# Patient Record
Sex: MALE | Race: WHITE | NOT HISPANIC OR LATINO | Employment: OTHER | URBAN - METROPOLITAN AREA
[De-identification: names, ages, dates, MRNs, and addresses within clinical notes are randomized per-mention and may not be internally consistent; named-entity substitution may affect disease eponyms.]

---

## 2017-01-05 ENCOUNTER — OFFICE VISIT (OUTPATIENT)
Dept: CARDIOLOGY | Facility: CLINIC | Age: 82
End: 2017-01-05
Attending: INTERNAL MEDICINE
Payer: MEDICARE

## 2017-01-05 VITALS
HEART RATE: 60 BPM | DIASTOLIC BLOOD PRESSURE: 78 MMHG | WEIGHT: 215 LBS | SYSTOLIC BLOOD PRESSURE: 138 MMHG | HEIGHT: 70 IN | BODY MASS INDEX: 30.78 KG/M2

## 2017-01-05 DIAGNOSIS — Z95.0 CARDIAC PACEMAKER IN SITU: ICD-10-CM

## 2017-01-05 DIAGNOSIS — I44.1 ATRIOVENTRICULAR BLOCK, SECOND DEGREE: Primary | ICD-10-CM

## 2017-01-05 DIAGNOSIS — G63 POLYNEUROPATHY ASSOCIATED WITH UNDERLYING DISEASE (H): ICD-10-CM

## 2017-01-05 PROCEDURE — 93000 ELECTROCARDIOGRAM COMPLETE: CPT | Performed by: PHYSICIAN ASSISTANT

## 2017-01-05 PROCEDURE — 99213 OFFICE O/P EST LOW 20 MIN: CPT | Performed by: PHYSICIAN ASSISTANT

## 2017-01-05 RX ORDER — GABAPENTIN 800 MG/1
800 TABLET ORAL 2 TIMES DAILY
Status: ON HOLD | COMMUNITY
Start: 2017-01-05 | End: 2022-07-14

## 2017-01-05 NOTE — PATIENT INSTRUCTIONS
1. Reviewed pacemaker checks - these look good!    2. No changes today    3. Continue good heart medications    4. Start checking BP a few times per week or so at [facility] and when you see Dr. Noyola.  Let me know if BPs are >130 most of the time    5. See us again in about September/October 2017 (when you get your pacemaker checked in the office) but call my nurse if need anything beforehand! My nurse is OSKAR: [phone]    6. Check your list of medications and if there are any discrepancies, call my nurse and let her know so we can update our medication list!!

## 2017-01-05 NOTE — PROGRESS NOTES
HPI and Plan:   See dictation #[id number]    Orders Placed This Encounter   Procedures     Follow-Up with Cardiac Advanced Practice Provider     EKG 12-lead complete w/read - Clinics (performed today)       Orders Placed This Encounter   Medications     gabapentin (NEURONTIN) 800 MG tablet     Sig: Take 1 tablet (800 mg) by mouth 2 times daily       Medications Discontinued During This Encounter   Medication Reason     gabapentin (NEURONTIN) 800 MG tablet Reorder     Encounter Diagnoses   Name Primary?     Atrioventricular block, second degree Yes     Polyneuropathy associated with underlying disease (H)      Cardiac pacemaker in situ        CURRENT MEDICATIONS:  Current Outpatient Prescriptions   Medication Sig Dispense Refill     gabapentin (NEURONTIN) 800 MG tablet Take 1 tablet (800 mg) by mouth 2 times daily       DULoxetine (CYMBALTA) 30 MG capsule Take 30 mg by mouth daily       tamsulosin (FLOMAX) 0.4 MG 24 hr capsule Take 0.4 mg by mouth daily       metFORMIN (GLUCOPHAGE-XR) 500 MG 24 hr tablet Take 500 mg by mouth daily (with dinner) 1/2 tab bid       simvastatin (ZOCOR) 40 MG tablet Take 40 mg by mouth At Bedtime       lisinopril (PRINIVIL,ZESTRIL) 10 MG tablet Take 10 mg by mouth daily       aspirin 325 MG tablet Take 325 mg by mouth daily       naproxen (NAPROSYN) 500 MG tablet Take 500 mg by mouth 2 times daily (with meals)       [DISCONTINUED] gabapentin (NEURONTIN) 800 MG tablet Take 800 mg by mouth 3 times daily 1 tab am, 2 tab pm         ALLERGIES   No Known Allergies    PAST MEDICAL HISTORY:  Past Medical History   Diagnosis Date     Second degree AV block 9-     Implantation of a dual-chamber pacemaker     Hypertension      Peripheral neuropathy (H)      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)      Hyperlipidemia      BPH (benign prostatic hyperplasia)        PAST SURGICAL HISTORY:  Past Surgical History   Procedure Laterality Date     Orthopedic surgery    "      FAMILY HISTORY:  Family History   Problem Relation Age of Onset     Family History Negative Mother      Myocardial Infarction Father 80     Unknown/Adopted Maternal Grandmother      Unknown/Adopted Maternal Grandfather      Unknown/Adopted Paternal Grandmother      Unknown/Adopted Paternal Grandfather      Rheumatoid Arthritis Sister      Myocardial Infarction Sister      Family History Negative Son      Family History Negative Daughter        SOCIAL HISTORY:  Social History     Social History     Marital Status:      Spouse Name: N/A     Number of Children: N/A     Years of Education: N/A     Social History Main Topics     Smoking status: Former Smoker     Smokeless tobacco: None     Alcohol Use: Yes      Comment: wine daily: 2      Drug Use: None     Sexual Activity: Not Asked     Other Topics Concern     Caffeine Concern No     coffee- occ decaf: 2-3 cups a day     Sleep Concern No     Stress Concern No     Weight Concern No     Special Diet No     Exercise Yes     everyday, range of motion     Social History Narrative       Review of Systems:  Skin:  Negative       Eyes:  Positive for glasses    ENT:  Negative      Respiratory:  Negative for dyspnea on exertion;shortness of breath     Cardiovascular:  Negative for;palpitations;chest pain;exercise intolerance;fatigue;lightheadedness;syncope or near-syncope Positive for;edema    Gastroenterology: Negative      Genitourinary:  Negative      Musculoskeletal:  Positive for arthritis;back pain    Neurologic:  Negative      Psychiatric:  Negative      Heme/Lymph/Imm:  Negative      Endocrine:  Positive for diabetes      Physical Exam:  Vitals: /78 mmHg  Pulse 60  Ht 1.778 m (5' 10\")  Wt 97.523 kg (215 lb)  BMI 30.85 kg/m2    Constitutional:  cooperative, alert and oriented, well developed, well nourished, in no acute distress        Skin:  warm and dry to the touch, no apparent skin lesions or masses noted        Head:  normocephalic, no masses " or lesions        Eyes:  pupils equal and round;conjunctivae and lids unremarkable;sclera white;no xanthalasma   eye glasses    ENT:  no pallor or cyanosis, dentition good hearing aide(s) present      Neck:  JVP normal;no carotid bruit        Chest:  normal breath sounds, clear to auscultation, normal A-P diameter, normal symmetry, normal respiratory excursion, no use of accessory muscles   pacemaker incision in the left infraclavicular area was well-healed      Cardiac: regular rhythm;no murmurs, gallops or rubs detected                  Abdomen:  abdomen soft        Vascular: pulses full and equal                                        Extremities and Back:  no deformities, clubbing, cyanosis, erythema observed   1+;bilateral LE edema          Neurological:  affect appropriate, oriented to time, person and place

## 2017-01-05 NOTE — MR AVS SNAPSHOT
After Visit Summary   1/5/2017    Mark Hernández    MRN: [id number]           Patient Information     Date Of Birth          9/23/1933        Visit Information        Provider Department      1/5/2017 1:50 PM Sho Hines PA-C [facility] AT [place]        Today's Diagnoses     Polyneuropathy associated with underlying disease (H)    -  1     Atrioventricular block, second degree           Care Instructions    1. Reviewed pacemaker checks - these look good!    2. No changes today    3. Continue good heart medications    4. Start checking BP a few times per week or so at [facility] and when you see Dr. Noyola.  Let me know if BPs are >130 most of the time    5. See us again in about September/October 2017 (when you get your pacemaker checked in the office) but call my nurse if need anything beforehand! My nurse is OSKAR: [phone]    6. Check your list of medications and if there are any discrepancies, call my nurse and let her know so we can update our medication list!!        Follow-ups after your visit        Additional Services     Follow-Up with Cardiac Advanced Practice Provider                 Your next 10 appointments already scheduled     Feb 16, 2017  2:30 PM   Remote PPM Check with ALEJANDRO TECH1   [facility] ([facility])    [address] [place] [id number] [phone]           This appointment is for a remote check of your pacemaker.  This is not an appointment at the office.              Future tests that were ordered for you today     Open Future Orders        Priority Expected Expires Ordered    Follow-Up with Cardiac Advanced Practice Provider Routine 10/2/2017 1/5/2018 1/5/2017            Who to contact     If you have questions or need follow up information about today's clinic visit or your schedule please contact [facility]  "directly at [phone].  Normal or non-critical lab and imaging results will be communicated to you by [email or web address], letter or phone within 4 business days after the clinic has received the results. If you do not hear from us within 7 days, please contact the clinic through [email or web address] or phone. If you have a critical or abnormal lab result, we will notify you by phone as soon as possible.  Submit refill requests through TVplus or call your pharmacy and they will forward the refill request to us. Please allow 3 business days for your refill to be completed.          Additional Information About Your Visit        eucl[employer]D[facility]irie Cloudware Information     TVplus lets you send messages to your doctor, view your test results, renew your prescriptions, schedule appointments and more. To sign up, go to [email or web address] . Click on \"Log in\" on the left side of the screen, which will take you to the Welcome page. Then click on \"Sign up Now\" on the right side of the page.     You will be asked to enter the access code listed below, as well as some personal information. Please follow the directions to create your username and password.     Your access code is: [email or web address]  Expires: 2017  2:23 PM     Your access code will  in 90 days. If you need help or a new code, please call your [facility] clinic or [phone].        Care EveryWhere ID     This is your Care EveryWhere ID. This could be used by other organizations to access your [facility] medical records  [id number]        Your Vitals Were     Pulse Height BMI (Body Mass Index)             60 1.778 m (5' 10\") 30.85 kg/m2          Blood Pressure from Last 3 Encounters:   17 138/78   16 160/62   16 134/84    Weight from Last 3 Encounters:   17 97.523 kg (215 lb)   16 96.798 kg (213 lb 6.4 oz)   16 96.163 kg (212 lb)              We Performed the Following     EKG 12-lead complete w/read - Clinics (performed today)     Follow-Up with Cardiac " Advanced Practice Provider          Today's Medication Changes          These changes are accurate as of: 1/5/17  2:23 PM.  If you have any questions, ask your nurse or doctor.               These medicines have changed or have updated prescriptions.        Dose/Directions    gabapentin 800 MG tablet   Commonly known as:  NEURONTIN   This may have changed:    - when to take this  - additional instructions   Used for:  Polyneuropathy associated with underlying disease (H)   Changed by:  Sho Hines PA-C        Dose:  800 mg   Take 1 tablet (800 mg) by mouth 2 times daily   Refills:  0                Primary Care Provider Office Phone # Fax #    Rebecca Noyola [phone]       [facility] [address]  [facility] [address]        Thank you!     Thank you for choosing [facility] AT [place]  for your care. Our goal is always to provide you with excellent care. Hearing back from our patients is one way we can continue to improve our services. Please take a few minutes to complete the written survey that you may receive in the mail after your visit with us. Thank you!             Your Updated Medication List - Protect others around you: Learn how to safely use, store and throw away your medicines at www.disposemymeds.org.          This list is accurate as of: 1/5/17  2:23 PM.  Always use your most recent med list.                   Brand Name Dispense Instructions for use    aspirin 325 MG tablet      Take 325 mg by mouth daily       DULoxetine 30 MG EC capsule    CYMBALTA     Take 30 mg by mouth daily       gabapentin 800 MG tablet    NEURONTIN     Take 1 tablet (800 mg) by mouth 2 times daily       lisinopril 10 MG tablet    PRINIVIL/ZESTRIL     Take 10 mg by mouth daily       metFORMIN 500 MG 24 hr tablet    GLUCOPHAGE-XR     Take 500 mg by mouth daily (with dinner) 1/2 tab bid       naproxen 500 MG tablet    NAPROSYN     Take 500 mg by  mouth 2 times daily (with meals)       simvastatin 40 MG tablet    ZOCOR     Take 40 mg by mouth At Bedtime       tamsulosin 0.4 MG capsule    FLOMAX     Take 0.4 mg by mouth daily

## 2017-01-05 NOTE — Clinical Note
1/5/2017    Rebecca Noyola MD  [facility]   [address] [place] [address]    RE: Mark Harrisondacia       Dear Colleague,    I had the pleasure of seeing Mark today when he came accompanied by his wife, Angle, for followup of his pacemaker implantation.  He is a very pleasant 83-year-old with a history of hypertension, type 2 diabetes and dyslipidemia.  He also was questionable coronary disease based on the results of an EKG and echocardiogram done in 08/2016.  He has a history of peripheral neuropathy from his diabetes and now sees Dr. Rebecca Noyola for this.  She has recently made adjustments in his gabapentin dosing.        He was sent to see Dr. Zamora back in 08/2016 due to concerns regarding second-degree heart block noted on an EKG.  He also was noted to have inferior Q-waves.  Dr. Zamora had him wear a 24-hour Holter monitor which showed evidence of both 2:1 and 3:1 heart block, and it was recommended that he undergo pacemaker implantation.  This was done on 09/16.  He had a dual-chamber [employer] pacemaker placed, and he has been getting routine device checks through our office.      Mark tells me that overall he has done well since this time.  He has absolutely no cardiorespiratory complaints.  He denies chest pain, pressure or tightness.  Denies change in his chronic lower extremity edema, denies orthopnea or PND, denies lightheadedness, minnie syncope or palpitations.      He has not had any problems with the pacemaker site or fevers or chills.  On 11/14 interrogation of his device showed he was 2% A paced and 99% V paced.  He did have 3 high rates which were noted to be sinus tachycardia on EGMs.      He is now seeing Dr. Radha Noyola for primary care prevention (as Dr. Blair has retired).  She sees him in 3 months and has recommended weight loss.      Echocardiogram done 06/2016 showed an EF of 50%-55% with hypokinesis of the inferior wall.  He had mild aortic and  tricuspid insufficiency, aortic root was borderline dilated at 4.1 cm.  There was question about a potential PFO.        Outpatient Encounter Prescriptions as of 1/5/2017   Medication Sig Dispense Refill     gabapentin (NEURONTIN) 800 MG tablet Take 1 tablet (800 mg) by mouth 2 times daily       DULoxetine (CYMBALTA) 30 MG capsule Take 30 mg by mouth daily       tamsulosin (FLOMAX) 0.4 MG 24 hr capsule Take 0.4 mg by mouth daily       metFORMIN (GLUCOPHAGE-XR) 500 MG 24 hr tablet Take 500 mg by mouth daily (with dinner) 1/2 tab bid       simvastatin (ZOCOR) 40 MG tablet Take 40 mg by mouth At Bedtime       lisinopril (PRINIVIL,ZESTRIL) 10 MG tablet Take 10 mg by mouth daily       aspirin 325 MG tablet Take 325 mg by mouth daily       naproxen (NAPROSYN) 500 MG tablet Take 500 mg by mouth 2 times daily (with meals)       [DISCONTINUED] gabapentin (NEURONTIN) 800 MG tablet Take 800 mg by mouth 3 times daily 1 tab am, 2 tab pm       No facility-administered encounter medications on file as of 1/5/2017.     ASSESSMENT AND PLAN:     1.  Heart block.  He is now status post dual-chamber [employer] pacemaker implantation in September.  He has done routine device checks, and they are all looking good.  He will continue this and has no questions today.     2.  Questionable coronary disease.  EKG done at Dr. Blair's office showed Q-waves in the inferior leads and he does have hypokinesis of the inferior walls.  He has had absolutely no chest discomfort, tightness or pressure.  He denies any limitations of his cardiorespiratory status.  He does remain on aspirin and statin therapy.  At this time, therefore, I will not make any medication changes or recommend stress testing, as he is completely asymptomatic, but would recommend continued use of proper cardiac medications.  His EF, as above, was 50%-55%.     3.  Hypertension.  Blood pressure was a bit high today when I checked it.  He usually gets in the 130s in the  clinic.  I have asked that he start checking this a bit more often and check what it is at Dr. Noyola's office.  If it remains elevated, certainly we can make some medication adjustments.  He is also working on losing weight, which should help bring blood pressure down as well.      I have asked him to look at his medication list and contact my nurse if there is any discrepancy between Dr. Noyola's and my list.  Right now I will plan to see him this fall when he gets his annual device check; however, he is to call me if he has any trouble in the interim.     Again, thank you for allowing me to participate in the care of your patient.      Sincerely,    Sho Hines PA-C     [facility]

## 2017-01-06 NOTE — PROGRESS NOTES
HISTORY OF PRESENT ILLNESS:  I had the pleasure of seeing Mark today when he came accompanied by his wife, Angle, for followup of his pacemaker implantation.  He is a very pleasant 83-year-old with a history of hypertension, type 2 diabetes and dyslipidemia.  He also was questionable coronary disease based on the results of an EKG and echocardiogram done in 08/2016.  He has a history of peripheral neuropathy from his diabetes and now sees Dr. Rebecca Noyola for this.  She has recently made adjustments in his gabapentin dosing.        He was sent to see Dr. Zamora back in 08/2016 due to concerns regarding second-degree heart block noted on an EKG.  He also was noted to have inferior Q-waves.  Dr. Zamora had him wear a 24-hour Holter monitor which showed evidence of both 2:1 and 3:1 heart block, and it was recommended that he undergo pacemaker implantation.  This was done on 09/16.  He had a dual-chamber [employer] pacemaker placed, and he has been getting routine device checks through our office.      Mark tells me that overall he has done well since this time.  He has absolutely no cardiorespiratory complaints.  He denies chest pain, pressure or tightness.  Denies change in his chronic lower extremity edema, denies orthopnea or PND, denies lightheadedness, minnie syncope or palpitations.      He has not had any problems with the pacemaker site or fevers or chills.  On 11/14 interrogation of his device showed he was 2% A paced and 99% V paced.  He did have 3 high rates which were noted to be sinus tachycardia on EGMs.      He is now seeing Dr. Radha Noyola for primary care prevention (as Dr. Blair has retired).  She sees him in 3 months and has recommended weight loss.      Echocardiogram done 06/2016 showed an EF of 50%-55% with hypokinesis of the inferior wall.  He had mild aortic and tricuspid insufficiency, aortic root was borderline dilated at 4.1 cm.  There was question about a potential PFO.         ASSESSMENT AND PLAN:     1.  Heart block.  He is now status post dual-chamber [employer] pacemaker implantation in September.  He has done routine device checks, and they are all looking good.  He will continue this and has no questions today.     2.  Questionable coronary disease.  EKG done at Dr. Blair's office showed Q-waves in the inferior leads and he does have hypokinesis of the inferior walls.  He has had absolutely no chest discomfort, tightness or pressure.  He denies any limitations of his cardiorespiratory status.  He does remain on aspirin and statin therapy.  At this time, therefore, I will not make any medication changes or recommend stress testing, as he is completely asymptomatic, but would recommend continued use of proper cardiac medications.  His EF, as above, was 50%-55%.     3.  Hypertension.  Blood pressure was a bit high today when I checked it.  He usually gets in the 130s in the clinic.  I have asked that he start checking this a bit more often and check what it is at Dr. Noyola's office.  If it remains elevated, certainly we can make some medication adjustments.  He is also working on losing weight, which should help bring blood pressure down as well.      I have asked him to look at his medication list and contact my nurse if there is any discrepancy between Dr. Noyola's and my list.  Right now I will plan to see him this fall when he gets his annual device check; however, he is to call me if he has any trouble in the interim.         TINY CHOUDHARY PA-C             D: 2017 14:39   T: 2017 19:23   MT: SHARMILA      Name:     FRANKIE THOMPSON   MRN:      [id number]        Account:      [id number]   :      1933           Service Date: 2017      Document: [id number]

## 2017-02-16 ENCOUNTER — ALLIED HEALTH/NURSE VISIT (OUTPATIENT)
Dept: CARDIOLOGY | Facility: CLINIC | Age: 82
End: 2017-02-16
Payer: MEDICARE

## 2017-02-16 DIAGNOSIS — Z95.0 CARDIAC PACEMAKER IN SITU: Primary | ICD-10-CM

## 2017-02-16 PROCEDURE — 93296 REM INTERROG EVL PM/IDS: CPT | Performed by: INTERNAL MEDICINE

## 2017-02-16 PROCEDURE — 93294 REM INTERROG EVL PM/LDLS PM: CPT | Performed by: INTERNAL MEDICINE

## 2017-02-16 NOTE — MR AVS SNAPSHOT
"              After Visit Summary   2/16/2017    Mark Hernández    MRN: [id number]           Patient Information     Date Of Birth          9/23/1933        Visit Information        Provider Department      2/16/2017 2:30 PM ALEJANDRO TECH1 [facility]        Today's Diagnoses     Cardiac pacemaker in situ    -  1       Follow-ups after your visit        Your next 10 appointments already scheduled     Feb 16, 2017  2:30 PM CST   Remote PPM Check with ALEJANDRO TECH1   [facility] ([facility])    [address] [place] [address] [phone]           This appointment is for a remote check of your pacemaker.  This is not an appointment at the office.              Who to contact     If you have questions or need follow up information about today's clinic visit or your schedule please contact [facility] directly at [phone].  Normal or non-critical lab and imaging results will be communicated to you by Papriikahart, letter or phone within 4 business days after the clinic has received the results. If you do not hear from us within 7 days, please contact the clinic through Papriikahart or phone. If you have a critical or abnormal lab result, we will notify you by phone as soon as possible.  Submit refill requests through [employer] or call your pharmacy and they will forward the refill request to us. Please allow 3 business days for your refill to be completed.          Additional Information About Your Visit        Papriikahart Information     [employer] lets you send messages to your doctor, view your test results, renew your prescriptions, schedule appointments and more. To sign up, go to [email or web address] . Click on \"Log in\" on the left side of the screen, which will take you to the Welcome page. Then click on \"Sign up Now\" on the right side of the page.     You will be asked to enter " the access code listed below, as well as some personal information. Please follow the directions to create your username and password.     Your access code is: [email or web address]  Expires: 2017  2:23 PM     Your access code will  in 90 days. If you need help or a new code, please call your [place] clinic or [phone].        Care EveryWhere ID     This is your Care EveryWhere ID. This could be used by other organizations to access your [place] medical records  [id number]         Blood Pressure from Last 3 Encounters:   17 138/78   16 160/62   16 134/84    Weight from Last 3 Encounters:   17 97.5 kg (215 lb)   16 96.8 kg (213 lb 6.4 oz)   16 96.2 kg (212 lb)              We Performed the Following     INTERROGATION DEVICE EVAL REMOTE, PACER/ICD (18860)     PM DEVICE INTERROGATE REMOTE (62663)        Primary Care Provider Office Phone # Fax #    Rebecca Noyola [phone]       [facility] [address]  [facility] [address]        Thank you!     Thank you for choosing [facility] [place]  for your care. Our goal is always to provide you with excellent care. Hearing back from our patients is one way we can continue to improve our services. Please take a few minutes to complete the written survey that you may receive in the mail after your visit with us. Thank you!             Your Updated Medication List - Protect others around you: Learn how to safely use, store and throw away your medicines at www.disposemymeds.org.          This list is accurate as of: 17  1:48 PM.  Always use your most recent med list.                   Brand Name Dispense Instructions for use    aspirin 325 MG tablet      Take 325 mg by mouth daily       DULoxetine 30 MG EC capsule    CYMBALTA     Take 30 mg by mouth daily       gabapentin 800 MG tablet    NEURONTIN     Take 1 tablet (800 mg) by mouth 2 times daily        lisinopril 10 MG tablet    PRINIVIL/ZESTRIL     Take 10 mg by mouth daily       metFORMIN 500 MG 24 hr tablet    GLUCOPHAGE-XR     Take 500 mg by mouth daily (with dinner) 1/2 tab bid       naproxen 500 MG tablet    NAPROSYN     Take 500 mg by mouth 2 times daily (with meals)       simvastatin 40 MG tablet    ZOCOR     Take 40 mg by mouth At Bedtime       tamsulosin 0.4 MG capsule    FLOMAX     Take 0.4 mg by mouth daily

## 2017-02-16 NOTE — PROGRESS NOTES
[place] [employer] (D) Remote PPM Device Check  AP: 2 % : 100 %  Mode: DDD        Presenting Rhythm: AS/  Heart Rate: Adequate rates per histogram  Sensing: Stable    Pacing Threshold: Stable    Impedance: Stable  Battery Status: 14.5 years  Atrial Arrhythmia: No mode switch episodes. 5 PMT episodes detected. EGMs show atrial rate at max tracking rate of 130bpm.  Ventricular Arrhythmia: 1 ventricular high rate. EGM shows Vs>As for probable NSVT lasting 18 beats, rates 145-180bpm. EF 50-55% (2016). Reviewed findings with JADYN Hopper. Will message Dr. Zamora with findings.      Care Plan: F/u PPM Latitude q 3 months. Gave patient results over the phone. TiffanyCVT

## 2017-05-25 ENCOUNTER — ALLIED HEALTH/NURSE VISIT (OUTPATIENT)
Dept: CARDIOLOGY | Facility: CLINIC | Age: 82
End: 2017-05-25
Payer: MEDICARE

## 2017-05-25 DIAGNOSIS — Z95.0 CARDIAC PACEMAKER IN SITU: Primary | ICD-10-CM

## 2017-05-25 PROCEDURE — 93296 REM INTERROG EVL PM/IDS: CPT | Performed by: INTERNAL MEDICINE

## 2017-05-25 PROCEDURE — 93294 REM INTERROG EVL PM/LDLS PM: CPT | Performed by: INTERNAL MEDICINE

## 2017-05-25 NOTE — PROGRESS NOTES
[place] [employer] Accolade (D) Remote PPM Device Check  AP: 3 % : 100 %  Mode: DDD        Presenting Rhythm: AP/  Heart Rate: Adequate rates per histogram  Sensing: Stable    Pacing Threshold: Stable    Impedance: Stable  Battery Status: 14 years  Atrial Arrhythmia: No mode switch episodes. 5 PMT episodes occurred, EGMs show atrial rate at max tracking rate of 130bpm.  Ventricular Arrhythmia: 1 ventricular high rate. EGM shows Vs>As for NSVT lasting 12 beats, rates 170-205bpm. EF 50-55% (2016). No associated symptoms as patient was asleep during episode. Reviewed with JADYN Jasso.       Care Plan: F/u PPM Latitude q 3 months. Gave patient results over the phone. TiffanyCVT

## 2017-05-25 NOTE — MR AVS SNAPSHOT
"              After Visit Summary   5/25/2017    Mark Hernández    MRN: [id number]           Patient Information     Date Of Birth          9/23/1933        Visit Information        Provider Department      5/25/2017 1:30 PM JAVON ONEILL [facility]        Today's Diagnoses     Cardiac pacemaker in situ    -  1       Follow-ups after your visit        Your next 10 appointments already scheduled     May 25, 2017  1:30 PM CDT   Remote PPM Check with ALEJANDRO TECH1   [facility] ([facility])    [address] [place] [address] [phone]           This appointment is for a remote check of your pacemaker.  This is not an appointment at the office.              Who to contact     If you have questions or need follow up information about today's clinic visit or your schedule please contact [facility] directly at [phone].  Normal or non-critical lab and imaging results will be communicated to you by Stardollhart, letter or phone within 4 business days after the clinic has received the results. If you do not hear from us within 7 days, please contact the clinic through Stardollhart or phone. If you have a critical or abnormal lab result, we will notify you by phone as soon as possible.  Submit refill requests through [employer] or call your pharmacy and they will forward the refill request to us. Please allow 3 business days for your refill to be completed.          Additional Information About Your Visit        Stardollhart Information     [employer] lets you send messages to your doctor, view your test results, renew your prescriptions, schedule appointments and more. To sign up, go to [email or web address] . Click on \"Log in\" on the left side of the screen, which will take you to the Welcome page. Then click on \"Sign up Now\" on the right side of the page.     You will be asked to enter " the access code listed below, as well as some personal information. Please follow the directions to create your username and password.     Your access code is: [email or web address]  Expires: 2017  8:52 AM     Your access code will  in 90 days. If you need help or a new code, please call your [place] clinic or [phone].        Care EveryWhere ID     This is your Care EveryWhere ID. This could be used by other organizations to access your [place] medical records  [id number]         Blood Pressure from Last 3 Encounters:   17 138/78   16 160/62   16 134/84    Weight from Last 3 Encounters:   17 97.5 kg (215 lb)   16 96.8 kg (213 lb 6.4 oz)   16 96.2 kg (212 lb)              We Performed the Following     INTERROGATION DEVICE EVAL REMOTE, PACER/ICD (09566)     PM DEVICE INTERROGATE REMOTE (97255)        Primary Care Provider Office Phone # Fax #    Rebecca Noyola [phone]       [facility] [address]  [facility] [address]        Thank you!     Thank you for choosing [facility] [place]  for your care. Our goal is always to provide you with excellent care. Hearing back from our patients is one way we can continue to improve our services. Please take a few minutes to complete the written survey that you may receive in the mail after your visit with us. Thank you!             Your Updated Medication List - Protect others around you: Learn how to safely use, store and throw away your medicines at www.disposemymeds.org.          This list is accurate as of: 17  8:52 AM.  Always use your most recent med list.                   Brand Name Dispense Instructions for use    aspirin 325 MG tablet      Take 325 mg by mouth daily       DULoxetine 30 MG EC capsule    CYMBALTA     Take 30 mg by mouth daily       gabapentin 800 MG tablet    NEURONTIN     Take 1 tablet (800 mg) by mouth 2 times daily        lisinopril 10 MG tablet    PRINIVIL/ZESTRIL     Take 10 mg by mouth daily       metFORMIN 500 MG 24 hr tablet    GLUCOPHAGE-XR     Take 500 mg by mouth daily (with dinner) 1/2 tab bid       naproxen 500 MG tablet    NAPROSYN     Take 500 mg by mouth 2 times daily (with meals)       simvastatin 40 MG tablet    ZOCOR     Take 40 mg by mouth At Bedtime       tamsulosin 0.4 MG capsule    FLOMAX     Take 0.4 mg by mouth daily

## 2017-08-31 ENCOUNTER — ALLIED HEALTH/NURSE VISIT (OUTPATIENT)
Dept: CARDIOLOGY | Facility: CLINIC | Age: 82
End: 2017-08-31
Payer: MEDICARE

## 2017-08-31 DIAGNOSIS — Z95.0 CARDIAC PACEMAKER IN SITU: Primary | ICD-10-CM

## 2017-08-31 PROCEDURE — 93296 REM INTERROG EVL PM/IDS: CPT | Performed by: INTERNAL MEDICINE

## 2017-08-31 PROCEDURE — 93294 REM INTERROG EVL PM/LDLS PM: CPT | Performed by: INTERNAL MEDICINE

## 2017-08-31 NOTE — MR AVS SNAPSHOT
After Visit Summary   8/31/2017    Mark Hernández    MRN: [id number]           Patient Information     Date Of Birth          9/23/1933        Visit Information        Provider Department      8/31/2017 1:45 PM ALEJANDRO TECH1 [facility]        Today's Diagnoses     Cardiac pacemaker in situ    -  1       Follow-ups after your visit        Your next 10 appointments already scheduled     Aug 31, 2017  1:45 PM CDT   Remote PPM Check with ALEJANDRO TECH1   [facility] ([facility])    [address] [place] [address] [address]  [facility] [id number] [phone]           This appointment is for a remote check of your pacemaker.  This is not an appointment at the office.            Sep 28, 2017  1:00 PM CDT   Pacemaker Check with ALEJANDRO DCR2   [facility] ([facility])    [address] [place] [address] [address]  [facility] [id number] [phone]            Sep 28, 2017  2:30 PM CDT   [facility] EP RETURN with Sho Hines PA-C   [facility] ([facility])    [address] [place] [address] [address]  [facility] [id number] [phone]              Who to contact     If you have questions or need follow up information about today's clinic visit or your schedule please contact [facility] directly at [phone].  Normal or non-critical lab and imaging results will be communicated to you by MyChart, letter or phone within 4 business days after the clinic has received the results. If you do not hear from us within 7 days, please contact the clinic through MyChart or phone. If you have a critical or abnormal lab result, we will notify you by phone as soon as possible.  Submit refill requests through [employer] or call your pharmacy and they will forward the refill request to us. Please allow 3 business days for your refill  "to be completed.          Additional Information About Your Visit        The [employer] travelhart Information     [employer] lets you send messages to your doctor, view your test results, renew your prescriptions, schedule appointments and more. To sign up, go to [email or web address] . Click on \"Log in\" on the left side of the screen, which will take you to the Welcome page. Then click on \"Sign up Now\" on the right side of the page.     You will be asked to enter the access code listed below, as well as some personal information. Please follow the directions to create your username and password.     Your access code is: [email or web address]  Expires: 2017 11:35 AM     Your access code will  in 90 days. If you need help or a new code, please call your [place] clinic or [phone].        Care EveryWhere ID     This is your Care EveryWhere ID. This could be used by other organizations to access your [place] medical records  [id number]         Blood Pressure from Last 3 Encounters:   17 138/78   16 160/62   16 134/84    Weight from Last 3 Encounters:   17 97.5 kg (215 lb)   16 96.8 kg (213 lb 6.4 oz)   16 96.2 kg (212 lb)              We Performed the Following     INTERROGATION DEVICE EVAL REMOTE, PACER/ICD (61724)     PM DEVICE INTERROGATE REMOTE (60363)        Primary Care Provider Office Phone # Fax #    Rebecca Noyola [phone]       [facility] [address]  [facility] [address]        Equal Access to Services     LESLEY MENJIVAR : Hadluis Gutierrez, wacharoda cesilia, qaybta lito alva. So [facility] [phone].    ATENCIÓN: Si habla español, tiene a gonzales disposición servicios gratuitos de asistencia lingüística. Camille al [phone].    We comply with applicable federal civil rights laws and [place] laws. We do not discriminate on the basis of race, color, national origin, age, disability " sex, sexual orientation or gender identity.            Thank you!     Thank you for choosing [facility] [place]  for your care. Our goal is always to provide you with excellent care. Hearing back from our patients is one way we can continue to improve our services. Please take a few minutes to complete the written survey that you may receive in the mail after your visit with us. Thank you!             Your Updated Medication List - Protect others around you: Learn how to safely use, store and throw away your medicines at www.disposemymeds.org.          This list is accurate as of: 8/31/17 11:35 AM.  Always use your most recent med list.                   Brand Name Dispense Instructions for use Diagnosis    aspirin 325 MG tablet      Take 325 mg by mouth daily        DULoxetine 30 MG EC capsule    CYMBALTA     Take 30 mg by mouth daily        gabapentin 800 MG tablet    NEURONTIN     Take 1 tablet (800 mg) by mouth 2 times daily    Polyneuropathy associated with underlying disease (H)       lisinopril 10 MG tablet    PRINIVIL/ZESTRIL     Take 10 mg by mouth daily        metFORMIN 500 MG 24 hr tablet    GLUCOPHAGE-XR     Take 500 mg by mouth daily (with dinner) 1/2 tab bid        naproxen 500 MG tablet    NAPROSYN     Take 500 mg by mouth 2 times daily (with meals)        simvastatin 40 MG tablet    ZOCOR     Take 40 mg by mouth At Bedtime        tamsulosin 0.4 MG capsule    FLOMAX     Take 0.4 mg by mouth daily

## 2017-08-31 NOTE — PROGRESS NOTES
[place] [employer] Accol[employer] (D) Remote PPM Device Check  AP: 2 % : 99 %  Mode: DDD        Presenting Rhythm: AS/  Heart Rate: Adequate rates per histogram  Sensing: Stable    Pacing Threshold: Stable    Impedance: Stable  Battery Status: 14 years  Atrial Arrhythmia: No mode switch episodes. 2 PMT episodes detected, EGMs show atrial rate at max track rate of 130bpm  Ventricular Arrhythmia: None     Care Plan: F/u annual threshold in 1 month. Gave patient results over the phone.  Young,CVT

## 2017-09-28 ENCOUNTER — OFFICE VISIT (OUTPATIENT)
Dept: CARDIOLOGY | Facility: CLINIC | Age: 82
End: 2017-09-28
Attending: PHYSICIAN ASSISTANT
Payer: MEDICARE

## 2017-09-28 VITALS
WEIGHT: 211.3 LBS | SYSTOLIC BLOOD PRESSURE: 122 MMHG | DIASTOLIC BLOOD PRESSURE: 46 MMHG | HEIGHT: 70 IN | HEART RATE: 83 BPM | BODY MASS INDEX: 30.25 KG/M2

## 2017-09-28 DIAGNOSIS — I44.1 ATRIOVENTRICULAR BLOCK, SECOND DEGREE: ICD-10-CM

## 2017-09-28 DIAGNOSIS — Z95.0 CARDIAC PACEMAKER IN SITU: Primary | ICD-10-CM

## 2017-09-28 PROCEDURE — 93280 PM DEVICE PROGR EVAL DUAL: CPT | Performed by: INTERNAL MEDICINE

## 2017-09-28 PROCEDURE — 99213 OFFICE O/P EST LOW 20 MIN: CPT | Mod: 25 | Performed by: PHYSICIAN ASSISTANT

## 2017-09-28 NOTE — LETTER
9/28/2017    Rebecca Noyola MD  [facility]   [address]  [facility] [address]    RE: Mark Hernández       Dear Colleague,    I had the pleasure of seeing Mark Hernández in the [facility].    HPI:   I had the pleasure of seeing Mark today when he came accompanied by his wife Tracey for routine follow-up. He first met Dr. Zamora 7/2016 after he had presented to a routine appointment and noted to have a slow heart rate. EKG showed 2:1 heart block and a Holter monitor was placed, showing both 2:1 and 3:1 heart block. He had no reversible etiology and had a structurally normal heart, and a dual-chamber [employer] pacemaker was placed September 2016. Other history includes hypertension, diabetes, dyslipidemia and peripheral neuropathy    Since that time, he states that he's done well from a heart perspective. He denies chest pain, pressure or tightness. Denies change in his chronic lower extremity edema, orthopnea or PND. He denies palpitations, lightheadedness or syncope. Denies fevers, chills or problems with his pacemaker site.    He has had significant right leg discomfort, and after a failed back injection, there is concern that he could have a septic right hip, which she had replaced a number of years ago. He sees Dr. De Los Santos and Dr. Macario at [facility] and sees Dr. Noyola, who recently got blood work. White count was normal (9/2017).    Annual device interrogation showed he had been 2% a paced and 99% V paced with some evidence of PAT but no atrial fibrillation or atrial flutter.    Assessment & Plan:    1. Heart Block s/p Pacemaker - he is 99% V paced on device interrogation today. He will continue routine device checks through our office and see Dr. Zamora in one year with his annual device interrogation.    2. HTN - blood pressure is under good control today and we will have him continue his current medications.    3. Questionable hip infection - there is  concern that he could have septic joint, and he is meeting with Dr. Noyola next week. I will keep an eye on his course peripherally, as certainly want to ensure he does not develop a systemic infection given his pacemaker implant.      Lucie Hines PA-C, MSPAS      Orders Placed This Encounter   Procedures     Follow-Up with Electrophysiologist     Follow-Up with Device Clinic     No orders of the defined types were placed in this encounter.    There are no discontinued medications.      Encounter Diagnosis   Name Primary?     Atrioventricular block, second degree        CURRENT MEDICATIONS:  Current Outpatient Prescriptions   Medication Sig Dispense Refill     gabapentin (NEURONTIN) 800 MG tablet Take 1 tablet (800 mg) by mouth 2 times daily       DULoxetine (CYMBALTA) 30 MG capsule Take 30 mg by mouth daily       tamsulosin (FLOMAX) 0.4 MG 24 hr capsule Take 0.4 mg by mouth daily       metFORMIN (GLUCOPHAGE-XR) 500 MG 24 hr tablet Take 500 mg by mouth daily (with dinner) 1/2 tab bid       simvastatin (ZOCOR) 40 MG tablet Take 40 mg by mouth At Bedtime       lisinopril (PRINIVIL,ZESTRIL) 10 MG tablet Take 10 mg by mouth daily       aspirin 325 MG tablet Take 325 mg by mouth daily       naproxen (NAPROSYN) 500 MG tablet Take 500 mg by mouth 2 times daily (with meals)         ALLERGIES   No Known Allergies    PAST MEDICAL HISTORY:  Past Medical History:   Diagnosis Date     BPH (benign prostatic hyperplasia)      Hyperlipidemia      Hypertension      Peripheral neuropathy (H)      Second degree AV block 9-    Implantation of a dual-chamber pacemaker     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)        PAST SURGICAL HISTORY:  Past Surgical History:   Procedure Laterality Date     ORTHOPEDIC SURGERY         FAMILY HISTORY:  Family History   Problem Relation Age of Onset     Family History Negative Mother      Myocardial Infarction Father 80     Unknown/Adopted Maternal Grandmother   "    Unknown/Adopted Maternal Grandfather      Unknown/Adopted Paternal Grandmother      Unknown/Adopted Paternal Grandfather      Rheumatoid Arthritis Sister      Myocardial Infarction Sister      Family History Negative Son      Family History Negative Daughter        SOCIAL HISTORY:  Social History     Social History     Marital status:      Spouse name: N/A     Number of children: N/A     Years of education: N/A     Social History Main Topics     Smoking status: Former Smoker     Packs/day: 1.00     Years: 24.00     Types: Cigarettes     Start date: 1949     Quit date: 1973     Smokeless tobacco: Never Used     Alcohol use Yes      Comment: wine daily: 2      Drug use: None     Sexual activity: Not Asked     Other Topics Concern     Caffeine Concern No     coffee- occ decaf: 2-3 cups a day     Sleep Concern No     Stress Concern No     Weight Concern No     Special Diet No     Exercise Yes     everyday, range of motion     Social History Narrative       Review of Systems:  Skin:  Negative     Eyes:  Positive for glasses  ENT:  Negative    Respiratory:  Negative for dyspnea on exertion;shortness of breath  Cardiovascular:  Negative for;palpitations;chest pain;exercise intolerance;fatigue;lightheadedness;syncope or near-syncope Positive for;edema  Gastroenterology: Negative    Genitourinary:  Negative    Musculoskeletal:  Positive for arthritis;back pain;joint pain  Neurologic:  Negative    Psychiatric:  Negative    Heme/Lymph/Imm:  Negative    Endocrine:  Positive for diabetes    Physical Exam:  Vitals: /46  Pulse 83  Ht 1.778 m (5' 10\")  Wt 95.8 kg (211 lb 4.8 oz)  BMI 30.32 kg/m2    Constitutional:  cooperative, alert and oriented, well developed, well nourished, in no acute distress        Skin:  warm and dry to the touch, no apparent skin lesions or masses noted        Head:  normocephalic, no masses or lesions        Eyes:  pupils equal and round;conjunctivae and lids unremarkable;sclera " white;no xanthalasma   eye glasses    ENT:  no pallor or cyanosis, dentition good hearing aide(s) present      Neck:  JVP normal;no carotid bruit        Chest:  normal breath sounds, clear to auscultation, normal A-P diameter, normal symmetry, normal respiratory excursion, no use of accessory muscles        Cardiac: regular rhythm;no murmurs, gallops or rubs detected                  Abdomen:  abdomen soft        Vascular: pulses full and equal                                      Extremities and Back:  no deformities, clubbing, cyanosis, erythema observed        Neurological:  affect appropriate, oriented to time, person and place          Recent Lab Results:  LIPID RESULTS:  No results found for: CHOL, HDL, LDL, TRIG, CHOLHDLRATIO    LIVER ENZYME RESULTS:  No results found for: AST, ALT    CBC RESULTS:  Lab Results   Component Value Date    WBC 5.1 09/23/2016    RBC 4.91 09/23/2016    HGB 16.6 09/23/2016    HCT 47.7 09/23/2016    MCV 97 09/23/2016    MCH 33.8 (H) 09/23/2016    MCHC 34.8 09/23/2016    RDW 13.4 09/23/2016     (L) 09/23/2016       BMP RESULTS:  Lab Results   Component Value Date     09/23/2016    POTASSIUM 4.8 09/23/2016    CHLORIDE 108 09/23/2016    CO2 30 09/23/2016    ANIONGAP 4 09/23/2016     (H) 09/23/2016    BUN 26 09/23/2016    CR 0.88 09/23/2016    GFRESTIMATED 83 09/23/2016    GFRESTBLACK >90   GFR Calc   09/23/2016    ESAU 8.9 09/23/2016        A1C RESULTS:  No results found for: A1C    INR RESULTS:  No results found for: INR    Thank you for allowing me to participate in the care of your patient.    Sincerely,     Sho Hines PA-C

## 2017-09-28 NOTE — MR AVS SNAPSHOT
After Visit Summary   9/28/2017    Mark Hernández    MRN: [id number]           Patient Information     Date Of Birth          9/23/1933        Visit Information        Provider Department      9/28/2017 2:30 PM Sho Hines PA-C [facility]        Today's Diagnoses     Atrioventricular block, second degree          Care Instructions    1. No medication changes today.      2. Pacemaker check today looked great.    3. I will keep an eye on Dr. Noyola's notes ... If infection noted in R hip, I will speak with Dr. Zamora to see if we need to do anything with the pacemaker.    4. See Dr. Zamora in 1 year but CALL if any issues beforehand - our device nurses are @ [phone]          Follow-ups after your visit        Additional Services     Follow-Up with Device Clinic           Follow-Up with Electrophysiologist                 Your next 10 appointments already scheduled     Jan 04, 2018  9:15 AM CST   Remote PPM Check with ALEJANDRO TECH1   [facility]    [address] [place] [id number] [phone]           This appointment is for a remote check of your pacemaker.  This is not an appointment at the office.              Future tests that were ordered for you today     Open Future Orders        Priority Expected Expires Ordered    Follow-Up with Device Clinic Routine 9/28/2018 11/2/2018 9/28/2017    Follow-Up with Electrophysiologist Routine 9/28/2018 9/29/2018 9/28/2017            Who to contact     If you have questions or need follow up information about today's clinic visit or your schedule please contact [facility] directly at [phone].  Normal or non-critical lab and imaging results will be communicated to you by MyChart, letter or phone within 4 business days after the clinic has received the results. If you do not hear from  "us within 7 days, please contact the clinic through [facility] or phone. If you have a critical or abnormal lab result, we will notify you by phone as soon as possible.  Submit refill requests through [facility] or call your pharmacy and they will forward the refill request to us. Please allow 3 business days for your refill to be completed.          Additional Information About Your Visit        [employer] Information     [facility] lets you send messages to your doctor, view your test results, renew your prescriptions, schedule appointments and more. To sign up, go to [email or web address] . Click on \"Log in\" on the left side of the screen, which will take you to the Welcome page. Then click on \"Sign up Now\" on the right side of the page.     You will be asked to enter the access code listed below, as well as some personal information. Please follow the directions to create your username and password.     Your access code is: [email or web address]  Expires: 2017 11:35 AM     Your access code will  in 90 days. If you need help or a new code, please call your [place] clinic or [phone].        Care EveryWhere ID     This is your Care EveryWhere ID. This could be used by other organizations to access your [place] medical records  [id number]        Your Vitals Were     Pulse Height BMI (Body Mass Index)             83 1.778 m (5' 10\") 30.32 kg/m2          Blood Pressure from Last 3 Encounters:   17 122/46   17 138/78   16 160/62    Weight from Last 3 Encounters:   17 95.8 kg (211 lb 4.8 oz)   17 97.5 kg (215 lb)   16 96.8 kg (213 lb 6.4 oz)              We Performed the Following     Follow-Up with Cardiac Advanced Practice Provider        Primary Care Provider Office Phone # Fax #    Rebecca Noyola [phone]       [facility] [address] [place] [address]        Equal Access to Services     JENIFFER MENJIVAR AH: Hadii meaghan Gutierrez, " robertda cesilia, qaybta kaalmada ora, lito sammyin hayaan flacajosh pedrodayton laDiogenesnael ah. So [facility] [phone].    ATENCIÓN: Si roque bridges, tiene a gonzales disposición servicios gratuitos de asistencia lingüística. Camille al [phone].    We comply with applicable federal civil rights laws and [place] laws. We do not discriminate on the basis of race, color, national origin, age, disability sex, sexual orientation or gender identity.            Thank you!     Thank you for choosing [facility] [place]  for your care. Our goal is always to provide you with excellent care. Hearing back from our patients is one way we can continue to improve our services. Please take a few minutes to complete the written survey that you may receive in the mail after your visit with us. Thank you!             Your Updated Medication List - Protect others around you: Learn how to safely use, store and throw away your medicines at www.disposemymeds.org.          This list is accurate as of: 9/28/17  2:51 PM.  Always use your most recent med list.                   Brand Name Dispense Instructions for use Diagnosis    aspirin 325 MG tablet      Take 325 mg by mouth daily        DULoxetine 30 MG EC capsule    CYMBALTA     Take 30 mg by mouth daily        gabapentin 800 MG tablet    NEURONTIN     Take 1 tablet (800 mg) by mouth 2 times daily    Polyneuropathy associated with underlying disease (H)       lisinopril 10 MG tablet    PRINIVIL/ZESTRIL     Take 10 mg by mouth daily        metFORMIN 500 MG 24 hr tablet    GLUCOPHAGE-XR     Take 500 mg by mouth daily (with dinner) 1/2 tab bid        naproxen 500 MG tablet    NAPROSYN     Take 500 mg by mouth 2 times daily (with meals)        simvastatin 40 MG tablet    ZOCOR     Take 40 mg by mouth At Bedtime        tamsulosin 0.4 MG capsule    FLOMAX     Take 0.4 mg by mouth daily

## 2017-09-28 NOTE — PATIENT INSTRUCTIONS
1. No medication changes today.      2. Pacemaker check today looked great.    3. I will keep an eye on Dr. Noyola's notes ... If infection noted in R hip, I will speak with Dr. Zamora to see if we need to do anything with the pacemaker.    4. See Dr. Zamora in 1 year but CALL if any issues beforehand - our device nurses are @ [phone]

## 2017-09-28 NOTE — PROGRESS NOTES
HPI:   I had the pleasure of seeing Mark today when he came accompanied by his wife Tracey for routine follow-up. He first met Dr. Zamora 7/2016 after he had presented to a routine appointment and noted to have a slow heart rate. EKG showed 2:1 heart block and a Holter monitor was placed, showing both 2:1 and 3:1 heart block. He had no reversible etiology and had a structurally normal heart, and a dual-chamber [employer] pacemaker was placed September 2016. Other history includes hypertension, diabetes, dyslipidemia and peripheral neuropathy    Since that time, he states that he's done well from a heart perspective. He denies chest pain, pressure or tightness. Denies change in his chronic lower extremity edema, orthopnea or PND. He denies palpitations, lightheadedness or syncope. Denies fevers, chills or problems with his pacemaker site.    He has had significant right leg discomfort, and after a failed back injection, there is concern that he could have a septic right hip, which she had replaced a number of years ago. He sees Dr. De Los Santos and Dr. Macario at [facility] and sees Dr. Noyola, who recently got blood work. White count was normal (9/2017).    Annual device interrogation showed he had been 2% a paced and 99% V paced with some evidence of PAT but no atrial fibrillation or atrial flutter.    Assessment & Plan:    1. Heart Block s/p Pacemaker - he is 99% V paced on device interrogation today. He will continue routine device checks through our office and see Dr. Zamora in one year with his annual device interrogation.    2. HTN - blood pressure is under good control today and we will have him continue his current medications.    3. Questionable hip infection - there is concern that he could have septic joint, and he is meeting with Dr. Noyola next week. I will keep an eye on his course peripherally, as certainly want to ensure he does not develop a systemic infection given his pacemaker implant.      Lucie Hines,  KATHLEEN, MSPAS      Orders Placed This Encounter   Procedures     Follow-Up with Electrophysiologist     Follow-Up with Device Clinic     No orders of the defined types were placed in this encounter.    There are no discontinued medications.      Encounter Diagnosis   Name Primary?     Atrioventricular block, second degree        CURRENT MEDICATIONS:  Current Outpatient Prescriptions   Medication Sig Dispense Refill     gabapentin (NEURONTIN) 800 MG tablet Take 1 tablet (800 mg) by mouth 2 times daily       DULoxetine (CYMBALTA) 30 MG capsule Take 30 mg by mouth daily       tamsulosin (FLOMAX) 0.4 MG 24 hr capsule Take 0.4 mg by mouth daily       metFORMIN (GLUCOPHAGE-XR) 500 MG 24 hr tablet Take 500 mg by mouth daily (with dinner) 1/2 tab bid       simvastatin (ZOCOR) 40 MG tablet Take 40 mg by mouth At Bedtime       lisinopril (PRINIVIL,ZESTRIL) 10 MG tablet Take 10 mg by mouth daily       aspirin 325 MG tablet Take 325 mg by mouth daily       naproxen (NAPROSYN) 500 MG tablet Take 500 mg by mouth 2 times daily (with meals)         ALLERGIES   No Known Allergies    PAST MEDICAL HISTORY:  Past Medical History:   Diagnosis Date     BPH (benign prostatic hyperplasia)      Hyperlipidemia      Hypertension      Peripheral neuropathy (H)      Second degree AV block 9-    Implantation of a dual-chamber pacemaker     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)        PAST SURGICAL HISTORY:  Past Surgical History:   Procedure Laterality Date     ORTHOPEDIC SURGERY         FAMILY HISTORY:  Family History   Problem Relation Age of Onset     Family History Negative Mother      Myocardial Infarction Father 80     Unknown/Adopted Maternal Grandmother      Unknown/Adopted Maternal Grandfather      Unknown/Adopted Paternal Grandmother      Unknown/Adopted Paternal Grandfather      Rheumatoid Arthritis Sister      Myocardial Infarction Sister      Family History Negative Son      Family History  "Negative Daughter        SOCIAL HISTORY:  Social History     Social History     Marital status:      Spouse name: N/A     Number of children: N/A     Years of education: N/A     Social History Main Topics     Smoking status: Former Smoker     Packs/day: 1.00     Years: 24.00     Types: Cigarettes     Start date: 1949     Quit date: 1973     Smokeless tobacco: Never Used     Alcohol use Yes      Comment: wine daily: 2      Drug use: None     Sexual activity: Not Asked     Other Topics Concern     Caffeine Concern No     coffee- occ decaf: 2-3 cups a day     Sleep Concern No     Stress Concern No     Weight Concern No     Special Diet No     Exercise Yes     everyday, range of motion     Social History Narrative       Review of Systems:  Skin:  Negative     Eyes:  Positive for glasses  ENT:  Negative    Respiratory:  Negative for dyspnea on exertion;shortness of breath  Cardiovascular:  Negative for;palpitations;chest pain;exercise intolerance;fatigue;lightheadedness;syncope or near-syncope Positive for;edema  Gastroenterology: Negative    Genitourinary:  Negative    Musculoskeletal:  Positive for arthritis;back pain;joint pain  Neurologic:  Negative    Psychiatric:  Negative    Heme/Lymph/Imm:  Negative    Endocrine:  Positive for diabetes    Physical Exam:  Vitals: /46  Pulse 83  Ht 1.778 m (5' 10\")  Wt 95.8 kg (211 lb 4.8 oz)  BMI 30.32 kg/m2    Constitutional:  cooperative, alert and oriented, well developed, well nourished, in no acute distress        Skin:  warm and dry to the touch, no apparent skin lesions or masses noted        Head:  normocephalic, no masses or lesions        Eyes:  pupils equal and round;conjunctivae and lids unremarkable;sclera white;no xanthalasma   eye glasses    ENT:  no pallor or cyanosis, dentition good hearing aide(s) present      Neck:  JVP normal;no carotid bruit        Chest:  normal breath sounds, clear to auscultation, normal A-P diameter, normal symmetry, " normal respiratory excursion, no use of accessory muscles        Cardiac: regular rhythm;no murmurs, gallops or rubs detected                  Abdomen:  abdomen soft        Vascular: pulses full and equal                                      Extremities and Back:  no deformities, clubbing, cyanosis, erythema observed        Neurological:  affect appropriate, oriented to time, person and place          Recent Lab Results:  LIPID RESULTS:  No results found for: CHOL, HDL, LDL, TRIG, CHOLHDLRATIO    LIVER ENZYME RESULTS:  No results found for: AST, ALT    CBC RESULTS:  Lab Results   Component Value Date    WBC 5.1 09/23/2016    RBC 4.91 09/23/2016    HGB 16.6 09/23/2016    HCT 47.7 09/23/2016    MCV 97 09/23/2016    MCH 33.8 (H) 09/23/2016    MCHC 34.8 09/23/2016    RDW 13.4 09/23/2016     (L) 09/23/2016       BMP RESULTS:  Lab Results   Component Value Date     09/23/2016    POTASSIUM 4.8 09/23/2016    CHLORIDE 108 09/23/2016    CO2 30 09/23/2016    ANIONGAP 4 09/23/2016     (H) 09/23/2016    BUN 26 09/23/2016    CR 0.88 09/23/2016    GFRESTIMATED 83 09/23/2016    GFRESTBLACK >90   GFR Calc   09/23/2016    ESAU 8.9 09/23/2016        A1C RESULTS:  No results found for: A1C    INR RESULTS:  No results found for: INR

## 2017-09-28 NOTE — PROGRESS NOTES
[employer] Accolade Pacemaker Device Check  AP: 2 % : 99 %  Mode: DDD 60        Underlying Rhythm: CHB with junctional escape beats at VVI 30  Heart Rate: Stable with adequate variability. Pt ambulates using a cane.  Sensing: Stable    Pacing Threshold: Stable   Impedance: Stable  Battery Status: 13.5 yrs  Atrial Arrhythmia: One ATR logged and EGM shows burst of PAT  Ventricular Arrhythmia: None  Setting Change: None    Care Plan: Pt has no complaints regarding PPM. Scheduled to see Lucie Hines today. Next Latitude scheduled for Son Ferguson RN.

## 2017-09-28 NOTE — MR AVS SNAPSHOT
After Visit Summary   9/28/2017    Mark Hernández    MRN: [id number]           Patient Information     Date Of Birth          9/23/1933        Visit Information        Provider Department      9/28/2017 1:00 PM ALEJANDRO DCR2 [facility]        Today's Diagnoses     Cardiac pacemaker in situ    -  1       Follow-ups after your visit        Your next 10 appointments already scheduled     Sep 28, 2017  2:30 PM CDT   [facility] EP RETURN with Sho Hines PA-C   [facility] ([facility])    [address]  [facility] [facility] [address] [phone]            Jan 04, 2018  9:15 AM CST   Remote PPM Check with ALEJANDRO TECH1   [facility] ([facility])    [address]  [place] [address] [phone]           This appointment is for a remote check of your pacemaker.  This is not an appointment at the office.              Who to contact     If you have questions or need follow up information about today's clinic visit or your schedule please contact [facility] directly at [phone].  Normal or non-critical lab and imaging results will be communicated to you by MyChart, letter or phone within 4 business days after the clinic has received the results. If you do not hear from us within 7 days, please contact the clinic through [facility] [employer] [facility] or phone. If you have a critical or abnormal lab result, we will notify you by phone as soon as possible.  Submit refill requests through [employer] or call your pharmacy and they will forward the refill request to us. Please allow 3 business days for your refill to be completed.          Additional Information About Your Visit        [employer] [facility] Information     [employer] lets you send messages to your doctor, view your test results, renew your prescriptions, schedule appointments  "and more. To sign up, go to [email or web address] . Click on \"Log in\" on the left side of the screen, which will take you to the Welcome page. Then click on \"Sign up Now\" on the right side of the page.     You will be asked to enter the access code listed below, as well as some personal information. Please follow the directions to create your username and password.     Your access code is: [email or web address]  Expires: 2017 11:35 AM     Your access code will  in 90 days. If you need help or a new code, please call your [place] clinic or [phone].        Care EveryWhere ID     This is your Care EveryWhere ID. This could be used by other organizations to access your [place] medical records  [id number]         Blood Pressure from Last 3 Encounters:   17 138/78   16 160/62   16 134/84    Weight from Last 3 Encounters:   17 97.5 kg (215 lb)   16 96.8 kg (213 lb 6.4 oz)   16 96.2 kg (212 lb)              We Performed the Following     PM DEVICE PROGRAMMING EVAL, DUAL LEAD PACER ([id number])        Primary Care Provider Office Phone # Fax #    Rebecca Noyola [phone]       [facility] [address]  [facility] [address]        Equal Access to Services     JENIFFER MENJIVAR AH: Hadii aad ku hadasho Soomaali, waaxda luqadaha, qaybta kaalmada adeegyada, lito snyder E Inkjosh ravi. So [facility] [phone].    ATENCIÓN: Si habla español, tiene a gonzales disposición servicios gratuitos de asistencia lingüística. Llbenji al [phone].    We comply with applicable federal civil rights laws and [place] laws. We do not discriminate on the basis of race, color, national origin, age, disability sex, sexual orientation or gender identity.            Thank you!     Thank you for choosing [facility] [place]  for your care. Our goal is always to provide you with excellent care. Hearing back from our patients is one " way we can continue to improve our services. Please take a few minutes to complete the written survey that you may receive in the mail after your visit with us. Thank you!             Your Updated Medication List - Protect others around you: Learn how to safely use, store and throw away your medicines at www.disposemymeds.org.          This list is accurate as of: 9/28/17  1:28 PM.  Always use your most recent med list.                   Brand Name Dispense Instructions for use Diagnosis    aspirin 325 MG tablet      Take 325 mg by mouth daily        DULoxetine 30 MG EC capsule    CYMBALTA     Take 30 mg by mouth daily        gabapentin 800 MG tablet    NEURONTIN     Take 1 tablet (800 mg) by mouth 2 times daily    Polyneuropathy associated with underlying disease (H)       lisinopril 10 MG tablet    PRINIVIL/ZESTRIL     Take 10 mg by mouth daily        metFORMIN 500 MG 24 hr tablet    GLUCOPHAGE-XR     Take 500 mg by mouth daily (with dinner) 1/2 tab bid        naproxen 500 MG tablet    NAPROSYN     Take 500 mg by mouth 2 times daily (with meals)        simvastatin 40 MG tablet    ZOCOR     Take 40 mg by mouth At Bedtime        tamsulosin 0.4 MG capsule    FLOMAX     Take 0.4 mg by mouth daily

## 2017-10-02 ENCOUNTER — HOSPITAL LABORATORY (OUTPATIENT)
Dept: OTHER | Facility: CLINIC | Age: 82
End: 2017-10-02

## 2017-10-02 LAB
APPEARANCE FLD: NORMAL
COLOR FLD: YELLOW
LYMPHOCYTES NFR FLD MANUAL: 5 %
NEUTS BAND NFR FLD MANUAL: 95 %
SPECIMEN SOURCE FLD: NORMAL
WBC # FLD AUTO: NORMAL /UL

## 2017-10-07 LAB
BACTERIA SPEC CULT: ABNORMAL
BACTERIA SPEC CULT: ABNORMAL
SPECIMEN SOURCE: ABNORMAL

## 2017-10-16 LAB
BACTERIA SPEC CULT: ABNORMAL
Lab: ABNORMAL
SPECIMEN SOURCE: ABNORMAL

## 2017-10-31 ENCOUNTER — DOCUMENTATION ONLY (OUTPATIENT)
Dept: CARDIOLOGY | Facility: CLINIC | Age: 82
End: 2017-10-31

## 2017-10-31 NOTE — PROGRESS NOTES
Called pt to get update on where he was with possible septic R hip prosthetic.    He has now had 2 aspirations since his last visit with me 9/28. The 2nd one was done just last week - apparently 'very little fluid' to aspirate. Awaiting culture results so has not yet heard back from Dr. De Los Santos.    No c/o fever, chills or problems with PPM site to report.    Will continue to keep on radar.

## 2017-12-14 ENCOUNTER — TELEPHONE (OUTPATIENT)
Dept: CARDIOLOGY | Facility: CLINIC | Age: 82
End: 2017-12-14

## 2017-12-14 NOTE — TELEPHONE ENCOUNTER
Call from pt this AM; he left a message. He had a ppm check and visit with Lucie Hines on 9-28-17.  There was at that time some question re: an infection in his right hip.  This indeed has proven to be positive for an infection in the hip. I will forward this note to Lucie Hines. SueLangenbrunnerRN

## 2017-12-18 ENCOUNTER — TRANSFERRED RECORDS (OUTPATIENT)
Dept: HEALTH INFORMATION MANAGEMENT | Facility: CLINIC | Age: 82
End: 2017-12-18

## 2017-12-18 NOTE — TELEPHONE ENCOUNTER
Call to pt with Lucie Hines's instructions to call us with any signs of infection ie lethargy, chills, fever or any changes in pacer site. Phone number of device office left. SueLangenbrunnerRN

## 2017-12-18 NOTE — TELEPHONE ENCOUNTER
Thanks for update - pls have pt make sure he is watching for any signs of systemic infection (lethargy, fever, chills) or any changes at the pacemaker pocket and call us if this happens.    Thx! Lucie

## 2017-12-27 ENCOUNTER — HOSPITAL ENCOUNTER (INPATIENT)
Facility: CLINIC | Age: 82
LOS: 6 days | Discharge: NURSING, REHAB OR LONG-TERM CARE | DRG: 467 | End: 2018-01-02
Attending: ORTHOPAEDIC SURGERY | Admitting: ORTHOPAEDIC SURGERY
Payer: MEDICARE

## 2017-12-27 DIAGNOSIS — Z96.649 INFECTION OF PROSTHETIC TOTAL HIP JOINT, INITIAL ENCOUNTER (H): Primary | ICD-10-CM

## 2017-12-27 DIAGNOSIS — T84.59XA INFECTION OF PROSTHETIC TOTAL HIP JOINT, INITIAL ENCOUNTER (H): Primary | ICD-10-CM

## 2017-12-27 PROBLEM — N17.9 ACUTE KIDNEY FAILURE (H): Status: ACTIVE | Noted: 2017-12-27

## 2017-12-27 LAB
ABO + RH BLD: NORMAL
ABO + RH BLD: NORMAL
ANION GAP SERPL CALCULATED.3IONS-SCNC: 6 MMOL/L (ref 3–14)
ANION GAP SERPL CALCULATED.3IONS-SCNC: 7 MMOL/L (ref 3–14)
BASOPHILS # BLD AUTO: 0 10E9/L (ref 0–0.2)
BASOPHILS NFR BLD AUTO: 0.2 %
BLD GP AB SCN SERPL QL: NORMAL
BLD PROD TYP BPU: NORMAL
BLOOD BANK CMNT PATIENT-IMP: NORMAL
BUN SERPL-MCNC: 45 MG/DL (ref 7–30)
BUN SERPL-MCNC: 50 MG/DL (ref 7–30)
CALCIUM SERPL-MCNC: 8.6 MG/DL (ref 8.5–10.1)
CALCIUM SERPL-MCNC: 8.7 MG/DL (ref 8.5–10.1)
CHLORIDE SERPL-SCNC: 105 MMOL/L (ref 94–109)
CHLORIDE SERPL-SCNC: 105 MMOL/L (ref 94–109)
CO2 SERPL-SCNC: 26 MMOL/L (ref 20–32)
CO2 SERPL-SCNC: 26 MMOL/L (ref 20–32)
CREAT SERPL-MCNC: 1.97 MG/DL (ref 0.66–1.25)
CREAT SERPL-MCNC: 2.02 MG/DL (ref 0.66–1.25)
DIFFERENTIAL METHOD BLD: ABNORMAL
EOSINOPHIL # BLD AUTO: 0.1 10E9/L (ref 0–0.7)
EOSINOPHIL NFR BLD AUTO: 1.2 %
ERYTHROCYTE [DISTWIDTH] IN BLOOD BY AUTOMATED COUNT: 13.8 % (ref 10–15)
ERYTHROCYTE [DISTWIDTH] IN BLOOD BY AUTOMATED COUNT: 13.9 % (ref 10–15)
ERYTHROCYTE [SEDIMENTATION RATE] IN BLOOD BY WESTERGREN METHOD: 38 MM/H (ref 0–20)
GFR SERPL CREATININE-BSD FRML MDRD: 32 ML/MIN/1.7M2
GFR SERPL CREATININE-BSD FRML MDRD: 33 ML/MIN/1.7M2
GLUCOSE BLDC GLUCOMTR-MCNC: 140 MG/DL (ref 70–99)
GLUCOSE BLDC GLUCOMTR-MCNC: 171 MG/DL (ref 70–99)
GLUCOSE SERPL-MCNC: 137 MG/DL (ref 70–99)
GLUCOSE SERPL-MCNC: 144 MG/DL (ref 70–99)
HCT VFR BLD AUTO: 34.6 % (ref 40–53)
HCT VFR BLD AUTO: 35.5 % (ref 40–53)
HGB BLD-MCNC: 11.5 G/DL (ref 13.3–17.7)
HGB BLD-MCNC: 11.5 G/DL (ref 13.3–17.7)
IMM GRANULOCYTES # BLD: 0 10E9/L (ref 0–0.4)
IMM GRANULOCYTES NFR BLD: 0.3 %
INR PPP: 1.11 (ref 0.86–1.14)
LYMPHOCYTES # BLD AUTO: 1 10E9/L (ref 0.8–5.3)
LYMPHOCYTES NFR BLD AUTO: 11.2 %
MCH RBC QN AUTO: 28.6 PG (ref 26.5–33)
MCH RBC QN AUTO: 29.6 PG (ref 26.5–33)
MCHC RBC AUTO-ENTMCNC: 32.4 G/DL (ref 31.5–36.5)
MCHC RBC AUTO-ENTMCNC: 33.2 G/DL (ref 31.5–36.5)
MCV RBC AUTO: 88 FL (ref 78–100)
MCV RBC AUTO: 89 FL (ref 78–100)
MONOCYTES # BLD AUTO: 0.7 10E9/L (ref 0–1.3)
MONOCYTES NFR BLD AUTO: 8.1 %
NEUTROPHILS # BLD AUTO: 6.8 10E9/L (ref 1.6–8.3)
NEUTROPHILS NFR BLD AUTO: 79 %
NRBC # BLD AUTO: 0 10*3/UL
NRBC BLD AUTO-RTO: 0 /100
NUM BPU REQUESTED: 3
PLATELET # BLD AUTO: 200 10E9/L (ref 150–450)
PLATELET # BLD AUTO: 219 10E9/L (ref 150–450)
POTASSIUM SERPL-SCNC: 4.8 MMOL/L (ref 3.4–5.3)
POTASSIUM SERPL-SCNC: 4.8 MMOL/L (ref 3.4–5.3)
RBC # BLD AUTO: 3.89 10E12/L (ref 4.4–5.9)
RBC # BLD AUTO: 4.02 10E12/L (ref 4.4–5.9)
SODIUM SERPL-SCNC: 137 MMOL/L (ref 133–144)
SODIUM SERPL-SCNC: 138 MMOL/L (ref 133–144)
SPECIMEN EXP DATE BLD: NORMAL
WBC # BLD AUTO: 8.6 10E9/L (ref 4–11)
WBC # BLD AUTO: 8.7 10E9/L (ref 4–11)

## 2017-12-27 PROCEDURE — 85610 PROTHROMBIN TIME: CPT | Performed by: ORTHOPAEDIC SURGERY

## 2017-12-27 PROCEDURE — 85027 COMPLETE CBC AUTOMATED: CPT | Performed by: HOSPITALIST

## 2017-12-27 PROCEDURE — 80048 BASIC METABOLIC PNL TOTAL CA: CPT | Performed by: HOSPITALIST

## 2017-12-27 PROCEDURE — 25000131 ZZH RX MED GY IP 250 OP 636 PS 637: Mod: GY | Performed by: HOSPITALIST

## 2017-12-27 PROCEDURE — 0SP90JZ REMOVAL OF SYNTHETIC SUBSTITUTE FROM RIGHT HIP JOINT, OPEN APPROACH: ICD-10-PCS | Performed by: ORTHOPAEDIC SURGERY

## 2017-12-27 PROCEDURE — 0SR9029 REPLACEMENT OF RIGHT HIP JOINT WITH METAL ON POLYETHYLENE SYNTHETIC SUBSTITUTE, CEMENTED, OPEN APPROACH: ICD-10-PCS | Performed by: ORTHOPAEDIC SURGERY

## 2017-12-27 PROCEDURE — 99223 1ST HOSP IP/OBS HIGH 75: CPT | Performed by: HOSPITALIST

## 2017-12-27 PROCEDURE — 99207 ZZC CONSULT E&M CHANGED TO INITIAL LEVEL: CPT | Performed by: HOSPITALIST

## 2017-12-27 PROCEDURE — 00000146 ZZHCL STATISTIC GLUCOSE BY METER IP

## 2017-12-27 PROCEDURE — 86923 COMPATIBILITY TEST ELECTRIC: CPT | Performed by: ORTHOPAEDIC SURGERY

## 2017-12-27 PROCEDURE — 85025 COMPLETE CBC W/AUTO DIFF WBC: CPT | Performed by: ORTHOPAEDIC SURGERY

## 2017-12-27 PROCEDURE — 86850 RBC ANTIBODY SCREEN: CPT | Performed by: ORTHOPAEDIC SURGERY

## 2017-12-27 PROCEDURE — 25000128 H RX IP 250 OP 636: Performed by: HOSPITALIST

## 2017-12-27 PROCEDURE — 36415 COLL VENOUS BLD VENIPUNCTURE: CPT | Performed by: ORTHOPAEDIC SURGERY

## 2017-12-27 PROCEDURE — 25000132 ZZH RX MED GY IP 250 OP 250 PS 637: Mod: GY | Performed by: HOSPITALIST

## 2017-12-27 PROCEDURE — 36415 COLL VENOUS BLD VENIPUNCTURE: CPT | Performed by: HOSPITALIST

## 2017-12-27 PROCEDURE — 85652 RBC SED RATE AUTOMATED: CPT | Performed by: ORTHOPAEDIC SURGERY

## 2017-12-27 PROCEDURE — 86900 BLOOD TYPING SEROLOGIC ABO: CPT | Performed by: ORTHOPAEDIC SURGERY

## 2017-12-27 PROCEDURE — 12000007 ZZH R&B INTERMEDIATE

## 2017-12-27 PROCEDURE — 86901 BLOOD TYPING SEROLOGIC RH(D): CPT | Performed by: ORTHOPAEDIC SURGERY

## 2017-12-27 PROCEDURE — 0QB60ZZ EXCISION OF RIGHT UPPER FEMUR, OPEN APPROACH: ICD-10-PCS | Performed by: ORTHOPAEDIC SURGERY

## 2017-12-27 PROCEDURE — A9270 NON-COVERED ITEM OR SERVICE: HCPCS | Mod: GY | Performed by: HOSPITALIST

## 2017-12-27 PROCEDURE — 80048 BASIC METABOLIC PNL TOTAL CA: CPT | Performed by: ORTHOPAEDIC SURGERY

## 2017-12-27 RX ORDER — SIMVASTATIN 10 MG
20 TABLET ORAL AT BEDTIME
Status: DISCONTINUED | OUTPATIENT
Start: 2017-12-27 | End: 2018-01-02 | Stop reason: HOSPADM

## 2017-12-27 RX ORDER — TAMSULOSIN HYDROCHLORIDE 0.4 MG/1
0.8 CAPSULE ORAL AT BEDTIME
Status: DISCONTINUED | OUTPATIENT
Start: 2017-12-27 | End: 2018-01-02 | Stop reason: HOSPADM

## 2017-12-27 RX ORDER — SODIUM CHLORIDE 9 MG/ML
INJECTION, SOLUTION INTRAVENOUS CONTINUOUS
Status: DISCONTINUED | OUTPATIENT
Start: 2017-12-27 | End: 2017-12-28

## 2017-12-27 RX ORDER — GABAPENTIN 800 MG/1
800 TABLET ORAL 2 TIMES DAILY
Status: DISCONTINUED | OUTPATIENT
Start: 2017-12-27 | End: 2018-01-02 | Stop reason: HOSPADM

## 2017-12-27 RX ORDER — DULOXETIN HYDROCHLORIDE 30 MG/1
30 CAPSULE, DELAYED RELEASE ORAL DAILY
Status: DISCONTINUED | OUTPATIENT
Start: 2017-12-28 | End: 2018-01-02 | Stop reason: HOSPADM

## 2017-12-27 RX ORDER — NALOXONE HYDROCHLORIDE 0.4 MG/ML
.1-.4 INJECTION, SOLUTION INTRAMUSCULAR; INTRAVENOUS; SUBCUTANEOUS
Status: CANCELLED | OUTPATIENT
Start: 2017-12-27

## 2017-12-27 RX ORDER — NICOTINE POLACRILEX 4 MG
15-30 LOZENGE BUCCAL
Status: DISCONTINUED | OUTPATIENT
Start: 2017-12-27 | End: 2018-01-02 | Stop reason: HOSPADM

## 2017-12-27 RX ORDER — HYDRALAZINE HYDROCHLORIDE 20 MG/ML
10 INJECTION INTRAMUSCULAR; INTRAVENOUS EVERY 6 HOURS PRN
Status: DISCONTINUED | OUTPATIENT
Start: 2017-12-27 | End: 2018-01-02 | Stop reason: HOSPADM

## 2017-12-27 RX ORDER — DEXTROSE MONOHYDRATE 25 G/50ML
25-50 INJECTION, SOLUTION INTRAVENOUS
Status: DISCONTINUED | OUTPATIENT
Start: 2017-12-27 | End: 2018-01-02 | Stop reason: HOSPADM

## 2017-12-27 RX ORDER — SULFAMETHOXAZOLE/TRIMETHOPRIM 800-160 MG
1 TABLET ORAL 2 TIMES DAILY
Status: ON HOLD | COMMUNITY
End: 2017-12-30

## 2017-12-27 RX ADMIN — SIMVASTATIN 20 MG: 10 TABLET, FILM COATED ORAL at 21:32

## 2017-12-27 RX ADMIN — GABAPENTIN 800 MG: 800 TABLET ORAL at 21:32

## 2017-12-27 RX ADMIN — SODIUM CHLORIDE, PRESERVATIVE FREE: 5 INJECTION INTRAVENOUS at 20:13

## 2017-12-27 RX ADMIN — TAMSULOSIN HYDROCHLORIDE 0.8 MG: 0.4 CAPSULE ORAL at 21:32

## 2017-12-27 RX ADMIN — INSULIN ASPART 1 UNITS: 100 INJECTION, SOLUTION INTRAVENOUS; SUBCUTANEOUS at 20:09

## 2017-12-27 NOTE — IP AVS SNAPSHOT
"[place] [address] [facility]: [phone]                                              INTERAGENCY TRANSFER FORM - LAB / IMAGING / EKG / EMG RESULTS   2017                    Hospital Admission Date: 2017  FRANKIE THOMPSON   : 1933  Sex: Male        Attending Provider: Kirk Summers MD     Allergies:  No Known Allergies    Infection:  None   Service:  ORTHOPEDICS    Ht:  1.778 m (5' 10\")   Wt:  89.4 kg (197 lb)   Admission Wt:  89.4 kg (197 lb)    BMI:  28.27 kg/m 2   BSA:  2.1 m 2            Patient PCP Information     Provider PCP Type    Rebecca Noyola MD General         Lab Results - 3 Days      Glucose by meter [[id number]] (Abnormal)  Resulted: 18 1236, Result status: Final result    Ordering provider: Kirk Summers MD  18 1208 Resulting lab: POINT OF CARE TEST, GLUCOSE    Specimen Information    Type Source Collected On     18 1208          Components       Value Reference Range Flag Lab   Glucose 164 70 - 99 mg/dL H 170            Glucose by meter [[id number]] (Abnormal)  Resulted: 18 0901, Result status: Final result    Ordering provider: Kirk Summers MD  18 0852 Resulting lab: POINT OF CARE TEST, GLUCOSE    Specimen Information    Type Source Collected On     18 0852          Components       Value Reference Range Flag Lab   Glucose 156 70 - 99 mg/dL H 170            Blood culture [[id number]]  Resulted: 18 0458, Result status: Preliminary result    Ordering provider: Kirk Summers MD  17 190 Resulting lab: MICRO RAPID TESTING LAB    Specimen Information    Type Source Collected On   Blood  17 1905   Comment:  Left Hand          Components       Value Reference Range Flag Lab   Specimen Description Blood Left Hand      Culture Micro No growth after 5 days   226            Blood culture [[id number]]  Resulted: 18 045, Result status: Preliminary result    Ordering provider: Kirk Summers, " MD  12/28/17 1901 Resulting lab: MICRO RAPID TESTING LAB    Specimen Information    Type Source Collected On   Blood  12/28/17 1910   Comment:  Right Arm          Components       Value Reference Range Flag Lab   Specimen Description Blood Right Arm      Special Requests Aerobic and anaerobic bottles received   FrStHsLb   Culture Micro No growth after 5 days   226            Glucose by meter [[id number]] (Abnormal)  Resulted: 01/02/18 0211, Result status: Final result    Ordering provider: Kirk Summers MD  01/02/18 0203 Resulting lab: POINT OF CARE TEST, GLUCOSE    Specimen Information    Type Source Collected On     01/02/18 0203          Components       Value Reference Range Flag Lab   Glucose 183 70 - 99 mg/dL H 170            Glucose by meter [[id number]] (Abnormal)  Resulted: 01/01/18 2101, Result status: Final result    Ordering provider: Kirk Summers MD  01/01/18 2053 Resulting lab: POINT OF CARE TEST, GLUCOSE    Specimen Information    Type Source Collected On     01/01/18 2053          Components       Value Reference Range Flag Lab   Glucose 255 70 - 99 mg/dL H 170            Glucose by meter [[id number]] (Abnormal)  Resulted: 01/01/18 1656, Result status: Final result    Ordering provider: Kirk Summers MD  01/01/18 1651 Resulting lab: POINT OF CARE TEST, GLUCOSE    Specimen Information    Type Source Collected On     01/01/18 1651          Components       Value Reference Range Flag Lab   Glucose 182 70 - 99 mg/dL H 170            Glucose by meter [[id number]] (Abnormal)  Resulted: 01/01/18 1251, Result status: Final result    Ordering provider: Kirk Summers MD  01/01/18 1243 Resulting lab: POINT OF CARE TEST, GLUCOSE    Specimen Information    Type Source Collected On     01/01/18 1243          Components       Value Reference Range Flag Lab   Glucose 155 70 - 99 mg/dL H 170            Glucose by meter [[id number]] (Abnormal)  Resulted: 01/01/18 0856, Result status: Final result     Ordering provider: Kirk Summers MD  01/01/18 0851 Resulting lab: POINT OF CARE TEST, GLUCOSE    Specimen Information    Type Source Collected On     01/01/18 0851          Components       Value Reference Range Flag Lab   Glucose 139 70 - 99 mg/dL H 170            Basic metabolic panel [[id number]] (Abnormal)  Resulted: 01/01/18 0704, Result status: Final result    Ordering provider: Kirk Summers MD  01/01/18 0000 Resulting lab: [facility]    Specimen Information    Type Source Collected On   Blood  01/01/18 0610          Components       Value Reference Range Flag Lab   Sodium 138 133 - 144 mmol/L  FrStHsLb   Potassium 4.2 3.4 - 5.3 mmol/L  FrStHsLb   Chloride 103 94 - 109 mmol/L  FrStHsLb   Carbon Dioxide 30 20 - 32 mmol/L  FrStHsLb   Anion Gap 5 3 - 14 mmol/L  FrStHsLb   Glucose 147 70 - 99 mg/dL H FrStHsLb   Urea Nitrogen 33 7 - 30 mg/dL H FrStHsLb   Creatinine 1.11 0.66 - 1.25 mg/dL  FrStHsLb   GFR Estimate 63 >60 mL/min/1.7m2  FrStHsLb   Comment:  Non  GFR Calc   GFR Estimate If Black 76 >60 mL/min/1.7m2  FrStHsLb   Comment:  African American GFR Calc   Calcium 9.0 8.5 - 10.1 mg/dL  FrStHsLb            Glucose by meter [[id number]] (Abnormal)  Resulted: 01/01/18 0315, Result status: Final result    Ordering provider: Kirk Summers MD  01/01/18 0305 Resulting lab: POINT OF CARE TEST, GLUCOSE    Specimen Information    Type Source Collected On     01/01/18 0305          Components       Value Reference Range Flag Lab   Glucose 157 70 - 99 mg/dL H 170            Glucose by meter [[id number]] (Abnormal)  Resulted: 12/31/17 2156, Result status: Final result    Ordering provider: Kirk Summers MD  12/31/17 2112 Resulting lab: POINT OF CARE TEST, GLUCOSE    Specimen Information    Type Source Collected On     12/31/17 2112          Components       Value Reference Range Flag Lab   Glucose 154 70 - 99 mg/dL H 170            Glucose by meter [[id number]] (Abnormal)   Resulted: 12/31/17 1851, Result status: Final result    Ordering provider: Kirk Summers MD  12/31/17 1731 Resulting lab: POINT OF CARE TEST, GLUCOSE    Specimen Information    Type Source Collected On     12/31/17 1731          Components       Value Reference Range Flag Lab   Glucose 257 70 - 99 mg/dL H 170            Glucose by meter [[id number]] (Abnormal)  Resulted: 12/31/17 1431, Result status: Final result    Ordering provider: Kirk Summers MD  12/31/17 1424 Resulting lab: POINT OF CARE TEST, GLUCOSE    Specimen Information    Type Source Collected On     12/31/17 1424          Components       Value Reference Range Flag Lab   Glucose 143 70 - 99 mg/dL H 170            Basic metabolic panel [[id number]] (Abnormal)  Resulted: 12/31/17 0554, Result status: Final result    Ordering provider: Kirk Summers MD  12/31/17 0000 Resulting lab: [facility]    Specimen Information    Type Source Collected On   Blood  12/31/17 0524          Components       Value Reference Range Flag Lab   Sodium 138 133 - 144 mmol/L  FrStHsLb   Potassium 4.0 3.4 - 5.3 mmol/L  FrStHsLb   Chloride 104 94 - 109 mmol/L  FrStHsLb   Carbon Dioxide 31 20 - 32 mmol/L  FrStHsLb   Anion Gap 3 3 - 14 mmol/L  FrStHsLb   Glucose 144 70 - 99 mg/dL H FrStHsLb   Urea Nitrogen 25 7 - 30 mg/dL  FrStHsLb   Creatinine 1.00 0.66 - 1.25 mg/dL  FrStHsLb   GFR Estimate 71 >60 mL/min/1.7m2  FrStHsLb   Comment:  Non  GFR Calc   GFR Estimate If Black 86 >60 mL/min/1.7m2  FrStHsLb   Comment:  African American GFR Calc   Calcium 8.8 8.5 - 10.1 mg/dL  FrStHsLb            Erythrocyte sedimentation rate auto [[id number]] (Abnormal)  Resulted: 12/31/17 0552, Result status: Final result    Ordering provider: Kirk Summers MD  12/31/17 0000 Resulting lab: [facility]    Specimen Information    Type Source Collected On   Blood  12/31/17 0524          Components       Value Reference Range Flag Lab   Sed Rate 80  0 - 20 mm/h H FrStHsLb            CBC with platelets [[id number]] (Abnormal)  Resulted: 12/31/17 0541, Result status: Final result    Ordering provider: Kirk Summers MD  12/31/17 0000 Resulting lab: [facility]    Specimen Information    Type Source Collected On   Blood  12/31/17 0524          Components       Value Reference Range Flag Lab   WBC 8.3 4.0 - 11.0 10e9/L  FrStHsLb   RBC Count 3.12 4.4 - 5.9 10e12/L L FrStHsLb   Hemoglobin 9.1 13.3 - 17.7 g/dL L FrStHsLb   Hematocrit 27.8 40.0 - 53.0 % L FrStHsLb   MCV 89 78 - 100 fl  FrStHsLb   MCH 29.2 26.5 - 33.0 pg  FrStHsLb   MCHC 32.7 31.5 - 36.5 g/dL  FrStHsLb   RDW 14.1 10.0 - 15.0 %  FrStHsLb   Platelet Count 180 150 - 450 10e9/L  FrStHsLb            Glucose by meter [[id number]] (Abnormal)  Resulted: 12/31/17 0221, Result status: Final result    Ordering provider: Kirk Summers MD  12/31/17 0201 Resulting lab: POINT OF CARE TEST, GLUCOSE    Specimen Information    Type Source Collected On     12/31/17 0201          Components       Value Reference Range Flag Lab   Glucose 150 70 - 99 mg/dL H 170            Glucose by meter [[id number]] (Abnormal)  Resulted: 12/30/17 2151, Result status: Final result    Ordering provider: Kirk Summers MD  12/30/17 2147 Resulting lab: POINT OF CARE TEST, GLUCOSE    Specimen Information    Type Source Collected On     12/30/17 2147          Components       Value Reference Range Flag Lab   Glucose 165 70 - 99 mg/dL H 170            Glucose by meter [[id number]] (Abnormal)  Resulted: 12/30/17 1655, Result status: Final result    Ordering provider: Kirk Summers MD  12/30/17 1652 Resulting lab: POINT OF CARE TEST, GLUCOSE    Specimen Information    Type Source Collected On     12/30/17 1652          Components       Value Reference Range Flag Lab   Glucose 170 70 - 99 mg/dL H 170            Glucose by meter [[id number]] (Abnormal)  Resulted: 12/30/17 1336, Result status: Final result    Ordering  provider: Kirk Summers MD  12/30/17 1332 Resulting lab: POINT OF CARE TEST, GLUCOSE    Specimen Information    Type Source Collected On     12/30/17 1332          Components       Value Reference Range Flag Lab   Glucose 140 70 - 99 mg/dL H 170            Basic metabolic panel [[id number]] (Abnormal)  Resulted: 12/30/17 0746, Result status: Final result    Ordering provider: Kirk Summers MD  12/30/17 0000 Resulting lab: [facility]    Specimen Information    Type Source Collected On   Blood  12/30/17 0715          Components       Value Reference Range Flag Lab   Sodium 139 133 - 144 mmol/L  FrStHsLb   Potassium 4.0 3.4 - 5.3 mmol/L  FrStHsLb   Chloride 105 94 - 109 mmol/L  FrStHsLb   Carbon Dioxide 28 20 - 32 mmol/L  FrStHsLb   Anion Gap 6 3 - 14 mmol/L  FrStHsLb   Glucose 147 70 - 99 mg/dL H FrStHsLb   Urea Nitrogen 26 7 - 30 mg/dL  FrStHsLb   Creatinine 1.24 0.66 - 1.25 mg/dL  FrStHsLb   GFR Estimate 56 >60 mL/min/1.7m2 L FrStHsLb   Comment:  Non  GFR Calc   GFR Estimate If Black 67 >60 mL/min/1.7m2  FrStHsLb   Comment:  African American GFR Calc   Calcium 8.5 8.5 - 10.1 mg/dL  FrStHsLb            CBC with platelets [[id number]] (Abnormal)  Resulted: 12/30/17 0726, Result status: Final result    Ordering provider: Kirk Summers MD  12/30/17 0000 Resulting lab: [facility]    Specimen Information    Type Source Collected On   Blood  12/30/17 0715          Components       Value Reference Range Flag Lab   WBC 7.3 4.0 - 11.0 10e9/L  FrStHsLb   RBC Count 3.20 4.4 - 5.9 10e12/L L FrStHsLb   Hemoglobin 9.1 13.3 - 17.7 g/dL L FrStHsLb   Hematocrit 28.5 40.0 - 53.0 % L FrStHsLb   MCV 89 78 - 100 fl  FrStHsLb   MCH 28.4 26.5 - 33.0 pg  FrStHsLb   MCHC 31.9 31.5 - 36.5 g/dL  FrStHsLb   RDW 14.0 10.0 - 15.0 %  FrStHsLb   Platelet Count 158 150 - 450 10e9/L  FrStHsLb            Glucose by meter [[id number]] (Abnormal)  Resulted: 12/30/17 0211, Result status: Final  result    Ordering provider: Kirk Summers MD  12/30/17 0204 Resulting lab: POINT OF CARE TEST, GLUCOSE    Specimen Information    Type Source Collected On     12/30/17 0204          Components       Value Reference Range Flag Lab   Glucose 161 70 - 99 mg/dL H 170            Testing Performed By     Lab - Abbreviation Name Director Address Valid Date Range    14 - FrStHsLb [facility] Unknown [address] [address] 05/08/15 1057 - Present    170 - Unknown POINT OF CARE TEST, GLUCOSE Unknown Unknown 10/31/11 1114 - Present    226 - Unknown MICRO RAPID TESTING LAB Unknown [address] [facility] [address] 12/19/14 0955 - Present            Unresulted Labs (24h ago through future)    Start       Ordered    01/04/18 0600  Creatinine  EVERY THREE DAYS,   Routine      01/01/18 0927    12/31/17 0600  Platelet count  (enoxaparin (LOVENOX) (Weight  kg with CrCl greater than 30 mL/min is prechecked))  EVERY THREE DAYS,   Routine     Comments:  Repeat every 3 days while on VTE prophylaxis. If no result is listed, this lab has not been done the past 365 days. LATEST LAB RESULT: Platelet Count (10e9/L)       Date                     Value                 12/27/2017               200              ----------      12/28/17 1900         Imaging Results - 3 Days      XR Chest Port 1 View [[id number]]  Resulted: 12/30/17 1514, Result status: Final result    Ordering provider: Eduardo Guido MD  12/30/17 1351 Resulted by: David Escamilla MD    Performed: 12/30/17 1358 - 12/30/17 1415 Resulting lab: RADIOLOGY RESULTS    Narrative:       CHEST PORTABLE ONE VIEW 12/30/2017 2:15 PM     COMPARISON: Two-view chest x-ray 9/23/2016.    HISTORY: RN placed PICC - verify tip placement.       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    There is a  new right PICC line with its tip in the upper superior vena  cava.    Moderate cardiomegaly again noted. The lungs are clear. There is no  pleural effusion or pneumothorax.    SEBASTIAN ALVARES MD      Testing Performed By     Lab - Abbreviation Name Director Address Valid Date Range    104 - Rad Rslts RADIOLOGY RESULTS Unknown Unknown 02/16/05 1553 - Present                Encounter-Level Documents:     There are no encounter-level documents.      Order-Level Documents - 12/27/2017:     Scan on 12/27/2017  9:14 AM by Outside, Provider : EKG, P. GILSON (below)

## 2017-12-27 NOTE — IP AVS SNAPSHOT
[address] [facility]    [address] [place] [address]    Phone:  [phone]                                       After Visit Summary   12/27/2017    Mark Hernández    MRN: [id number]           After Visit Summary Signature Page     I have received my discharge instructions, and my questions have been answered. I have discussed any challenges I see with this plan with the nurse or doctor.    ..........................................................................................................................................  Patient/Patient Representative Signature      ..........................................................................................................................................  Patient Representative Print Name and Relationship to Patient    ..................................................               ................................................  Date                                            Time    ..........................................................................................................................................  Reviewed by Signature/Title    ...................................................              ..............................................  Date                                                            Time

## 2017-12-27 NOTE — IP AVS SNAPSHOT
` `     [place] [address] [facility]: [phone]                 INTERAGENCY TRANSFER FORM - NOTES (H&P, Discharge Summary, Consults, Procedures, Therapies)   2017                    Hospital Admission Date: 2017  MARK HERNÁNDEZ   : 1933  Sex: Male        Patient PCP Information     Provider PCP Type    Rebecca Noyola MD General         History & Physicals      H&P signed by Sara Barron at 2017  9:14 AM      Author:  Sara Barron Service:  (none) Author Type:  Physician    Filed:  2017  9:14 AM Date of Service:  2017  9:10 AM Creation Time:  2017  9:14 AM    Status:  Signed :  Sara Barron (Physician)     Scan on 2017  9:14 AM by Anderson Provider : CODY RIGGINS, HISTORY AND PHY 2017 1          Revision History        User Key Date/Time User Provider Type Action    > [N/A] 2017  9:14 AM Anderson, Provider Physician Sign                  Discharge Summaries     No notes of this type exist for this encounter.         Consult Notes      Consults signed by Eduardo Guido MD at 2017 11:56 AM      Author:  Eduardo Guido MD Service:  Infectious Disease Author Type:  Physician    Filed:  2017 11:56 AM Date of Service:  2017  9:19 AM Creation Time:  2017  9:31 AM    Status:  Signed :  Eduardo Guido MD (Physician)         ATTENDING PHYSICIAN:  Harry Summers MD      REASON FOR CONSULTATION:  I was asked by Dr. Summers to assess infected right total hip arthroplasty.      IMPRESSION:   1.  Mark Hernández, 84-year-old male with diabetes mellitus.   2.  History of right total hip arthroplasty, originally placed in .  The patient required revision arthroplasty in  because of a fall and a periprosthetic fracture.   3.  Hip aspirations done for chronic hip pain reasons including aspiration done in October of this year which showed staph epidermidis, sensitive to cephalosporins and  oxacillin.  The patient has received p.o.  Bactrim for the past ten days and was admitted on this occasion for explantation of the arthroplasty.   4.  Status post explantation of right total hip arthroplasty with poly exchange 12/28/2017.  No new hip cultures available.  Blood cultures negative to date.      RECOMMENDATIONS:   1.  Agree with IV Ancef given the sensitivities of the staph epidermidis.   2.  The patient will require six weeks of IV antibiotic therapy, likely followed by several months of oral antibiotic because of the poly exchange.      HISTORY OF PRESENT ILLNESS:  This is an 84-year-old male with original right total hip arthroplasty in 1991.  He suffered a periprosthetic fracture after a fall in 2001 and required revision of the arthroplasty.  He says that since that time he has had pain in the area of the hip.  More recently, he has seen Dr. Summers and has undergone a number of hip aspirations.  An aspiration from 10/02/2017 grew staph epidermidis sensitive to oxacillin.  Fluid showed 28,000 white blood cells, predominantly neutrophils.  The patient has been given oral Bactrim over the previous ten days and arrangements made to be admitted to the hospital for explantation of the arthroplasty.  This was done yesterday with poly exchange.  The patient denies accompanying fevers.  Blood cultures are negative at 1 day.      PAST MEDICAL HISTORY:   1.  Diabetes mellitus.   2.  Hypertension.   3.  Hyperlipidemia.   4.  Peripheral neuropathy.   5.  Second-degree AV block with pacemaker placement.   6.  BPH.   7.  Right total hip arthroplasty with revision.   8.  Diverticulosis.   9.  Bladder cancer with tumor excision 04/2017.      ALLERGIES:  None known.      SOCIAL HISTORY:  Lives at home.  Retired  of [employer].      FAMILY HISTORY:  Noncontributory.      REVIEW OF SYSTEMS:  Negative other than that described above.      PHYSICAL EXAMINATION:   VITAL SIGNS:  Temperature 97.9, blood pressure  133/68, heart rate 76 and regular.   GENERAL:  Well-developed, well-nourished, alert and oriented, does not look acutely ill.   SKIN:  No rashes or nodules.   HEENT:  Eyes:  No subconjunctival hemorrhages or scleral icterus.  Oropharynx without erythema or exudate.   NECK:  Supple, no thyromegaly.   LYMPH:  No cervical or axillary lymphadenopathy.   LUNGS:  Clear to auscultation, no use of accessory muscles of respiration.   COR:  S1, S2 normal, no S3, S4 or murmur.   ABDOMEN:  Soft, nontender, no mass or hepatosplenomegaly.   EXTREMITIES:  Right hip is surgically dressed and was not examined.  No calf tenderness or pedal edema.      For further details of the patient's history, please refer to the chart.  Thank you very much for this consultation.         EDUARDO CARRILLO MD             D: 2017 09:19   T: 2017 09:31   MT: #[id number]      Name:     FRANKIE THOMPSON   MRN:      [id number]        Account:       [id number]   :      1933           Consult Date:  2017      Document: [id number]       cc: Cayetano Vasques MD   [SO1.1]   Revision History        User Key Date/Time User Provider Type Action    > SO1.1 2017 11:56 AM Eduardo Carrillo MD Physician Sign            Consults by Eduardo Carrillo MD at 2017  9:21 AM     Author:  Eduardo Carrillo MD Service:  (none) Author Type:  Physician    Filed:  2017  9:21 AM Date of Service:  2017  9:21 AM Creation Time:  2017  9:20 AM    Status:  Signed :  Eduardo Carrillo MD (Physician)         ID consult dictated.  Infected R MATTY.  Cont Ancef.  Will need 6 wks IV ab.[SO1.1]     Revision History        User Key Date/Time User Provider Type Action    > SO1.1 2017  9:21 AM Eduardo Carrillo MD Physician Sign            Consults by Kirk Summers MD at 2017  1:15 PM     Author:  Kirk Summers MD Service:  (none) Author Type:  Physician    Filed:  2017  4:44 PM Date of  Service:  2017  1:15 PM Creation Time:  2017  4:44 PM    Status:  Signed :  Kirk Summers MD (Physician)         Septic  Right  Total hip   Sub acute/ chronic[JT1.1]     Revision History        User Key Date/Time User Provider Type Action    > JT1.1 2017  4:44 PM Kirk Summers MD Physician Sign                     Progress Notes - Physician (Notes from 17 through 18)      Progress Notes by Kirk Summers MD at 2018 12:18 PM     Author:  Kirk Summers MD Service:  (none) Author Type:  Physician    Filed:  2018 12:20 PM Date of Service:  2018 12:18 PM Creation Time:  2018 12:18 PM    Status:  Signed :  Kirk Summers MD (Physician)         Mark Hernández  2018  POD # ?    Doing well.  Pain well-controlled.  Tolerating physical therapy and rehabilitation well.  independent  Temperatures:  Current - Temp: 97.8  F (36.6  C); Max - Temp  Av.1  F (36.7  C)  Min: 97.4  F (36.3  C)  Max: 98.8  F (37.1  C)  Pulse range: No Data Recorded  Blood pressure range: Systolic (24hrs), Av , Min:115 , Max:127   ; Diastolic (24hrs), Av, Min:58, Max:72    CMS:    intact  Labs:   Results for orders placed or performed during the hospital encounter of 17 (from the past 24 hour(s))   Glucose by meter   Result Value Ref Range    Glucose 155 (H) 70 - 99 mg/dL   Glucose by meter   Result Value Ref Range    Glucose 182 (H) 70 - 99 mg/dL   Glucose by meter   Result Value Ref Range    Glucose 255 (H) 70 - 99 mg/dL   Glucose by meter   Result Value Ref Range    Glucose 183 (H) 70 - 99 mg/dL   Glucose by meter   Result Value Ref Range    Glucose 156 (H) 70 - 99 mg/dL       PLAN:  Discharge plan: Skilled Nursing Facility   Due  To cost  Of  Antibiotics,  Otherwise  He  Would be ready  To go home  Today.[JT1.1]       Revision History        User Key Date/Time User Provider Type Action    > JT1.1 2018 12:20 PM Kirk Summers MD Physician Sign             Progress Notes by Bhavin Mccoy MD at 1/2/2018  9:20 AM     Author:  Bhavin Mccoy MD Service:  Infectious Disease Author Type:  Physician    Filed:  1/2/2018  9:21 AM Date of Service:  1/2/2018  9:20 AM Creation Time:  1/2/2018  9:20 AM    Status:  Signed :  Bhavin Mccoy MD (Physician)         [facility]  Infectious Disease Progress Note          Assessment and Plan:   IMPRESSION:   1.  Mark Hernández, 84-year-old male with diabetes mellitus.   2.  History of right total hip arthroplasty, originally placed in 1991.  The patient required revision arthroplasty in 2001 because of a fall and a periprosthetic fracture.   3.  Hip aspirations done for chronic hip pain reasons including aspiration done in October of this year which showed staph epidermidis, sensitive to cephalosporins and oxacillin.  The patient has received p.o.  Bactrim for the past ten days and was admitted on this occasion for explantation of the arthroplasty.   4.  Status post explantation of right total hip arthroplasty with poly exchange 12/28/2017.  No new hip cultures available.  Blood cultures negative to date.       RECOMMENDATIONS:   1.  Continue IV Ancef given the sensitivities of the staph epidermidis, sl risk of heterogenous population but not worth vanco toxicity, definitely add po Rif for full course   2.  The patient will require six weeks of IV antibiotic therapy, likely followed by several months of oral antibiotic because of the poly exchange. Likely po minocin 100 bid and Rif for 3 mos then po minocin or other for life  3. Have pt f/u in office with Dr. Mccoy in 4  wks.  4. Orders entered for 6 wks of IV Ancef.        Interval History:   PICC placed.  Afebrile.  Blood cxs neg.  No new surgical cxs. Doing well flynn Rif, disposition in process              Medications:       rifampin  300 mg Oral Q12H VERNON     sodium chloride (PF)  10 mL Intracatheter Q8H     ferrous gluconate  324 mg Oral  "Daily with breakfast     senna-docusate  1-2 tablet Oral BID     ceFAZolin  2 g Intravenous Q8H     enoxaparin  40 mg Subcutaneous Q24H     Polyethylene Glycol 400  1 drop Both Eyes BID     insulin aspart  1-10 Units Subcutaneous TID AC     insulin aspart  1-7 Units Subcutaneous At Bedtime     DULoxetine  30 mg Oral Daily     gabapentin  800 mg Oral BID     simvastatin  20 mg Oral At Bedtime     tamsulosin  0.8 mg Oral At Bedtime                  Physical Exam:   Blood pressure 115/63, pulse 82, temperature 97.8  F (36.6  C), temperature source Oral, resp. rate 16, height 1.778 m (5' 10\"), weight 89.4 kg (197 lb), SpO2 94 %.  [unfilled]  Vital Signs with Ranges  Temp:  [97.4  F (36.3  C)-98.8  F (37.1  C)] 97.8  F (36.6  C)  Pulse:  [82] 82  Heart Rate:  [80-87] 80  Resp:  [16] 16  BP: (114-127)/(58-72) 115/63  SpO2:  [94 %-98 %] 94 %    Constitutional: Awake, alert, cooperative, no apparent distress   Lungs: Clear to auscultation bilaterally, no crackles or wheezing   Cardiovascular: Regular rate and rhythm, normal S1 and S2, and no murmur noted   Abdomen: Normal bowel sounds, soft, non-distended, non-tender   Skin: No rashes, no cyanosis, no edema   Other:           Data:   All microbiology laboratory data reviewed.  Recent Labs   Lab Test  12/31/17   0524  12/30/17   0715  12/29/17   0655   WBC  8.3  7.3  8.7   HGB  9.1*  9.1*  9.9*   HCT  27.8*  28.5*  31.2*   MCV  89  89  91   PLT  180  158  169     Recent Labs   Lab Test  01/01/18   0610  12/31/17   0524  12/30/17   0715   CR  1.11  1.00  1.24     Recent Labs   Lab Test  12/31/17   0524   SED  80*[SD1.1]        Revision History        User Key Date/Time User Provider Type Action    > SD1.1 1/2/2018  9:21 AM Bhavin Mccoy MD Physician Sign            Progress Notes by Kirk Summers MD at 1/2/2018  8:27 AM     Author:  Kirk Summers MD Service:  (none) Author Type:  Physician    Filed:  1/2/2018  8:27 AM Date of Service:  1/2/2018  8:27 AM Creation " Time:  1/2/2018  8:27 AM    Status:  Signed :  Kirk Summers MD (Physician)         Ortho    Will need  To  Find  Any  nh  As  Cant  Reasonably  Keep him here.   NOT   [facility][JT1.1]     Revision History        User Key Date/Time User Provider Type Action    > JT1.1 1/2/2018  8:27 AM Kirk Summers MD Physician Sign            Progress Notes by Vanessa Inman LICSW at 1/1/2018  1:45 PM     Author:  Vanessa Inman LICSW Service:  (none) Author Type:      Filed:  1/1/2018  4:12 PM Date of Service:  1/1/2018  1:45 PM Creation Time:  1/1/2018  1:45 PM    Status:  Addendum :  Vanessa Inman LICSW ()         GLORIA  D:  [facility] and [facility].  Spoke with Tamara at [facility] -no vacancies until Wednesday or Thursday,   [facility] and any further availability is not know till their admission office re-opens on Tuesday.    [facility] has a semi-private room available and their nursing supervisor Sandra will assess patient.  Referral faxed to [phone][KH1.1]    Update:  Writer did not hear back from nursing supervisor and when writer called [facility] back the  evening supervisor was on duty and he did not have the faxed referral.    Writer has left a message for [facility] admissions asking for a return call on Tuesday regarding availability.[KH1.2]  Spoke with patient and updated him.  Explained Tuesday SW will follow up with [facility] in the morning.  He prefers a private room and private room rates for [facility] and [facility] were discussed.  In regards to transportation, he reports his wife will transport if staff can assist with transfers in and out of the car.[KH1.3]      Revision History        User Key Date/Time User Provider Type Action    > KH1.3 1/1/2018  4:12 PM Vanessa Inman LICSW  Addend     KH1.2 1/1/2018  3:38 PM Vanessa Inman LICSW  Addend      KH1.1 1/1/2018  1:51 PM Vanessa Inman, [facility]  Sign            Progress Notes by Rafael Steven MD at 1/1/2018 11:06 AM     Author:  Rafael Steven MD Service:  Hospitalist Author Type:  Physician    Filed:  1/1/2018 11:07 AM Date of Service:  1/1/2018 11:06 AM Creation Time:  1/1/2018 11:06 AM    Status:  Signed :  Rafael Steven MD (Physician)         [facility]    Hospitalist Progress Note    Assessment & Plan   Mr. Mark Hernández is an 84-year-old male with past medical history significant for diabetes, hypertension, dyslipidemia and peripheral neuropathy pacemaker for atrioventricular block, benign prostatic hyperplasia, right total hip arthroplasty, diverticulosis who is admitted to orthopedics for infected right total hip arthroplasty and hospitalist requested consultation for acute renal failure and medical management.     Infected right total hip arthroplasty.    -Status post I/D and placement of abx beads, poly exchange on 12/28  -antibiotics per Infectious disease  -routine postoperative care per orthopedic surgery     Acute kidney injury:  -His baseline creatinine is around 1 as of 09/2017.    -Presented with a creatinine of 2.02  -prerenal and probably worsened by a recent use of naproxen and the continued use of metformin and lisinopril.   -Continue to hold lisinopril  -blood pressure adequately controlled at this time  -renal function improved     Diabetes mellitus.    -Continue to hold metformin.    -sliding scale insulin.   -Adequate glycemic control at the moment    Hypertension.    -Adequately controlled  -hold lisinopril as above    Dyslipidemia.    -Continue simvastatin.     Peripheral neuropathy.    -continue Neurontin and Cymbalta.     Benign prostatic hyperplasia.    -Continue with Flomax.       D/W: RN  DVT Prophylaxis: Defer to primary service  Code Status: No Order    Disposition: Expected discharge per ortho    Rafael Steven  MD    Interval History     Renal function stable. Denies new complaints except constiption      -Data reviewed today: I reviewed all new labs and imaging results over the last 24 hours. I personally reviewed no images or EKG's today.    Physical Exam   Temp: 98.3  F (36.8  C) Temp src: Oral BP: 116/61 Pulse: 81 Heart Rate: 79 Resp: 16 SpO2: 98 % O2 Device: None (Room air)    Vitals:    12/28/17 0356   Weight: 89.4 kg (197 lb)     Vital Signs with Ranges  Temp:  [97.8  F (36.6  C)-99.3  F (37.4  C)] 98.3  F (36.8  C)  Pulse:  [81] 81  Heart Rate:  [] 79  Resp:  [16] 16  BP: (103-131)/(56-75) 116/61  SpO2:  [96 %-98 %] 98 %  I/O last 3 completed shifts:  In: 200 [P.O.:200]  Out: 1775 [Urine:1775]    Constitutional: AAOX3, NAD, Appears comfortable  Respiratory:  CTA B/L, Normal WOB  Cardiovascular: RRR, No murmur  GI: Soft, Non- tender, BS- normoactive  Skin/Integument: Warm and dry, no rashes  MSK: rt hip incision bandaged  Neuro: CN- grossly intact    Medications        rifampin  300 mg Oral Q12H VERNON     sodium chloride (PF)  10 mL Intracatheter Q8H     ferrous gluconate  324 mg Oral Daily with breakfast     senna-docusate  1-2 tablet Oral BID     ceFAZolin  2 g Intravenous Q8H     enoxaparin  40 mg Subcutaneous Q24H     Polyethylene Glycol 400  1 drop Both Eyes BID     insulin aspart  1-10 Units Subcutaneous TID AC     insulin aspart  1-7 Units Subcutaneous At Bedtime     DULoxetine  30 mg Oral Daily     gabapentin  800 mg Oral BID     simvastatin  20 mg Oral At Bedtime     tamsulosin  0.8 mg Oral At Bedtime       Data     Recent Labs  Lab 01/01/18  0610 12/31/17  0524 12/30/17  0715 12/29/17  0655  12/27/17 2020   WBC  --  8.3 7.3 8.7  < > 8.7   HGB  --  9.1* 9.1* 9.9*  < > 11.5*   MCV  --  89 89 91  < > 89   PLT  --  180 158 169  < > 200   INR  --   --   --   --   --  1.11    138 139 137  < > 138   POTASSIUM 4.2 4.0 4.0 4.9  < > 4.8   CHLORIDE 103 104 105 105  < > 105   CO2 30 31 28 27  < > 26   BUN  33* 25 26 27  < > 50*   CR 1.11 1.00 1.24 1.29*  < > 2.02*   ANIONGAP 5 3 6 5  < > 7   ESAU 9.0 8.8 8.5 8.1*  < > 8.7   * 144* 147* 141*  < > 144*   < > = values in this interval not displayed.    No results found for this or any previous visit (from the past 24 hour(s)).[NB1.1]     Revision History        User Key Date/Time User Provider Type Action    > NB1.1 1/1/2018 11:07 AM Rafael Steven MD Physician Sign            Progress Notes by Bhavin Mccoy MD at 1/1/2018 10:30 AM     Author:  Bhavin Mccoy MD Service:  Infectious Disease Author Type:  Physician    Filed:  1/1/2018 10:32 AM Date of Service:  1/1/2018 10:30 AM Creation Time:  1/1/2018 10:30 AM    Status:  Signed :  Bhavin Mccoy MD (Physician)         [facility]  Infectious Disease Progress Note          Assessment and Plan:   IMPRESSION:   1.  Mark Hernández, 84-year-old male with diabetes mellitus.   2.  History of right total hip arthroplasty, originally placed in 1991.  The patient required revision arthroplasty in 2001 because of a fall and a periprosthetic fracture.   3.  Hip aspirations done for chronic hip pain reasons including aspiration done in October of this year which showed staph epidermidis, sensitive to cephalosporins and oxacillin.  The patient has received p.o.  Bactrim for the past ten days and was admitted on this occasion for explantation of the arthroplasty.   4.  Status post explantation of right total hip arthroplasty with poly exchange 12/28/2017.  No new hip cultures available.  Blood cultures negative to date.       RECOMMENDATIONS:   1.  Continue IV Ancef given the sensitivities of the staph epidermidis, sl risk of heterogenous population but not worth vanco toxicity, definitely add po Rif for full course   2.  The patient will require six weeks of IV antibiotic therapy, likely followed by several months of oral antibiotic because of the poly exchange. Likely po minocin 100 bid  "and Rif for 3 mos then po minocin or other for life  3. Have pt f/u in office with Dr. Mccoy in 4  wks.  4. Orders entered for 6 wks of IV Ancef.        Interval History:   PICC placed.  Afebrile.  Blood cxs neg.  No new surgical cxs.              Medications:       rifampin  300 mg Oral Q12H VERNON     sodium chloride (PF)  10 mL Intracatheter Q8H     ferrous gluconate  324 mg Oral Daily with breakfast     senna-docusate  1-2 tablet Oral BID     ceFAZolin  2 g Intravenous Q8H     enoxaparin  40 mg Subcutaneous Q24H     Polyethylene Glycol 400  1 drop Both Eyes BID     insulin aspart  1-10 Units Subcutaneous TID AC     insulin aspart  1-7 Units Subcutaneous At Bedtime     DULoxetine  30 mg Oral Daily     gabapentin  800 mg Oral BID     simvastatin  20 mg Oral At Bedtime     tamsulosin  0.8 mg Oral At Bedtime                  Physical Exam:   Blood pressure 116/61, pulse 81, temperature 98.3  F (36.8  C), temperature source Oral, resp. rate 16, height 1.778 m (5' 10\"), weight 89.4 kg (197 lb), SpO2 98 %.  [unfilled]  Vital Signs with Ranges  Temp:  [97.8  F (36.6  C)-99.3  F (37.4  C)] 98.3  F (36.8  C)  Pulse:  [81] 81  Heart Rate:  [] 79  Resp:  [16] 16  BP: (103-131)/(56-75) 116/61  SpO2:  [96 %-98 %] 98 %    Constitutional: Awake, alert, cooperative, no apparent distress   Lungs: Clear to auscultation bilaterally, no crackles or wheezing   Cardiovascular: Regular rate and rhythm, normal S1 and S2, and no murmur noted   Abdomen: Normal bowel sounds, soft, non-distended, non-tender   Skin: No rashes, no cyanosis, no edema   Other:           Data:   All microbiology laboratory data reviewed.  Recent Labs   Lab Test  12/31/17   0524  12/30/17   0715  12/29/17   0655   WBC  8.3  7.3  8.7   HGB  9.1*  9.1*  9.9*   HCT  27.8*  28.5*  31.2*   MCV  89  89  91   PLT  180  158  169     Recent Labs   Lab Test  01/01/18   0610  12/31/17   0524  12/30/17   0715   CR  1.11  1.00  1.24     Recent Labs   Lab Test  12/31/17   " "0524   SED  80*[SD1.1]        Revision History        User Key Date/Time User Provider Type Action    > SD1.1 2018 10:32 AM Bhavin Mccoy MD Physician Sign            Progress Notes by Napoleon Munguia PA-C at 2018  8:54 AM     Author:  Napoleon Munguia PA-C Service:  Orthopedics Author Type:  Physician Assistant - C    Filed:  2018  8:57 AM Date of Service:  2018  8:54 AM Creation Time:  2018  8:54 AM    Status:  Signed :  Napoleon Munguia PA-C (Physician Assistant - C)         Mark SAMANIEGO Edgar  2018  Patient s/p Rev and Irrigation and debridement of RTHA: stable  Admit Date:  2017      Doing well.  Objective: patient states he is doing well overall. No complaints of chest pain or shortness of breath.   Ready to work with therapy and plans on discharging to a TCU tomorrow. Pain well controlled with oral pain medications.   Blood pressure 116/61, pulse 81, temperature 98.3  F (36.8  C), temperature source Oral, resp. rate 16, height 1.778 m (5' 10\"), weight 89.4 kg (197 lb), SpO2 98 %.    Temperatures:  Current - Temp: 98.3  F (36.8  C); Max - Temp  Av.6  F (37  C)  Min: 97.8  F (36.6  C)  Max: 99.3  F (37.4  C)  Pulse range: Pulse  Av  Min: 81  Max: 81  Blood pressure range: Systolic (24hrs), Av , Min:103 , Max:131   ; Diastolic (24hrs), Av, Min:56, Max:75    Exam:  CMS: intact  alert, stable, wound ok  Dressing c/d/i  picc line site well maintained and clean  Calf s/nt in RLE  Communicates clearly with examiner    Labs:  Recent Labs   Lab Test  18   0610  17   0524  17   0715   POTASSIUM  4.2  4.0  4.0     Recent Labs   Lab Test  17   0524  17   0715  17   0655   HGB  9.1*  9.1*  9.9*     Recent Labs   Lab Test  17   INR  1.11     Recent Labs   Lab Test  17   0524  17   0715  17   0655   PLT  180  158  169       PLAN: continue current dvt ppx regimen.   Continue current oral " pain medications  Continue IV abx per infectious disease recommendation: IV ancef  Plan on discharge to TCU tomorrow.   Continue current hip precautions as well and use ambulatory assistance at all times.[NH1.1]        Revision History        User Key Date/Time User Provider Type Action    > NH1.1 1/1/2018  8:57 AM Napoleon Munguia PA-C Physician Assistant - C Sign            Progress Notes by Rafael Steven MD at 12/31/2017  2:48 PM     Author:  Rafael Steven MD Service:  Hospitalist Author Type:  Physician    Filed:  12/31/2017  2:50 PM Date of Service:  12/31/2017  2:48 PM Creation Time:  12/31/2017  2:48 PM    Status:  Signed :  Rafael Steven MD (Physician)         [facility]    Hospitalist Progress Note    Assessment & Plan   Mr. Mark Hernández is an 84-year-old male with past medical history significant for diabetes, hypertension, dyslipidemia and peripheral neuropathy pacemaker for atrioventricular block, benign prostatic hyperplasia, right total hip arthroplasty, diverticulosis who is admitted to orthopedics for infected right total hip arthroplasty and hospitalist requested consultation for acute renal failure and medical management.     Infected right total hip arthroplasty.    -Status post I/D and placement of abx beads, poly exchange on 12/28  -antibiotics per Infectious disease  -routine postoperative care per orthopedic surgery     Acute kidney injury:  -His baseline creatinine is around 1 as of 09/2017.    -Presented with a creatinine of 2.02  -prerenal and probably worsened by a recent use of naproxen and the continued use of metformin and lisinopril.   -Continue to hold lisinopril  -blood pressure adequately controlled at this time  -renal function improved     Diabetes mellitus.    -Continue to hold metformin.    -sliding scale insulin.   -Adequate glycemic control at the moment    Hypertension.    -Adequately controlled  -hold lisinopril     Dyslipidemia.     -Continue simvastatin.     Peripheral neuropathy.    -continue Neurontin and Cymbalta.     Benign prostatic hyperplasia.    -Continue with Flomax.       D/W: RN  DVT Prophylaxis: Defer to primary service  Code Status: No Order    Disposition: Expected discharge per ortho    Rafael Steven MD    Interval History     Renal function improved. Patient denies new complaints. Blood sugars adequately controlled.      -Data reviewed today: I reviewed all new labs and imaging results over the last 24 hours. I personally reviewed no images or EKG's today.    Physical Exam   Temp: 97.8  F (36.6  C) Temp src: Oral BP: 131/75   Heart Rate: 100 Resp: 16 SpO2: 96 % O2 Device: None (Room air)    Vitals:    12/28/17 0356   Weight: 89.4 kg (197 lb)     Vital Signs with Ranges  Temp:  [97.8  F (36.6  C)-99  F (37.2  C)] 97.8  F (36.6  C)  Heart Rate:  [] 100  Resp:  [16-18] 16  BP: (112-131)/(59-75) 131/75  SpO2:  [94 %-96 %] 96 %  I/O last 3 completed shifts:  In: 640 [P.O.:640]  Out: 2450 [Urine:2450]    Constitutional: AAOX3, NAD, Appears comfortable  Respiratory:  CTA B/L, Normal WOB  Cardiovascular: RRR, No murmur  GI: Soft, Non- tender, BS- normoactive  Skin/Integument: Warm and dry, no rashes  MSK: rt hip incision bandaged  Neuro: CN- grossly intact    Medications        sodium chloride (PF)  10 mL Intracatheter Q8H     ferrous gluconate  324 mg Oral Daily with breakfast     senna-docusate  1-2 tablet Oral BID     ceFAZolin  2 g Intravenous Q8H     sodium chloride (PF)  3 mL Intracatheter Q8H     enoxaparin  40 mg Subcutaneous Q24H     Polyethylene Glycol 400  1 drop Both Eyes BID     insulin aspart  1-10 Units Subcutaneous TID AC     insulin aspart  1-7 Units Subcutaneous At Bedtime     DULoxetine  30 mg Oral Daily     gabapentin  800 mg Oral BID     simvastatin  20 mg Oral At Bedtime     tamsulosin  0.8 mg Oral At Bedtime       Data     Recent Labs  Lab 12/31/17  0524 12/30/17  0715 12/29/17  0655  12/27/17 2020  "  WBC 8.3 7.3 8.7  < > 8.7   HGB 9.1* 9.1* 9.9*  < > 11.5*   MCV 89 89 91  < > 89    158 169  < > 200   INR  --   --   --   --  1.11    139 137  < > 138   POTASSIUM 4.0 4.0 4.9  < > 4.8   CHLORIDE 104 105 105  < > 105   CO2 31 28 27  < > 26   BUN 25 26 27  < > 50*   CR 1.00 1.24 1.29*  < > 2.02*   ANIONGAP 3 6 5  < > 7   ESAU 8.8 8.5 8.1*  < > 8.7   * 147* 141*  < > 144*   < > = values in this interval not displayed.    No results found for this or any previous visit (from the past 24 hour(s)).[NB1.1]     Revision History        User Key Date/Time User Provider Type Action    > NB1.1 2017  2:50 PM Rafael Steven MD Physician Sign            Progress Notes by Zeina Escalante MD at 2017 11:04 AM     Author:  Zeina Escalante MD Service:  Orthopedics Author Type:  Physician    Filed:  2017 11:06 AM Date of Service:  2017 11:04 AM Creation Time:  2017 11:04 AM    Status:  Signed :  Zeina Escalante MD (Physician)         Mark Harrisonaman  2017  POD # 3 sp revision joao    Admit Date:  2017      Doing well.  Clean wound without signs of infection.  Normal healing wound.  No immediate surgical complications identified.  No excessive bleeding  Pain well-controlled.  Objective:  Blood pressure 129/66, pulse 66, temperature 98  F (36.7  C), resp. rate 16, height 1.778 m (5' 10\"), weight 89.4 kg (197 lb), SpO2 96 %.    Temperatures:  Current - Temp: 98  F (36.7  C); Max - Temp  Av.4  F (36.9  C)  Min: 98  F (36.7  C)  Max: 99  F (37.2  C)  Pulse range: No Data Recorded  Blood pressure range: Systolic (24hrs), Av , Min:111 , Max:129   ; Diastolic (24hrs), Av, Min:59, Max:66    Exam:  CMS: intact  alert, stable, wound ok  Calf nontender b le.       Labs:  Recent Labs   Lab Test  17   0524  17   0715  17   0655   POTASSIUM  4.0  4.0  4.9     Recent Labs   Lab Test  17   0524  17   0715  17   0655   HGB  " 9.1*  9.1*  9.9*     Recent Labs   Lab Test  12/27/17   2020   INR  1.11     Recent Labs   Lab Test  12/31/17   0524  12/30/17   0715  12/29/17   0655   PLT  180  158  169       PLAN: Weight bearing as tolerated  Continue physical therapy  Pain control measures  Additional problems to be followed by medicine physician  Dc to rehab when bed is available.[LV1.1]          Revision History        User Key Date/Time User Provider Type Action    > LV1.1 12/31/2017 11:06 AM Zeina Escalante MD Physician Sign            Progress Notes by Eduardo Guido MD at 12/31/2017  8:28 AM     Author:  Eduardo Guido MD Service:  (none) Author Type:  Physician    Filed:  12/31/2017  8:30 AM Date of Service:  12/31/2017  8:28 AM Creation Time:  12/31/2017  8:28 AM    Status:  Signed :  Eduardo Guido MD (Physician)         [facility]  Infectious Disease Progress Note          Assessment and Plan:   IMPRESSION:   1.  Mark Hernández, 84-year-old male with diabetes mellitus.   2.  History of right total hip arthroplasty, originally placed in 1991.  The patient required revision arthroplasty in 2001 because of a fall and a periprosthetic fracture.   3.  Hip aspirations done for chronic hip pain reasons including aspiration done in October of this year which showed staph epidermidis, sensitive to cephalosporins and oxacillin.  The patient has received p.o.  Bactrim for the past ten days and was admitted on this occasion for explantation of the arthroplasty.   4.  Status post explantation of right total hip arthroplasty with poly exchange 12/28/2017.  No new hip cultures available.  Blood cultures negative to date.       RECOMMENDATIONS:   1.  Agree with IV Ancef given the sensitivities of the staph epidermidis.   2.  The patient will require six weeks of IV antibiotic therapy, likely followed by several months of oral antibiotic because of the poly exchange.   3. Have pt f/u in office  "with either Dr. Broussard or Dr. Mccoy in 2-3 wks.  4. Orders entered for 6 wks of IV Ancef.        Interval History:   PICC placed.  Afebrile.  Blood cxs neg.  No new surgical cxs.              Medications:       sodium chloride (PF)  10 mL Intracatheter Q8H     ferrous gluconate  324 mg Oral Daily with breakfast     senna-docusate  1-2 tablet Oral BID     ceFAZolin  2 g Intravenous Q8H     sodium chloride (PF)  3 mL Intracatheter Q8H     enoxaparin  40 mg Subcutaneous Q24H     acetaminophen  975 mg Oral Q8H     Polyethylene Glycol 400  1 drop Both Eyes BID     insulin aspart  1-10 Units Subcutaneous TID AC     insulin aspart  1-7 Units Subcutaneous At Bedtime     DULoxetine  30 mg Oral Daily     gabapentin  800 mg Oral BID     simvastatin  20 mg Oral At Bedtime     tamsulosin  0.8 mg Oral At Bedtime                  Physical Exam:   Blood pressure 129/66, pulse 66, temperature 98  F (36.7  C), resp. rate 16, height 1.778 m (5' 10\"), weight 89.4 kg (197 lb), SpO2 96 %.  [unfilled]  Vital Signs with Ranges  Temp:  [98  F (36.7  C)-99  F (37.2  C)] 98  F (36.7  C)  Heart Rate:  [81-97] 81  Resp:  [16-18] 16  BP: (111-129)/(59-66) 129/66  SpO2:  [94 %-98 %] 96 %    Constitutional: Awake, alert, cooperative, no apparent distress   Lungs: Clear to auscultation bilaterally, no crackles or wheezing   Cardiovascular: Regular rate and rhythm, normal S1 and S2, and no murmur noted   Abdomen: Normal bowel sounds, soft, non-distended, non-tender   Skin: No rashes, no cyanosis, no edema   Other:           Data:   All microbiology laboratory data reviewed.  Recent Labs   Lab Test  12/31/17   0524 12/30/17   0715  12/29/17   0655   WBC  8.3  7.3  8.7   HGB  9.1*  9.1*  9.9*   HCT  27.8*  28.5*  31.2*   MCV  89  89  91   PLT  180  158  169     Recent Labs   Lab Test  12/31/17   0524  12/30/17   0715  12/29/17   0655   CR  1.00  1.24  1.29*     Recent Labs   Lab Test  12/31/17   0524   SED  80*[SO1.1]        Revision History        " User Key Date/Time User Provider Type Action    > SO1.1 12/31/2017  8:30 AM Eduardo Guido MD Physician Sign            Progress Notes by Vanessa Inman LICSW at 12/30/2017  2:53 PM     Author:  Vanessa Inman LICSW Service:  (none) Author Type:      Filed:  12/30/2017  2:54 PM Date of Service:  12/30/2017  2:53 PM Creation Time:  12/30/2017  2:53 PM    Status:  Signed :  Vanessa Inman LICSW ()         SW  On Sunday,  will [facility] [place].[KH1.1]      Revision History        User Key Date/Time User Provider Type Action    > KH1.1 12/30/2017  2:54 PM Vanessa Inman LICSW  Sign            Progress Notes by Rafael Steven MD at 12/30/2017  1:37 PM     Author:  Rafael Steven MD Service:  Hospitalist Author Type:  Physician    Filed:  12/30/2017  1:43 PM Date of Service:  12/30/2017  1:37 PM Creation Time:  12/30/2017  1:37 PM    Status:  Signed :  Rafael Steven MD (Physician)         [facility]    Hospitalist Progress Note    Assessment & Plan    Mr. Mark Hernández is an 84-year-old male with past medical history significant for diabetes, hypertension, dyslipidemia and peripheral neuropathy pacemaker for atrioventricular block, benign prostatic hyperplasia, right total hip arthroplasty, diverticulosis who is admitted to orthopedics for infected right total hip arthroplasty and hospitalist requested consultation for acute renal failure and medical management.     Infected right total hip arthroplasty.    -Status post I/D and placement of abx beads, poly exchange on 12/28  -antibiotics. Infectious disease  -routine postoperative care per orthopedic surgery     Acute kidney injury:  -His baseline creatinine is around 1 as of 09/2017.    -Presented with a creatinine of 2.02  -prerenal and probably worsened by a recent use of naproxen and the continued use of metformin and  lisinopril.   -Continue to hold lisinopril  -renal function improving     Diabetes mellitus.    -Continue to hold metformin.    -sliding scale insulin.   -Adequate glycemic control at the moment    Hypertension.    -Adequately controlled  -hold lisinopril     Dyslipidemia.    -Continue with simvastatin.     Peripheral neuropathy.    -continue Neurontin and Cymbalta.     Benign prostatic hyperplasia.    -Continue with Flomax.       D/W: RN  DVT Prophylaxis: Defer to primary service  Code Status: No Order    Disposition: Expected discharge per ortho    Rafael Steven MD    Interval History   renal function continues to improve. Pain well controlled. No dyspnea    -Data reviewed today: I reviewed all new labs and imaging results over the last 24 hours. I personally reviewed no images or EKG's today.    Physical Exam   Temp: 97.5  F (36.4  C) Temp src: Oral BP: 111/66   Heart Rate: 86 Resp: 18 SpO2: 98 % O2 Device: None (Room air)    Vitals:    12/28/17 0356   Weight: 89.4 kg (197 lb)     Vital Signs with Ranges  Temp:  [97.5  F (36.4  C)-99.4  F (37.4  C)] 97.5  F (36.4  C)  Heart Rate:  [] 86  Resp:  [16-18] 18  BP: (111-139)/(63-76) 111/66  SpO2:  [94 %-100 %] 98 %  I/O last 3 completed shifts:  In: 2306 [P.O.:880; I.V.:1426]  Out: 3075 [Urine:2975; Drains:100]    Constitutional: AAOX3, NAD, Appears comfortable  Respiratory:  No crackles, No wheezes, CTA B/L, Normal WOB  Cardiovascular: RRR, No murmur  GI: Soft, Non- tender, BS- normoactive  Skin/Integument: Warm and dry, no rashes  MSK: rt hip incision bandaged  Neuro: CN- grossly intact    Medications        sodium chloride (PF)  10 mL Intracatheter Q8H     [START ON 12/31/2017] ferrous gluconate  324 mg Oral Daily with breakfast     senna-docusate  1-2 tablet Oral BID     ceFAZolin  2 g Intravenous Q8H     sodium chloride (PF)  3 mL Intracatheter Q8H     enoxaparin  40 mg Subcutaneous Q24H     acetaminophen  975 mg Oral Q8H     Polyethylene Glycol 400  1  drop Both Eyes BID     insulin aspart  1-10 Units Subcutaneous TID AC     insulin aspart  1-7 Units Subcutaneous At Bedtime     DULoxetine  30 mg Oral Daily     gabapentin  800 mg Oral BID     simvastatin  20 mg Oral At Bedtime     tamsulosin  0.8 mg Oral At Bedtime       Data     Recent Labs  Lab 17  0715 17  0655 17  1910 17  0708 17   WBC 7.3 8.7 9.7  --  8.7   HGB 9.1* 9.9* 11.1*  --  11.5*   MCV 89 91 90  --  89    169 202  --  200   INR  --   --   --   --  1.11    137  --  139 138   POTASSIUM 4.0 4.9  --  4.8 4.8   CHLORIDE 105 105  --  108 105   CO2 28 27  --  29 26   BUN 26 27  --  43* 50*   CR 1.24 1.29* 1.39* 1.66* 2.02*   ANIONGAP 6 5  --  2* 7   ESAU 8.5 8.1*  --  8.7 8.7   * 141*  --  146* 144*       No results found for this or any previous visit (from the past 24 hour(s)).[NB1.1]     Revision History        User Key Date/Time User Provider Type Action    > NB1.1 2017  1:43 PM Rafael Steven MD Physician Sign            Progress Notes by Kirk Summers MD at 2017 12:10 PM     Author:  Kirk Summers MD Service:  (none) Author Type:  Physician    Filed:  2017 12:11 PM Date of Service:  2017 12:10 PM Creation Time:  2017 12:10 PM    Status:  Signed :  Kirk Summers MD (Physician)         Mark F Edgar  2017  POD #2    Doing well.  Clean wound without signs of infection.  No immediate surgical complications identified.  No excessive bleeding  Pain well-controlled.  Tolerating physical therapy and rehabilitation well.  Temperatures:  Current - Temp: 97.5  F (36.4  C); Max - Temp  Av.5  F (36.9  C)  Min: 97.5  F (36.4  C)  Max: 99.4  F (37.4  C)  Pulse range: No Data Recorded  Blood pressure range: Systolic (24hrs), Av , Min:121 , Max:152   ; Diastolic (24hrs), Av, Min:63, Max:83    CMS: intact  Labs:   Results for orders placed or performed during the hospital encounter of  12/27/17 (from the past 24 hour(s))   Glucose by meter   Result Value Ref Range    Glucose 177 (H) 70 - 99 mg/dL   Glucose by meter   Result Value Ref Range    Glucose 172 (H) 70 - 99 mg/dL   Glucose by meter   Result Value Ref Range    Glucose 161 (H) 70 - 99 mg/dL   Basic metabolic panel   Result Value Ref Range    Sodium 139 133 - 144 mmol/L    Potassium 4.0 3.4 - 5.3 mmol/L    Chloride 105 94 - 109 mmol/L    Carbon Dioxide 28 20 - 32 mmol/L    Anion Gap 6 3 - 14 mmol/L    Glucose 147 (H) 70 - 99 mg/dL    Urea Nitrogen 26 7 - 30 mg/dL    Creatinine 1.24 0.66 - 1.25 mg/dL    GFR Estimate 56 (L) >60 mL/min/1.7m2    GFR Estimate If Black 67 >60 mL/min/1.7m2    Calcium 8.5 8.5 - 10.1 mg/dL   CBC with platelets   Result Value Ref Range    WBC 7.3 4.0 - 11.0 10e9/L    RBC Count 3.20 (L) 4.4 - 5.9 10e12/L    Hemoglobin 9.1 (L) 13.3 - 17.7 g/dL    Hematocrit 28.5 (L) 40.0 - 53.0 %    MCV 89 78 - 100 fl    MCH 28.4 26.5 - 33.0 pg    MCHC 31.9 31.5 - 36.5 g/dL    RDW 14.0 10.0 - 15.0 %    Platelet Count 158 150 - 450 10e9/L       PLAN:should be ready  For tcu  Tomorrow  Has  Done well  Discharge plan: Skilled Nursing Facility    PICC line  today[JT1.1]       Revision History        User Key Date/Time User Provider Type Action    > JT1.1 12/30/2017 12:11 PM Kirk Summers MD Physician Sign            Progress Notes by Vanessa Inman LICSW at 12/30/2017 12:04 PM     Author:  Vanessa Inman LICSW Service:  (none) Author Type:      Filed:  12/30/2017 12:05 PM Date of Service:  12/30/2017 12:04 PM Creation Time:  12/30/2017 12:04 PM    Status:  Signed :  Vanessa Inman LICSW ()         SW  D:  Call out to [facility] and [facility] regarding placement possibly Sunday or Monday.[KH1.1]     Revision History        User Key Date/Time User Provider Type Action    > KH1.1 12/30/2017 12:05 PM [facility] Inman, [facility]  Sign            Progress Notes by  Eduardo Guido MD at 12/30/2017  9:31 AM     Author:  Eduardo Guido MD Service:  (none) Author Type:  Physician    Filed:  12/30/2017  9:39 AM Date of Service:  12/30/2017  9:31 AM Creation Time:  12/30/2017  9:31 AM    Status:  Signed :  Eduardo Guido MD (Physician)         [facility]  Infectious Disease Progress Note          Assessment and Plan:   IMPRESSION:   1.  Mark Hernández, 84-year-old male with diabetes mellitus.   2.  History of right total hip arthroplasty, originally placed in 1991.  The patient required revision arthroplasty in 2001 because of a fall and a periprosthetic fracture.   3.  Hip aspirations done for chronic hip pain reasons including aspiration done in October of this year which showed staph epidermidis, sensitive to cephalosporins and oxacillin.  The patient has received p.o.  Bactrim for the past ten days and was admitted on this occasion for explantation of the arthroplasty.   4.  Status post explantation of right total hip arthroplasty with poly exchange 12/28/2017.  No new hip cultures available.  Blood cultures negative to date.       RECOMMENDATIONS:   1.  Agree with IV Ancef given the sensitivities of the staph epidermidis.   2.  The patient will require six weeks of IV antibiotic therapy, likely followed by several months of oral antibiotic because of the poly exchange.   3. Will order PICC  4. Orders entered for 6 wks of IV Ancef.        Interval History:   Feels well.  No c/o.  Afebrile.  Blood cxs neg.  No new surgical cxs.              Medications:       ceFAZolin  2 g Intravenous Q8H     sodium chloride (PF)  3 mL Intracatheter Q8H     enoxaparin  40 mg Subcutaneous Q24H     acetaminophen  975 mg Oral Q8H     Polyethylene Glycol 400  1 drop Both Eyes BID     insulin aspart  1-10 Units Subcutaneous TID AC     insulin aspart  1-7 Units Subcutaneous At Bedtime     DULoxetine  30 mg Oral Daily     gabapentin  800 mg Oral BID  "    simvastatin  20 mg Oral At Bedtime     tamsulosin  0.8 mg Oral At Bedtime                  Physical Exam:   Blood pressure 122/63, pulse 66, temperature 97.5  F (36.4  C), resp. rate 16, height 1.778 m (5' 10\"), weight 89.4 kg (197 lb), SpO2 99 %.  [unfilled]  Vital Signs with Ranges  Temp:  [97.5  F (36.4  C)-99.4  F (37.4  C)] 97.5  F (36.4  C)  Heart Rate:  [] 80  Resp:  [16] 16  BP: (121-152)/(63-83) 122/63  SpO2:  [94 %-100 %] 99 %    Constitutional: Awake, alert, cooperative, no apparent distress   Lungs: Clear to auscultation bilaterally, no crackles or wheezing   Cardiovascular: Regular rate and rhythm, normal S1 and S2, and no murmur noted   Abdomen: Normal bowel sounds, soft, non-distended, non-tender   Skin: No rashes, no cyanosis, no edema   Other:           Data:   All microbiology laboratory data reviewed.  Recent Labs   Lab Test  12/30/17   0715  12/29/17   0655  12/28/17   1910   WBC  7.3  8.7  9.7   HGB  9.1*  9.9*  11.1*   HCT  28.5*  31.2*  34.5*   MCV  89  91  90   PLT  158  169  202     Recent Labs   Lab Test  12/30/17   0715  12/29/17   0655  12/28/17   1910   CR  1.24  1.29*  1.39*     Recent Labs   Lab Test  12/27/17   2020   SED  38*[SO1.1]        Revision History        User Key Date/Time User Provider Type Action    > SO1.1 12/30/2017  9:39 AM Eduardo Guido MD Physician Sign                  Procedure Notes     No notes of this type exist for this encounter.      Progress Notes - Therapies (Notes from 12/30/17 through 01/02/18)     No notes of this type exist for this encounter.      "

## 2017-12-27 NOTE — PRE-PROCEDURE INSTRUCTIONS
Admit  For  Hydration  Due to elevation  Of the  Creatine    Has a  Spontaneous  Septic  Right hip  Hid  Internid=st  rec  admit

## 2017-12-27 NOTE — IP AVS SNAPSHOT
"` `           [place] [address] [facility]: [phone]                                              INTERAGENCY TRANSFER FORM - NURSING   2017                    Hospital Admission Date: 2017  FRANKIE THOMPSON   : 1933  Sex: Male        Attending Provider: Kirk Summers MD     Allergies:  No Known Allergies    Infection:  None   Service:  ORTHOPEDICS    Ht:  1.778 m (5' 10\")   Wt:  89.4 kg (197 lb)   Admission Wt:  89.4 kg (197 lb)    BMI:  28.27 kg/m 2   BSA:  2.1 m 2            Patient PCP Information     Provider PCP Type    Rebecca Noyola MD General      Current Code Status     This patient does not have a recorded code status. Please follow your organizational policy for patients in this situation.      Code Status History     This patient does not have a recorded code status. Please follow your organizational policy for patients in this situation.      Advance Directives        Does patient have a scanned Advance Directive/ACP document in EPIC?           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 ) 17 0817   Full except areas not inspected   Buttock, left;Buttock, right;Coccyx " "12/29/17 1037    GASTROINTESTINAL (ADULT,PEDIATRIC,OB)     Procedural focused assessment (identify areas inspected)   Occiput;Nose;Cheek, left;Cheek, right;Abdomen;Hip, right;Buttock, right;Buttock, left 12/28/17 1724    GI WDL  WDL 01/02/18 1308   Inspection under devices  Full 01/02/18 1308    All Quadrants Bowel Sounds  audible and active in all quadrants 01/02/18 1310   Skin WDL  ex 01/02/18 1308    Last Bowel Movement  01/01/18 01/02/18 0905   Skin Color/Characteristics  bruised (ecchymotic) 01/02/18 1308    GI Signs/Symptoms  constipation 01/01/18 1445   Skin Integrity  incision(s) 01/02/18 1308    Passing flatus  yes 01/02/18 1310   SAFETY      COMMUNICATION ASSESSMENT     Safety WDL  WDL 01/02/18 1308    Patient's communication style  spoken language (English or Bilingual) 12/27/17 2142   All Alarms  alarm(s) activated and audible 01/02/18 1310    FINAL RESOURCES                        Assessment WDL (Within Defined Limits) Definitions           Safety WDL     Effective: 09/28/15    Row Information: <b>WDL Definition:</b> Bed in low position, wheels locked; call light in reach; upper side rails up x 2; ID band on<br> <font color=\"gray\"><i>Item=AS safety wdl>>List=AS safety wdl>>Version=F14</i></font>      Skin WDL     Effective: 09/28/15    Row Information: <b>WDL Definition:</b> Warm; dry; intact; elastic; without discoloration; pressure points without redness<br> <font color=\"gray\"><i>Item=AS skin wdl>>List=AS skin wdl>>Version=F14</i></font>      Vitals     Vital Signs Flowsheet     VITAL SIGNS     Response to Interventions  Decrease in pain 01/02/18 0731    Temp  97.8  F (36.6  C) 01/02/18 0748   ANALGESIA SIDE EFFECTS MONITORING      Temp src  Oral 01/02/18 0748   Side Effects Monitoring: Respiratory Quality  R 01/02/18 0901    Resp  16 01/02/18 0748   Side Effects Monitoring: Respiratory Depth  N 01/02/18 0901    Pulse  82 01/01/18 1212   Side Effects Monitoring: Sedation Level  1 01/02/18 0901    " "Heart Rate  80 01/02/18 0748   HEIGHT AND WEIGHT      Pulse/Heart Rate Source  Monitor 01/02/18 0210   Height  1.778 m (5' 10\") 12/28/17 0401    BP  115/63 01/02/18 0748   Height Method  Stated 12/28/17 0401    BP Location  Left arm 01/02/18 0210   Weight  89.4 kg (197 lb) 12/28/17 0401    OXYGEN THERAPY     Weight Method  Bed scale 12/28/17 0401    SpO2  94 % 01/02/18 0748   Bed Scale  Standard (fitted sheet, draw sheet/ pad, cover/flat sheet, blanket, two pillows);Pillow (add comment for number);Fitted sheet;Draw sheet/ pad (add comment for number);[place] (add comment for number);Cover/flat sheet (add comment for number) (2 pillows, one blanket, one sheet) 12/28/17 0401    O2 Device  None (Room air) 01/02/18 0748   BSA (Calculated - sq m)  2.1 12/28/17 0401    Oxygen Delivery  2 LPM 12/29/17 0417   BMI (Calculated)  28.33 12/28/17 0401    RESPIRATORY MONITORING     POSITIONING      Respiratory Monitoring (EtCO2)  45 mmHg 12/29/17 1658   Body Position  independently positioning 01/02/18 0901    Integrated Pulmonary Index (IPI)  10 12/29/17 1658   Head of Bed (HOB)  HOB at 20-30 degrees 01/02/18 0901    PACEMAKER     Positioning/Transfer Devices  pillows;in use 12/31/17 2254    Pacemaker  Permanent 01/02/18 1308   Chair  Upright in chair 01/02/18 1308    Pacer Mode  DDD 12/28/17 1834   DAILY CARE      Function  Sensing 12/28/17 1834   Activity Management  activity adjusted per tolerance;ambulated in room;ambulated in schrader;ambulated to bathroom 01/02/18 0901    PAIN/COMFORT     Activity Assistance Provided  assistance, 1 person 01/02/18 0901    Patient Currently in Pain  yes 01/02/18 0901   Assistive Device Utilized  gait belt;walker 01/02/18 0901    Preferred Pain Scale  number (Numeric Rating Pain Scale) 01/02/18 0901   ECG      0-10 Pain Scale  2 01/02/18 0901   ECG Rhythm  Paced rhythm 12/28/17 1805    Pain Location  Hip 01/01/18 1434   Ectopy  None 12/28/17 1805    Pain Orientation  Right 01/01/18 " 1434   OTHER      Pain Management Interventions  analgesia administered 12/29/17 1019   NIBP  113/80 12/28/17 1719    Pain Intervention(s)  Medication (See eMAR) 01/01/18 1434   NIBP Mean  92 12/28/17 1719            Patient Lines/Drains/Airways Status    Active LINES/DRAINS/AIRWAYS     Name: Placement date: Placement time: Site: Days: Last dressing change:    Incision/Surgical Site 12/28/17 Right Hip 12/28/17   1634    4             Patient Lines/Drains/Airways Status    Active PICC/CVC     Name: Placement date: Placement time: Site: Days: Additional Info Last dressing change:    PICC Single Lumen 12/30/17 Right Basilic 12/30/17   1351   Basilic   2 External Cath Length (cm): 5 cm            Size (Fr): 4 Fr            Orientation: Right            Extremity Circumference (cm): 29.5 cm            Dressing Intervention: Chlorhexidine patch;Transparent;Securing device;New dressing            Description: Valved;Power PICC            Total Catheter Length (cm) Trimmed: 44 cm            Site Prep: Chlorhexidine            Local Anesthetic: Injectable            Inserted by: lynda Ochoa RN            Insertion attempts with ultrasound: 1            Patient Tolerance: Tolerated well            Placement Verification: Xray;Blood Return            Difficulty with threading line: No            Tip location: SVC            Full barrier precautions done: Yes            Consent Signed: Yes            Time Out performed: Yes            Lot #: [id number]               Intake/Output Detail Report     Date Intake     Output     Net    Shift P.O. I.V. IV Piggyback Total Urine Drains Blood Total       Noc 12/31/17 2300 - 01/01/18 0659 -- -- -- -- 575 -- -- 575 -575    Day 01/01/18 0700 - 01/01/18 1459 600 -- -- 600 875 -- -- 875 -275    [place] 01/01/18 1500 - 01/01/18 2259 880 -- -- 880 750 -- -- 750 130    Noc 01/01/18 2300 - 01/02/18 0659 -- -- -- -- 1650 -- -- 1650 -1650    Day 01/02/18 0700 - 01/02/18 1459 -- -- -- -- 350 -- -- 350  -350      Last Void/BM       Most Recent Value    Urine Occurrence 1 at 01/02/2018 1203    Stool Occurrence 1 at 01/02/2018 1203      Case Management/Discharge Planning     Case Management/Discharge Planning Flowsheet     REFERRAL INFORMATION     EXPECTED DISCHARGE      Did the Initial Social Work Assessment result in a Social Work Case?  Yes 12/29/17 1604   Expected Discharge Date  01/02/18 (TCU) 01/01/18 1631    Admission Type  inpatient 12/29/17 1604   DISCHARGE PLANNING      Arrived From  home or self-care 12/29/17 1604   Transportation Available  car;family or friend will provide (Pt drove prior to hospitalization. ) 12/29/17 0817    Referral Source  physician 12/29/17 1604   FINAL RESOURCES      Reason For Consult  discharge planning 12/29/17 1604   Equipment Currently Used at Home  walker, rolling 12/29/17 0817    Record Reviewed  medical record 12/29/17 1604   PAS Number  [id number] 01/02/18 1301    CTS Assigned to Case  Snadra Gilbert 12/29/17 1604   Senior Linkage Line Referral Placed  01/02/18 01/02/18 1301    LIVING ENVIRONMENT     ABUSE RISK SCREEN      Lives With  spouse 12/29/17 1604   QUESTION TO PATIENT:  Has a member of your family or a partner(now or in the past) intimidated, hurt, manipulated, or controlled you in any way?  no 12/27/17 2146    Living Arrangements  house 12/29/17 1604   QUESTION TO PATIENT: Do you feel safe going back to the place where you are living?  yes 12/27/17 2146    Able to Return to Prior Living Arrangements  no 12/29/17 1604   OBSERVATION: Is there reason to believe there has been maltreatment of a vulnerable adult (ie. Physical/Sexual/Emotional abuse, self neglect, lack of adequate food, shelter, medical care, or financial exploitation)?  no 12/27/17 2146    COPING/STRESS     (R) MENTAL HEALTH SUICIDE RISK      Major Change/Loss/Stressor  denies 12/27/17 2144   Are you depressed or being treated for depression?  No 12/27/17 2142

## 2017-12-27 NOTE — IP AVS SNAPSHOT
MRN:[id number]                      After Visit Summary   12/27/2017    Mark Hernández    MRN: [id number]           Thank you!     Thank you for choosing [facility] for your care. Our goal is always to provide you with excellent care. Hearing back from our patients is one way we can continue to improve our services. Please take a few minutes to complete the written survey that you may receive in the mail after you visit with us. Thank you!        Patient Information     Date Of Birth          9/23/1933        Designated Caregiver       Most Recent Value    Caregiver    Will someone help with your care after discharge? yes    Name of designated caregiver Mark    Phone number of caregiver [phone]    Caregiver address in file      About your hospital stay     You were admitted on:  December 27, 2017 You last received care in the:  [place] [address] [facility]    You were discharged on:  January 2, 2018        Reason for your hospital stay       Septic  Total hip                  Who to Call     For medical emergencies, please call 911.  For non-urgent questions about your medical care, please call your primary care provider or clinic, [phone]  For questions related to your surgery, please call your surgery clinic        Attending Provider     Provider Specialty    iKrk Summers MD Surgery       Primary Care Provider Office Phone # Fax #    Rebecca Noyola [phone]      After Care Instructions     Activity - Up with assistive device           Activity - Up with nursing assistance           Advance Diet as Tolerated       Follow this diet upon discharge:   Diabetic  diet            Discharge Instructions           General info for SNF       Length of Stay Estimate: Short Term Care: Estimated # of Days 31-90  Condition at Discharge: Stable  Level of care:skilled   Rehabilitation Potential: Excellent  Admission H&P remains valid and up-to-date: Yes  Recent  Chemotherapy: N/A  Use Nursing Home Standing Orders: Yes            Hip precautions           Mantoux instructions       Give two-step Mantoux (PPD) Per Facility Policy Yes            Weight bearing status       Full wt bearing  But  No  Abductor  Strengthening  For  6 to 8  weeks            Wound care       Site:   Right hip  Instructions:  Dressing change  Qod    Hitesh  Out  And  Steri strip  At  18  Days  Post op  Or later                  Follow-up Appointments     Follow Up and recommended labs and tests       Follow up with me in 3 weeks  Post  Discharge  At  [place]  Office.     Cbc  Sed  Rate  Every  Monday  please                  Your next 10 appointments already scheduled     Jan 04, 2018  9:15 AM CST   Remote PPM Check with ALEJANDRO TECH1   [facility] ([facility])    [address]  [facility] [address] [phone]           This appointment is for a remote check of your pacemaker.  This is not an appointment at the office.              Additional Services     Occupational Therapy Adult Consult       Evaluate and treat as clinically indicated.    Reason:    joao            Physical Therapy Adult Consult       Evaluate and treat as clinically indicated.    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, has chronic greater  Troch  Non union so is  At  A bit  Higher  Risk  For  A dislocation   Avoid  Abducto  Strengthening  But  Can  Use  General  Abductor  Use exercises                  Future tests that were ordered for you     AntiEmbolism Stockings       Bilateral below knee length.On in the morning, off at night                  Pending Results     Date and Time Order Name Status Description    12/28/2017 1901 Blood culture Preliminary     12/28/2017 1901 Blood culture Preliminary             Statement of Approval     Ordered          01/02/18 1222  I have reviewed and agree with all the recommendations and orders detailed in this document.  EFFECTIVE NOW   "   Approved and electronically signed by:  Kirk Summers MD           17 0943  I have reviewed and agree with all the recommendations and orders detailed in this document.     Approved and electronically signed by:  Eduardo Guido MD             Admission Information     Date & Time Provider Department Dept. Phone    2017 Kirk Summers MD [place] [address] [facility] [phone]      Your Vitals Were     Blood Pressure Pulse Temperature Respirations Height Weight    115/63 82 97.8  F (36.6  C) (Oral) 16 1.778 m (5' 10\") 89.4 kg (197 lb)    Pulse Oximetry BMI (Body Mass Index)                94% 28.27 kg/m2          MyChart Information     [employer] lets you send messages to your doctor, view your test results, renew your prescriptions, schedule appointments and more. To sign up, go to [email or web address] . Click on \"Log in\" on the left side of the screen, which will take you to the Welcome page. Then click on \"Sign up Now\" on the right side of the page.     You will be asked to enter the access code listed below, as well as some personal information. Please follow the directions to create your username and password.     Your access code is: [email or web address]  Expires: 3/31/2018 11:08 AM     Your access code will  in 90 days. If you need help or a new code, please call your [place] clinic or [phone].        Care EveryWhere ID     This is your Care EveryWhere ID. This could be used by other organizations to access your [place] medical records  [id number]        Equal Access to Services     [facility]: Hadii meaghan forrester hadasho Sojerricaali, waaxda luqadaha, qaybta kaalmada lito payan. So [facility] [phone].    ATENCIÓN: Si habla español, tiene a gonzales disposición servicios gratuitos de asistencia lingüística. Llame al [phone].    We comply with applicable federal civil rights laws and [place] laws. We do not discriminate on " the basis of race, color, national origin, age, disability, sex, sexual orientation, or gender identity.               Review of your medicines      START taking        Dose / Directions    acetaminophen 325 MG tablet   Commonly known as:  TYLENOL        Dose:  975 mg   Take 3 tablets (975 mg) by mouth every 8 hours   Quantity:  100 tablet   Refills:  0       ceFAZolin 1 GM vial   Commonly known as:  ANCEF        Dose:  2 g   Inject 2 g into the vein every 8 hours CBC with differential, creatinine, SGOT weekly while on this medication to be faxed to Dr. Mccoy office.   Quantity:  1 each   Refills:  1       ceFAZolin-dextrose 2-4 GM/100ML-% Soln infusion   Commonly known as:  ANCEF   Indication:  Bone and/or Joint Infection        Dose:  2 g   Inject 100 mLs (2 g) into the vein every 8 hours   Refills:  0       docusate sodium 100 MG tablet   Commonly known as:  COLACE        Dose:  100 mg   Take 100 mg by mouth daily   Quantity:  60 tablet   Refills:  1       enoxaparin 40 MG/0.4ML injection   Commonly known as:  LOVENOX        Dose:  40 mg   Inject 0.4 mLs (40 mg) Subcutaneous every 24 hours for 10 days   Refills:  0       ferrous gluconate 324 (38 FE) MG tablet   Commonly known as:  FERGON        Dose:  324 mg   Take 1 tablet (324 mg) by mouth daily (with breakfast)   Quantity:  60 tablet   Refills:  0       oxyCODONE IR 5 MG tablet   Commonly known as:  ROXICODONE        Dose:  5 mg   Take 1 tablet (5 mg) by mouth every 4 hours as needed for moderate to severe pain   Quantity:  80 tablet   Refills:  0       rifampin 300 MG capsule   Commonly known as:  RIFADIN   Indication:  Bone and/or Joint Infection        Dose:  300 mg   Take 1 capsule (300 mg) by mouth every 12 hours   Refills:  0         CONTINUE these medicines which have NOT CHANGED        Dose / Directions    aspirin 325 MG tablet        Dose:  325 mg   Start taking on:  1/10/2018   Take 1 tablet (325 mg) by mouth daily   Quantity:  60 tablet   Refills:   0       CYANOCOBALAMIN PO        Dose:  1000 mcg   Take 1,000 mcg by mouth daily   Refills:  0       DULoxetine 30 MG EC capsule   Commonly known as:  CYMBALTA        Dose:  30 mg   Take 30 mg by mouth daily   Refills:  0       gabapentin 800 MG tablet   Commonly known as:  NEURONTIN   Used for:  Polyneuropathy associated with underlying disease (H)        Dose:  800 mg   Take 1 tablet (800 mg) by mouth 2 times daily   Refills:  0       lisinopril 10 MG tablet   Commonly known as:  PRINIVIL/ZESTRIL        Dose:  10 mg   Take 10 mg by mouth daily   Refills:  0       metFORMIN 500 MG 24 hr tablet   Commonly known as:  GLUCOPHAGE-XR        Dose:  500 mg   Take 500 mg by mouth daily (with dinner) 1/2 tab bid   Refills:  0       simvastatin 40 MG tablet   Commonly known as:  ZOCOR        Dose:  20 mg   Take 20 mg by mouth At Bedtime Takes 1/2 of a 40mg tablet   Refills:  0       tamsulosin 0.4 MG capsule   Commonly known as:  FLOMAX        Dose:  0.8 mg   Take 0.8 mg by mouth At Bedtime   Refills:  0         STOP taking     naproxen 500 MG tablet   Commonly known as:  NAPROSYN           sulfamethoxazole-trimethoprim 800-160 MG per tablet   Commonly known as:  BACTRIM DS/SEPTRA DS                Where to get your medicines      These medications were sent to [facility] - [address]  [address] [facility] [place] [address]     Phone:  [phone]     ferrous gluconate 324 (38 FE) MG tablet         Some of these will need a paper prescription and others can be bought over the counter. Ask your nurse if you have questions.     You don't need a prescription for these medications     acetaminophen 325 MG tablet    aspirin 325 MG tablet    ceFAZolin 1 GM vial    ceFAZolin-dextrose 2-4 GM/100ML-% Soln infusion    docusate sodium 100 MG tablet    enoxaparin 40 MG/0.4ML injection    rifampin 300 MG capsule         Information about where to get these medications is not yet available     !  Ask your nurse or doctor about these medications     oxyCODONE IR 5 MG tablet               ANTIBIOTIC INSTRUCTION     You've Been Prescribed an Antibiotic - Now What?  Your healthcare team thinks that you or your loved one might have an infection. Some infections can be treated with antibiotics, which are powerful, life-saving drugs. Like all medications, antibiotics have side effects and should only be used when necessary. There are some important things you should know about your antibiotic treatment.      Your healthcare team may run tests before you start taking an antibiotic.    Your team may take samples (e.g., from your blood, urine or other areas) to run tests to look for bacteria. These test can be important to determine if you need an antibiotic at all and, if you do, which antibiotic will work best.      Within a few days, your healthcare team might change or even stop your antibiotic.    Your team may start you on an antibiotic while they are working to find out what is making you sick.    Your team might change your antibiotic because test results show that a different antibiotic would be better to treat your infection.    In some cases, once your team has more information, they learn that you do not need an antibiotic at all. They may find out that you don't have an infection, or that the antibiotic you're taking won't work against your infection. For example, an infection caused by a virus can't be treated with antibiotics. Staying on an antibiotic when you don't need it is more likely to be harmful than helpful.      You may experience side effects from your antibiotic.    Like all medications, antibiotics have side effects. Some of these can be serious.    Let you healthcare team know if you have any known allergies when you are admitted to the hospital.    One significant side effect of nearly all antibiotics is the risk of severe and sometimes deadly diarrhea caused by Clostridium difficile (C.  Difficile). This occurs when a person takes antibiotics because some good germs are destroyed. Antibiotic use allows C. diificile to take over, putting patients at high risk for this serious infection.    As a patient or caregiver, it is important to understand your or your loved one's antibiotic treatment. It is especially important for caregivers to speak up when patients can't speak for themselves. Here are some important questions to ask your healthcare team.    What infection is this antibiotic treating and how do you know I have that infection?    What side effects might occur from this antibiotic?    How long will I need to take this antibiotic?    Is it safe to take this antibiotic with other medications or supplements (e.g., vitamins) that I am taking?     Are there any special directions I need to know about taking this antibiotic? For example, should I take it with food?    How will I be monitored to know whether my infection is responding to the antibiotic?    What tests may help to make sure the right antibiotic is prescribed for me?      Information provided by:  www.cdc.gov/getsmart  U.S. Department of Health and Human Services  Centers for disease Control and Prevention  National Center for Emerging and Zoonotic Infectious Diseases  Division of Healthcare Quality Promotion         Protect others around you: Learn how to safely use, store and throw away your medicines at www.disposemymeds.org.             Medication List: This is a list of all your medications and when to take them. Check marks below indicate your daily home schedule. Keep this list as a reference.      Medications           Morning Afternoon Evening Bedtime As Needed    acetaminophen 325 MG tablet   Commonly known as:  TYLENOL   Take 3 tablets (975 mg) by mouth every 8 hours   Last time this was given:  650 mg on 12/31/2017  4:34 PM                                aspirin 325 MG tablet   Take 1 tablet (325 mg) by mouth daily   Start  taking on:  1/10/2018                                ceFAZolin 1 GM vial   Commonly known as:  ANCEF   Inject 2 g into the vein every 8 hours CBC with differential, creatinine, SGOT weekly while on this medication to be faxed to Dr. Mccoy office.   Last time this was given:  4 g on 12/28/2017  4:25 PM                                ceFAZolin-dextrose 2-4 GM/100ML-% Soln infusion   Commonly known as:  ANCEF   Inject 100 mLs (2 g) into the vein every 8 hours   Last time this was given:  2 g on 1/2/2018 12:03 PM                                CYANOCOBALAMIN PO   Take 1,000 mcg by mouth daily                                docusate sodium 100 MG tablet   Commonly known as:  COLACE   Take 100 mg by mouth daily                                DULoxetine 30 MG EC capsule   Commonly known as:  CYMBALTA   Take 30 mg by mouth daily   Last time this was given:  30 mg on 1/2/2018  7:56 AM                                enoxaparin 40 MG/0.4ML injection   Commonly known as:  LOVENOX   Inject 0.4 mLs (40 mg) Subcutaneous every 24 hours for 10 days   Last time this was given:  40 mg on 1/2/2018 12:03 PM                                ferrous gluconate 324 (38 FE) MG tablet   Commonly known as:  FERGON   Take 1 tablet (324 mg) by mouth daily (with breakfast)   Last time this was given:  324 mg on 1/2/2018  7:56 AM                                gabapentin 800 MG tablet   Commonly known as:  NEURONTIN   Take 1 tablet (800 mg) by mouth 2 times daily   Last time this was given:  800 mg on 1/2/2018  7:56 AM                                lisinopril 10 MG tablet   Commonly known as:  PRINIVIL/ZESTRIL   Take 10 mg by mouth daily                                metFORMIN 500 MG 24 hr tablet   Commonly known as:  GLUCOPHAGE-XR   Take 500 mg by mouth daily (with dinner) 1/2 tab bid                                oxyCODONE IR 5 MG tablet   Commonly known as:  ROXICODONE   Take 1 tablet (5 mg) by mouth every 4 hours as needed for moderate to  severe pain   Last time this was given:  5 mg on 1/2/2018  6:38 AM                                rifampin 300 MG capsule   Commonly known as:  RIFADIN   Take 1 capsule (300 mg) by mouth every 12 hours   Last time this was given:  300 mg on 1/2/2018  6:39 AM                                simvastatin 40 MG tablet   Commonly known as:  ZOCOR   Take 20 mg by mouth At Bedtime Takes 1/2 of a 40mg tablet   Last time this was given:  20 mg on 1/1/2018  9:23 PM                                tamsulosin 0.4 MG capsule   Commonly known as:  FLOMAX   Take 0.8 mg by mouth At Bedtime   Last time this was given:  0.8 mg on 1/1/2018  9:24 PM

## 2017-12-27 NOTE — IP AVS SNAPSHOT
"[place] [address] [facility]: [phone]                                              INTERAGENCY TRANSFER FORM - PHYSICIAN ORDERS   2017                    Hospital Admission Date: 2017  FRANKIE THOMPSON   : 1933  Sex: Male        Attending Provider: Kirk Summers MD     Allergies:  No Known Allergies    Infection:  None   Service:  ORTHOPEDICS    Ht:  1.778 m (5' 10\")   Wt:  89.4 kg (197 lb)   Admission Wt:  89.4 kg (197 lb)    BMI:  28.27 kg/m 2   BSA:  2.1 m 2            Patient PCP Information     Provider PCP Type    Rebecca Noyola MD General      ED Clinical Impression     Diagnosis Description Comment Added By Time Added    Infection of prosthetic total hip joint, initial encounter (H) [T84.59XA, Z96.649] Infection of prosthetic total hip joint, initial encounter (H) [T84.59XA, Z96.649]  Eduardo Guido MD 2017  9:42 AM      Hospital Problems as of 2018              Priority Class Noted POA    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 Please follow your organizational policy for patients in this situation.         Medication Review      START taking        Dose / Directions Comments    acetaminophen 325 MG tablet   Commonly known as:  TYLENOL        Dose:  975 mg   Take 3 tablets (975 mg) by mouth every 8 hours   Quantity:  100 tablet   Refills:  0        ceFAZolin 1 GM vial   Commonly known as:  ANCEF        Dose:  2 g   Inject 2 g into the vein every 8 hours CBC with differential, creatinine, SGOT weekly while on this medication to be faxed to Dr. Mccoy office.   Quantity:  1 each   Refills:  1        ceFAZolin-dextrose 2-4 GM/100ML-% Soln infusion   Commonly known as:  ANCEF "   Indication:  Bone and/or Joint Infection        Dose:  2 g   Inject 100 mLs (2 g) into the vein every 8 hours   Refills:  0        docusate sodium 100 MG tablet   Commonly known as:  COLACE        Dose:  100 mg   Take 100 mg by mouth daily   Quantity:  60 tablet   Refills:  1        enoxaparin 40 MG/0.4ML injection   Commonly known as:  LOVENOX        Dose:  40 mg   Inject 0.4 mLs (40 mg) Subcutaneous every 24 hours for 10 days   Refills:  0        ferrous gluconate 324 (38 FE) MG tablet   Commonly known as:  FERGON        Dose:  324 mg   Take 1 tablet (324 mg) by mouth daily (with breakfast)   Quantity:  60 tablet   Refills:  0        oxyCODONE IR 5 MG tablet   Commonly known as:  ROXICODONE        Dose:  5 mg   Take 1 tablet (5 mg) by mouth every 4 hours as needed for moderate to severe pain   Quantity:  80 tablet   Refills:  0        rifampin 300 MG capsule   Commonly known as:  RIFADIN   Indication:  Bone and/or Joint Infection        Dose:  300 mg   Take 1 capsule (300 mg) by mouth every 12 hours   Refills:  0          CONTINUE these medications which have NOT CHANGED        Dose / Directions Comments    aspirin 325 MG tablet        Dose:  325 mg   Start taking on:  1/10/2018   Take 1 tablet (325 mg) by mouth daily   Quantity:  60 tablet   Refills:  0        CYANOCOBALAMIN PO        Dose:  1000 mcg   Take 1,000 mcg by mouth daily   Refills:  0        DULoxetine 30 MG EC capsule   Commonly known as:  CYMBALTA        Dose:  30 mg   Take 30 mg by mouth daily   Refills:  0        gabapentin 800 MG tablet   Commonly known as:  NEURONTIN   Used for:  Polyneuropathy associated with underlying disease (H)        Dose:  800 mg   Take 1 tablet (800 mg) by mouth 2 times daily   Refills:  0        lisinopril 10 MG tablet   Commonly known as:  PRINIVIL/ZESTRIL        Dose:  10 mg   Take 10 mg by mouth daily   Refills:  0        metFORMIN 500 MG 24 hr tablet   Commonly known as:  GLUCOPHAGE-XR        Dose:  500 mg   Take  500 mg by mouth daily (with dinner) 1/2 tab bid   Refills:  0        simvastatin 40 MG tablet   Commonly known as:  ZOCOR        Dose:  20 mg   Take 20 mg by mouth At Bedtime Takes 1/2 of a 40mg tablet   Refills:  0        tamsulosin 0.4 MG capsule   Commonly known as:  FLOMAX        Dose:  0.8 mg   Take 0.8 mg by mouth At Bedtime   Refills:  0          STOP taking     naproxen 500 MG tablet   Commonly known as:  NAPROSYN           sulfamethoxazole-trimethoprim 800-160 MG per tablet   Commonly known as:  BACTRIM DS/SEPTRA DS                   Summary of Visit     Reason for your hospital stay       Septic  Total hip             After Care     Activity - Up with assistive device           Activity - Up with nursing assistance           Advance Diet as Tolerated       Follow this diet upon discharge:   Diabetic  diet       Discharge Instructions           General info for SNF       Length of Stay Estimate: Short Term Care: Estimated # of Days 31-90  Condition at Discharge: Stable  Level of care:skilled   Rehabilitation Potential: Excellent  Admission H&P remains valid and up-to-date: Yes  Recent Chemotherapy: N/A  Use Nursing Home Standing Orders: Yes       Hip precautions           Mantoux instructions       Give two-step Mantoux (PPD) Per Facility Policy Yes       Weight bearing status       Full wt bearing  But  No  Abductor  Strengthening  For  6 to 8  weeks       Wound care       Site:   Right hip  Instructions:  Dressing change  Qod    Hitesh  Out  And  Steri strip  At  18  Days  Post op  Or later             Referrals     Occupational Therapy Adult Consult       Evaluate and treat as clinically indicated.    Reason:    joao       Physical Therapy Adult Consult       Evaluate and treat as clinically indicated.    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, has chronic greater  Troch  Non union so is  At  A bit  Higher  Risk  For  A dislocation   Avoid  Abducto  Strengthening  But  Can  Use  General  Abductor  Use  exercises             Supplies     AntiEmbolism Stockings       Bilateral below knee length.On in the morning, off at night             Your next 10 appointments already scheduled     Jan 04, 2018  9:15 AM CST   Remote PPM Check with ALEJANDRO TECH1   [facility] ([facility])    [address] [place], [place] [id number]  [facility] [address] [phone]           This appointment is for a remote check of your pacemaker.  This is not an appointment at the office.              Follow-Up Appointment Instructions     Future Labs/Procedures    Follow Up and recommended labs and tests     Comments:    Follow up with me in 3 weeks  Post  Discharge  At  [place]  Office.     Cbc  Sed  Rate  Every  Monday  please      Follow-Up Appointment Instructions     Follow Up and recommended labs and tests       Follow up with me in 3 weeks  Post  Discharge  At  [place]  Office.     Cbc  Sed  Rate  Every  Monday  please             Statement of Approval     Ordered          01/02/18 1222  I have reviewed and agree with all the recommendations and orders detailed in this document.  EFFECTIVE NOW     Approved and electronically signed by:  Kirk Summers MD           12/30/17 8988  I have reviewed and agree with all the recommendations and orders detailed in this document.     Approved and electronically signed by:  Eduardo Guido MD

## 2017-12-27 NOTE — IP AVS SNAPSHOT
` ` Patient Information     Patient Name Sex     Mark Hernández ([id number]) Male 1933       Room Bed    5517 5517-01      Patient Demographics     Address Phone    [address] [facility] [address] [phone] (Home)  [phone] (Mobile)      Patient Ethnicity & Race     Ethnic Group Patient Race    American White      Emergency Contact(s)     Name Relation Home Work Mobile    Angle Hernández Spouse [phone]      Documents on File        Status Date Received Description       Documents for the Patient    Insurance Card  08     Consent Form  08     Privacy Notice - [place] Received 08     Other  08 medicare ?'s    External Medication Information Consent Accepted 16     Patient ID Received 16     Consent for Services - Hospital/Clinic Received () 11     Insurance Card Received 11     Advance Directives and Living Will Not Received  Living Will 96    Consent for EHR Access  13 Copied from existing Consent for services - C/HOD collected on 2011    [facility] Specified Other       Consent for Services/Privacy Notice - Hospital/Clinic Received () 16     Insurance Card Received 16 BCBS/Medicare    HIM BRANDEN Authorization  08/10/16 [facility]    HIM BRANDEN Authorization  16     Insurance Card Received 17     HIM BRANDEN Authorization  17 [facility] [place]    Consent for Services/Privacy Notice - Hospital/Clinic Received 17     Care Everywhere Prospective Auth Received 17     Patient ID Received 17     Insurance Card Received 17        Documents for the Encounter    H/P - History and Physical  17 P. GILSON, HISTORY AND PHY 2017    CMS IM for Patient Signature Received 17     CMS IM for Patient Signature       EKG Cardiac - HIM Scan  17 EKG, P. 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 Acct Type    Mark Hernández  FCS Yes Personal/Family    Address Phone          [address] [place] [address] [phone](H)              Coverage Information (for Hospital Account #[id number])     1. MEDICARE/MEDICARE     F/O Payor/Plan Precert #    MEDICARE/MEDICARE     Subscriber Subscriber #    GeorgidaciaMark DANETTE [id number]    Address Phone    [employer]  [address]  [place] [address] [phone]          2. [place]     F/O Payor/Plan Precert #    [place]     Subscriber Subscriber #    Madhav Hernándezip DANETTE [id number]    Address Phone    [address] [address]  [place] [address] [phone]

## 2017-12-27 NOTE — IP AVS SNAPSHOT
` [place] [address] ORTHO SPECIALTY UNIT: [phone]            Medication Administration Report for Mark Hernández as of 01/02/18 1332   Legend:    Given Hold Not Given Due Canceled Entry Other Actions    Time Time (Time) Time  Time-Action       Inactive    Active    Linked        Medications 12/27/17 12/28/17 12/29/17 12/30/17 12/31/17 01/01/18 01/02/18    acetaminophen (TYLENOL) tablet 650 mg  Dose: 650 mg Freq: EVERY 4 HOURS PRN Route: PO  PRN Reason: other  PRN Comment: surgical pain  Start: 12/31/17 0000   Admin Instructions: May give first dose 4 hours after last scheduled dose of acetaminophen.  Maximum acetaminophen dose from all sources = 75 mg/kg/day not to exceed 4 grams/day.         1634 (650 mg)-Given             benzocaine-menthol (CHLORASEPTIC) 6-10 MG lozenge 1-2 lozenge  Dose: 1-2 lozenge Freq: EVERY 1 HOUR PRN Route: BU  PRN Reason: sore throat  PRN Comment: sore throat without fever  Start: 12/28/17 1900              ceFAZolin (ANCEF) intermittent infusion 2 g in 100 mL dextrose PRE-MIX  Dose: 2 g Freq: EVERY 8 HOURS Route: IV  Indications of Use: BONE AND/OR JOINT INFECTION  Start: 12/29/17 1200      1124 (2 g)-Given       1957 (2 g)-Given        0418 (2 g)-Given       (1341)-Not Given [C]       2021 (2 g)-Given        0349 (2 g)-Given       1206 (2 g)-Given       2034 (2 g)-Given        0405 (2 g)-Given       1205 (2 g)-Given       2030 (2 g)-Given        0332 (2 g)-Given       1203 (2 g)-Given       [ ] 2000           DULoxetine (CYMBALTA) EC capsule 30 mg  Dose: 30 mg Freq: DAILY Route: PO  Start: 12/28/17 0900     (1026)-Not Given       1155-Auto Hold       1840-Unhold        1124 (30 mg)-Given        0843 (30 mg)-Given        0826 (30 mg)-Given        0807 (30 mg)-Given        0756 (30 mg)-Given                  enoxaparin (LOVENOX) injection 40 mg  Dose: 40 mg Freq: EVERY 24 HOURS Route: SC  Start: 12/29/17 3430   Admin Instructions: Check to make sure start date/time is  12-24 hours post op unless documented complication, AND no sooner than 22 hours post op if spinal anesthesia used.   Continue until discharge to home. HOLD if platelet count falls below 50% of baseline or less than 100,000/ L and notify provider.       1124 (40 mg)-Given        1144 (40 mg)-Given        1206 (40 mg)-Given        1156 (40 mg)-Given        1203 (40 mg)-Given           ferrous gluconate (FERGON) tablet 324 mg  Dose: 324 mg Freq: DAILY WITH BREAKFAST Route: PO  Start: 12/31/17 0900   Admin Instructions: DO NOT CRUSH. Absorbed best on an empty stomach. If stomach upset occurs, can take with meals.         0826 (324 mg)-Given        0807 (324 mg)-Given        0756 (324 mg)-Given                  gabapentin (NEURONTIN) tablet 800 mg  Dose: 800 mg Freq: 2 TIMES DAILY Route: PO  Start: 12/27/17 2100    2132 (800 mg)-Given        (1026)-Not Given       1155-Auto Hold       1840-Unhold       2134 (800 mg)-Given        1124 (800 mg)-Given       2123 (800 mg)-Given        0843 (800 mg)-Given       2102 (800 mg)-Given        0826 (800 mg)-Given       2032 (800 mg)-Given        0807 (800 mg)-Given       2031 (800 mg)-Given [C]        0756 (800 mg)-Given              [ ] 2100           glucose 40 % gel 15-30 g  Dose: 15-30 g Freq: EVERY 15 MIN PRN Route: PO  PRN Reason: low blood sugar  Start: 12/27/17 1818   Admin Instructions: Give 15 g for BG 51 to 69 mg/dL IF patient is conscious and able to swallow. Give 30 g for BG less than or equal to 50 mg/dL IF patient is conscious and able to swallow. Do NOT give glucose gel via enteral tube.  IF patient has enteral tube: give apple juice 120 mL (4 oz or 15 g of CHO) via enteral tube for BG 51 to 69 mg/dL.  Give apple juice 240 mL (8 oz or 30 g of CHO) via enteral tube for BG less than or equal to 50 mg/dL.    ~Oral gel is preferable for conscious and able to swallow patient.   ~IF gel unavailable or patient refuses may provide apple juice 120 mL (4 oz or 15 g of CHO).  Document juice on I and O flowsheet.      1155-Auto Hold       1840-Unhold               Or  dextrose 50 % injection 25-50 mL  Dose: 25-50 mL Freq: EVERY 15 MIN PRN Route: IV  PRN Reason: low blood sugar  Start: 12/27/17 1818   Admin Instructions: Use if have IV access, BG less than 70 mg/dL and meet dose criteria below:  Dose if conscious and alert (or disorientated) and NPO = 25 mL  Dose if unconscious / not alert = 50 mL  Vesicant. For ordered doses up to 25 mg, give IV Push undiluted. Give each 5g over 1 minute.      1155-Auto Hold       1840-Unhold               Or  glucagon injection 1 mg  Dose: 1 mg Freq: EVERY 15 MIN PRN Route: SC  PRN Reason: low blood sugar  PRN Comment: May repeat x 1 only  Start: 12/27/17 1818   Admin Instructions: May give SQ or IM. ONLY use glucagon IF patient has NO IV access AND is UNABLE to swallow AND blood glucose is LESS than or EQUAL to 50 mg/dL.  Give IV Push over 1 minute. Reconstitute with 1mL sterile water.      1155-Auto Hold       1840-Unhold                hydrALAZINE (APRESOLINE) injection 10 mg  Dose: 10 mg Freq: EVERY 6 HOURS PRN Route: IV  PRN Reason: high blood pressure  PRN Comment: For sbp>180  Start: 12/27/17 1818   Admin Instructions: For ordered doses up to 40 mg, give IV Push undiluted over 1 minute.      1155-Auto Hold       1840-Unhold                HYDROmorphone (PF) (DILAUDID) injection 0.3-0.5 mg  Dose: 0.3-0.5 mg Freq: EVERY 2 HOURS PRN Route: IV  PRN Reason: other  PRN Comment: pain control or improvement in physical function.  Hold dose for analgesic side effects.  Start: 12/28/17 1900   Admin Instructions: Notify provider to assess for uncontrolled pain or analgesic side effects. Hold while on IV PCA or with regular IV opioid dosing.  For ordered doses up to 4 mg give IV Push undiluted. Administer each 2mg over 2-5 minutes.      1930 (0.5 mg)-Given       2134 (0.5 mg)-Given        0424 (0.5 mg)-Given [C]       0805 (0.5 mg)-Given               insulin  aspart (NovoLOG) inj (RAPID ACTING)  Dose: 1-7 Units Freq: AT BEDTIME Route: SC  Start: 12/27/17 2200   Admin Instructions: HIGH INSULIN RESISTANCE DOSING    Do Not give Bedtime Correction Insulin if BG less than 200.   For  - 224 give 1 units.   For  - 249 give 2 units.   For  - 274 give 3 units.   For  - 299 give 4 units.   For  - 324 give 5 units.   For  - 349 give 6 units.   For BG greater than or equal to 350 give 7 units.   Notify provider if glucose greater than or equal to 350 mg/dL after administration of correction dose.  If given at mealtime, must be administered 5 min before meal or immediately after.     (2134)-Not Given        1155-Auto Hold       1840-Unhold       (2135)-Not Given [C]        (2125)-Not Given        (2200)-Not Given        (2143)-Not Given        2125 (3 Units)-Given        [ ] 2200           insulin aspart (NovoLOG) inj (RAPID ACTING)  Dose: 1-10 Units Freq: 3 TIMES DAILY BEFORE MEALS Route: SC  Start: 12/27/17 1830   Admin Instructions: Correction Scale - HIGH INSULIN RESISTANCE DOSING     Do Not give Correction Insulin if Pre-Meal BG less than 140.   For Pre-Meal  - 164 give 1 unit.   For Pre-Meal  - 189 give 2 units.   For Pre-Meal  - 214 give 3 units.   For Pre-Meal  - 239 give 4 units.   For Pre-Meal  - 264 give 5 units.   For Pre-Meal  - 289 give 6 units.   For Pre-Meal  - 314 give 7 units.   For Pre-Meal  - 339 give 8 units.   For Pre-Meal  - 364 give 9 units.   For Pre-Meal BG greater than or equal to 365 give 10 units  To be given with prandial insulin, and based on pre-meal blood glucose.   Notify provider if glucose greater than or equal to 350 mg/dL after administration of correction dose.  If given at mealtime, must be administered 5 min before meal or immediately after.     2009 (1 Units)-Given        (0917)-Not Given       1155-Auto Hold       1200-Automatically Held      "  1700-Automatically Held       1840-Unhold        (1000)-Not Given       (1359)-Not Given [C]       1455 (2 Units)-Given       (1848)-Not Given        (0847)-Not Given       (1342)-Not Given       (1728)-Not Given        (0831)-Not Given       (1200)-Not Given       1752 (5 Units)-Given        (0954)-Not Given       1314 (1 Units)-Given       1801 (2 Units)-Given        0855 (1 Units)-Given       1251 (1 Units)-Given       [ ] 1700           lidocaine (LMX4) cream  Freq: EVERY 1 HOUR PRN Route: Top  PRN Reason: pain  PRN Comment: with VAD insertion or accessing implanted port.  Start: 12/28/17 1900   Admin Instructions: Do NOT give if patient has a history of allergy to any local anesthetic or any \"david\" product.   Apply 30 minutes prior to VAD insertion or port access.  MAX Dose:  2.5 g (  of 5 g tube)               lidocaine 1 % 1 mL  Dose: 1 mL Freq: EVERY 1 HOUR PRN Route: OTHER  PRN Comment: mild pain with VAD insertion or accessing implanted port  Start: 12/28/17 1900   Admin Instructions: Do NOT give if patient has a history of allergy to any local anesthetic or any \"david\" product. MAX dose 1 mL subcutaneous OR intradermal in divided doses.               naloxone (NARCAN) injection 0.1-0.4 mg  Dose: 0.1-0.4 mg Freq: EVERY 2 MIN PRN Route: IV  PRN Reason: opioid reversal  Start: 12/28/17 1900   Admin Instructions: For respiratory rate LESS than or EQUAL to 8.  Partial reversal dose:  0.1 mg titrated q 2 minutes for Analgesia Side Effects Monitoring Sedation Level of 3 (frequently drowsy, arousable, drifts to sleep during conversation).Full reversal dose:  0.4 mg bolus for Analgesia Side Effects Monitoring Sedation Level of 4 (somnolent, minimal or no response to stimulation).  For ordered doses up to 2mg give IVP. Give each 0.4mg over 15 seconds in emergency situations. For non-emergent situations further dilute in 9mL of NS to facilitate titration of response.               ondansetron (ZOFRAN-ODT) ODT tab " 4 mg  Dose: 4 mg Freq: EVERY 6 HOURS PRN Route: PO  PRN Reasons: nausea,vomiting  Start: 12/28/17 1900   Admin Instructions: This is Step 1 of nausea and vomiting management.  If nausea not resolved in 15 minutes, go to Step 2 prochlorperazine (COMPAZINE). Do not push through foil backing. Peel back foil and gently remove. Place on tongue immediately. Administration with liquid unnecessary              Or  ondansetron (ZOFRAN) injection 4 mg  Dose: 4 mg Freq: EVERY 6 HOURS PRN Route: IV  PRN Reasons: nausea,vomiting  Start: 12/28/17 1900   Admin Instructions: This is Step 1 of nausea and vomiting management.  If nausea not resolved in 15 minutes, go to Step 2 prochlorperazine (COMPAZINE).  Irritant. For ordered doses up to 4 mg, give IV Push undiluted over 2-5 minutes.               oxyCODONE IR (ROXICODONE) tablet 5 mg  Dose: 5 mg Freq: EVERY 3 HOURS PRN Route: PO  PRN Reason: moderate to severe pain  Start: 12/28/17 1900   Admin Instructions: IF CrCl is UNKNOWN start at lowest end of dosing range.  Hold while on PCA or with regular IV opioid dosing.       1304 (5 mg)-Given       2123 (5 mg)-Given        0843 (5 mg)-Given       1144 (5 mg)-Given       1442 (5 mg)-Given       2102 (5 mg)-Given        0826 (5 mg)-Given       1634 (5 mg)-Given       2032 (5 mg)-Given        0807 (5 mg)-Given        0638 (5 mg)-Given           polyethylene glycol (MIRALAX/GLYCOLAX) Packet 17 g  Dose: 17 g Freq: 2 TIMES DAILY PRN Route: PO  PRN Comment: constipation  Start: 01/01/18 1106   Admin Instructions: 1 Packet = 17 grams. Mixed prescribed dose in 8 ounces of water. Follow with 8 oz. of water.          1313 (17 g)-Given            Polyethylene Glycol 400 0.25 % GEL 1 drop  Dose: 1 drop Freq: 2 TIMES DAILY Route: Both Eyes  Start: 12/28/17 2215   Admin Instructions: Pt own      2255 (1 drop)-Given        1412 (1 drop)-Given       2127 (1 drop)-Given        0848 (1 drop)-Given       2105 (1 drop)-Given        0835 (1  drop)-Given       2147 (1 drop)-Given        0953 (1 drop)-Given       2030 (1 drop)-Given        0756 (1 drop)-Given              [ ] 2100           prochlorperazine (COMPAZINE) injection 5 mg  Dose: 5 mg Freq: EVERY 6 HOURS PRN Route: IV  PRN Reasons: nausea,vomiting  Start: 12/28/17 1900   Admin Instructions: This is Step 2 of nausea and vomiting management.   If nausea not resolved in 15 minutes, give metoclopramide (REGLAN) if ordered (step 3 of nausea and vomiting management)  For ordered doses up to 10 mg, give IV Push undiluted. Each 5mg over 1 minute.              Or  prochlorperazine (COMPAZINE) tablet 5 mg  Dose: 5 mg Freq: EVERY 6 HOURS PRN Route: PO  PRN Reasons: nausea,vomiting  Start: 12/28/17 1900   Admin Instructions: This is Step 2 of nausea and vomiting management.   If nausea not resolved in 15 minutes, give metoclopramide (REGLAN) if ordered (step 3 of nausea and vomiting management)               rifampin (RIFADIN) capsule 300 mg  Dose: 300 mg Freq: EVERY 12 HOURS SCHEDULED Route: PO  Indications of Use: BONE AND/OR JOINT INFECTION  Start: 01/01/18 1045   Admin Instructions: Recommended to take 1 hour before or 2 hours after a meal.          1313 (300 mg)-Given       1808 (300 mg)-Given        0639 (300 mg)-Given       [ ] 1900           senna-docusate (SENOKOT-S;PERICOLACE) 8.6-50 MG per tablet 1-2 tablet  Dose: 1-2 tablet Freq: 2 TIMES DAILY Route: PO  Start: 12/30/17 2100       2102 (2 tablet)-Given        0826 (2 tablet)-Given       2032 (2 tablet)-Given        0807 (2 tablet)-Given       (2040)-Not Given        0756 (2 tablet)-Given              [ ] 2100           simvastatin (ZOCOR) tablet 20 mg  Dose: 20 mg Freq: AT BEDTIME Route: PO  Start: 12/27/17 2200    2132 (20 mg)-Given        1155-Auto Hold       1840-Unhold       2135 (20 mg)-Given        2123 (20 mg)-Given        2102 (20 mg)-Given        2146 (20 mg)-Given        2123 (20 mg)-Given        [ ] 2200           sodium chloride  (PF) 0.9% PF flush 10 mL  Dose: 10 mL Freq: EVERY 8 HOURS Route: IK  Start: 12/30/17 1015   Admin Instructions: And Q1H PRN, to lock CVC - Open Ended (Tunneled and Non-Tunneled) dormant lumen.        1144 (10 mL)-Given       2022 (10 mL)-Given        0351 (10 mL)-Given       0524 (10 mL)-Given       1030 (10 mL)-Given       1750 (10 mL)-Given        0405 (10 mL)-Given       0954 (10 mL)-Given       1808 (10 mL)-Given               0935 (10 mL)-Given       [ ] 1815           sodium chloride (PF) 0.9% PF flush 10-20 mL  Dose: 10-20 mL Freq: EVERY 1 HOUR PRN Route: IK  PRN Reasons: line flush,post meds or blood draw  Start: 12/30/17 1014   Admin Instructions: to flush CVC - Open Ended (Tunneled and Non-Tunneled).  10 mL post IV meds; 20 mL post blood draw.               tamsulosin (FLOMAX) capsule 0.8 mg  Dose: 0.8 mg Freq: AT BEDTIME Route: PO  Start: 12/27/17 2200   Admin Instructions: Administer 30 minutes after the same meal each day.  Capsules should be swallowed whole; do not crush chew or open.     2132 (0.8 mg)-Given        1155-Auto Hold       1840-Unhold       2134 (0.8 mg)-Given        2123 (0.8 mg)-Given        2102 (0.8 mg)-Given        2146 (0.8 mg)-Given        2124 (0.8 mg)-Given        [ ] 2200          Discontinued Medications  Medications 12/27/17 12/28/17 12/29/17 12/30/17 12/31/17 01/01/18 01/02/18         Dose: 975 mg Freq: EVERY 8 HOURS Route: PO  Start: 12/28/17 1915   End: 12/31/17 1359   Admin Instructions: Do not use if patient has an active opioid/acetaminophen analgesic order for pain  Maximum acetaminophen dose from all sources = 75 mg/kg/day not to exceed 4 grams/day.      (1919)-Not Given [C]       2135 (975 mg)-Given        0706 (975 mg)-Given              1714 (975 mg)-Given       2123 (975 mg)-Given        0602 (975 mg)-Given       1341 (975 mg)-Given       2102 (975 mg)-Given        0518 (975 mg)-Given       1359-Med Discontinued           Dose: 1 mL Freq: EVERY 1 HOUR PRN Route:  "OTHER  PRN Comment: mild pain with VAD insertion or accessing implanted port  Start: 12/27/17 2004   End: 01/02/18 0903   Admin Instructions: Do NOT give if patient has a history of allergy to any local anesthetic or any \"david\" product. MAX dose 1 mL subcutaneous OR intradermal in divided doses.           0903-Med Discontinued         Dose: 3 mL Freq: EVERY 8 HOURS Route: IK  Start: 12/28/17 1915   End: 01/01/18 0332   Admin Instructions: And Q1H PRN, to lock peripheral IV dormant line.      (1916)-Not Given        0424 (3 mL)-Given       (1100)-Not Given       1958 (3 mL)-Given        0420 (3 mL)-Given       1342 (3 mL)-Given                      (1751)-Not Given       (2033)-Not Given               0332-Med Discontinued          Dose: 3 mL Freq: EVERY 1 HOUR PRN Route: IK  PRN Reason: line flush  PRN Comment: for peripheral IV flush post IV meds  Start: 12/28/17 1900   End: 01/01/18 0331         0331-Med Discontinued          "

## 2017-12-28 ENCOUNTER — ANESTHESIA (OUTPATIENT)
Dept: SURGERY | Facility: CLINIC | Age: 82
DRG: 467 | End: 2017-12-28
Payer: MEDICARE

## 2017-12-28 ENCOUNTER — APPOINTMENT (OUTPATIENT)
Dept: GENERAL RADIOLOGY | Facility: CLINIC | Age: 82
DRG: 467 | End: 2017-12-28
Attending: ORTHOPAEDIC SURGERY
Payer: MEDICARE

## 2017-12-28 ENCOUNTER — ANESTHESIA EVENT (OUTPATIENT)
Dept: SURGERY | Facility: CLINIC | Age: 82
DRG: 467 | End: 2017-12-28
Payer: MEDICARE

## 2017-12-28 PROBLEM — Z96.649 INFECTION OF PROSTHETIC TOTAL HIP JOINT (H): Status: ACTIVE | Noted: 2017-12-28

## 2017-12-28 PROBLEM — T84.59XA INFECTION OF PROSTHETIC TOTAL HIP JOINT (H): Status: ACTIVE | Noted: 2017-12-28

## 2017-12-28 LAB
ANION GAP SERPL CALCULATED.3IONS-SCNC: 2 MMOL/L (ref 3–14)
BASOPHILS # BLD AUTO: 0 10E9/L (ref 0–0.2)
BASOPHILS NFR BLD AUTO: 0.2 %
BUN SERPL-MCNC: 43 MG/DL (ref 7–30)
CALCIUM SERPL-MCNC: 8.7 MG/DL (ref 8.5–10.1)
CHLORIDE SERPL-SCNC: 108 MMOL/L (ref 94–109)
CO2 SERPL-SCNC: 29 MMOL/L (ref 20–32)
CREAT SERPL-MCNC: 1.39 MG/DL (ref 0.66–1.25)
CREAT SERPL-MCNC: 1.66 MG/DL (ref 0.66–1.25)
DIFFERENTIAL METHOD BLD: ABNORMAL
EOSINOPHIL # BLD AUTO: 0.1 10E9/L (ref 0–0.7)
EOSINOPHIL NFR BLD AUTO: 0.6 %
ERYTHROCYTE [DISTWIDTH] IN BLOOD BY AUTOMATED COUNT: 14.2 % (ref 10–15)
GFR SERPL CREATININE-BSD FRML MDRD: 40 ML/MIN/1.7M2
GFR SERPL CREATININE-BSD FRML MDRD: 49 ML/MIN/1.7M2
GLUCOSE BLDC GLUCOMTR-MCNC: 136 MG/DL (ref 70–99)
GLUCOSE BLDC GLUCOMTR-MCNC: 143 MG/DL (ref 70–99)
GLUCOSE BLDC GLUCOMTR-MCNC: 146 MG/DL (ref 70–99)
GLUCOSE BLDC GLUCOMTR-MCNC: 170 MG/DL (ref 70–99)
GLUCOSE SERPL-MCNC: 146 MG/DL (ref 70–99)
HCT VFR BLD AUTO: 34.5 % (ref 40–53)
HGB BLD-MCNC: 11.1 G/DL (ref 13.3–17.7)
IMM GRANULOCYTES # BLD: 0 10E9/L (ref 0–0.4)
IMM GRANULOCYTES NFR BLD: 0.2 %
LYMPHOCYTES # BLD AUTO: 0.8 10E9/L (ref 0.8–5.3)
LYMPHOCYTES NFR BLD AUTO: 7.7 %
MCH RBC QN AUTO: 29.1 PG (ref 26.5–33)
MCHC RBC AUTO-ENTMCNC: 32.2 G/DL (ref 31.5–36.5)
MCV RBC AUTO: 90 FL (ref 78–100)
MONOCYTES # BLD AUTO: 0.7 10E9/L (ref 0–1.3)
MONOCYTES NFR BLD AUTO: 6.8 %
NEUTROPHILS # BLD AUTO: 8.2 10E9/L (ref 1.6–8.3)
NEUTROPHILS NFR BLD AUTO: 84.5 %
NRBC # BLD AUTO: 0 10*3/UL
NRBC BLD AUTO-RTO: 0 /100
PLATELET # BLD AUTO: 202 10E9/L (ref 150–450)
POTASSIUM SERPL-SCNC: 4.8 MMOL/L (ref 3.4–5.3)
RBC # BLD AUTO: 3.82 10E12/L (ref 4.4–5.9)
SODIUM SERPL-SCNC: 139 MMOL/L (ref 133–144)
WBC # BLD AUTO: 9.7 10E9/L (ref 4–11)

## 2017-12-28 PROCEDURE — 25000128 H RX IP 250 OP 636: Performed by: ANESTHESIOLOGY

## 2017-12-28 PROCEDURE — 25000132 ZZH RX MED GY IP 250 OP 250 PS 637: Mod: GY | Performed by: ORTHOPAEDIC SURGERY

## 2017-12-28 PROCEDURE — 00000146 ZZHCL STATISTIC GLUCOSE BY METER IP

## 2017-12-28 PROCEDURE — 71000012 ZZH RECOVERY PHASE 1 LEVEL 1 FIRST HR: Performed by: ORTHOPAEDIC SURGERY

## 2017-12-28 PROCEDURE — 99207 ZZC NO CHARGE VISIT/PATIENT NOT SEEN: CPT | Performed by: PHYSICIAN ASSISTANT

## 2017-12-28 PROCEDURE — 25000128 H RX IP 250 OP 636: Performed by: ORTHOPAEDIC SURGERY

## 2017-12-28 PROCEDURE — C1776 JOINT DEVICE (IMPLANTABLE): HCPCS | Performed by: ORTHOPAEDIC SURGERY

## 2017-12-28 PROCEDURE — A9270 NON-COVERED ITEM OR SERVICE: HCPCS | Mod: GY | Performed by: ORTHOPAEDIC SURGERY

## 2017-12-28 PROCEDURE — 36415 COLL VENOUS BLD VENIPUNCTURE: CPT | Performed by: ORTHOPAEDIC SURGERY

## 2017-12-28 PROCEDURE — 40000169 ZZH STATISTIC PRE-PROCEDURE ASSESSMENT I: Performed by: ORTHOPAEDIC SURGERY

## 2017-12-28 PROCEDURE — 40000986 XR PELVIS PORT 1/2 VW

## 2017-12-28 PROCEDURE — 80048 BASIC METABOLIC PNL TOTAL CA: CPT | Performed by: HOSPITALIST

## 2017-12-28 PROCEDURE — 27210995 ZZH RX 272: Performed by: ORTHOPAEDIC SURGERY

## 2017-12-28 PROCEDURE — 99207 ZZC NO CHARGE VISIT/PATIENT NOT SEEN: CPT | Performed by: INTERNAL MEDICINE

## 2017-12-28 PROCEDURE — 37000009 ZZH ANESTHESIA TECHNICAL FEE, EACH ADDTL 15 MIN: Performed by: ORTHOPAEDIC SURGERY

## 2017-12-28 PROCEDURE — 25800025 ZZH RX 258: Performed by: ORTHOPAEDIC SURGERY

## 2017-12-28 PROCEDURE — 36000067 ZZH SURGERY LEVEL 5 1ST 30 MIN: Performed by: ORTHOPAEDIC SURGERY

## 2017-12-28 PROCEDURE — 36415 COLL VENOUS BLD VENIPUNCTURE: CPT | Performed by: HOSPITALIST

## 2017-12-28 PROCEDURE — 25000125 ZZHC RX 250

## 2017-12-28 PROCEDURE — 36000069 ZZH SURGERY LEVEL 5 EA 15 ADDTL MIN: Performed by: ORTHOPAEDIC SURGERY

## 2017-12-28 PROCEDURE — 25000566 ZZH SEVOFLURANE, EA 15 MIN: Performed by: ORTHOPAEDIC SURGERY

## 2017-12-28 PROCEDURE — 82565 ASSAY OF CREATININE: CPT | Performed by: ORTHOPAEDIC SURGERY

## 2017-12-28 PROCEDURE — 25000125 ZZHC RX 250: Performed by: ORTHOPAEDIC SURGERY

## 2017-12-28 PROCEDURE — 27210794 ZZH OR GENERAL SUPPLY STERILE: Performed by: ORTHOPAEDIC SURGERY

## 2017-12-28 PROCEDURE — 25000132 ZZH RX MED GY IP 250 OP 250 PS 637: Mod: GY | Performed by: HOSPITALIST

## 2017-12-28 PROCEDURE — C1763 CONN TISS, NON-HUMAN: HCPCS | Performed by: ORTHOPAEDIC SURGERY

## 2017-12-28 PROCEDURE — 87040 BLOOD CULTURE FOR BACTERIA: CPT | Performed by: ORTHOPAEDIC SURGERY

## 2017-12-28 PROCEDURE — 25000128 H RX IP 250 OP 636: Performed by: NURSE ANESTHETIST, CERTIFIED REGISTERED

## 2017-12-28 PROCEDURE — A9270 NON-COVERED ITEM OR SERVICE: HCPCS | Mod: GY | Performed by: HOSPITALIST

## 2017-12-28 PROCEDURE — 85025 COMPLETE CBC W/AUTO DIFF WBC: CPT | Performed by: ORTHOPAEDIC SURGERY

## 2017-12-28 PROCEDURE — 37000008 ZZH ANESTHESIA TECHNICAL FEE, 1ST 30 MIN: Performed by: ORTHOPAEDIC SURGERY

## 2017-12-28 PROCEDURE — 12000007 ZZH R&B INTERMEDIATE

## 2017-12-28 PROCEDURE — 25000125 ZZHC RX 250: Performed by: NURSE ANESTHETIST, CERTIFIED REGISTERED

## 2017-12-28 DEVICE — IMPLANTABLE DEVICE
Type: IMPLANTABLE DEVICE | Site: HIP | Status: NON-FUNCTIONAL
Removed: 2020-05-13

## 2017-12-28 DEVICE — GRAFT BONE OSTEOSET KIT FAST CURE 25ML 8400-0311: Type: IMPLANTABLE DEVICE | Site: HIP | Status: FUNCTIONAL

## 2017-12-28 RX ORDER — CEFAZOLIN SODIUM 2 G/100ML
2 INJECTION, SOLUTION INTRAVENOUS
Status: COMPLETED | OUTPATIENT
Start: 2017-12-28 | End: 2017-12-28

## 2017-12-28 RX ORDER — SODIUM CHLORIDE, SODIUM LACTATE, POTASSIUM CHLORIDE, CALCIUM CHLORIDE 600; 310; 30; 20 MG/100ML; MG/100ML; MG/100ML; MG/100ML
INJECTION, SOLUTION INTRAVENOUS CONTINUOUS
Status: DISCONTINUED | OUTPATIENT
Start: 2017-12-28 | End: 2017-12-28

## 2017-12-28 RX ORDER — VECURONIUM BROMIDE 1 MG/ML
INJECTION, POWDER, LYOPHILIZED, FOR SOLUTION INTRAVENOUS PRN
Status: DISCONTINUED | OUTPATIENT
Start: 2017-12-28 | End: 2017-12-28

## 2017-12-28 RX ORDER — ONDANSETRON 4 MG/1
4 TABLET, ORALLY DISINTEGRATING ORAL EVERY 6 HOURS PRN
Status: DISCONTINUED | OUTPATIENT
Start: 2017-12-28 | End: 2018-01-02 | Stop reason: HOSPADM

## 2017-12-28 RX ORDER — ACETAMINOPHEN 325 MG/1
650 TABLET ORAL EVERY 4 HOURS PRN
Status: DISCONTINUED | OUTPATIENT
Start: 2017-12-31 | End: 2018-01-02 | Stop reason: HOSPADM

## 2017-12-28 RX ORDER — ONDANSETRON 4 MG/1
4 TABLET, ORALLY DISINTEGRATING ORAL EVERY 30 MIN PRN
Status: DISCONTINUED | OUTPATIENT
Start: 2017-12-28 | End: 2017-12-28 | Stop reason: HOSPADM

## 2017-12-28 RX ORDER — BUPIVACAINE HYDROCHLORIDE AND EPINEPHRINE 2.5; 5 MG/ML; UG/ML
INJECTION, SOLUTION INFILTRATION; PERINEURAL PRN
Status: DISCONTINUED | OUTPATIENT
Start: 2017-12-28 | End: 2017-12-28 | Stop reason: HOSPADM

## 2017-12-28 RX ORDER — BUPIVACAINE HYDROCHLORIDE 2.5 MG/ML
INJECTION, SOLUTION INFILTRATION; PERINEURAL PRN
Status: DISCONTINUED | OUTPATIENT
Start: 2017-12-28 | End: 2017-12-28 | Stop reason: HOSPADM

## 2017-12-28 RX ORDER — LIDOCAINE 40 MG/G
CREAM TOPICAL
Status: DISCONTINUED | OUTPATIENT
Start: 2017-12-28 | End: 2018-01-02 | Stop reason: HOSPADM

## 2017-12-28 RX ORDER — HYDROMORPHONE HYDROCHLORIDE 1 MG/ML
.3-.5 INJECTION, SOLUTION INTRAMUSCULAR; INTRAVENOUS; SUBCUTANEOUS
Status: DISCONTINUED | OUTPATIENT
Start: 2017-12-28 | End: 2018-01-02 | Stop reason: HOSPADM

## 2017-12-28 RX ORDER — PROCHLORPERAZINE MALEATE 5 MG
5 TABLET ORAL EVERY 6 HOURS PRN
Status: DISCONTINUED | OUTPATIENT
Start: 2017-12-28 | End: 2018-01-02 | Stop reason: HOSPADM

## 2017-12-28 RX ORDER — SODIUM CHLORIDE, SODIUM LACTATE, POTASSIUM CHLORIDE, CALCIUM CHLORIDE 600; 310; 30; 20 MG/100ML; MG/100ML; MG/100ML; MG/100ML
INJECTION, SOLUTION INTRAVENOUS CONTINUOUS PRN
Status: DISCONTINUED | OUTPATIENT
Start: 2017-12-28 | End: 2017-12-28

## 2017-12-28 RX ORDER — VANCOMYCIN HYDROCHLORIDE 1 G/20ML
INJECTION, POWDER, LYOPHILIZED, FOR SOLUTION INTRAVENOUS PRN
Status: DISCONTINUED | OUTPATIENT
Start: 2017-12-28 | End: 2017-12-28 | Stop reason: HOSPADM

## 2017-12-28 RX ORDER — SODIUM CHLORIDE, SODIUM LACTATE, POTASSIUM CHLORIDE, CALCIUM CHLORIDE 600; 310; 30; 20 MG/100ML; MG/100ML; MG/100ML; MG/100ML
INJECTION, SOLUTION INTRAVENOUS CONTINUOUS
Status: DISCONTINUED | OUTPATIENT
Start: 2017-12-28 | End: 2017-12-28 | Stop reason: HOSPADM

## 2017-12-28 RX ORDER — FENTANYL CITRATE 50 UG/ML
25-50 INJECTION, SOLUTION INTRAMUSCULAR; INTRAVENOUS
Status: DISCONTINUED | OUTPATIENT
Start: 2017-12-28 | End: 2017-12-28 | Stop reason: HOSPADM

## 2017-12-28 RX ORDER — SODIUM CHLORIDE 9 MG/ML
INJECTION, SOLUTION INTRAVENOUS CONTINUOUS
Status: DISCONTINUED | OUTPATIENT
Start: 2017-12-28 | End: 2017-12-29

## 2017-12-28 RX ORDER — HYDROMORPHONE HYDROCHLORIDE 1 MG/ML
.3-.5 INJECTION, SOLUTION INTRAMUSCULAR; INTRAVENOUS; SUBCUTANEOUS EVERY 10 MIN PRN
Status: DISCONTINUED | OUTPATIENT
Start: 2017-12-28 | End: 2017-12-28 | Stop reason: HOSPADM

## 2017-12-28 RX ORDER — ONDANSETRON 2 MG/ML
INJECTION INTRAMUSCULAR; INTRAVENOUS PRN
Status: DISCONTINUED | OUTPATIENT
Start: 2017-12-28 | End: 2017-12-28

## 2017-12-28 RX ORDER — OXYCODONE HYDROCHLORIDE 5 MG/1
5 TABLET ORAL
Status: DISCONTINUED | OUTPATIENT
Start: 2017-12-28 | End: 2018-01-02 | Stop reason: HOSPADM

## 2017-12-28 RX ORDER — FENTANYL CITRATE 50 UG/ML
INJECTION, SOLUTION INTRAMUSCULAR; INTRAVENOUS PRN
Status: DISCONTINUED | OUTPATIENT
Start: 2017-12-28 | End: 2017-12-28

## 2017-12-28 RX ORDER — ACETAMINOPHEN 325 MG/1
975 TABLET ORAL EVERY 8 HOURS
Status: DISPENSED | OUTPATIENT
Start: 2017-12-28 | End: 2017-12-31

## 2017-12-28 RX ORDER — CEFAZOLIN SODIUM 1 G/3ML
INJECTION, POWDER, FOR SOLUTION INTRAMUSCULAR; INTRAVENOUS CONTINUOUS PRN
Status: DISCONTINUED | OUTPATIENT
Start: 2017-12-28 | End: 2017-12-28 | Stop reason: HOSPADM

## 2017-12-28 RX ORDER — NALOXONE HYDROCHLORIDE 0.4 MG/ML
.1-.4 INJECTION, SOLUTION INTRAMUSCULAR; INTRAVENOUS; SUBCUTANEOUS
Status: DISCONTINUED | OUTPATIENT
Start: 2017-12-28 | End: 2017-12-28 | Stop reason: HOSPADM

## 2017-12-28 RX ORDER — EPHEDRINE SULFATE 50 MG/ML
INJECTION, SOLUTION INTRAMUSCULAR; INTRAVENOUS; SUBCUTANEOUS PRN
Status: DISCONTINUED | OUTPATIENT
Start: 2017-12-28 | End: 2017-12-28

## 2017-12-28 RX ORDER — NEOSTIGMINE METHYLSULFATE 1 MG/ML
VIAL (ML) INJECTION PRN
Status: DISCONTINUED | OUTPATIENT
Start: 2017-12-28 | End: 2017-12-28

## 2017-12-28 RX ORDER — NALOXONE HYDROCHLORIDE 0.4 MG/ML
.1-.4 INJECTION, SOLUTION INTRAMUSCULAR; INTRAVENOUS; SUBCUTANEOUS
Status: DISCONTINUED | OUTPATIENT
Start: 2017-12-28 | End: 2018-01-02 | Stop reason: HOSPADM

## 2017-12-28 RX ORDER — GLYCOPYRROLATE 0.2 MG/ML
INJECTION, SOLUTION INTRAMUSCULAR; INTRAVENOUS PRN
Status: DISCONTINUED | OUTPATIENT
Start: 2017-12-28 | End: 2017-12-28

## 2017-12-28 RX ORDER — ONDANSETRON 2 MG/ML
4 INJECTION INTRAMUSCULAR; INTRAVENOUS EVERY 30 MIN PRN
Status: DISCONTINUED | OUTPATIENT
Start: 2017-12-28 | End: 2017-12-28 | Stop reason: HOSPADM

## 2017-12-28 RX ORDER — LIDOCAINE HYDROCHLORIDE 20 MG/ML
INJECTION, SOLUTION INFILTRATION; PERINEURAL PRN
Status: DISCONTINUED | OUTPATIENT
Start: 2017-12-28 | End: 2017-12-28

## 2017-12-28 RX ORDER — PROPOFOL 10 MG/ML
INJECTION, EMULSION INTRAVENOUS PRN
Status: DISCONTINUED | OUTPATIENT
Start: 2017-12-28 | End: 2017-12-28

## 2017-12-28 RX ORDER — ONDANSETRON 2 MG/ML
4 INJECTION INTRAMUSCULAR; INTRAVENOUS EVERY 6 HOURS PRN
Status: DISCONTINUED | OUTPATIENT
Start: 2017-12-28 | End: 2018-01-02 | Stop reason: HOSPADM

## 2017-12-28 RX ADMIN — PHENYLEPHRINE HYDROCHLORIDE 100 MCG: 10 INJECTION INTRAVENOUS at 15:12

## 2017-12-28 RX ADMIN — Medication 5 MG: at 15:14

## 2017-12-28 RX ADMIN — ROCURONIUM BROMIDE 50 MG: 10 INJECTION INTRAVENOUS at 14:18

## 2017-12-28 RX ADMIN — Medication 5 MG: at 17:04

## 2017-12-28 RX ADMIN — SODIUM CHLORIDE, POTASSIUM CHLORIDE, SODIUM LACTATE AND CALCIUM CHLORIDE: 600; 310; 30; 20 INJECTION, SOLUTION INTRAVENOUS at 12:18

## 2017-12-28 RX ADMIN — PHENYLEPHRINE HYDROCHLORIDE 100 MCG: 10 INJECTION INTRAVENOUS at 17:04

## 2017-12-28 RX ADMIN — PHENYLEPHRINE HYDROCHLORIDE 200 MCG: 10 INJECTION INTRAVENOUS at 14:18

## 2017-12-28 RX ADMIN — VECURONIUM BROMIDE 2 MG: 1 INJECTION, POWDER, LYOPHILIZED, FOR SOLUTION INTRAVENOUS at 15:32

## 2017-12-28 RX ADMIN — Medication 5 MG: at 16:26

## 2017-12-28 RX ADMIN — FENTANYL CITRATE 50 MCG: 50 INJECTION, SOLUTION INTRAMUSCULAR; INTRAVENOUS at 15:09

## 2017-12-28 RX ADMIN — PHENYLEPHRINE HYDROCHLORIDE 100 MCG: 10 INJECTION INTRAVENOUS at 16:12

## 2017-12-28 RX ADMIN — PHENYLEPHRINE HYDROCHLORIDE 0.25 MCG/KG/MIN: 10 INJECTION, SOLUTION INTRAMUSCULAR; INTRAVENOUS; SUBCUTANEOUS at 15:28

## 2017-12-28 RX ADMIN — SODIUM CHLORIDE, POTASSIUM CHLORIDE, SODIUM LACTATE AND CALCIUM CHLORIDE: 600; 310; 30; 20 INJECTION, SOLUTION INTRAVENOUS at 15:17

## 2017-12-28 RX ADMIN — LIDOCAINE HYDROCHLORIDE 40 MG: 20 INJECTION, SOLUTION INFILTRATION; PERINEURAL at 14:17

## 2017-12-28 RX ADMIN — Medication 0.5 MG: at 21:34

## 2017-12-28 RX ADMIN — Medication 0.5 MG: at 19:30

## 2017-12-28 RX ADMIN — CEFAZOLIN SODIUM 2 G: 2 INJECTION, SOLUTION INTRAVENOUS at 14:49

## 2017-12-28 RX ADMIN — Medication 5 MG: at 15:12

## 2017-12-28 RX ADMIN — PHENYLEPHRINE HYDROCHLORIDE 100 MCG: 10 INJECTION INTRAVENOUS at 15:20

## 2017-12-28 RX ADMIN — VECURONIUM BROMIDE 1 MG: 1 INJECTION, POWDER, LYOPHILIZED, FOR SOLUTION INTRAVENOUS at 16:19

## 2017-12-28 RX ADMIN — GABAPENTIN 800 MG: 800 TABLET ORAL at 21:34

## 2017-12-28 RX ADMIN — SIMVASTATIN 20 MG: 10 TABLET, FILM COATED ORAL at 21:35

## 2017-12-28 RX ADMIN — GLYCOPYRROLATE 0.6 MG: 0.2 INJECTION, SOLUTION INTRAMUSCULAR; INTRAVENOUS at 17:00

## 2017-12-28 RX ADMIN — PHENYLEPHRINE HYDROCHLORIDE 100 MCG: 10 INJECTION INTRAVENOUS at 16:26

## 2017-12-28 RX ADMIN — SODIUM CHLORIDE: 9 INJECTION, SOLUTION INTRAVENOUS at 19:28

## 2017-12-28 RX ADMIN — PROPOFOL 130 MG: 10 INJECTION, EMULSION INTRAVENOUS at 14:17

## 2017-12-28 RX ADMIN — ACETAMINOPHEN 975 MG: 325 TABLET, FILM COATED ORAL at 21:35

## 2017-12-28 RX ADMIN — NEOSTIGMINE METHYLSULFATE 4 MG: 1 INJECTION INTRAMUSCULAR; INTRAVENOUS; SUBCUTANEOUS at 17:00

## 2017-12-28 RX ADMIN — ONDANSETRON 4 MG: 2 INJECTION INTRAMUSCULAR; INTRAVENOUS at 16:58

## 2017-12-28 RX ADMIN — DEXMEDETOMIDINE HYDROCHLORIDE 8 MCG: 100 INJECTION, SOLUTION INTRAVENOUS at 14:10

## 2017-12-28 RX ADMIN — HYDROMORPHONE HYDROCHLORIDE 0.5 MG: 1 INJECTION, SOLUTION INTRAMUSCULAR; INTRAVENOUS; SUBCUTANEOUS at 14:55

## 2017-12-28 RX ADMIN — FENTANYL CITRATE 50 MCG: 50 INJECTION, SOLUTION INTRAMUSCULAR; INTRAVENOUS at 15:05

## 2017-12-28 RX ADMIN — PHENYLEPHRINE HYDROCHLORIDE 100 MCG: 10 INJECTION INTRAVENOUS at 15:55

## 2017-12-28 RX ADMIN — PHENYLEPHRINE HYDROCHLORIDE 100 MCG: 10 INJECTION INTRAVENOUS at 15:14

## 2017-12-28 RX ADMIN — CEFAZOLIN SODIUM 1 G: 1 SOLUTION INTRAVENOUS at 22:25

## 2017-12-28 RX ADMIN — TAMSULOSIN HYDROCHLORIDE 0.8 MG: 0.4 CAPSULE ORAL at 21:34

## 2017-12-28 RX ADMIN — CEFAZOLIN SODIUM 1 G: 1 SOLUTION INTRAVENOUS at 16:54

## 2017-12-28 RX ADMIN — DEXMEDETOMIDINE HYDROCHLORIDE 0.3 MCG/KG/HR: 100 INJECTION, SOLUTION INTRAVENOUS at 14:57

## 2017-12-28 RX ADMIN — VECURONIUM BROMIDE 1 MG: 1 INJECTION, POWDER, LYOPHILIZED, FOR SOLUTION INTRAVENOUS at 15:49

## 2017-12-28 RX ADMIN — SODIUM CHLORIDE, POTASSIUM CHLORIDE, SODIUM LACTATE AND CALCIUM CHLORIDE: 600; 310; 30; 20 INJECTION, SOLUTION INTRAVENOUS at 14:29

## 2017-12-28 ASSESSMENT — ENCOUNTER SYMPTOMS: DYSRHYTHMIAS: 1

## 2017-12-28 NOTE — PLAN OF CARE
Problem: Patient Care Overview  Goal: Plan of Care/Patient Progress Review  Outcome: No Change  Pt is A&Ox4, [place], use hearing aids. Baseline numbness on bilateral LE. Up w/ SBA. Voiding in urinal/BR. Had BM x1 this shift. NPO since midnight. Denied pain and declined any pain medications. Surgery @1315 today.

## 2017-12-28 NOTE — PLAN OF CARE
Problem: Patient Care Overview  Goal: Plan of Care/Patient Progress Review  Outcome: No Change  Pt was directly admitted around 1730, A&O x4, VSS on RA, CMS intact, Activity w/1, denies pain, Diet NPO, IV-SL, Pt going for I&D tomorrow. Continue to monitor.

## 2017-12-28 NOTE — PROGRESS NOTES
"SPIRITUAL [facility] SERVICES Progress Note  [facility]    SH visited pt, who is known to me, on request. Pt reflected on his health journey and shared how his kerrie in God has helped he and his wife navigate it. Pt displayed positive attitude and says he is looking forward to the results of his surgery today so he will have more information about his condition and next steps. Pt shared that he is well supported by family, including a daughter who is an RN, as well as his wife, whom he described as an \"gloria\" and his Jewish community.    SH provided reflective listening, support and encouragement, affirming pt's long history of service and ability to be a light in this circumstance. Pt welcomed prayer. SH will follow.    Gigi Varghese M.Div.  Chaplain Resident  [phone] Pager  "

## 2017-12-28 NOTE — CONSULTS
PRIMARY CARE PHYSICIAN:  Dr. Rebecca Noyola.      REQUESTING PHYSICIAN:  Dr. Summers from Orthopedics.      REASON FOR CONSULTATION:  Acute renal failure, medical management.      HISTORY OF PRESENT ILLNESS:  Mr. Mark Hernández is a pleasant 84-year-old male with a past medical history as listed below, notably for right total hip arthroplasty who is admitted to orthopedics for planned right hip revision for an infected arthroplasty tomorrow and hospitalist was requested a consultation for acute renal failure and medical management.  As stated he had right hip arthroplasty in 2001 and since September 2017 he has been having some right hip pain.  Has had 3 aspirations done so far and the last being on 12/05/2017 which was growing coagulase negative staph.  He was started on Bactrim for 10 days starting 12/18/2017 and is planned for right hip revision tomorrow.  He has been taking tramadol for pain control.  He saw his primary care provider yesterday for a preop clearance and routine labs were noted with a BMP with a creatinine of 1.68 which is a jump from his baseline creatinine around 1, so the patient was admitted tonight for medical optimization.  He denies any fever, chills or rigors.  No chest pain or shortness of breath.  Denies pain in abdomen.  Reports normal bowel and bladder habit.      REVIEW OF SYSTEMS:  A 10-point review of systems was done and were negative apart from those mentioned in the history of present illness.  He does have chronic bilateral lower extremity edema.        PAST MEDICAL HISTORY:   1.  Diabetes mellitus type 2.   2.  Hypertension.   3.  Dyslipidemia.   4.  Peripheral neuropathy.   5.  History of second-degree AV block status post pacemaker   6.  BPH.   7.  Right total hip arthroplasty 2001.   8.  Diverticulosis.   9.  Bladder cancer, status post tumor excision, 04/2017.      MEDICATIONS PRIOR TO ADMISSION:  Prescriptions Prior to Admission   Medication Sig Dispense Refill Last Dose      sulfamethoxazole-trimethoprim (BACTRIM DS/SEPTRA DS) 800-160 MG per tablet Take 1 tablet by mouth 2 times daily   12/27/2017 at am     CYANOCOBALAMIN PO Take 1,000 mcg by mouth daily   12/27/2017 at Unknown time     gabapentin (NEURONTIN) 800 MG tablet Take 1 tablet (800 mg) by mouth 2 times daily   12/27/2017 at pm     DULoxetine (CYMBALTA) 30 MG capsule Take 30 mg by mouth daily   12/27/2017 at am     tamsulosin (FLOMAX) 0.4 MG 24 hr capsule Take 0.8 mg by mouth At Bedtime    12/26/2017 at hs     metFORMIN (GLUCOPHAGE-XR) 500 MG 24 hr tablet Take 500 mg by mouth daily (with dinner) 1/2 tab bid   12/27/2017 at pm     simvastatin (ZOCOR) 40 MG tablet Take 20 mg by mouth At Bedtime Takes 1/2 of a 40mg tablet   12/26/2017 at hs     lisinopril (PRINIVIL,ZESTRIL) 10 MG tablet Take 10 mg by mouth daily   12/27/2017 at am     aspirin 325 MG tablet Take 325 mg by mouth daily   12/27/2017 at am     naproxen (NAPROSYN) 500 MG tablet Take 500 mg by mouth 2 times daily as needed for moderate pain    Past Week at prn         ALLERGIES:  No known drug allergies.        SOCIAL HISTORY:  He denies smoking.  Takes occasional wine.  No illicit drug use.      FAMILY HISTORY:  His father had MI, mother had schizophrenia.  Family history otherwise not pertinent to current presentation.      PHYSICAL EXAMINATION:   GENERAL:  The patient is conscious, alert, oriented x3, lying comfortably in bed in no apparent distress.   VITAL SIGNS:  Temperature 97.6, heart rate of 95, blood pressure 123/69, saturation 97% on room air.   HEENT:  Pupils are equal and reactive to light and accommodation.  Extraocular movements are intact.  Oral mucosa is moist.   NECK:  Supple, no raised JVD.   RESPIRATORY:  Respiration system lung sounds bilaterally clear to auscultation, no wheezes or crepitation.   CARDIOVASCULAR:  Normal S1-S2, regular rate and rhythm, no murmur.   ABDOMEN:  Soft, nontender, nondistended, no guarding, rigidity or rebound  tenderness.   LOWER EXTREMITIES:  With mild bilateral edema present.   NEUROLOGIC:  No focal neurological deficits noted.  Cranial nerves II-XII grossly intact.   PSYCHIATRIC:  Normal mood and affect.      LABORATORY DATA AND IMAGING:  Reviewed.  Preop labs from 12/26/2017 [facility]  CBC with WBC of 7.6, hemoglobin of 12.4, platelet 236, BMP with sodium of 136, potassium 5.0, chloride 102, bicarbonate 25, BUN 39, creatinine of 1.68.  Preop EKG noted with A sensed V paced rhythm.        ASSESSMENT AND PLAN:  Mr. Mark Hernández is an 84-year-old male with past medical history significant for diabetes, hypertension, dyslipidemia and peripheral neuropathy pacemaker for atrioventricular block, benign prostatic hyperplasia, right total hip arthroplasty, diverticulosis who is admitted to orthopedics for infected right total hip arthroplasty and hospitalist requested consultation for acute renal failure and medical management.     1.  Infected right total hip arthroplasty.  Perioperative care per orthopedics plans for right total hip revision tomorrow.     2.  Acute renal failure.  His baseline creatinine is around 1 as of 09/2017.  The acute renal failure is likely prerenal and probably worsened by a recent use of naproxen and tramadol and the continued use of metformin and lisinopril.  At this point, will start him on IV hydration with normal saline at 100 mL per hour.  Will hold off on the metformin, lisinopril.  He already discontinued his tramadol and naproxen.  Will repeat basic metabolic panel in the a.m.  If renal function is worsening, we might need to obtain a renal ultrasound and nephrology consultation.     3.  Diabetes mellitus.  Will hold off on the metformin.  Will start him on sliding scale insulin.   4.  Hypertension.  His blood pressures currently seems controlled.  I will hold off on the lisinopril because of acute renal failure.  Will start him on hydralazine p.r.n. for systolic blood pressure  more than 180.   5.  Dyslipidemia.  Continue with simvastatin.   6.  Peripheral neuropathy.  Will continue with his Neurontin and Cymbalta.   7.  Benign prostatic hyperplasia.  Continue with Flomax.   8.  Deep venous thrombosis prophylaxis per Orthopedics.      CODE STATUS:  Full code.      We thank Orthopedic Service for letting us participate in the care of this patient.  We will follow the patient along with you.         RUBEN VASQUEZ MD             D: 2017 18:28   T: 2017 19:50   MT: RAMONITA      Name:     FRANKIE THOMPSON   MRN:      [id number]        Account:       [id number]   :      1933           Consult Date:  2017      Document: [id number]

## 2017-12-28 NOTE — ANESTHESIA CARE TRANSFER NOTE
Patient: Mark Hernández    Procedure(s):  IRRIGATION AND DEBRIDEMENT RIGHT HIP WITH PLACEMENT ANTIBIOTIC BEADS, POLY EXCHANGE, AND PARTIAL HIP REVISION.  - Wound Class: III-Contaminated   - Wound Class: I-Clean    Diagnosis: HIP INFECTION, POSSIBLE LOOSENING RIGHT HIP   Diagnosis Additional Information: No value filed.    Anesthesia Type:   General, ETT     Note:  Airway :Face Mask  Patient transferred to:PACU  Handoff Report: Identifed the Patient, Identified the Reponsible Provider, Reviewed the pertinent medical history, Discussed the surgical course, Reviewed Intra-OP anesthesia mangement and issues during anesthesia, Set expectations for post-procedure period and Allowed opportunity for questions and acknowledgement of understanding    Neuromuscular blockade reversed after TOF 4/4, spontaneous respirations, adequate tidal volumes, followed commands to voice, oropharynx suctioned with soft flexible catheter, extubated atraumatically, extubated with suction, airway patent after extubation.  Oxygen via facemask at 10 liters per minute to PACU. Oxygen tubing connected to wall O2 in PACU, SpO2, NiBP, and EKG monitors and alarms on and functioning, report on patient's clinical status given to PACU RN, RN questions answered. Patient denied pain at handoff in PACU.    Electronically Signed By: LUCY Mix CRNA  December 28, 2017  5:31 PM

## 2017-12-28 NOTE — PROGRESS NOTES
Per [facility]:  Patient has Medicare, and a [employer] supplement that follows Medicare guidelines.   Would potentially have coverage for drug through Part D, and have a copay per dispense. Supplies and per yessica would be self-pay and patient would need to be homebound for nursing coverage.   Please let us know if patient would like a self-pay quote, we would need drug, dose and frequency.  Will follow. Ivy Nicholas RN

## 2017-12-28 NOTE — PLAN OF CARE
Problem: Patient Care Overview  Goal: Plan of Care/Patient Progress Review  Outcome: No Change  Bedrest w/ bathroom privileges, SBA. A&O x4. CMS intact. Pain 6/10 when walking, pt refused any pain meds. All meds held d/t NPO status. Sent to surgery @ noon.

## 2017-12-28 NOTE — BRIEF OP NOTE
[facility] Brief Operative Note    Pre-operative diagnosis: HIP INFECTION, POSSIBLE LOOSENING RIGHT HIP    Post-operative diagnosis Septic  Hip. ,  No  Loosening,  Poly  Exchanged, femoral had  exchanged   Procedure: Procedure(s):  IRRIGATION AND DEBRIDEMENT RIGHT HIP WITH PLACEMENT ANTIBIOTIC BEADS, POLY EXCHANGE, AND PARTIAL HIP REVISION.  - Wound Class: III-Contaminated   - Wound Class: I-Clean   Surgeon(s): Surgeon(s) and Role:     * Kirk Summers MD - Primary    asst medhat moncada opa-c   Estimated blood loss: 400 mL    Specimens: none   Findings: Chronic  Sepsis,  Sub  [facility]  [facility]     Will require  6  To 8 weks  Iv abx  And  Po  forever

## 2017-12-28 NOTE — ANESTHESIA PREPROCEDURE EVALUATION
Anesthesia Evaluation     . Pt has had prior anesthetic.     No history of anesthetic complications          ROS/MED HX    ENT/Pulmonary:      (-) sleep apnea   Neurologic:     (+)neuropathy     Cardiovascular:     (+) hypertension----. : . . . pacemaker :. dysrhythmias 2nd Deg Heart Block, .       METS/Exercise Tolerance:     Hematologic:         Musculoskeletal:         GI/Hepatic:        (-) GERD   Renal/Genitourinary:     (+) chronic renal disease,       Endo:     (+) type II DM .      Psychiatric:         Infectious Disease:         Malignancy:         Other:                     Physical Exam  Normal systems: dental    Airway   Mallampati: II  TM distance: >3 FB  Neck ROM: full    Dental     Cardiovascular   Rhythm and rate: regular      Pulmonary    breath sounds clear to auscultation                    Anesthesia Plan      History & Physical Review  History and physical reviewed and following examination; no interval change.    ASA Status:  3 .        Plan for General and ETT with Intravenous induction. Maintenance will be Balanced.    PONV prophylaxis:  Ondansetron (or other 5HT-3) and Dexamethasone or Solumedrol  Additional equipment: Videolaryngoscope and 2nd IV      Postoperative Care  Postoperative pain management:  IV analgesics.      Consents  Anesthetic plan, risks, benefits and alternatives discussed with:  Patient.  Use of blood products discussed: Yes.   Consented to blood products.  .                          .

## 2017-12-29 ENCOUNTER — APPOINTMENT (OUTPATIENT)
Dept: PHYSICAL THERAPY | Facility: CLINIC | Age: 82
DRG: 467 | End: 2017-12-29
Attending: ORTHOPAEDIC SURGERY
Payer: MEDICARE

## 2017-12-29 LAB
ANION GAP SERPL CALCULATED.3IONS-SCNC: 5 MMOL/L (ref 3–14)
BLD PROD TYP BPU: NORMAL
BLD UNIT ID BPU: 0
BLOOD PRODUCT CODE: NORMAL
BPU ID: NORMAL
BUN SERPL-MCNC: 27 MG/DL (ref 7–30)
CALCIUM SERPL-MCNC: 8.1 MG/DL (ref 8.5–10.1)
CHLORIDE SERPL-SCNC: 105 MMOL/L (ref 94–109)
CO2 SERPL-SCNC: 27 MMOL/L (ref 20–32)
CREAT SERPL-MCNC: 1.29 MG/DL (ref 0.66–1.25)
ERYTHROCYTE [DISTWIDTH] IN BLOOD BY AUTOMATED COUNT: 14.2 % (ref 10–15)
GFR SERPL CREATININE-BSD FRML MDRD: 53 ML/MIN/1.7M2
GLUCOSE BLDC GLUCOMTR-MCNC: 138 MG/DL (ref 70–99)
GLUCOSE BLDC GLUCOMTR-MCNC: 148 MG/DL (ref 70–99)
GLUCOSE BLDC GLUCOMTR-MCNC: 172 MG/DL (ref 70–99)
GLUCOSE BLDC GLUCOMTR-MCNC: 177 MG/DL (ref 70–99)
GLUCOSE SERPL-MCNC: 141 MG/DL (ref 70–99)
HCT VFR BLD AUTO: 31.2 % (ref 40–53)
HGB BLD-MCNC: 9.9 G/DL (ref 13.3–17.7)
MCH RBC QN AUTO: 28.8 PG (ref 26.5–33)
MCHC RBC AUTO-ENTMCNC: 31.7 G/DL (ref 31.5–36.5)
MCV RBC AUTO: 91 FL (ref 78–100)
PLATELET # BLD AUTO: 169 10E9/L (ref 150–450)
POTASSIUM SERPL-SCNC: 4.9 MMOL/L (ref 3.4–5.3)
RBC # BLD AUTO: 3.44 10E12/L (ref 4.4–5.9)
SODIUM SERPL-SCNC: 137 MMOL/L (ref 133–144)
TRANSFUSION STATUS PATIENT QL: NORMAL
WBC # BLD AUTO: 8.7 10E9/L (ref 4–11)

## 2017-12-29 PROCEDURE — 99207 ZZC CDG-MDM COMPONENT: MEETS MODERATE - UP CODED: CPT | Performed by: INTERNAL MEDICINE

## 2017-12-29 PROCEDURE — 12000007 ZZH R&B INTERMEDIATE

## 2017-12-29 PROCEDURE — 97161 PT EVAL LOW COMPLEX 20 MIN: CPT | Mod: GP | Performed by: PHYSICAL THERAPIST

## 2017-12-29 PROCEDURE — 00000146 ZZHCL STATISTIC GLUCOSE BY METER IP

## 2017-12-29 PROCEDURE — 99233 SBSQ HOSP IP/OBS HIGH 50: CPT | Performed by: INTERNAL MEDICINE

## 2017-12-29 PROCEDURE — 40000193 ZZH STATISTIC PT WARD VISIT: Performed by: PHYSICAL THERAPIST

## 2017-12-29 PROCEDURE — 36415 COLL VENOUS BLD VENIPUNCTURE: CPT | Performed by: ORTHOPAEDIC SURGERY

## 2017-12-29 PROCEDURE — 25000128 H RX IP 250 OP 636: Performed by: ORTHOPAEDIC SURGERY

## 2017-12-29 PROCEDURE — 80048 BASIC METABOLIC PNL TOTAL CA: CPT | Performed by: ORTHOPAEDIC SURGERY

## 2017-12-29 PROCEDURE — A9270 NON-COVERED ITEM OR SERVICE: HCPCS | Mod: GY | Performed by: HOSPITALIST

## 2017-12-29 PROCEDURE — 97110 THERAPEUTIC EXERCISES: CPT | Mod: GP | Performed by: PHYSICAL THERAPIST

## 2017-12-29 PROCEDURE — 25000128 H RX IP 250 OP 636: Performed by: INTERNAL MEDICINE

## 2017-12-29 PROCEDURE — 25000132 ZZH RX MED GY IP 250 OP 250 PS 637: Mod: GY | Performed by: ORTHOPAEDIC SURGERY

## 2017-12-29 PROCEDURE — 97530 THERAPEUTIC ACTIVITIES: CPT | Mod: GP | Performed by: PHYSICAL THERAPIST

## 2017-12-29 PROCEDURE — 85027 COMPLETE CBC AUTOMATED: CPT | Performed by: ORTHOPAEDIC SURGERY

## 2017-12-29 PROCEDURE — A9270 NON-COVERED ITEM OR SERVICE: HCPCS | Mod: GY | Performed by: ORTHOPAEDIC SURGERY

## 2017-12-29 PROCEDURE — 25000132 ZZH RX MED GY IP 250 OP 250 PS 637: Mod: GY | Performed by: HOSPITALIST

## 2017-12-29 RX ORDER — CEFAZOLIN SODIUM 2 G/100ML
2 INJECTION, SOLUTION INTRAVENOUS EVERY 8 HOURS
Status: DISCONTINUED | OUTPATIENT
Start: 2017-12-29 | End: 2018-01-02 | Stop reason: HOSPADM

## 2017-12-29 RX ADMIN — GABAPENTIN 800 MG: 800 TABLET ORAL at 11:24

## 2017-12-29 RX ADMIN — OXYCODONE HYDROCHLORIDE 5 MG: 5 TABLET ORAL at 13:04

## 2017-12-29 RX ADMIN — DULOXETINE 30 MG: 30 CAPSULE, DELAYED RELEASE ORAL at 11:24

## 2017-12-29 RX ADMIN — ACETAMINOPHEN 975 MG: 325 TABLET, FILM COATED ORAL at 21:23

## 2017-12-29 RX ADMIN — ACETAMINOPHEN 975 MG: 325 TABLET, FILM COATED ORAL at 17:14

## 2017-12-29 RX ADMIN — Medication 0.5 MG: at 04:24

## 2017-12-29 RX ADMIN — CEFAZOLIN SODIUM 2 G: 2 INJECTION, SOLUTION INTRAVENOUS at 19:57

## 2017-12-29 RX ADMIN — OXYCODONE HYDROCHLORIDE 5 MG: 5 TABLET ORAL at 21:23

## 2017-12-29 RX ADMIN — Medication 0.5 MG: at 08:05

## 2017-12-29 RX ADMIN — CEFAZOLIN SODIUM 2 G: 2 INJECTION, SOLUTION INTRAVENOUS at 11:24

## 2017-12-29 RX ADMIN — TAMSULOSIN HYDROCHLORIDE 0.8 MG: 0.4 CAPSULE ORAL at 21:23

## 2017-12-29 RX ADMIN — ENOXAPARIN SODIUM 40 MG: 40 INJECTION SUBCUTANEOUS at 11:24

## 2017-12-29 RX ADMIN — ACETAMINOPHEN 975 MG: 325 TABLET, FILM COATED ORAL at 07:06

## 2017-12-29 RX ADMIN — INSULIN ASPART 2 UNITS: 100 INJECTION, SOLUTION INTRAVENOUS; SUBCUTANEOUS at 14:55

## 2017-12-29 RX ADMIN — SIMVASTATIN 20 MG: 10 TABLET, FILM COATED ORAL at 21:23

## 2017-12-29 RX ADMIN — CEFAZOLIN SODIUM 1 G: 1 SOLUTION INTRAVENOUS at 07:06

## 2017-12-29 RX ADMIN — GABAPENTIN 800 MG: 800 TABLET ORAL at 21:23

## 2017-12-29 NOTE — PLAN OF CARE
Problem: Patient Care Overview  Goal: Plan of Care/Patient Progress Review  Outcome: No Change  POD 1.  A&Ox4.  VSS.  Baseline neuropathy.  Pain managed with IV Dilaudid and scheduled Tylenol.  CMS intact.  Mock removed at 0700.  Due to void.  Hemovac draining.  IVF infusing.  Continue to monitor.

## 2017-12-29 NOTE — PLAN OF CARE
Problem: Patient Care Overview  Goal: Plan of Care/Patient Progress Review  Outcome: Improving  Pt remains A/O. arrivbed to floor at 1845. General anesthesia. HVC. Dangled. IVF. Pain controlled with Iv dilaudid. O2 2L. Mock. Pacemaker. DMII. Clears. Continue to monitor.

## 2017-12-29 NOTE — PROGRESS NOTES
Met with patient this morning to discuss discharge planning. Had gotten a call from [facility] inquiring if the referral for Home IV Antibiotics is still active. After speaking with the patient, he said there is no way he could discharge to home. His home is  a split level and it would be hard to maneuver around. He really wants to discharge to a TCU, [facility] or [facility]. I explained that it would be up to his surgeon to approve this plan. Will continue to follow for further discharge plans.

## 2017-12-29 NOTE — PROGRESS NOTES
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 Patient is Mark, an 84 year-old male who was admitted on 12/27 for an infection of prosthetic total hip joint. His tentative discharge date is 12/30 or 12/31. GLORIA discussed with Dr. Kirk Summers MD, and reviewed medical record. Per Dr. Summers, the recommendation is TCU for 6 weeks of IV antibiotics. Patient would prefer a private room at [facility] or [facility]. He is aware of the possibility of private room fees. GLORIA placed these referrals via [facility].     Transportation Available: car, family or friend will provide (Pt drove prior to hospitalization. )    ASSESSMENT  Cognitive Status:  Did not meet with patient  Concerns to be addressed: Discharge planning.     PLAN  Financial costs for the patient includes Private room fees.  Patient given options and choices for discharge TCU choices.  Patient/family is agreeable to the plan?  Yes  Patient Goals and Preferences: Discharge to TCU.  Patient anticipates discharging to:  TCU.    AMANDA Murillo, LICSW

## 2017-12-29 NOTE — PLAN OF CARE
Problem: Patient Care Overview  Goal: Plan of Care/Patient Progress Review  PT: Orders received, evaluation completed, and treatment initiated. Patient is a 83 y/o male POD # 1 R hip I & D, placement of antibiotic beads, and partial revision. Patient lives with his spouse in a Townhouse, 2 steps to enter/exit and 16 stairs to access bedroom. Patient reports completing functional mobility independently with use of FWW prior to surgery.     Discharge Planner PT   Patient plan for discharge: None stated.     Current status: Patient performed supine-sit with Katy to maintain hip precautions. Sit <> stand with FWW and Katy, cues provided for hand placement and upright posture upon standing. Patient ambulated 30 feet with FWW and CGA.     Barriers to return to prior living situation: Stairs-not yet assessed.     Recommendations for discharge: TBD on POD # 2    Rationale for recommendations: TBD on POD # 2         Entered by: Lynn Laureano 12/29/2017 9:45 AM

## 2017-12-29 NOTE — ANESTHESIA POSTPROCEDURE EVALUATION
Patient: Mark Hernández    Procedure(s):  IRRIGATION AND DEBRIDEMENT RIGHT HIP WITH PLACEMENT ANTIBIOTIC BEADS, POLY EXCHANGE, AND PARTIAL HIP REVISION.  - Wound Class: III-Contaminated   - Wound Class: I-Clean    Diagnosis:HIP INFECTION, POSSIBLE LOOSENING RIGHT HIP   Diagnosis Additional Information: No value filed.    Anesthesia Type:  General, ETT    Note:  Anesthesia Post Evaluation    Patient location during evaluation: PACU  Patient participation: Able to fully participate in evaluation  Level of consciousness: awake  Pain management: adequate  Airway patency: patent  Cardiovascular status: acceptable  Respiratory status: acceptable  Hydration status: acceptable  PONV: none     Anesthetic complications: None          Last vitals:  Vitals:    12/28/17 1730 12/28/17 1740 12/28/17 1750   BP: 113/67 115/64 105/63   Pulse:      Resp: 25 8 11   Temp:  36.3  C (97.3  F) 36.3  C (97.3  F)   SpO2: 98% 98% 97%         Electronically Signed By: Lynda Hilario  December 28, 2017  6:04 PM

## 2017-12-29 NOTE — PROGRESS NOTES
[facility]    Hospitalist Progress Note    Assessment & Plan    Mr. Mark Hernández is an 84-year-old male with past medical history significant for diabetes, hypertension, dyslipidemia and peripheral neuropathy pacemaker for atrioventricular block, benign prostatic hyperplasia, right total hip arthroplasty, diverticulosis who is admitted to orthopedics for infected right total hip arthroplasty and hospitalist requested consultation for acute renal failure and medical management.     Infected right total hip arthroplasty.    -Status post I/D and placement of abx beads, poly exchange on 12/28  -antibiotics. Infectious disease  -routine postoperative care per orthopedic surgery     Acute kidney injury:  -His baseline creatinine is around 1 as of 09/2017.    -Presented with a creatinine of 2.02  -the acute renal failure is likely prerenal and probably worsened by a recent use of naproxen and the continued use of metformin and lisinopril.   -Continue to hold lisinopril  -renal function better with hydration     Diabetes mellitus.    -Will hold metformin.    -sliding scale insulin.     Hypertension.    -His blood pressures currently seems controlled.   -hold lisinopril     Dyslipidemia.    -Continue with simvastatin.     Peripheral neuropathy.    -continue Neurontin and Cymbalta.     Benign prostatic hyperplasia.    -Continue with Flomax.       D/W: RN  DVT Prophylaxis: Defer to primary service  Code Status: No Order    Disposition: Expected discharge per ortho    Rafael Steven MD    Interval History   Pain well controlled. S/p I and D and right hip replacement with abx beads on 12/28. No dyspnea    -Data reviewed today: I reviewed all new labs and imaging results over the last 24 hours. I personally reviewed no images or EKG's today.    Physical Exam   Temp: 97.9  F (36.6  C) Temp src: Oral BP: 133/68   Heart Rate: 76 Resp: 16 SpO2: 95 % O2 Device: Nasal cannula Oxygen Delivery: 2 LPM  Vitals:     12/28/17 0356   Weight: 89.4 kg (197 lb)     Vital Signs with Ranges  Temp:  [97.2  F (36.2  C)-97.9  F (36.6  C)] 97.9  F (36.6  C)  Heart Rate:  [71-85] 76  Resp:  [8-25] 16  BP: ()/(46-70) 133/68  SpO2:  [92 %-99 %] 95 %  I/O last 3 completed shifts:  In: 2050 [I.V.:2050]  Out: 1100 [Urine:675; Drains:25; Blood:400]    Constitutional: AAOX3, NAD, Appears comfortable  HEENT: Moist oral mucosa, no oral lesions, No pallor or icterus  Neck- Supple, Good ROM, No JVD  Respiratory:  No crackles, No wheezes, CTA B/L, Normal WOB  Cardiovascular: RRR, No murmur  GI: Soft, Non- tender, BS- normoactive, No Guarding/rebound/rigidity  Skin/Integument: Warm and dry, no rashes  MSK: rt hip incision bandaged  Neuro: CN- grossly intact    Medications     NaCl 125 mL/hr at 12/28/17 2225       ceFAZolin  2 g Intravenous Q8H     sodium chloride (PF)  3 mL Intracatheter Q8H     enoxaparin  40 mg Subcutaneous Q24H     acetaminophen  975 mg Oral Q8H     Polyethylene Glycol 400  1 drop Both Eyes BID     insulin aspart  1-10 Units Subcutaneous TID AC     insulin aspart  1-7 Units Subcutaneous At Bedtime     DULoxetine  30 mg Oral Daily     gabapentin  800 mg Oral BID     simvastatin  20 mg Oral At Bedtime     tamsulosin  0.8 mg Oral At Bedtime       Data     Recent Labs  Lab 12/29/17  0655 12/28/17  1910 12/28/17  0708 12/27/17 2020   WBC 8.7 9.7  --  8.7   HGB 9.9* 11.1*  --  11.5*   MCV 91 90  --  89    202  --  200   INR  --   --   --  1.11     --  139 138   POTASSIUM 4.9  --  4.8 4.8   CHLORIDE 105  --  108 105   CO2 27  --  29 26   BUN 27  --  43* 50*   CR 1.29* 1.39* 1.66* 2.02*   ANIONGAP 5  --  2* 7   ESAU 8.1*  --  8.7 8.7   *  --  146* 144*       Recent Results (from the past 24 hour(s))   XR Pelvis Port 1/2 Views    Narrative    PORTABLE PELVIS ONE-TWO VIEWS   12/28/2017 6:00 PM     INDICATION: Postop hip arthroplasty.    COMPARISON: None.      Impression    IMPRESSION: Postoperative changes of right  hip arthroplasty with screw  fixation of the acetabular component. Numerous tiny bone densities  about the femoral neck region. Surgical drain in place. Postoperative  gas in the soft tissues.    KIERAN LE MD

## 2017-12-29 NOTE — PROGRESS NOTES
12/29/17 0812   Quick Adds   Type of Visit Initial PT Evaluation   Living Environment   Lives With spouse   Living Arrangements house  ([place] [facility])   Number of Stairs to Enter Home 2  (No railing. )   Number of Stairs Within Home 16  (1 railing. )   Transportation Available car;family or friend will provide  (Pt drove prior to hospitalization. )   Living Environment Comment Pt's spouse is able to assist at time of discharge.    Self-Care   Usual Activity Tolerance good   Current Activity Tolerance moderate   Regular Exercise yes   Activity/Exercise Type strength training   Exercise Amount/Frequency daily   Equipment Currently Used at Home walker, rolling   Activity/Exercise/Self-Care Comment Pt owns FWW and SEC.    Functional Level Prior   Ambulation 1-->assistive equipment   Transferring 1-->assistive equipment   Toileting 1-->assistive equipment   Bathing 1-->assistive equipment  (Shower bench. )   Dressing 0-->independent   Fall history within last six months yes   Number of times patient has fallen within last six months 6   Prior Functional Level Comment Pt reports completing functional mobility independently with use of FWW.    General Information   Onset of Illness/Injury or Date of Surgery - Date 12/27/17   Referring Physician Kirk Summers MD   Patient/Family Goals Statement None stated.    Pertinent History of Current Problem (include personal factors and/or comorbidities that impact the POC) 83 y/o male POD # 1 I & D of R hip, placement of antibiotic beads and partial MATTY revision.    Precautions/Limitations fall precautions;right hip precautions  (Posterior-Lateral Precautions, No Abduction for 6-8 weeks. )   Weight-Bearing Status - RLE weight-bearing as tolerated   General Observations Pt in supine upon arrival of therapist.    General Info Comments Ambulate with assist.    Cognitive Status Examination   Orientation orientation to person, place and time   Level of Consciousness alert    Follows Commands and Answers Questions 100% of the time   Personal Safety and Judgment intact   Pain Assessment   Patient Currently in Pain (R hip pain at rest: 4-5/10)   Integumentary/Edema   Integumentary/Edema Comments R hip incision covered with dressing, hemovac intact.    Posture    Posture Comments Noted forward flexed posture.    Range of Motion (ROM)   ROM Comment Limited R hip ROM secondary to pain and hip precautions, otherwise B LEs WFL.    Strength   Strength Comments Not formally assessed. Pt demonstrates at least 3/5 grossly in B LEs with functional mobility.   Bed Mobility   Bed Mobility Comments Supine-sit, [place].    Transfer Skills   Transfer Comments Sit <> stand with FWW and Kat[place].    Gait   Gait Comments Pt amb 10' with FWW and CGA.    Balance   Balance Comments Noted good sitting balance and fair standing balance.    Sensory Examination   Sensory Perception Comments Pt reports peripheral neuropathy in B LEs at baseline.    Modality Interventions   Planned Modality Interventions Cryotherapy   Planned Modality Interventions Comments PRN.    General Therapy Interventions   Planned Therapy Interventions bed mobility training;gait training;ROM;strengthening;transfer training   Clinical Impression   Criteria for Skilled Therapeutic Intervention yes, treatment indicated   PT Diagnosis Difficulty with gait.    Influenced by the following impairments Pain, Generalized weakness, Decreased activity tolerance, Impaired R hip ROM   Functional limitations due to impairments Limited functional mobility requiring AD and assist.    Clinical Presentation Evolving/Changing   Clinical Presentation Rationale Based on PMH, current presentation, and social support.    Clinical Decision Making (Complexity) Low complexity   Therapy Frequency` 2 times/day   Predicted Duration of Therapy Intervention (days/wks) 4 days   Anticipated Discharge Disposition Home with Assist   Risk & Benefits of therapy have been explained  "Yes   Patient, Family & other staff in agreement with plan of care Yes   [facility] AM-PAC  \"6 Clicks\" V.2 Basic Mobility Inpatient Short Form   1. Turning from your back to your side while in a flat bed without using bedrails? 4 - None   2. Moving from lying on your back to sitting on the side of a flat bed without using bedrails? 3 - A Little   3. Moving to and from a bed to a chair (including a wheelchair)? 3 - A Little   4. Standing up from a chair using your arms (e.g., wheelchair, or bedside chair)? 3 - A Little   5. To walk in hospital room? 3 - A Little   6. Climbing 3-5 steps with a railing? 2 - A Lot   Basic Mobility Raw Score (Score out of 24.Lower scores equate to lower levels of function) 18   Total Evaluation Time   Total Evaluation Time (Minutes) 8     "

## 2017-12-29 NOTE — PLAN OF CARE
Problem: Patient Care Overview  Goal: Plan of Care/Patient Progress Review  Outcome: No Change  A&O x4. CMS intact. L pedial pulse weak but palpable. D/c'd IV fluids. Hemovac in place and draining sanguinous fluid. B, 177. A1, w/walker to bedside commode.

## 2017-12-29 NOTE — PROGRESS NOTES
Cross Cover:    Paged regarding IV fluid orders and PTA eye drops.    -Continue with IVF with normal saline at 125 cc/hr.    -Resume PTA eye drops when verified by pharmacy.      Ivan Thomas PA-C  [phone]

## 2017-12-29 NOTE — CONSULTS
ATTENDING PHYSICIAN:  Harry Summers MD      REASON FOR CONSULTATION:  I was asked by Dr. Summers to assess infected right total hip arthroplasty.      IMPRESSION:   1.  Mark Hernández, 84-year-old male with diabetes mellitus.   2.  History of right total hip arthroplasty, originally placed in 1991.  The patient required revision arthroplasty in 2001 because of a fall and a periprosthetic fracture.   3.  Hip aspirations done for chronic hip pain reasons including aspiration done in October of this year which showed staph epidermidis, sensitive to cephalosporins and oxacillin.  The patient has received p.o.  Bactrim for the past ten days and was admitted on this occasion for explantation of the arthroplasty.   4.  Status post explantation of right total hip arthroplasty with poly exchange 12/28/2017.  No new hip cultures available.  Blood cultures negative to date.      RECOMMENDATIONS:   1.  Agree with IV Ancef given the sensitivities of the staph epidermidis.   2.  The patient will require six weeks of IV antibiotic therapy, likely followed by several months of oral antibiotic because of the poly exchange.      HISTORY OF PRESENT ILLNESS:  This is an 84-year-old male with original right total hip arthroplasty in 1991.  He suffered a periprosthetic fracture after a fall in 2001 and required revision of the arthroplasty.  He says that since that time he has had pain in the area of the hip.  More recently, he has seen Dr. Summers and has undergone a number of hip aspirations.  An aspiration from 10/02/2017 grew staph epidermidis sensitive to oxacillin.  Fluid showed 28,000 white blood cells, predominantly neutrophils.  The patient has been given oral Bactrim over the previous ten days and arrangements made to be admitted to the hospital for explantation of the arthroplasty.  This was done yesterday with poly exchange.  The patient denies accompanying fevers.  Blood cultures are negative at 1 day.      PAST MEDICAL HISTORY:    1.  Diabetes mellitus.   2.  Hypertension.   3.  Hyperlipidemia.   4.  Peripheral neuropathy.   5.  Second-degree AV block with pacemaker placement.   6.  BPH.   7.  Right total hip arthroplasty with revision.   8.  Diverticulosis.   9.  Bladder cancer with tumor excision 2017.      ALLERGIES:  None known.      SOCIAL HISTORY:  Lives at home.  Retired  of [employer].      FAMILY HISTORY:  Noncontributory.      REVIEW OF SYSTEMS:  Negative other than that described above.      PHYSICAL EXAMINATION:   VITAL SIGNS:  Temperature 97.9, blood pressure 133/68, heart rate 76 and regular.   GENERAL:  Well-developed, well-nourished, alert and oriented, does not look acutely ill.   SKIN:  No rashes or nodules.   HEENT:  Eyes:  No subconjunctival hemorrhages or scleral icterus.  Oropharynx without erythema or exudate.   NECK:  Supple, no thyromegaly.   LYMPH:  No cervical or axillary lymphadenopathy.   LUNGS:  Clear to auscultation, no use of accessory muscles of respiration.   COR:  S1, S2 normal, no S3, S4 or murmur.   ABDOMEN:  Soft, nontender, no mass or hepatosplenomegaly.   EXTREMITIES:  Right hip is surgically dressed and was not examined.  No calf tenderness or pedal edema.      For further details of the patient's history, please refer to the chart.  Thank you very much for this consultation.         DEB CARRILLO MD             D: 2017 09:19   T: 2017 09:31   MT: ARMAND#[id number]      Name:     FRANKIE THOMPSON   MRN:      [id number]        Account:       [id number]   :      1933           Consult Date:  2017      Document: [id number]       cc: Cayetano Vasques MD

## 2017-12-29 NOTE — PROGRESS NOTES
Mark Hernández  2017  POD #1    Doing well.  No immediate surgical complications identified.  Temperatures:  Current - Temp: 99  F (37.2  C); Max - Temp  Av.6  F (36.4  C)  Min: 97.2  F (36.2  C)  Max: 99  F (37.2  C)  Pulse range: No Data Recorded  Blood pressure range: Systolic (24hrs), Av , Min:89 , Max:152   ; Diastolic (24hrs), Av, Min:46, Max:83    CMS: intact  Labs:   Results for orders placed or performed during the hospital encounter of 17 (from the past 24 hour(s))   Glucose by meter   Result Value Ref Range    Glucose 170 (H) 70 - 99 mg/dL   XR Pelvis Port 1/2 Views    Narrative    PORTABLE PELVIS ONE-TWO VIEWS   2017 6:00 PM     INDICATION: Postop hip arthroplasty.    COMPARISON: None.      Impression    IMPRESSION: Postoperative changes of right hip arthroplasty with screw  fixation of the acetabular component. Numerous tiny bone densities  about the femoral neck region. Surgical drain in place. Postoperative  gas in the soft tissues.    KIERAN LE MD   Blood culture   Result Value Ref Range    Specimen Description Blood Left Hand     Culture Micro No growth after 16 hours    CBC with platelets differential   Result Value Ref Range    WBC 9.7 4.0 - 11.0 10e9/L    RBC Count 3.82 (L) 4.4 - 5.9 10e12/L    Hemoglobin 11.1 (L) 13.3 - 17.7 g/dL    Hematocrit 34.5 (L) 40.0 - 53.0 %    MCV 90 78 - 100 fl    MCH 29.1 26.5 - 33.0 pg    MCHC 32.2 31.5 - 36.5 g/dL    RDW 14.2 10.0 - 15.0 %    Platelet Count 202 150 - 450 10e9/L    Diff Method Automated Method     % Neutrophils 84.5 %    % Lymphocytes 7.7 %    % Monocytes 6.8 %    % Eosinophils 0.6 %    % Basophils 0.2 %    % Immature Granulocytes 0.2 %    Nucleated RBCs 0 0 /100    Absolute Neutrophil 8.2 1.6 - 8.3 10e9/L    Absolute Lymphocytes 0.8 0.8 - 5.3 10e9/L    Absolute Monocytes 0.7 0.0 - 1.3 10e9/L    Absolute Eosinophils 0.1 0.0 - 0.7 10e9/L    Absolute Basophils 0.0 0.0 - 0.2 10e9/L    Abs Immature Granulocytes  0.0 0 - 0.4 10e9/L    Absolute Nucleated RBC 0.0    Creatinine   Result Value Ref Range    Creatinine 1.39 (H) 0.66 - 1.25 mg/dL    GFR Estimate 49 (L) >60 mL/min/1.7m2    GFR Estimate If Black 59 (L) >60 mL/min/1.7m2   Blood culture   Result Value Ref Range    Specimen Description Blood Right Arm     Special Requests Aerobic and anaerobic bottles received     Culture Micro No growth after 16 hours    Glucose by meter   Result Value Ref Range    Glucose 136 (H) 70 - 99 mg/dL   Glucose by meter   Result Value Ref Range    Glucose 148 (H) 70 - 99 mg/dL   Basic metabolic panel   Result Value Ref Range    Sodium 137 133 - 144 mmol/L    Potassium 4.9 3.4 - 5.3 mmol/L    Chloride 105 94 - 109 mmol/L    Carbon Dioxide 27 20 - 32 mmol/L    Anion Gap 5 3 - 14 mmol/L    Glucose 141 (H) 70 - 99 mg/dL    Urea Nitrogen 27 7 - 30 mg/dL    Creatinine 1.29 (H) 0.66 - 1.25 mg/dL    GFR Estimate 53 (L) >60 mL/min/1.7m2    GFR Estimate If Black 64 >60 mL/min/1.7m2    Calcium 8.1 (L) 8.5 - 10.1 mg/dL   CBC with platelets   Result Value Ref Range    WBC 8.7 4.0 - 11.0 10e9/L    RBC Count 3.44 (L) 4.4 - 5.9 10e12/L    Hemoglobin 9.9 (L) 13.3 - 17.7 g/dL    Hematocrit 31.2 (L) 40.0 - 53.0 %    MCV 91 78 - 100 fl    MCH 28.8 26.5 - 33.0 pg    MCHC 31.7 31.5 - 36.5 g/dL    RDW 14.2 10.0 - 15.0 %    Platelet Count 169 150 - 450 10e9/L   Glucose by meter   Result Value Ref Range    Glucose 138 (H) 70 - 99 mg/dL   Glucose by meter   Result Value Ref Range    Glucose 177 (H) 70 - 99 mg/dL       PLAN:  Discharge plan: initiallly NH   But  Will depend  On  finacial  If  Iv  abx  Are  Covered  comonents  Left in  Minimal soft tissue work  Done   Should  Rebound  Fairly quickly    Id  Consult  In  Works   picc line  When  Optimal  Sat  Or  Sun      prob  D/c  tuesday

## 2017-12-29 NOTE — PLAN OF CARE
Problem: Patient Care Overview  Goal: Plan of Care/Patient Progress Review  PT: Patient transferring to commode upon arrival of therapist. Patient requesting to hold PT until after using commode.

## 2017-12-30 ENCOUNTER — APPOINTMENT (OUTPATIENT)
Dept: GENERAL RADIOLOGY | Facility: CLINIC | Age: 82
DRG: 467 | End: 2017-12-30
Attending: INTERNAL MEDICINE
Payer: MEDICARE

## 2017-12-30 ENCOUNTER — APPOINTMENT (OUTPATIENT)
Dept: PHYSICAL THERAPY | Facility: CLINIC | Age: 82
DRG: 467 | End: 2017-12-30
Attending: ORTHOPAEDIC SURGERY
Payer: MEDICARE

## 2017-12-30 LAB
ANION GAP SERPL CALCULATED.3IONS-SCNC: 6 MMOL/L (ref 3–14)
BUN SERPL-MCNC: 26 MG/DL (ref 7–30)
CALCIUM SERPL-MCNC: 8.5 MG/DL (ref 8.5–10.1)
CHLORIDE SERPL-SCNC: 105 MMOL/L (ref 94–109)
CO2 SERPL-SCNC: 28 MMOL/L (ref 20–32)
CREAT SERPL-MCNC: 1.24 MG/DL (ref 0.66–1.25)
ERYTHROCYTE [DISTWIDTH] IN BLOOD BY AUTOMATED COUNT: 14 % (ref 10–15)
GFR SERPL CREATININE-BSD FRML MDRD: 56 ML/MIN/1.7M2
GLUCOSE BLDC GLUCOMTR-MCNC: 140 MG/DL (ref 70–99)
GLUCOSE BLDC GLUCOMTR-MCNC: 161 MG/DL (ref 70–99)
GLUCOSE BLDC GLUCOMTR-MCNC: 165 MG/DL (ref 70–99)
GLUCOSE BLDC GLUCOMTR-MCNC: 170 MG/DL (ref 70–99)
GLUCOSE SERPL-MCNC: 147 MG/DL (ref 70–99)
HCT VFR BLD AUTO: 28.5 % (ref 40–53)
HGB BLD-MCNC: 9.1 G/DL (ref 13.3–17.7)
MCH RBC QN AUTO: 28.4 PG (ref 26.5–33)
MCHC RBC AUTO-ENTMCNC: 31.9 G/DL (ref 31.5–36.5)
MCV RBC AUTO: 89 FL (ref 78–100)
PLATELET # BLD AUTO: 158 10E9/L (ref 150–450)
POTASSIUM SERPL-SCNC: 4 MMOL/L (ref 3.4–5.3)
RBC # BLD AUTO: 3.2 10E12/L (ref 4.4–5.9)
SODIUM SERPL-SCNC: 139 MMOL/L (ref 133–144)
WBC # BLD AUTO: 7.3 10E9/L (ref 4–11)

## 2017-12-30 PROCEDURE — 25000132 ZZH RX MED GY IP 250 OP 250 PS 637: Mod: GY | Performed by: HOSPITALIST

## 2017-12-30 PROCEDURE — A9270 NON-COVERED ITEM OR SERVICE: HCPCS | Mod: GY | Performed by: ORTHOPAEDIC SURGERY

## 2017-12-30 PROCEDURE — 25000132 ZZH RX MED GY IP 250 OP 250 PS 637: Mod: GY | Performed by: ORTHOPAEDIC SURGERY

## 2017-12-30 PROCEDURE — 00000146 ZZHCL STATISTIC GLUCOSE BY METER IP

## 2017-12-30 PROCEDURE — 97110 THERAPEUTIC EXERCISES: CPT | Mod: GP | Performed by: PHYSICAL THERAPIST

## 2017-12-30 PROCEDURE — 25000125 ZZHC RX 250: Performed by: INTERNAL MEDICINE

## 2017-12-30 PROCEDURE — 12000007 ZZH R&B INTERMEDIATE

## 2017-12-30 PROCEDURE — 97530 THERAPEUTIC ACTIVITIES: CPT | Mod: GP | Performed by: PHYSICAL THERAPIST

## 2017-12-30 PROCEDURE — 85027 COMPLETE CBC AUTOMATED: CPT | Performed by: ORTHOPAEDIC SURGERY

## 2017-12-30 PROCEDURE — 97116 GAIT TRAINING THERAPY: CPT | Mod: GP | Performed by: PHYSICAL THERAPIST

## 2017-12-30 PROCEDURE — 36569 INSJ PICC 5 YR+ W/O IMAGING: CPT

## 2017-12-30 PROCEDURE — 40000986 XR CHEST PORT 1 VW

## 2017-12-30 PROCEDURE — 40000193 ZZH STATISTIC PT WARD VISIT: Performed by: PHYSICAL THERAPIST

## 2017-12-30 PROCEDURE — 25000128 H RX IP 250 OP 636: Performed by: INTERNAL MEDICINE

## 2017-12-30 PROCEDURE — 40000894 ZZH STATISTIC OT IP EVAL DEFER: Performed by: OCCUPATIONAL THERAPIST

## 2017-12-30 PROCEDURE — 99232 SBSQ HOSP IP/OBS MODERATE 35: CPT | Performed by: INTERNAL MEDICINE

## 2017-12-30 PROCEDURE — A9270 NON-COVERED ITEM OR SERVICE: HCPCS | Mod: GY | Performed by: HOSPITALIST

## 2017-12-30 PROCEDURE — 80048 BASIC METABOLIC PNL TOTAL CA: CPT | Performed by: ORTHOPAEDIC SURGERY

## 2017-12-30 PROCEDURE — 36415 COLL VENOUS BLD VENIPUNCTURE: CPT | Performed by: ORTHOPAEDIC SURGERY

## 2017-12-30 PROCEDURE — 27210219 ZZH KIT SHRLOCK 5FR DBL LUM PWR P

## 2017-12-30 PROCEDURE — 25000128 H RX IP 250 OP 636: Performed by: ORTHOPAEDIC SURGERY

## 2017-12-30 RX ORDER — CEFAZOLIN SODIUM 1 G/3ML
2 INJECTION, POWDER, FOR SOLUTION INTRAMUSCULAR; INTRAVENOUS EVERY 8 HOURS
Qty: 1 EACH | Refills: 1 | Status: SHIPPED | OUTPATIENT
Start: 2017-12-30 | End: 2017-12-31

## 2017-12-30 RX ORDER — FERROUS GLUCONATE 324(38)MG
324 TABLET ORAL
Qty: 60 TABLET | Refills: 0 | Status: SHIPPED | OUTPATIENT
Start: 2017-12-31 | End: 2020-05-12

## 2017-12-30 RX ORDER — FERROUS GLUCONATE 324(38)MG
324 TABLET ORAL
Status: DISCONTINUED | OUTPATIENT
Start: 2017-12-31 | End: 2018-01-02 | Stop reason: HOSPADM

## 2017-12-30 RX ORDER — ASPIRIN 325 MG
325 TABLET ORAL DAILY
Qty: 60 TABLET | COMMUNITY
Start: 2018-01-10 | End: 2019-04-24

## 2017-12-30 RX ORDER — OXYCODONE HYDROCHLORIDE 5 MG/1
5 TABLET ORAL EVERY 4 HOURS PRN
Qty: 80 TABLET | Refills: 0 | Status: SHIPPED | DISCHARGE
Start: 2017-12-30 | End: 2019-04-24

## 2017-12-30 RX ORDER — ACETAMINOPHEN 325 MG/1
975 TABLET ORAL EVERY 8 HOURS
Qty: 100 TABLET | COMMUNITY
Start: 2017-12-30 | End: 2020-05-12

## 2017-12-30 RX ORDER — AMOXICILLIN 250 MG
1-2 CAPSULE ORAL 2 TIMES DAILY
Status: DISCONTINUED | OUTPATIENT
Start: 2017-12-30 | End: 2018-01-02 | Stop reason: HOSPADM

## 2017-12-30 RX ORDER — ASPIRIN 81 MG
100 TABLET, DELAYED RELEASE (ENTERIC COATED) ORAL DAILY
Qty: 60 TABLET | Refills: 1 | DISCHARGE
Start: 2017-12-30 | End: 2018-08-21

## 2017-12-30 RX ADMIN — ACETAMINOPHEN 975 MG: 325 TABLET, FILM COATED ORAL at 13:41

## 2017-12-30 RX ADMIN — DULOXETINE 30 MG: 30 CAPSULE, DELAYED RELEASE ORAL at 08:43

## 2017-12-30 RX ADMIN — ENOXAPARIN SODIUM 40 MG: 40 INJECTION SUBCUTANEOUS at 11:44

## 2017-12-30 RX ADMIN — ACETAMINOPHEN 975 MG: 325 TABLET, FILM COATED ORAL at 06:02

## 2017-12-30 RX ADMIN — GABAPENTIN 800 MG: 800 TABLET ORAL at 08:43

## 2017-12-30 RX ADMIN — CEFAZOLIN SODIUM 2 G: 2 INJECTION, SOLUTION INTRAVENOUS at 20:21

## 2017-12-30 RX ADMIN — TAMSULOSIN HYDROCHLORIDE 0.8 MG: 0.4 CAPSULE ORAL at 21:02

## 2017-12-30 RX ADMIN — OXYCODONE HYDROCHLORIDE 5 MG: 5 TABLET ORAL at 11:44

## 2017-12-30 RX ADMIN — LIDOCAINE HYDROCHLORIDE 2 ML: 10 INJECTION, SOLUTION EPIDURAL; INFILTRATION; INTRACAUDAL; PERINEURAL at 13:29

## 2017-12-30 RX ADMIN — OXYCODONE HYDROCHLORIDE 5 MG: 5 TABLET ORAL at 08:43

## 2017-12-30 RX ADMIN — SENNOSIDES AND DOCUSATE SODIUM 2 TABLET: 8.6; 5 TABLET ORAL at 21:02

## 2017-12-30 RX ADMIN — ACETAMINOPHEN 975 MG: 325 TABLET, FILM COATED ORAL at 21:02

## 2017-12-30 RX ADMIN — GABAPENTIN 800 MG: 800 TABLET ORAL at 21:02

## 2017-12-30 RX ADMIN — OXYCODONE HYDROCHLORIDE 5 MG: 5 TABLET ORAL at 21:02

## 2017-12-30 RX ADMIN — OXYCODONE HYDROCHLORIDE 5 MG: 5 TABLET ORAL at 14:42

## 2017-12-30 RX ADMIN — CEFAZOLIN SODIUM 2 G: 2 INJECTION, SOLUTION INTRAVENOUS at 04:18

## 2017-12-30 RX ADMIN — SIMVASTATIN 20 MG: 10 TABLET, FILM COATED ORAL at 21:02

## 2017-12-30 NOTE — PLAN OF CARE
Problem: Patient Care Overview  Goal: Plan of Care/Patient Progress Review  OT order received.  Per chart review, patient will need TCU for antibiotic therapy.  Will defer OT evaluation to TCU setting.

## 2017-12-30 NOTE — PLAN OF CARE
Problem: Patient Care Overview  Goal: Plan of Care/Patient Progress Review  Outcome: Improving  Up with 1 and a walker. Taking oxycodone and tylenol for pain. PICC placed today for 6 weeks of antibiotics. Plan to go to TCU on Sunday.

## 2017-12-30 NOTE — PROGRESS NOTES
[facility]    Hospitalist Progress Note    Assessment & Plan    Mr. Mark Hernández is an 84-year-old male with past medical history significant for diabetes, hypertension, dyslipidemia and peripheral neuropathy pacemaker for atrioventricular block, benign prostatic hyperplasia, right total hip arthroplasty, diverticulosis who is admitted to orthopedics for infected right total hip arthroplasty and hospitalist requested consultation for acute renal failure and medical management.     Infected right total hip arthroplasty.    -Status post I/D and placement of abx beads, poly exchange on 12/28  -antibiotics. Infectious disease  -routine postoperative care per orthopedic surgery     Acute kidney injury:  -His baseline creatinine is around 1 as of 09/2017.    -Presented with a creatinine of 2.02  -prerenal and probably worsened by a recent use of naproxen and the continued use of metformin and lisinopril.   -Continue to hold lisinopril  -renal function improving     Diabetes mellitus.    -Continue to hold metformin.    -sliding scale insulin.   -Adequate glycemic control at the moment    Hypertension.    -Adequately controlled  -hold lisinopril     Dyslipidemia.    -Continue with simvastatin.     Peripheral neuropathy.    -continue Neurontin and Cymbalta.     Benign prostatic hyperplasia.    -Continue with Flomax.       D/W: RN  DVT Prophylaxis: Defer to primary service  Code Status: No Order    Disposition: Expected discharge per ortho    Rafael Steven MD    Interval History   renal function continues to improve. Pain well controlled. No dyspnea    -Data reviewed today: I reviewed all new labs and imaging results over the last 24 hours. I personally reviewed no images or EKG's today.    Physical Exam   Temp: 97.5  F (36.4  C) Temp src: Oral BP: 111/66   Heart Rate: 86 Resp: 18 SpO2: 98 % O2 Device: None (Room air)    Vitals:    12/28/17 0356   Weight: 89.4 kg (197 lb)     Vital Signs with  Ranges  Temp:  [97.5  F (36.4  C)-99.4  F (37.4  C)] 97.5  F (36.4  C)  Heart Rate:  [] 86  Resp:  [16-18] 18  BP: (111-139)/(63-76) 111/66  SpO2:  [94 %-100 %] 98 %  I/O last 3 completed shifts:  In: 2306 [P.O.:880; I.V.:1426]  Out: 3075 [Urine:2975; Drains:100]    Constitutional: AAOX3, NAD, Appears comfortable  Respiratory:  No crackles, No wheezes, CTA B/L, Normal WOB  Cardiovascular: RRR, No murmur  GI: Soft, Non- tender, BS- normoactive  Skin/Integument: Warm and dry, no rashes  MSK: rt hip incision bandaged  Neuro: CN- grossly intact    Medications        sodium chloride (PF)  10 mL Intracatheter Q8H     [START ON 12/31/2017] ferrous gluconate  324 mg Oral Daily with breakfast     senna-docusate  1-2 tablet Oral BID     ceFAZolin  2 g Intravenous Q8H     sodium chloride (PF)  3 mL Intracatheter Q8H     enoxaparin  40 mg Subcutaneous Q24H     acetaminophen  975 mg Oral Q8H     Polyethylene Glycol 400  1 drop Both Eyes BID     insulin aspart  1-10 Units Subcutaneous TID AC     insulin aspart  1-7 Units Subcutaneous At Bedtime     DULoxetine  30 mg Oral Daily     gabapentin  800 mg Oral BID     simvastatin  20 mg Oral At Bedtime     tamsulosin  0.8 mg Oral At Bedtime       Data     Recent Labs  Lab 12/30/17  0715 12/29/17  0655 12/28/17  1910 12/28/17  0708 12/27/17 2020   WBC 7.3 8.7 9.7  --  8.7   HGB 9.1* 9.9* 11.1*  --  11.5*   MCV 89 91 90  --  89    169 202  --  200   INR  --   --   --   --  1.11    137  --  139 138   POTASSIUM 4.0 4.9  --  4.8 4.8   CHLORIDE 105 105  --  108 105   CO2 28 27  --  29 26   BUN 26 27  --  43* 50*   CR 1.24 1.29* 1.39* 1.66* 2.02*   ANIONGAP 6 5  --  2* 7   ESAU 8.5 8.1*  --  8.7 8.7   * 141*  --  146* 144*       No results found for this or any previous visit (from the past 24 hour(s)).

## 2017-12-30 NOTE — PROGRESS NOTES
"[facility]  Infectious Disease Progress Note          Assessment and Plan:   IMPRESSION:   1.  Mark Hernández, 84-year-old male with diabetes mellitus.   2.  History of right total hip arthroplasty, originally placed in 1991.  The patient required revision arthroplasty in 2001 because of a fall and a periprosthetic fracture.   3.  Hip aspirations done for chronic hip pain reasons including aspiration done in October of this year which showed staph epidermidis, sensitive to cephalosporins and oxacillin.  The patient has received p.o.  Bactrim for the past ten days and was admitted on this occasion for explantation of the arthroplasty.   4.  Status post explantation of right total hip arthroplasty with poly exchange 12/28/2017.  No new hip cultures available.  Blood cultures negative to date.       RECOMMENDATIONS:   1.  Agree with IV Ancef given the sensitivities of the staph epidermidis.   2.  The patient will require six weeks of IV antibiotic therapy, likely followed by several months of oral antibiotic because of the poly exchange.   3. Will order PICC  4. Orders entered for 6 wks of IV Ancef.        Interval History:   Feels well.  No c/o.  Afebrile.  Blood cxs neg.  No new surgical cxs.              Medications:       ceFAZolin  2 g Intravenous Q8H     sodium chloride (PF)  3 mL Intracatheter Q8H     enoxaparin  40 mg Subcutaneous Q24H     acetaminophen  975 mg Oral Q8H     Polyethylene Glycol 400  1 drop Both Eyes BID     insulin aspart  1-10 Units Subcutaneous TID AC     insulin aspart  1-7 Units Subcutaneous At Bedtime     DULoxetine  30 mg Oral Daily     gabapentin  800 mg Oral BID     simvastatin  20 mg Oral At Bedtime     tamsulosin  0.8 mg Oral At Bedtime                  Physical Exam:   Blood pressure 122/63, pulse 66, temperature 97.5  F (36.4  C), resp. rate 16, height 1.778 m (5' 10\"), weight 89.4 kg (197 lb), SpO2 99 %.  [unfilled]  Vital Signs with Ranges  Temp:  [97.5  F " (36.4  C)-99.4  F (37.4  C)] 97.5  F (36.4  C)  Heart Rate:  [] 80  Resp:  [16] 16  BP: (121-152)/(63-83) 122/63  SpO2:  [94 %-100 %] 99 %    Constitutional: Awake, alert, cooperative, no apparent distress   Lungs: Clear to auscultation bilaterally, no crackles or wheezing   Cardiovascular: Regular rate and rhythm, normal S1 and S2, and no murmur noted   Abdomen: Normal bowel sounds, soft, non-distended, non-tender   Skin: No rashes, no cyanosis, no edema   Other:           Data:   All microbiology laboratory data reviewed.  Recent Labs   Lab Test  12/30/17   0715  12/29/17   0655  12/28/17   1910   WBC  7.3  8.7  9.7   HGB  9.1*  9.9*  11.1*   HCT  28.5*  31.2*  34.5*   MCV  89  91  90   PLT  158  169  202     Recent Labs   Lab Test  12/30/17 0715  12/29/17   0655  12/28/17 1910   CR  1.24  1.29*  1.39*     Recent Labs   Lab Test  12/27/17 2020   SED  38*

## 2017-12-30 NOTE — PLAN OF CARE
Problem: Patient Care Overview  Goal: Plan of Care/Patient Progress Review  A&O. VSS. UP with A1 and walker. Pain managed with tylenol and oxycodone. CMS intact ex baseline neuropathy. Dressing CDI. Voiding adequately per urinal. Slept between cares.

## 2017-12-30 NOTE — PLAN OF CARE
Problem: Patient Care Overview  Goal: Plan of Care/Patient Progress Review  Outcome: Improving  Up with assist of 1 and walker. Taking tylenol and oxycodone for pain. Plan for TCU placement for long term antibiotics.

## 2017-12-30 NOTE — OP NOTE
DATE OF PROCEDURE:  12/28/2017     PREOPERATIVE DIAGNOSIS:  Septic total hip arthroplasty.  Loose hardware.      POSTOPERATIVE DIAGNOSIS:  Septic total hip arthroplasty.  Loose hardware.      PROCEDURE:     1.  Partial revision, irrigation and debridement, synovectomy, placement of antibiotic powder antibiotic beads.     2.  Complete thorough irrigation and debridement, deep subcutaneous intra-articular soft tissue osseous tissue as well.     3.  Revision acetabular component and femoral head component.      SURGEON:  Kirk Summers MD      ASSISTANT:   Fadi PUGH      The patient received antibiotics preop.  We had a good culture preop did not need to worry about delaying antibiotic coverage.      DESCRIPTION OF PROCEDURE:  Mark Hernández identification made, brought in the operating room.  Timeout was requested, position with left hip down, right hip up lateral position.  Axillary roll was placed and padded and washed by Anesthesia.  Preservation was made.  A timeout was requested again after prepping and draping and everyone agreed, we proceeded.  Made longitudinal incisions.  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  He had a previous claw plate removed.      Fluid came out looking cloudy.  Again, we had cultures sent already.  The granulation tissues compatible with sepsis.  There was no malodor.  Again, it was certainly not ultra chronic but certainly not acute, I believe this goes back about 3 months.      We began to clean things out.  Rongeur and osteotomes and cutting cautery was utilized to remove complete synovectomy.  Removed the soft tissue around the hip denuded the soft tissue to accept the superior component attached to it, brought it up cephalad so we can get in to see, dislocated the hip.  Femoral component was tight.  Removed the femoral head.  Put the neck anteriorly.  Held in place with a sharp supra-acetabular  retractor.  Complete synovectomy performed completely removed the liner.  Found a loose piece a wire from a previous operation, super mass aspect of the acetabulum.  Cup was intact and inspected the bone holes.  Since this is sepsis, I decided not to bone graft it.  It was solid bone in each of the drill holes through the cup the screw was intact.  The cup was solid.  The femoral component was tight and inspected the greater trochanter.  No significant problems were seen there.  Nothing to suspect osteo at this time.      We irrigated, debrided it tediously, bleeding controlled with electrocautery.  Then went I think 4 bags of 3000 mL of saline and eight 1 liter bags of triple antibiotic solution.      Manufactured beads 2 grams of vancomycin and 1 batch of beads packed in there, 4 grams of powdered ancef, 1 gram of powdered vancomycin placed in the joint area deep to the fascia.  This was done with trials and progress was made.  This is Osteonics cup with a 32 head placed in with the neutral liner, 20 degree high wall liner placed at roughly the 9 o'clock position apex.  Then we put all the poly and the beads in after trials were done with a 2.5 length changed it to a +5 femoral component.      Stability is good as we could make it without having intact trochanter.      Once that was done, we drilled drill holes in the trochanter, used #5 FiberWire just to hold the alignment hoping it will scar over, anticipating this is not a bone-to-bone healing issue.  I wanted to minimize hardware and he has been living with this for 10-14 years.  The deep fascia was reapproximated with #5 FiberWire and #1 Vicryl, 0 Ethibond followed by 0 V-Loc, 2-0 Vicryl, staples.  Hemovac brought out, placed on clamp for 3 hours.  Tranexamic acid was utilized 2 grams in 30 mL of saline.      ebl was  300 cc to  400  Difficult  To be exact  Due  To  Vol of irrigant  PROCEDURE:   1.  Complete synovectomy.   2.  Irrigation and debridement of  skin, subcutaneous tissues, deep fascial tissues bony tissue, mary jo-acetabulum and the proximal femur as well as the muscular and tendon tissues.  Size of the wound was 16 cm.    3,  Revise  Acetabular component,  And  Femoral head  componrent  IMPLANT:  Revised Osteonics.  Osteonics cup was changed from 28 head to 32, and a +5 length femoral component, a 20 degree liner placed in the acetabulum.      The patient had an abductor pillow placed.  Transferred awake and alert to the recovery room.      PLAN:  No abductor exercises for about 8 weeks, walker for 8 weeks, and 6 weeks of IV antibiotics most likely.  Infectious Disease were consulted, Bacteria was Staph coag-negative, sensitive to everything except plain penicillin.  Given the fact he has some renal dysfunction, most likely Ancef will be the drug of choice, but will ask Infectious Disease to see him next day or two.  PICC line will be placed when safe, blood cultures have been ordered.         SAVANAH SPRAGUE MD             D: 2017 06:39   T: 2017 08:32   MT: ARMAND#[id number]      Name:     FRANKIE THOMPSON   MRN:      [id number]        Account:        [id number]   :      1933           Procedure Date: 2017      Document: [id number]

## 2017-12-30 NOTE — PLAN OF CARE
Problem: Patient Care Overview  Goal: Plan of Care/Patient Progress Review  Outcome: Improving  VSS, CMS intact. Up with 1 and walker. Pain well controlled with oxycodone and tylenol. Dressing C/D/I. Blood sugar 170 this evening, refused insulin. PICC placed today. Alert and orient x 4. Possible discharge to TCU tomorrow.

## 2017-12-30 NOTE — PROGRESS NOTES
GLORIA  On Sunday, GLORIA will follow up [facility] to determine bed availability.

## 2017-12-30 NOTE — PROGRESS NOTES
Mark Hernández  2017  POD #2    Doing well.  Clean wound without signs of infection.  No immediate surgical complications identified.  No excessive bleeding  Pain well-controlled.  Tolerating physical therapy and rehabilitation well.  Temperatures:  Current - Temp: 97.5  F (36.4  C); Max - Temp  Av.5  F (36.9  C)  Min: 97.5  F (36.4  C)  Max: 99.4  F (37.4  C)  Pulse range: No Data Recorded  Blood pressure range: Systolic (24hrs), Av , Min:121 , Max:152   ; Diastolic (24hrs), Av, Min:63, Max:83    CMS: intact  Labs:   Results for orders placed or performed during the hospital encounter of 17 (from the past 24 hour(s))   Glucose by meter   Result Value Ref Range    Glucose 177 (H) 70 - 99 mg/dL   Glucose by meter   Result Value Ref Range    Glucose 172 (H) 70 - 99 mg/dL   Glucose by meter   Result Value Ref Range    Glucose 161 (H) 70 - 99 mg/dL   Basic metabolic panel   Result Value Ref Range    Sodium 139 133 - 144 mmol/L    Potassium 4.0 3.4 - 5.3 mmol/L    Chloride 105 94 - 109 mmol/L    Carbon Dioxide 28 20 - 32 mmol/L    Anion Gap 6 3 - 14 mmol/L    Glucose 147 (H) 70 - 99 mg/dL    Urea Nitrogen 26 7 - 30 mg/dL    Creatinine 1.24 0.66 - 1.25 mg/dL    GFR Estimate 56 (L) >60 mL/min/1.7m2    GFR Estimate If Black 67 >60 mL/min/1.7m2    Calcium 8.5 8.5 - 10.1 mg/dL   CBC with platelets   Result Value Ref Range    WBC 7.3 4.0 - 11.0 10e9/L    RBC Count 3.20 (L) 4.4 - 5.9 10e12/L    Hemoglobin 9.1 (L) 13.3 - 17.7 g/dL    Hematocrit 28.5 (L) 40.0 - 53.0 %    MCV 89 78 - 100 fl    MCH 28.4 26.5 - 33.0 pg    MCHC 31.9 31.5 - 36.5 g/dL    RDW 14.0 10.0 - 15.0 %    Platelet Count 158 150 - 450 10e9/L       PLAN:should be ready  For tcu  Tomorrow  Has  Done well  Discharge plan: Skilled Nursing Facility    PICC line  today

## 2017-12-30 NOTE — PLAN OF CARE
Problem: Patient Care Overview  Goal: Plan of Care/Patient Progress Review  Discharge Planner PT   Patient plan for discharge: TCU  Current status: Pain 6/10 at rest and with activity. Pt tolerated supine MATTY exercises moderately with increased pain. Pt able to verbalize hip precautions but still needing cues during mobility. Pt requires Katy of 1 for supine>sit with HOB elevated. Pt transfers sit<>stand to FWW with Katy. Pt ambulates 80' with FWW and CGA, 3pt gait pattern, w/c follow.   Barriers to return to prior living situation: current level of assist, stairs, pain, decreased activity tolerance  Recommendations for discharge: TCU  Rationale for recommendations: Pt would benefit from skilled PT at TCU to progress ROM, strength, and gait mechanics prior to returning home with spouse. Pt reports his spouse is not able to provided current level of assist needed for safe mobility.        Entered by: Ludy Skinner 12/30/2017 11:21 AM

## 2017-12-30 NOTE — PROGRESS NOTES
GLORIA  D:  Call out to [facility] TCU regarding placement possibly Sunday or Monday.

## 2017-12-31 ENCOUNTER — APPOINTMENT (OUTPATIENT)
Dept: PHYSICAL THERAPY | Facility: CLINIC | Age: 82
DRG: 467 | End: 2017-12-31
Attending: ORTHOPAEDIC SURGERY
Payer: MEDICARE

## 2017-12-31 LAB
ANION GAP SERPL CALCULATED.3IONS-SCNC: 3 MMOL/L (ref 3–14)
BUN SERPL-MCNC: 25 MG/DL (ref 7–30)
CALCIUM SERPL-MCNC: 8.8 MG/DL (ref 8.5–10.1)
CHLORIDE SERPL-SCNC: 104 MMOL/L (ref 94–109)
CO2 SERPL-SCNC: 31 MMOL/L (ref 20–32)
CREAT SERPL-MCNC: 1 MG/DL (ref 0.66–1.25)
ERYTHROCYTE [DISTWIDTH] IN BLOOD BY AUTOMATED COUNT: 14.1 % (ref 10–15)
ERYTHROCYTE [SEDIMENTATION RATE] IN BLOOD BY WESTERGREN METHOD: 80 MM/H (ref 0–20)
GFR SERPL CREATININE-BSD FRML MDRD: 71 ML/MIN/1.7M2
GLUCOSE BLDC GLUCOMTR-MCNC: 143 MG/DL (ref 70–99)
GLUCOSE BLDC GLUCOMTR-MCNC: 150 MG/DL (ref 70–99)
GLUCOSE BLDC GLUCOMTR-MCNC: 154 MG/DL (ref 70–99)
GLUCOSE BLDC GLUCOMTR-MCNC: 257 MG/DL (ref 70–99)
GLUCOSE SERPL-MCNC: 144 MG/DL (ref 70–99)
HCT VFR BLD AUTO: 27.8 % (ref 40–53)
HGB BLD-MCNC: 9.1 G/DL (ref 13.3–17.7)
MCH RBC QN AUTO: 29.2 PG (ref 26.5–33)
MCHC RBC AUTO-ENTMCNC: 32.7 G/DL (ref 31.5–36.5)
MCV RBC AUTO: 89 FL (ref 78–100)
PLATELET # BLD AUTO: 180 10E9/L (ref 150–450)
POTASSIUM SERPL-SCNC: 4 MMOL/L (ref 3.4–5.3)
RBC # BLD AUTO: 3.12 10E12/L (ref 4.4–5.9)
SODIUM SERPL-SCNC: 138 MMOL/L (ref 133–144)
WBC # BLD AUTO: 8.3 10E9/L (ref 4–11)

## 2017-12-31 PROCEDURE — 85652 RBC SED RATE AUTOMATED: CPT | Performed by: ORTHOPAEDIC SURGERY

## 2017-12-31 PROCEDURE — 25000132 ZZH RX MED GY IP 250 OP 250 PS 637: Mod: GY | Performed by: ORTHOPAEDIC SURGERY

## 2017-12-31 PROCEDURE — 80048 BASIC METABOLIC PNL TOTAL CA: CPT | Performed by: ORTHOPAEDIC SURGERY

## 2017-12-31 PROCEDURE — 97116 GAIT TRAINING THERAPY: CPT | Mod: GP

## 2017-12-31 PROCEDURE — 85027 COMPLETE CBC AUTOMATED: CPT | Performed by: ORTHOPAEDIC SURGERY

## 2017-12-31 PROCEDURE — 25000128 H RX IP 250 OP 636: Performed by: ORTHOPAEDIC SURGERY

## 2017-12-31 PROCEDURE — 97110 THERAPEUTIC EXERCISES: CPT | Mod: GP

## 2017-12-31 PROCEDURE — 25000132 ZZH RX MED GY IP 250 OP 250 PS 637: Mod: GY | Performed by: HOSPITALIST

## 2017-12-31 PROCEDURE — 00000146 ZZHCL STATISTIC GLUCOSE BY METER IP

## 2017-12-31 PROCEDURE — 99232 SBSQ HOSP IP/OBS MODERATE 35: CPT | Performed by: INTERNAL MEDICINE

## 2017-12-31 PROCEDURE — 25000128 H RX IP 250 OP 636: Performed by: INTERNAL MEDICINE

## 2017-12-31 PROCEDURE — A9270 NON-COVERED ITEM OR SERVICE: HCPCS | Mod: GY | Performed by: ORTHOPAEDIC SURGERY

## 2017-12-31 PROCEDURE — 40000193 ZZH STATISTIC PT WARD VISIT

## 2017-12-31 PROCEDURE — 99207 ZZC CDG-MDM COMPONENT: MEETS MODERATE - UP CODED: CPT | Performed by: INTERNAL MEDICINE

## 2017-12-31 PROCEDURE — A9270 NON-COVERED ITEM OR SERVICE: HCPCS | Mod: GY | Performed by: HOSPITALIST

## 2017-12-31 PROCEDURE — 12000007 ZZH R&B INTERMEDIATE

## 2017-12-31 RX ORDER — CEFAZOLIN SODIUM 1 G/3ML
2 INJECTION, POWDER, FOR SOLUTION INTRAMUSCULAR; INTRAVENOUS EVERY 8 HOURS
Qty: 1 EACH | Refills: 1 | DISCHARGE
Start: 2017-12-31 | End: 2018-02-09

## 2017-12-31 RX ADMIN — SIMVASTATIN 20 MG: 10 TABLET, FILM COATED ORAL at 21:46

## 2017-12-31 RX ADMIN — FERROUS GLUCONATE 324 MG: 324 TABLET ORAL at 08:26

## 2017-12-31 RX ADMIN — SENNOSIDES AND DOCUSATE SODIUM 2 TABLET: 8.6; 5 TABLET ORAL at 08:26

## 2017-12-31 RX ADMIN — CEFAZOLIN SODIUM 2 G: 2 INJECTION, SOLUTION INTRAVENOUS at 12:06

## 2017-12-31 RX ADMIN — OXYCODONE HYDROCHLORIDE 5 MG: 5 TABLET ORAL at 16:34

## 2017-12-31 RX ADMIN — GABAPENTIN 800 MG: 800 TABLET ORAL at 20:32

## 2017-12-31 RX ADMIN — ACETAMINOPHEN 650 MG: 325 TABLET, FILM COATED ORAL at 16:34

## 2017-12-31 RX ADMIN — INSULIN ASPART 5 UNITS: 100 INJECTION, SOLUTION INTRAVENOUS; SUBCUTANEOUS at 17:52

## 2017-12-31 RX ADMIN — TAMSULOSIN HYDROCHLORIDE 0.8 MG: 0.4 CAPSULE ORAL at 21:46

## 2017-12-31 RX ADMIN — GABAPENTIN 800 MG: 800 TABLET ORAL at 08:26

## 2017-12-31 RX ADMIN — CEFAZOLIN SODIUM 2 G: 2 INJECTION, SOLUTION INTRAVENOUS at 20:34

## 2017-12-31 RX ADMIN — SENNOSIDES AND DOCUSATE SODIUM 2 TABLET: 8.6; 5 TABLET ORAL at 20:32

## 2017-12-31 RX ADMIN — DULOXETINE 30 MG: 30 CAPSULE, DELAYED RELEASE ORAL at 08:26

## 2017-12-31 RX ADMIN — ACETAMINOPHEN 975 MG: 325 TABLET, FILM COATED ORAL at 05:18

## 2017-12-31 RX ADMIN — CEFAZOLIN SODIUM 2 G: 2 INJECTION, SOLUTION INTRAVENOUS at 03:49

## 2017-12-31 RX ADMIN — OXYCODONE HYDROCHLORIDE 5 MG: 5 TABLET ORAL at 20:32

## 2017-12-31 RX ADMIN — OXYCODONE HYDROCHLORIDE 5 MG: 5 TABLET ORAL at 08:26

## 2017-12-31 RX ADMIN — ENOXAPARIN SODIUM 40 MG: 40 INJECTION SUBCUTANEOUS at 12:06

## 2017-12-31 NOTE — PLAN OF CARE
Problem: Patient Care Overview  Goal: Plan of Care/Patient Progress Review  Patient plan for discharge: TCU  Current status: Sit to/from stand with FWW and CGA. Sit to/from supine with min assist for LEs. Amb 125 ft x 1 with FWW and CGA/SBA. Up/down 3 steps x 1 with B rails and CGA. Tolerates MATTY exs well with minimal reports of pain. Pt sitting up in w/c at end of session with all needs in reach and chair alarm on.  Barriers to return to prior living situation: current level of assist, stairs, pain, decreased activity tolerance  Recommendations for discharge: TCU per the plan established by the Physical Therapist   Rationale for recommendations: Pt would benefit from skilled PT at TCU to progress ROM, strength, and gait mechanics prior to returning home with spouse. Pt reports his spouse is not able to provided current level of assist needed for safe mobility.

## 2017-12-31 NOTE — PLAN OF CARE
Problem: Patient Care Overview  Goal: Plan of Care/Patient Progress Review  A&O. VSS. Up with A1 and walker. Pain managed with oxycodone and tylenol. CMS intact. Dressing CDI. PICC RUE. Voiding per urinal. Plan for TCU at [place].

## 2017-12-31 NOTE — PROGRESS NOTES
[facility]    Hospitalist Progress Note    Assessment & Plan   Mr. Mark Hernández is an 84-year-old male with past medical history significant for diabetes, hypertension, dyslipidemia and peripheral neuropathy pacemaker for atrioventricular block, benign prostatic hyperplasia, right total hip arthroplasty, diverticulosis who is admitted to orthopedics for infected right total hip arthroplasty and hospitalist requested consultation for acute renal failure and medical management.     Infected right total hip arthroplasty.    -Status post I/D and placement of abx beads, poly exchange on 12/28  -antibiotics per Infectious disease  -routine postoperative care per orthopedic surgery     Acute kidney injury:  -His baseline creatinine is around 1 as of 09/2017.    -Presented with a creatinine of 2.02  -prerenal and probably worsened by a recent use of naproxen and the continued use of metformin and lisinopril.   -Continue to hold lisinopril  -blood pressure adequately controlled at this time  -renal function improved     Diabetes mellitus.    -Continue to hold metformin.    -sliding scale insulin.   -Adequate glycemic control at the moment    Hypertension.    -Adequately controlled  -hold lisinopril     Dyslipidemia.    -Continue simvastatin.     Peripheral neuropathy.    -continue Neurontin and Cymbalta.     Benign prostatic hyperplasia.    -Continue with Flomax.       D/W: RN  DVT Prophylaxis: Defer to primary service  Code Status: No Order    Disposition: Expected discharge per ortho    Rafael Steven MD    Interval History     Renal function improved. Patient denies new complaints. Blood sugars adequately controlled.      -Data reviewed today: I reviewed all new labs and imaging results over the last 24 hours. I personally reviewed no images or EKG's today.    Physical Exam   Temp: 97.8  F (36.6  C) Temp src: Oral BP: 131/75   Heart Rate: 100 Resp: 16 SpO2: 96 % O2 Device: None (Room air)    Vitals:     12/28/17 0356   Weight: 89.4 kg (197 lb)     Vital Signs with Ranges  Temp:  [97.8  F (36.6  C)-99  F (37.2  C)] 97.8  F (36.6  C)  Heart Rate:  [] 100  Resp:  [16-18] 16  BP: (112-131)/(59-75) 131/75  SpO2:  [94 %-96 %] 96 %  I/O last 3 completed shifts:  In: 640 [P.O.:640]  Out: 2450 [Urine:2450]    Constitutional: AAOX3, NAD, Appears comfortable  Respiratory:  CTA B/L, Normal WOB  Cardiovascular: RRR, No murmur  GI: Soft, Non- tender, BS- normoactive  Skin/Integument: Warm and dry, no rashes  MSK: rt hip incision bandaged  Neuro: CN- grossly intact    Medications        sodium chloride (PF)  10 mL Intracatheter Q8H     ferrous gluconate  324 mg Oral Daily with breakfast     senna-docusate  1-2 tablet Oral BID     ceFAZolin  2 g Intravenous Q8H     sodium chloride (PF)  3 mL Intracatheter Q8H     enoxaparin  40 mg Subcutaneous Q24H     Polyethylene Glycol 400  1 drop Both Eyes BID     insulin aspart  1-10 Units Subcutaneous TID AC     insulin aspart  1-7 Units Subcutaneous At Bedtime     DULoxetine  30 mg Oral Daily     gabapentin  800 mg Oral BID     simvastatin  20 mg Oral At Bedtime     tamsulosin  0.8 mg Oral At Bedtime       Data     Recent Labs  Lab 12/31/17  0524 12/30/17  0715 12/29/17  0655  12/27/17 2020   WBC 8.3 7.3 8.7  < > 8.7   HGB 9.1* 9.1* 9.9*  < > 11.5*   MCV 89 89 91  < > 89    158 169  < > 200   INR  --   --   --   --  1.11    139 137  < > 138   POTASSIUM 4.0 4.0 4.9  < > 4.8   CHLORIDE 104 105 105  < > 105   CO2 31 28 27  < > 26   BUN 25 26 27  < > 50*   CR 1.00 1.24 1.29*  < > 2.02*   ANIONGAP 3 6 5  < > 7   ESAU 8.8 8.5 8.1*  < > 8.7   * 147* 141*  < > 144*   < > = values in this interval not displayed.    No results found for this or any previous visit (from the past 24 hour(s)).

## 2017-12-31 NOTE — PROGRESS NOTES
[facility]  Infectious Disease Progress Note          Assessment and Plan:   IMPRESSION:   1.  Mark Hernández, 84-year-old male with diabetes mellitus.   2.  History of right total hip arthroplasty, originally placed in 1991.  The patient required revision arthroplasty in 2001 because of a fall and a periprosthetic fracture.   3.  Hip aspirations done for chronic hip pain reasons including aspiration done in October of this year which showed staph epidermidis, sensitive to cephalosporins and oxacillin.  The patient has received p.o.  Bactrim for the past ten days and was admitted on this occasion for explantation of the arthroplasty.   4.  Status post explantation of right total hip arthroplasty with poly exchange 12/28/2017.  No new hip cultures available.  Blood cultures negative to date.       RECOMMENDATIONS:   1.  Agree with IV Ancef given the sensitivities of the staph epidermidis.   2.  The patient will require six weeks of IV antibiotic therapy, likely followed by several months of oral antibiotic because of the poly exchange.   3. Have pt f/u in office with either Dr. Broussard or Dr. Mccoy in 2-3 wks.  4. Orders entered for 6 wks of IV Ancef.        Interval History:   PICC placed.  Afebrile.  Blood cxs neg.  No new surgical cxs.              Medications:       sodium chloride (PF)  10 mL Intracatheter Q8H     ferrous gluconate  324 mg Oral Daily with breakfast     senna-docusate  1-2 tablet Oral BID     ceFAZolin  2 g Intravenous Q8H     sodium chloride (PF)  3 mL Intracatheter Q8H     enoxaparin  40 mg Subcutaneous Q24H     acetaminophen  975 mg Oral Q8H     Polyethylene Glycol 400  1 drop Both Eyes BID     insulin aspart  1-10 Units Subcutaneous TID AC     insulin aspart  1-7 Units Subcutaneous At Bedtime     DULoxetine  30 mg Oral Daily     gabapentin  800 mg Oral BID     simvastatin  20 mg Oral At Bedtime     tamsulosin  0.8 mg Oral At Bedtime                  Physical Exam:  "  Blood pressure 129/66, pulse 66, temperature 98  F (36.7  C), resp. rate 16, height 1.778 m (5' 10\"), weight 89.4 kg (197 lb), SpO2 96 %.  [unfilled]  Vital Signs with Ranges  Temp:  [98  F (36.7  C)-99  F (37.2  C)] 98  F (36.7  C)  Heart Rate:  [81-97] 81  Resp:  [16-18] 16  BP: (111-129)/(59-66) 129/66  SpO2:  [94 %-98 %] 96 %    Constitutional: Awake, alert, cooperative, no apparent distress   Lungs: Clear to auscultation bilaterally, no crackles or wheezing   Cardiovascular: Regular rate and rhythm, normal S1 and S2, and no murmur noted   Abdomen: Normal bowel sounds, soft, non-distended, non-tender   Skin: No rashes, no cyanosis, no edema   Other:           Data:   All microbiology laboratory data reviewed.  Recent Labs   Lab Test  12/31/17 0524 12/30/17 0715 12/29/17   0655   WBC  8.3  7.3  8.7   HGB  9.1*  9.1*  9.9*   HCT  27.8*  28.5*  31.2*   MCV  89  89  91   PLT  180  158  169     Recent Labs   Lab Test  12/31/17 0524 12/30/17   0715  12/29/17   0655   CR  1.00  1.24  1.29*     Recent Labs   Lab Test  12/31/17 0524   SED  80*     "

## 2017-12-31 NOTE — PROGRESS NOTES
"Mark SAMANIEGO Edgar  2017  POD # 3 sp revision joao    Admit Date:  2017      Doing well.  Clean wound without signs of infection.  Normal healing wound.  No immediate surgical complications identified.  No excessive bleeding  Pain well-controlled.  Objective:  Blood pressure 129/66, pulse 66, temperature 98  F (36.7  C), resp. rate 16, height 1.778 m (5' 10\"), weight 89.4 kg (197 lb), SpO2 96 %.    Temperatures:  Current - Temp: 98  F (36.7  C); Max - Temp  Av.4  F (36.9  C)  Min: 98  F (36.7  C)  Max: 99  F (37.2  C)  Pulse range: No Data Recorded  Blood pressure range: Systolic (24hrs), Av , Min:111 , Max:129   ; Diastolic (24hrs), Av, Min:59, Max:66    Exam:  CMS: intact  alert, stable, wound ok  Calf nontender b le.       Labs:  Recent Labs   Lab Test  17   0524  17   0715  17   0655   POTASSIUM  4.0  4.0  4.9     Recent Labs   Lab Test  17   0524  17   0715  17   0655   HGB  9.1*  9.1*  9.9*     Recent Labs   Lab Test  17   2020   INR  1.11     Recent Labs   Lab Test  17   0524  17   0715  17   0655   PLT  180  158  169       PLAN: Weight bearing as tolerated  Continue physical therapy  Pain control measures  Additional problems to be followed by medicine physician  Dc to rehab when bed is available.       "

## 2018-01-01 ENCOUNTER — APPOINTMENT (OUTPATIENT)
Dept: PHYSICAL THERAPY | Facility: CLINIC | Age: 83
DRG: 467 | End: 2018-01-01
Attending: ORTHOPAEDIC SURGERY
Payer: MEDICARE

## 2018-01-01 LAB
ANION GAP SERPL CALCULATED.3IONS-SCNC: 5 MMOL/L (ref 3–14)
BUN SERPL-MCNC: 33 MG/DL (ref 7–30)
CALCIUM SERPL-MCNC: 9 MG/DL (ref 8.5–10.1)
CHLORIDE SERPL-SCNC: 103 MMOL/L (ref 94–109)
CO2 SERPL-SCNC: 30 MMOL/L (ref 20–32)
CREAT SERPL-MCNC: 1.11 MG/DL (ref 0.66–1.25)
GFR SERPL CREATININE-BSD FRML MDRD: 63 ML/MIN/1.7M2
GLUCOSE BLDC GLUCOMTR-MCNC: 139 MG/DL (ref 70–99)
GLUCOSE BLDC GLUCOMTR-MCNC: 155 MG/DL (ref 70–99)
GLUCOSE BLDC GLUCOMTR-MCNC: 157 MG/DL (ref 70–99)
GLUCOSE BLDC GLUCOMTR-MCNC: 182 MG/DL (ref 70–99)
GLUCOSE BLDC GLUCOMTR-MCNC: 255 MG/DL (ref 70–99)
GLUCOSE SERPL-MCNC: 147 MG/DL (ref 70–99)
POTASSIUM SERPL-SCNC: 4.2 MMOL/L (ref 3.4–5.3)
SODIUM SERPL-SCNC: 138 MMOL/L (ref 133–144)

## 2018-01-01 PROCEDURE — 00000146 ZZHCL STATISTIC GLUCOSE BY METER IP

## 2018-01-01 PROCEDURE — 40000193 ZZH STATISTIC PT WARD VISIT: Performed by: PHYSICAL THERAPIST

## 2018-01-01 PROCEDURE — 80048 BASIC METABOLIC PNL TOTAL CA: CPT | Performed by: ORTHOPAEDIC SURGERY

## 2018-01-01 PROCEDURE — A9270 NON-COVERED ITEM OR SERVICE: HCPCS | Mod: GY | Performed by: ORTHOPAEDIC SURGERY

## 2018-01-01 PROCEDURE — A9270 NON-COVERED ITEM OR SERVICE: HCPCS | Mod: GY | Performed by: HOSPITALIST

## 2018-01-01 PROCEDURE — 25000128 H RX IP 250 OP 636: Performed by: INTERNAL MEDICINE

## 2018-01-01 PROCEDURE — 25000128 H RX IP 250 OP 636: Performed by: ORTHOPAEDIC SURGERY

## 2018-01-01 PROCEDURE — 25000132 ZZH RX MED GY IP 250 OP 250 PS 637: Mod: GY | Performed by: INTERNAL MEDICINE

## 2018-01-01 PROCEDURE — A9270 NON-COVERED ITEM OR SERVICE: HCPCS | Mod: GY | Performed by: INTERNAL MEDICINE

## 2018-01-01 PROCEDURE — 97110 THERAPEUTIC EXERCISES: CPT | Mod: GP | Performed by: PHYSICAL THERAPIST

## 2018-01-01 PROCEDURE — 12000007 ZZH R&B INTERMEDIATE

## 2018-01-01 PROCEDURE — 97116 GAIT TRAINING THERAPY: CPT | Mod: GP | Performed by: PHYSICAL THERAPIST

## 2018-01-01 PROCEDURE — 40000239 ZZH STATISTIC VAT ROUNDS

## 2018-01-01 PROCEDURE — 25000132 ZZH RX MED GY IP 250 OP 250 PS 637: Mod: GY | Performed by: HOSPITALIST

## 2018-01-01 PROCEDURE — 99232 SBSQ HOSP IP/OBS MODERATE 35: CPT | Performed by: INTERNAL MEDICINE

## 2018-01-01 PROCEDURE — 25000132 ZZH RX MED GY IP 250 OP 250 PS 637: Mod: GY | Performed by: ORTHOPAEDIC SURGERY

## 2018-01-01 RX ORDER — POLYETHYLENE GLYCOL 3350 17 G/17G
17 POWDER, FOR SOLUTION ORAL 2 TIMES DAILY PRN
Status: DISCONTINUED | OUTPATIENT
Start: 2018-01-01 | End: 2018-01-02 | Stop reason: HOSPADM

## 2018-01-01 RX ORDER — RIFAMPIN 300 MG/1
300 CAPSULE ORAL EVERY 12 HOURS SCHEDULED
Status: DISCONTINUED | OUTPATIENT
Start: 2018-01-01 | End: 2018-01-02 | Stop reason: HOSPADM

## 2018-01-01 RX ADMIN — TAMSULOSIN HYDROCHLORIDE 0.8 MG: 0.4 CAPSULE ORAL at 21:24

## 2018-01-01 RX ADMIN — CEFAZOLIN SODIUM 2 G: 2 INJECTION, SOLUTION INTRAVENOUS at 04:05

## 2018-01-01 RX ADMIN — SENNOSIDES AND DOCUSATE SODIUM 2 TABLET: 8.6; 5 TABLET ORAL at 08:07

## 2018-01-01 RX ADMIN — INSULIN ASPART 1 UNITS: 100 INJECTION, SOLUTION INTRAVENOUS; SUBCUTANEOUS at 13:14

## 2018-01-01 RX ADMIN — OXYCODONE HYDROCHLORIDE 5 MG: 5 TABLET ORAL at 08:07

## 2018-01-01 RX ADMIN — INSULIN ASPART 2 UNITS: 100 INJECTION, SOLUTION INTRAVENOUS; SUBCUTANEOUS at 18:01

## 2018-01-01 RX ADMIN — DULOXETINE 30 MG: 30 CAPSULE, DELAYED RELEASE ORAL at 08:07

## 2018-01-01 RX ADMIN — RIFAMPIN 300 MG: 300 CAPSULE ORAL at 13:13

## 2018-01-01 RX ADMIN — RIFAMPIN 300 MG: 300 CAPSULE ORAL at 18:08

## 2018-01-01 RX ADMIN — FERROUS GLUCONATE 324 MG: 324 TABLET ORAL at 08:07

## 2018-01-01 RX ADMIN — ENOXAPARIN SODIUM 40 MG: 40 INJECTION SUBCUTANEOUS at 11:56

## 2018-01-01 RX ADMIN — CEFAZOLIN SODIUM 2 G: 2 INJECTION, SOLUTION INTRAVENOUS at 12:05

## 2018-01-01 RX ADMIN — GABAPENTIN 800 MG: 800 TABLET ORAL at 20:31

## 2018-01-01 RX ADMIN — CEFAZOLIN SODIUM 2 G: 2 INJECTION, SOLUTION INTRAVENOUS at 20:30

## 2018-01-01 RX ADMIN — POLYETHYLENE GLYCOL 3350 17 G: 17 POWDER, FOR SOLUTION ORAL at 13:13

## 2018-01-01 RX ADMIN — GABAPENTIN 800 MG: 800 TABLET ORAL at 08:07

## 2018-01-01 RX ADMIN — SIMVASTATIN 20 MG: 10 TABLET, FILM COATED ORAL at 21:23

## 2018-01-01 NOTE — PLAN OF CARE
Problem: Patient Care Overview  Goal: Plan of Care/Patient Progress Review  Discharge Planner PT   Patient plan for discharge: TCU  Current status: Patient ambulated 150' with WW and CGA, cueing to stand erect.  WW lowered at end of session.  Initially step-to gait, progressing to step-through.  Decreased steffanie.  Tolerated well. Supine to sit with Mod A.  Sat at EOB up to 2 minutes.  Sit to and from stand with Min A.  Patient requesting therapist hold walker so patient could pull up on it.  Patient instructed to push from bed with one hand.  In chair at end of session with chair alarm on and nurses button within reach.  Calling nursing to get bloodsugars checked prior to ordering breakfast.    Barriers to return to prior living situation: Level of current functional mobility, level of assistance required, wife unable to assist very much  Recommendations for discharge: TCU  Rationale for recommendations: Will benefit from TCU stay, including skilled PT intervention, to increase strength and functional mobility prior to return home.       Entered by: Lucia Rocha 01/01/2018 8:33 AM

## 2018-01-01 NOTE — PROGRESS NOTES
[facility]  Infectious Disease Progress Note          Assessment and Plan:   IMPRESSION:   1.  Mark Hernández, 84-year-old male with diabetes mellitus.   2.  History of right total hip arthroplasty, originally placed in 1991.  The patient required revision arthroplasty in 2001 because of a fall and a periprosthetic fracture.   3.  Hip aspirations done for chronic hip pain reasons including aspiration done in October of this year which showed staph epidermidis, sensitive to cephalosporins and oxacillin.  The patient has received p.o.  Bactrim for the past ten days and was admitted on this occasion for explantation of the arthroplasty.   4.  Status post explantation of right total hip arthroplasty with poly exchange 12/28/2017.  No new hip cultures available.  Blood cultures negative to date.       RECOMMENDATIONS:   1.  Continue IV Ancef given the sensitivities of the staph epidermidis, sl risk of heterogenous population but not worth vanco toxicity, definitely add po Rif for full course   2.  The patient will require six weeks of IV antibiotic therapy, likely followed by several months of oral antibiotic because of the poly exchange. Likely po minocin 100 bid and Rif for 3 mos then po minocin or other for life  3. Have pt f/u in office with Dr. Mccoy in 4  wks.  4. Orders entered for 6 wks of IV Ancef.        Interval History:   PICC placed.  Afebrile.  Blood cxs neg.  No new surgical cxs.              Medications:       rifampin  300 mg Oral Q12H VERNON     sodium chloride (PF)  10 mL Intracatheter Q8H     ferrous gluconate  324 mg Oral Daily with breakfast     senna-docusate  1-2 tablet Oral BID     ceFAZolin  2 g Intravenous Q8H     enoxaparin  40 mg Subcutaneous Q24H     Polyethylene Glycol 400  1 drop Both Eyes BID     insulin aspart  1-10 Units Subcutaneous TID AC     insulin aspart  1-7 Units Subcutaneous At Bedtime     DULoxetine  30 mg Oral Daily     gabapentin  800 mg Oral BID      "simvastatin  20 mg Oral At Bedtime     tamsulosin  0.8 mg Oral At Bedtime                  Physical Exam:   Blood pressure 116/61, pulse 81, temperature 98.3  F (36.8  C), temperature source Oral, resp. rate 16, height 1.778 m (5' 10\"), weight 89.4 kg (197 lb), SpO2 98 %.  [unfilled]  Vital Signs with Ranges  Temp:  [97.8  F (36.6  C)-99.3  F (37.4  C)] 98.3  F (36.8  C)  Pulse:  [81] 81  Heart Rate:  [] 79  Resp:  [16] 16  BP: (103-131)/(56-75) 116/61  SpO2:  [96 %-98 %] 98 %    Constitutional: Awake, alert, cooperative, no apparent distress   Lungs: Clear to auscultation bilaterally, no crackles or wheezing   Cardiovascular: Regular rate and rhythm, normal S1 and S2, and no murmur noted   Abdomen: Normal bowel sounds, soft, non-distended, non-tender   Skin: No rashes, no cyanosis, no edema   Other:           Data:   All microbiology laboratory data reviewed.  Recent Labs   Lab Test  12/31/17 0524 12/30/17   0715  12/29/17   0655   WBC  8.3  7.3  8.7   HGB  9.1*  9.1*  9.9*   HCT  27.8*  28.5*  31.2*   MCV  89  89  91   PLT  180  158  169     Recent Labs   Lab Test  01/01/18   0610  12/31/17   0524  12/30/17   0715   CR  1.11  1.00  1.24     Recent Labs   Lab Test  12/31/17 0524   SED  80*     "

## 2018-01-01 NOTE — PLAN OF CARE
Problem: Patient Care Overview  Goal: Plan of Care/Patient Progress Review  Outcome: Improving  VSS, CMS intact. Up with 1 and walker. Pain well controlled with oxycodone and tylenol. Voiding per urinal. Alert and orient x 4. Possible discharge to TCU tomorrow.

## 2018-01-01 NOTE — PROGRESS NOTES
[facility]    Hospitalist Progress Note    Assessment & Plan   Mr. Mark Hernández is an 84-year-old male with past medical history significant for diabetes, hypertension, dyslipidemia and peripheral neuropathy pacemaker for atrioventricular block, benign prostatic hyperplasia, right total hip arthroplasty, diverticulosis who is admitted to orthopedics for infected right total hip arthroplasty and hospitalist requested consultation for acute renal failure and medical management.     Infected right total hip arthroplasty.    -Status post I/D and placement of abx beads, poly exchange on 12/28  -antibiotics per Infectious disease  -routine postoperative care per orthopedic surgery     Acute kidney injury:  -His baseline creatinine is around 1 as of 09/2017.    -Presented with a creatinine of 2.02  -prerenal and probably worsened by a recent use of naproxen and the continued use of metformin and lisinopril.   -Continue to hold lisinopril  -blood pressure adequately controlled at this time  -renal function improved     Diabetes mellitus.    -Continue to hold metformin.    -sliding scale insulin.   -Adequate glycemic control at the moment    Hypertension.    -Adequately controlled  -hold lisinopril as above    Dyslipidemia.    -Continue simvastatin.     Peripheral neuropathy.    -continue Neurontin and Cymbalta.     Benign prostatic hyperplasia.    -Continue with Flomax.       D/W: RN  DVT Prophylaxis: Defer to primary service  Code Status: No Order    Disposition: Expected discharge per ortho    Rafael Steven MD    Interval History     Renal function stable. Denies new complaints except constiption      -Data reviewed today: I reviewed all new labs and imaging results over the last 24 hours. I personally reviewed no images or EKG's today.    Physical Exam   Temp: 98.3  F (36.8  C) Temp src: Oral BP: 116/61 Pulse: 81 Heart Rate: 79 Resp: 16 SpO2: 98 % O2 Device: None (Room air)    Vitals:    12/28/17  0356   Weight: 89.4 kg (197 lb)     Vital Signs with Ranges  Temp:  [97.8  F (36.6  C)-99.3  F (37.4  C)] 98.3  F (36.8  C)  Pulse:  [81] 81  Heart Rate:  [] 79  Resp:  [16] 16  BP: (103-131)/(56-75) 116/61  SpO2:  [96 %-98 %] 98 %  I/O last 3 completed shifts:  In: 200 [P.O.:200]  Out: 1775 [Urine:1775]    Constitutional: AAOX3, NAD, Appears comfortable  Respiratory:  CTA B/L, Normal WOB  Cardiovascular: RRR, No murmur  GI: Soft, Non- tender, BS- normoactive  Skin/Integument: Warm and dry, no rashes  MSK: rt hip incision bandaged  Neuro: CN- grossly intact    Medications        rifampin  300 mg Oral Q12H VERNON     sodium chloride (PF)  10 mL Intracatheter Q8H     ferrous gluconate  324 mg Oral Daily with breakfast     senna-docusate  1-2 tablet Oral BID     ceFAZolin  2 g Intravenous Q8H     enoxaparin  40 mg Subcutaneous Q24H     Polyethylene Glycol 400  1 drop Both Eyes BID     insulin aspart  1-10 Units Subcutaneous TID AC     insulin aspart  1-7 Units Subcutaneous At Bedtime     DULoxetine  30 mg Oral Daily     gabapentin  800 mg Oral BID     simvastatin  20 mg Oral At Bedtime     tamsulosin  0.8 mg Oral At Bedtime       Data     Recent Labs  Lab 01/01/18  0610 12/31/17  0524 12/30/17  0715 12/29/17  0655  12/27/17 2020   WBC  --  8.3 7.3 8.7  < > 8.7   HGB  --  9.1* 9.1* 9.9*  < > 11.5*   MCV  --  89 89 91  < > 89   PLT  --  180 158 169  < > 200   INR  --   --   --   --   --  1.11    138 139 137  < > 138   POTASSIUM 4.2 4.0 4.0 4.9  < > 4.8   CHLORIDE 103 104 105 105  < > 105   CO2 30 31 28 27  < > 26   BUN 33* 25 26 27  < > 50*   CR 1.11 1.00 1.24 1.29*  < > 2.02*   ANIONGAP 5 3 6 5  < > 7   ESAU 9.0 8.8 8.5 8.1*  < > 8.7   * 144* 147* 141*  < > 144*   < > = values in this interval not displayed.    No results found for this or any previous visit (from the past 24 hour(s)).

## 2018-01-01 NOTE — PROGRESS NOTES
"Mark Hernández  2018  Patient s/p Rev and Irrigation and debridement of RTHA: stable  Admit Date:  2017      Doing well.  Objective: patient states he is doing well overall. No complaints of chest pain or shortness of breath.   Ready to work with therapy and plans on discharging to a TCU tomorrow. Pain well controlled with oral pain medications.   Blood pressure 116/61, pulse 81, temperature 98.3  F (36.8  C), temperature source Oral, resp. rate 16, height 1.778 m (5' 10\"), weight 89.4 kg (197 lb), SpO2 98 %.    Temperatures:  Current - Temp: 98.3  F (36.8  C); Max - Temp  Av.6  F (37  C)  Min: 97.8  F (36.6  C)  Max: 99.3  F (37.4  C)  Pulse range: Pulse  Av  Min: 81  Max: 81  Blood pressure range: Systolic (24hrs), Av , Min:103 , Max:131   ; Diastolic (24hrs), Av, Min:56, Max:75    Exam:  CMS: intact  alert, stable, wound ok  Dressing c/d/i  picc line site well maintained and clean  Calf s/nt in RLE  Communicates clearly with examiner    Labs:  Recent Labs   Lab Test  18   0610  17   0524  17   0715   POTASSIUM  4.2  4.0  4.0     Recent Labs   Lab Test  17   0524  17   0715  17   0655   HGB  9.1*  9.1*  9.9*     Recent Labs   Lab Test  17   2020   INR  1.11     Recent Labs   Lab Test  17   0524  17   0715  17   0655   PLT  180  158  169       PLAN: continue current dvt ppx regimen.   Continue current oral pain medications  Continue IV abx per infectious disease recommendation: IV ancef  Plan on discharge to TCU tomorrow.   Continue current hip precautions as well and use ambulatory assistance at all times.     "

## 2018-01-01 NOTE — PROGRESS NOTES
GLORIA  D:  Ref[facility] have been made to [facility] and [facility].  [place] [facility] [place] Wednesday or Thursday,   [facility] is full thru today and any further availability is not know till their admission office re-opens on Tuesday.    [facility] has a semi-private room available and their nursing supervisor Sandra will assess patient.  Referral faxed to [phone]    Update:  Writer did not hear back from nursing supervisor and when writer called [facility] back the  evening supervisor was on duty and he did not have the faxed referral.    Writer has left a message for [facility] admissions asking for a return call on Tuesday regarding availability.  Spoke with patient and updated him.  Explained Tuesday SW will follow up with [facility] in the morning.  He prefers a private room and private room rates for [facility] were discussed.  In regards to transportation, he reports his wife will transport if staff can assist with transfers in and out of the car.

## 2018-01-01 NOTE — PLAN OF CARE
Problem: Patient Care Overview  Goal: Plan of Care/Patient Progress Review  Outcome: Improving  Pt alert and oriented. Vital signs stable. Baseline numbness lower extremities. Oxycodone for  Pain. Voiding adequate amounts per urinal. Will continue to monitor.

## 2018-01-01 NOTE — PLAN OF CARE
Problem: Patient Care Overview  Goal: Plan of Care/Patient Progress Review  Outcome: Improving  Pt A&Ox4; calm, cooperative, and pleasant. VSS; CMS intact except for baseline numbness in bilateral feet. R hip pain managed with oxycodone. Up with assist of 1/walker and gait belt. Tolerating diet; Voiding without difficulties; however, has c/o constipation; PO fluid intake encouraged and scheduled senna-s as well as PRN Miralax given; awaiting for result. Will continue to monitor.

## 2018-01-02 ENCOUNTER — APPOINTMENT (OUTPATIENT)
Dept: PHYSICAL THERAPY | Facility: CLINIC | Age: 83
DRG: 467 | End: 2018-01-02
Attending: ORTHOPAEDIC SURGERY
Payer: MEDICARE

## 2018-01-02 VITALS
OXYGEN SATURATION: 94 % | TEMPERATURE: 97.8 F | HEIGHT: 70 IN | WEIGHT: 197 LBS | BODY MASS INDEX: 28.2 KG/M2 | SYSTOLIC BLOOD PRESSURE: 115 MMHG | HEART RATE: 82 BPM | DIASTOLIC BLOOD PRESSURE: 63 MMHG | RESPIRATION RATE: 16 BRPM

## 2018-01-02 LAB
GLUCOSE BLDC GLUCOMTR-MCNC: 156 MG/DL (ref 70–99)
GLUCOSE BLDC GLUCOMTR-MCNC: 164 MG/DL (ref 70–99)
GLUCOSE BLDC GLUCOMTR-MCNC: 183 MG/DL (ref 70–99)

## 2018-01-02 PROCEDURE — 97116 GAIT TRAINING THERAPY: CPT | Mod: GP

## 2018-01-02 PROCEDURE — 25000128 H RX IP 250 OP 636: Performed by: ORTHOPAEDIC SURGERY

## 2018-01-02 PROCEDURE — A9270 NON-COVERED ITEM OR SERVICE: HCPCS | Mod: GY | Performed by: ORTHOPAEDIC SURGERY

## 2018-01-02 PROCEDURE — 25000132 ZZH RX MED GY IP 250 OP 250 PS 637: Mod: GY | Performed by: ORTHOPAEDIC SURGERY

## 2018-01-02 PROCEDURE — 40000193 ZZH STATISTIC PT WARD VISIT

## 2018-01-02 PROCEDURE — 25000132 ZZH RX MED GY IP 250 OP 250 PS 637: Mod: GY | Performed by: HOSPITALIST

## 2018-01-02 PROCEDURE — 97110 THERAPEUTIC EXERCISES: CPT | Mod: GP

## 2018-01-02 PROCEDURE — A9270 NON-COVERED ITEM OR SERVICE: HCPCS | Mod: GY | Performed by: HOSPITALIST

## 2018-01-02 PROCEDURE — 25000132 ZZH RX MED GY IP 250 OP 250 PS 637: Mod: GY | Performed by: INTERNAL MEDICINE

## 2018-01-02 PROCEDURE — 00000146 ZZHCL STATISTIC GLUCOSE BY METER IP

## 2018-01-02 PROCEDURE — A9270 NON-COVERED ITEM OR SERVICE: HCPCS | Mod: GY | Performed by: INTERNAL MEDICINE

## 2018-01-02 PROCEDURE — 25000128 H RX IP 250 OP 636: Performed by: INTERNAL MEDICINE

## 2018-01-02 PROCEDURE — 99232 SBSQ HOSP IP/OBS MODERATE 35: CPT | Performed by: INTERNAL MEDICINE

## 2018-01-02 RX ORDER — CEFAZOLIN SODIUM 2 G/100ML
2 INJECTION, SOLUTION INTRAVENOUS EVERY 8 HOURS
DISCHARGE
Start: 2018-01-02 | End: 2019-04-24

## 2018-01-02 RX ORDER — RIFAMPIN 300 MG/1
300 CAPSULE ORAL EVERY 12 HOURS
DISCHARGE
Start: 2018-01-02 | End: 2018-02-16

## 2018-01-02 RX ADMIN — CEFAZOLIN SODIUM 2 G: 2 INJECTION, SOLUTION INTRAVENOUS at 03:32

## 2018-01-02 RX ADMIN — CEFAZOLIN SODIUM 2 G: 2 INJECTION, SOLUTION INTRAVENOUS at 12:03

## 2018-01-02 RX ADMIN — OXYCODONE HYDROCHLORIDE 5 MG: 5 TABLET ORAL at 06:38

## 2018-01-02 RX ADMIN — RIFAMPIN 300 MG: 300 CAPSULE ORAL at 06:39

## 2018-01-02 RX ADMIN — DULOXETINE 30 MG: 30 CAPSULE, DELAYED RELEASE ORAL at 07:56

## 2018-01-02 RX ADMIN — GABAPENTIN 800 MG: 800 TABLET ORAL at 07:56

## 2018-01-02 RX ADMIN — INSULIN ASPART 1 UNITS: 100 INJECTION, SOLUTION INTRAVENOUS; SUBCUTANEOUS at 08:55

## 2018-01-02 RX ADMIN — INSULIN ASPART 1 UNITS: 100 INJECTION, SOLUTION INTRAVENOUS; SUBCUTANEOUS at 12:51

## 2018-01-02 RX ADMIN — FERROUS GLUCONATE 324 MG: 324 TABLET ORAL at 07:56

## 2018-01-02 RX ADMIN — SENNOSIDES AND DOCUSATE SODIUM 2 TABLET: 8.6; 5 TABLET ORAL at 07:56

## 2018-01-02 RX ADMIN — ENOXAPARIN SODIUM 40 MG: 40 INJECTION SUBCUTANEOUS at 12:03

## 2018-01-02 NOTE — PLAN OF CARE
Problem: Hip Arthroplasty (Total, Partial) (Adult)  Goal: Signs and Symptoms of Listed Potential Problems Will be Absent, Minimized or Managed (Hip Arthroplasty)  Signs and symptoms of listed potential problems will be absent, minimized or managed by discharge/transition of care (reference Hip Arthroplasty (Total, Partial) (Adult) CPG).   Outcome: Improving  A&O x4, VSS on RA, CMS-baseline neuropathy BLE and tingling on RUE per pt, Drng CDI, Activity w/1, Voiding adequately in urinal, denies pain but will take pain medication prior to PT, Diet m-carb, blood sugar at 0200 was 183. PICC RUE- SL. Continue to monitor.

## 2018-01-02 NOTE — PLAN OF CARE
Problem: Hip Arthroplasty (Total, Partial) (Adult)  Goal: Signs and Symptoms of Listed Potential Problems Will be Absent, Minimized or Managed (Hip Arthroplasty)  Signs and symptoms of listed potential problems will be absent, minimized or managed by discharge/transition of care (reference Hip Arthroplasty (Total, Partial) (Adult) CPG).   Outcome: Improving  A&O x4, VSS on RA, CMS intact, Drng CDI, Activity w/1, Voiding adequately in urinal and BM this shift, denied pain, Diet m-carb, PICC RUE- SL. Possible d/c to TCU tomorrow. Continue to monitor.

## 2018-01-02 NOTE — PROGRESS NOTES
PAS-RR    D: Per DHS regulation, SW completed and submitted PAS-RR to [place] [employer] via the [employer].  PAS-RR confirmation # is : [id number]    I: SW spoke with patient and family and they are aware a PAS-RR has been submitted.  SW reviewed with patient and family that they may be contacted for a follow up appointment within 10 days of hospital discharge if their SNF stay is < 30 days.  Contact information for [employer] was also provided.    A: patient and family verbalized understanding.    P: Further questions may be directed to [employer] at #[phone], option #4 for PAS-RR staff.    D: Discharge orders received and faxed to [facility]. Facility has bed availability for today. Patient will be transported via family around 1400. Patient and family are in agreement.

## 2018-01-02 NOTE — PROGRESS NOTES
Mark SAMANIEGO Edgar  2018  POD # ?    Doing well.  Pain well-controlled.  Tolerating physical therapy and rehabilitation well.  independent  Temperatures:  Current - Temp: 97.8  F (36.6  C); Max - Temp  Av.1  F (36.7  C)  Min: 97.4  F (36.3  C)  Max: 98.8  F (37.1  C)  Pulse range: No Data Recorded  Blood pressure range: Systolic (24hrs), Av , Min:115 , Max:127   ; Diastolic (24hrs), Av, Min:58, Max:72    CMS:    intact  Labs:   Results for orders placed or performed during the hospital encounter of 17 (from the past 24 hour(s))   Glucose by meter   Result Value Ref Range    Glucose 155 (H) 70 - 99 mg/dL   Glucose by meter   Result Value Ref Range    Glucose 182 (H) 70 - 99 mg/dL   Glucose by meter   Result Value Ref Range    Glucose 255 (H) 70 - 99 mg/dL   Glucose by meter   Result Value Ref Range    Glucose 183 (H) 70 - 99 mg/dL   Glucose by meter   Result Value Ref Range    Glucose 156 (H) 70 - 99 mg/dL       PLAN:  Discharge plan: Skilled Nursing Facility   Due  To cost  Of  Antibiotics,  Otherwise  He  Would be ready  To go home  Today.

## 2018-01-02 NOTE — PROGRESS NOTES
[facility]  Infectious Disease Progress Note          Assessment and Plan:   IMPRESSION:   1.  Mark Hernández, 84-year-old male with diabetes mellitus.   2.  History of right total hip arthroplasty, originally placed in 1991.  The patient required revision arthroplasty in 2001 because of a fall and a periprosthetic fracture.   3.  Hip aspirations done for chronic hip pain reasons including aspiration done in October of this year which showed staph epidermidis, sensitive to cephalosporins and oxacillin.  The patient has received p.o.  Bactrim for the past ten days and was admitted on this occasion for explantation of the arthroplasty.   4.  Status post explantation of right total hip arthroplasty with poly exchange 12/28/2017.  No new hip cultures available.  Blood cultures negative to date.       RECOMMENDATIONS:   1.  Continue IV Ancef given the sensitivities of the staph epidermidis, sl risk of heterogenous population but not worth vanco toxicity, definitely add po Rif for full course   2.  The patient will require six weeks of IV antibiotic therapy, likely followed by several months of oral antibiotic because of the poly exchange. Likely po minocin 100 bid and Rif for 3 mos then po minocin or other for life  3. Have pt f/u in office with Dr. Mccoy in 4  wks.  4. Orders entered for 6 wks of IV Ancef.        Interval History:   PICC placed.  Afebrile.  Blood cxs neg.  No new surgical cxs. Doing well flynn Rif, disposition in process              Medications:       rifampin  300 mg Oral Q12H VERNON     sodium chloride (PF)  10 mL Intracatheter Q8H     ferrous gluconate  324 mg Oral Daily with breakfast     senna-docusate  1-2 tablet Oral BID     ceFAZolin  2 g Intravenous Q8H     enoxaparin  40 mg Subcutaneous Q24H     Polyethylene Glycol 400  1 drop Both Eyes BID     insulin aspart  1-10 Units Subcutaneous TID AC     insulin aspart  1-7 Units Subcutaneous At Bedtime     DULoxetine  30 mg Oral  "Daily     gabapentin  800 mg Oral BID     simvastatin  20 mg Oral At Bedtime     tamsulosin  0.8 mg Oral At Bedtime                  Physical Exam:   Blood pressure 115/63, pulse 82, temperature 97.8  F (36.6  C), temperature source Oral, resp. rate 16, height 1.778 m (5' 10\"), weight 89.4 kg (197 lb), SpO2 94 %.  [unfilled]  Vital Signs with Ranges  Temp:  [97.4  F (36.3  C)-98.8  F (37.1  C)] 97.8  F (36.6  C)  Pulse:  [82] 82  Heart Rate:  [80-87] 80  Resp:  [16] 16  BP: (114-127)/(58-72) 115/63  SpO2:  [94 %-98 %] 94 %    Constitutional: Awake, alert, cooperative, no apparent distress   Lungs: Clear to auscultation bilaterally, no crackles or wheezing   Cardiovascular: Regular rate and rhythm, normal S1 and S2, and no murmur noted   Abdomen: Normal bowel sounds, soft, non-distended, non-tender   Skin: No rashes, no cyanosis, no edema   Other:           Data:   All microbiology laboratory data reviewed.  Recent Labs   Lab Test  12/31/17 0524 12/30/17   0715  12/29/17   0655   WBC  8.3  7.3  8.7   HGB  9.1*  9.1*  9.9*   HCT  27.8*  28.5*  31.2*   MCV  89  89  91   PLT  180  158  169     Recent Labs   Lab Test  01/01/18   0610  12/31/17 0524  12/30/17   0715   CR  1.11  1.00  1.24     Recent Labs   Lab Test  12/31/17 0524   SED  80*     "

## 2018-01-02 NOTE — PLAN OF CARE
Alert and oriented x4. VSS. Pain controled with oxycodone and tylenol. IV ABX. Up with one and walker. Dressing change. discharge paperwork gone over with pt and family, questions answered. Sent with belongings and medications.

## 2018-01-02 NOTE — PLAN OF CARE
Problem: Patient Care Overview  Goal: Plan of Care/Patient Progress Review  Discharge Planner PT   Patient plan for discharge: TCU  Current status: Supine to sit with mod A. Sit to/from stand with min A; cues for safe hand placement. Ambulates 300' with FWW and CGA/SBA. Tolerates MATTY exercises well.   Barriers to return to prior living situation: Level of current functional mobility, level of assistance required, wife unable to assist very much  Recommendations for discharge: TCU  Rationale for recommendations: Patient would benefit from continued PT services to address strength, balance and activity tolerance deficits prior to returning home.        Entered by: Mandeep Stacy 01/02/2018 12:18 PM       Physical Therapy Discharge Summary    Reason for therapy discharge:    Discharged to transitional care facility.    Progress towards therapy goal(s). See goals on Care Plan in [facility] electronic health record for goal details.  Goals not met.  Barriers to achieving goals:   discharge from facility.    Therapy recommendation(s):    Continued therapy is recommended.  Rationale/Recommendations:  See rationale above.

## 2018-01-02 NOTE — PROGRESS NOTES
[facility]    Hospitalist Progress Note    Assessment & Plan   Mr. Mark Hernández is an 84-year-old male with past medical history significant for diabetes, hypertension, dyslipidemia and peripheral neuropathy pacemaker for atrioventricular block, benign prostatic hyperplasia, right total hip arthroplasty, diverticulosis who is admitted to orthopedics for infected right total hip arthroplasty and hospitalist requested consultation for acute renal failure and medical management.     Infected right total hip arthroplasty.    -Status post I/D and placement of abx beads, poly exchange on 12/28  -antibiotics per Infectious disease  -routine postoperative care per orthopedic surgery     Acute kidney injury:  Hypertension.    -His baseline creatinine is around 1 as of 09/2017.    -Presented with a creatinine of 2.02  -prerenal and probably worsened by a recent use of naproxen and the continued use of metformin and lisinopril.   -renal function improved and stable for last 4 days; likely can resume lisinopril at DC     Diabetes mellitus.    -resume metformin at DC.      Dyslipidemia.    -Continue simvastatin.     Peripheral neuropathy.    -continue Neurontin and Cymbalta.     Benign prostatic hyperplasia.    -Continue with Flomax.       D/W: RN  DVT Prophylaxis: Defer to primary service  Code Status: No Order    Disposition: Expected discharge per ortho; likely later today    Rafael Steven MD    Interval History     Renal function stable. Denies new complaints except. Constipation resolved    -Data reviewed today: I reviewed all new labs and imaging results over the last 24 hours. I personally reviewed no images or EKG's today.    Physical Exam   Temp: 97.8  F (36.6  C) Temp src: Oral BP: 115/63   Heart Rate: 80 Resp: 16 SpO2: 94 % O2 Device: None (Room air)    Vitals:    12/28/17 0356   Weight: 89.4 kg (197 lb)     Vital Signs with Ranges  Temp:  [97.4  F (36.3  C)-98.8  F (37.1  C)] 97.8  F (36.6   C)  Heart Rate:  [80-87] 80  Resp:  [16] 16  BP: (115-127)/(58-72) 115/63  SpO2:  [94 %-98 %] 94 %  I/O last 3 completed shifts:  In: 1480 [P.O.:1480]  Out: 3275 [Urine:3275]    Constitutional: AAOX3, NAD, Appears comfortable  Respiratory:  CTA B/L, Normal WOB  Cardiovascular: RRR, No murmur  GI: Soft, Non- tender, BS- normoactive  Skin/Integument: Warm and dry, no rashes  MSK: rt hip incision bandaged  Neuro: CN- grossly intact    Medications        rifampin  300 mg Oral Q12H VERNON     sodium chloride (PF)  10 mL Intracatheter Q8H     ferrous gluconate  324 mg Oral Daily with breakfast     senna-docusate  1-2 tablet Oral BID     ceFAZolin  2 g Intravenous Q8H     enoxaparin  40 mg Subcutaneous Q24H     Polyethylene Glycol 400  1 drop Both Eyes BID     insulin aspart  1-10 Units Subcutaneous TID AC     insulin aspart  1-7 Units Subcutaneous At Bedtime     DULoxetine  30 mg Oral Daily     gabapentin  800 mg Oral BID     simvastatin  20 mg Oral At Bedtime     tamsulosin  0.8 mg Oral At Bedtime       Data     Recent Labs  Lab 01/01/18  0610 12/31/17  0524 12/30/17  0715 12/29/17  0655  12/27/17 2020   WBC  --  8.3 7.3 8.7  < > 8.7   HGB  --  9.1* 9.1* 9.9*  < > 11.5*   MCV  --  89 89 91  < > 89   PLT  --  180 158 169  < > 200   INR  --   --   --   --   --  1.11    138 139 137  < > 138   POTASSIUM 4.2 4.0 4.0 4.9  < > 4.8   CHLORIDE 103 104 105 105  < > 105   CO2 30 31 28 27  < > 26   BUN 33* 25 26 27  < > 50*   CR 1.11 1.00 1.24 1.29*  < > 2.02*   ANIONGAP 5 3 6 5  < > 7   ESAU 9.0 8.8 8.5 8.1*  < > 8.7   * 144* 147* 141*  < > 144*   < > = values in this interval not displayed.    No results found for this or any previous visit (from the past 24 hour(s)).

## 2018-01-03 LAB
BACTERIA SPEC CULT: NO GROWTH
BACTERIA SPEC CULT: NO GROWTH
Lab: NORMAL
SPECIMEN SOURCE: NORMAL
SPECIMEN SOURCE: NORMAL

## 2018-01-05 ENCOUNTER — RECORDS - HEALTHEAST (OUTPATIENT)
Dept: LAB | Facility: CLINIC | Age: 83
End: 2018-01-05

## 2018-01-07 ENCOUNTER — ALLIED HEALTH/NURSE VISIT (OUTPATIENT)
Dept: CARDIOLOGY | Facility: CLINIC | Age: 83
End: 2018-01-07
Payer: MEDICARE

## 2018-01-07 DIAGNOSIS — Z95.0 CARDIAC PACEMAKER IN SITU: Primary | ICD-10-CM

## 2018-01-07 PROCEDURE — 93296 REM INTERROG EVL PM/IDS: CPT | Performed by: INTERNAL MEDICINE

## 2018-01-07 PROCEDURE — 93294 REM INTERROG EVL PM/LDLS PM: CPT | Performed by: INTERNAL MEDICINE

## 2018-01-07 NOTE — MR AVS SNAPSHOT
"              After Visit Summary   2018    Mark Hernández    MRN: [id number]           Patient Information     Date Of Birth          1933        Visit Information        Provider Department      2018 4:00 PM ALEJANDRO TECH1 [facility]        Today's Diagnoses     Cardiac pacemaker in situ    -  1       Follow-ups after your visit        Who to contact     If you have questions or need follow up information about today's clinic visit or your schedule please contact [facility] directly at [phone].  Normal or non-critical lab and imaging results will be communicated to you by [employer] BioScienceshart, letter or phone within 4 business days after the clinic has received the results. If you do not hear from us within 7 days, please contact the clinic through Acuspheret or phone. If you have a critical or abnormal lab result, we will notify you by phone as soon as possible.  Submit refill requests through THREAT STREAM or call your pharmacy and they will forward the refill request to us. Please allow 3 business days for your refill to be completed.          Additional Information About Your Visit        MyChart Information     THREAT STREAM lets you send messages to your doctor, view your test results, renew your prescriptions, schedule appointments and more. To sign up, go to [email or web address] . Click on \"Log in\" on the left side of the screen, which will take you to the Welcome page. Then click on \"Sign up Now\" on the right side of the page.     You will be asked to enter the access code listed below, as well as some personal information. Please follow the directions to create your username and password.     Your access code is: [email or web address]  Expires: 3/31/2018 11:08 AM     Your access code will  in 90 days. If you need help or a new code, please call your [place] clinic or [phone].        Care EveryWhere ID     This is your Care EveryWhere " ID. This could be used by other organizations to access your [place] medical records  [id number]         Blood Pressure from Last 3 Encounters:   01/02/18 115/63   09/28/17 122/46   01/05/17 138/78    Weight from Last 3 Encounters:   12/28/17 89.4 kg (197 lb)   09/28/17 95.8 kg (211 lb 4.8 oz)   01/05/17 97.5 kg (215 lb)              We Performed the Following     INTERROGATION DEVICE EVAL REMOTE, PACER/ICD (74109)     PM DEVICE INTERROGATE REMOTE (87834)        Primary Care Provider Office Phone # Fax #    Rebecca Noyola [phone]       [facility] [address]  [facility] [address]        Equal Access to Services     [facility] : Hadii aad ku hadasho Soomaali, waaxda luqadaha, qaybta kaalmada adeegyada, waxay idiin hayaan gisella matthew . So [facility] [phone].    ATENCIÓN: Si habla español, tiene a gonzales disposición servicios gratuitos de asistencia lingüística. Camille al [phone].    We comply with applicable federal civil rights laws and [place] laws. We do not discriminate on the basis of race, color, national origin, age, disability, sex, sexual orientation, or gender identity.            Thank you!     Thank you for choosing [facility]  for your care. Our goal is always to provide you with excellent care. Hearing back from our patients is one way we can continue to improve our services. Please take a few minutes to complete the written survey that you may receive in the mail after your visit with us. Thank you!             Your Updated Medication List - Protect others around you: Learn how to safely use, store and throw away your medicines at www.disposemymeds.org.          This list is accurate as of: 1/7/18 11:59 PM.  Always use your most recent med list.                   Brand Name Dispense Instructions for use Diagnosis    acetaminophen 325 MG tablet    TYLENOL    100 tablet    Take 3 tablets (975 mg) by mouth  every 8 hours    Infection of prosthetic total hip joint, initial encounter (H)       aspirin 325 MG tablet   Start taking on:  1/10/2018     60 tablet    Take 1 tablet (325 mg) by mouth daily    Infection of prosthetic total hip joint, initial encounter (H)       ceFAZolin 1 GM vial    ANCEF    1 each    Inject 2 g into the vein every 8 hours CBC with differential, creatinine, SGOT weekly while on this medication to be faxed to Dr. Mccoy office.    Infection of prosthetic total hip joint, initial encounter (H)       ceFAZolin-dextrose 2-4 GM/100ML-% Soln infusion    ANCEF     Inject 100 mLs (2 g) into the vein every 8 hours    Infection of prosthetic total hip joint, initial encounter (H)       CYANOCOBALAMIN PO      Take 1,000 mcg by mouth daily        docusate sodium 100 MG tablet    COLACE    60 tablet    Take 100 mg by mouth daily    Infection of prosthetic total hip joint, initial encounter (H)       DULoxetine 30 MG EC capsule    CYMBALTA     Take 30 mg by mouth daily        enoxaparin 40 MG/0.4ML injection    LOVENOX     Inject 0.4 mLs (40 mg) Subcutaneous every 24 hours for 10 days    Infection of prosthetic total hip joint, initial encounter (H)       ferrous gluconate 324 (38 FE) MG tablet    FERGON    60 tablet    Take 1 tablet (324 mg) by mouth daily (with breakfast)    Infection of prosthetic total hip joint, initial encounter (H)       gabapentin 800 MG tablet    NEURONTIN     Take 1 tablet (800 mg) by mouth 2 times daily    Polyneuropathy associated with underlying disease (H)       lisinopril 10 MG tablet    PRINIVIL/ZESTRIL     Take 10 mg by mouth daily        metFORMIN 500 MG 24 hr tablet    GLUCOPHAGE-XR     Take 500 mg by mouth daily (with dinner) 1/2 tab bid        oxyCODONE IR 5 MG tablet    ROXICODONE    80 tablet    Take 1 tablet (5 mg) by mouth every 4 hours as needed for moderate to severe pain    Infection of prosthetic total hip joint, initial encounter (H)       rifampin 300 MG capsule     RIFADIN     Take 1 capsule (300 mg) by mouth every 12 hours    Infection of prosthetic total hip joint, initial encounter (H)       simvastatin 40 MG tablet    ZOCOR     Take 20 mg by mouth At Bedtime Takes 1/2 of a 40mg tablet        tamsulosin 0.4 MG capsule    FLOMAX     Take 0.8 mg by mouth At Bedtime

## 2018-01-08 LAB
ALP SERPL-CCNC: 97 U/L (ref 45–120)
ALT SERPL W P-5'-P-CCNC: <6 U/L (ref 0–45)
AST SERPL W P-5'-P-CCNC: 22 U/L (ref 0–40)
BASOPHILS # BLD AUTO: 0 THOU/UL (ref 0–0.2)
BASOPHILS NFR BLD AUTO: 1 % (ref 0–2)
BILIRUB SERPL-MCNC: 0.2 MG/DL (ref 0–1)
BUN SERPL-MCNC: 35 MG/DL (ref 8–28)
C REACTIVE PROTEIN LHE: 2.8 MG/DL (ref 0–0.8)
CREAT SERPL-MCNC: 1.15 MG/DL (ref 0.7–1.3)
EOSINOPHIL # BLD AUTO: 0.4 THOU/UL (ref 0–0.4)
EOSINOPHIL NFR BLD AUTO: 5 % (ref 0–6)
ERYTHROCYTE [DISTWIDTH] IN BLOOD BY AUTOMATED COUNT: 14.3 % (ref 11–14.5)
ERYTHROCYTE [SEDIMENTATION RATE] IN BLOOD BY WESTERGREN METHOD: 68 MM/HR (ref 0–15)
GFR SERPL CREATININE-BSD FRML MDRD: >60 ML/MIN/1.73M2
HCT VFR BLD AUTO: 32.5 % (ref 40–54)
HGB BLD-MCNC: 10.1 G/DL (ref 14–18)
LYMPHOCYTES # BLD AUTO: 1.1 THOU/UL (ref 0.8–4.4)
LYMPHOCYTES NFR BLD AUTO: 14 % (ref 20–40)
MCH RBC QN AUTO: 28.9 PG (ref 27–34)
MCHC RBC AUTO-ENTMCNC: 31.1 G/DL (ref 32–36)
MCV RBC AUTO: 93 FL (ref 80–100)
MONOCYTES # BLD AUTO: 0.6 THOU/UL (ref 0–0.9)
MONOCYTES NFR BLD AUTO: 7 % (ref 2–10)
NEUTROPHILS # BLD AUTO: 5.6 THOU/UL (ref 2–7.7)
NEUTROPHILS NFR BLD AUTO: 73 % (ref 50–70)
PLATELET # BLD AUTO: 317 THOU/UL (ref 140–440)
PMV BLD AUTO: 8.8 FL (ref 8.5–12.5)
POTASSIUM BLD-SCNC: 4.3 MMOL/L (ref 3.5–5)
RBC # BLD AUTO: 3.49 MILL/UL (ref 4.4–6.2)
SODIUM SERPL-SCNC: 139 MMOL/L (ref 136–145)
WBC: 7.9 THOU/UL (ref 4–11)

## 2018-01-09 NOTE — PROGRESS NOTES
[employer] Accolade MRI EL (D) Remote PPM Device Check  AP: 0% : 99%  Mode: DDD        Presenting Rhythm: AS/  Heart Rate: adequate heart rates per histogram  Sensing: RA: stable RV: not performed    Pacing Threshold: RA: not performed RV: stable    Impedance: stable  Battery Status: 13 years remaining  Atrial Arrhythmia: 1 mode switch episode for a 9 sec burst of PAT  Ventricular Arrhythmia: none   Other: 5 PMT episodes. EGMs show AS/ at max tracking rate of 130bpm    Care Plan: F/U Latitude NXT q 3 months.  No answer, left message with results and next transmission date. Michelle ROWLEY

## 2018-01-16 ENCOUNTER — RECORDS - HEALTHEAST (OUTPATIENT)
Dept: LAB | Facility: CLINIC | Age: 83
End: 2018-01-16

## 2018-01-16 LAB
ALP SERPL-CCNC: 109 U/L (ref 45–120)
ALT SERPL W P-5'-P-CCNC: <6 U/L (ref 0–45)
AST SERPL W P-5'-P-CCNC: 16 U/L (ref 0–40)
BASOPHILS # BLD AUTO: 0.1 THOU/UL (ref 0–0.2)
BASOPHILS NFR BLD AUTO: 1 % (ref 0–2)
BILIRUB SERPL-MCNC: 0.2 MG/DL (ref 0–1)
BUN SERPL-MCNC: 25 MG/DL (ref 8–28)
C REACTIVE PROTEIN LHE: 1.4 MG/DL (ref 0–0.8)
CREAT SERPL-MCNC: 1.01 MG/DL (ref 0.7–1.3)
EOSINOPHIL # BLD AUTO: 0.2 THOU/UL (ref 0–0.4)
EOSINOPHIL NFR BLD AUTO: 3 % (ref 0–6)
ERYTHROCYTE [DISTWIDTH] IN BLOOD BY AUTOMATED COUNT: 15.9 % (ref 11–14.5)
ERYTHROCYTE [SEDIMENTATION RATE] IN BLOOD BY WESTERGREN METHOD: 35 MM/HR (ref 0–15)
GFR SERPL CREATININE-BSD FRML MDRD: >60 ML/MIN/1.73M2
HCT VFR BLD AUTO: 34 % (ref 40–54)
HGB BLD-MCNC: 10.3 G/DL (ref 14–18)
LYMPHOCYTES # BLD AUTO: 0.8 THOU/UL (ref 0.8–4.4)
LYMPHOCYTES NFR BLD AUTO: 12 % (ref 20–40)
MCH RBC QN AUTO: 29.3 PG (ref 27–34)
MCHC RBC AUTO-ENTMCNC: 30.3 G/DL (ref 32–36)
MCV RBC AUTO: 97 FL (ref 80–100)
MONOCYTES # BLD AUTO: 0.7 THOU/UL (ref 0–0.9)
MONOCYTES NFR BLD AUTO: 10 % (ref 2–10)
NEUTROPHILS # BLD AUTO: 5 THOU/UL (ref 2–7.7)
NEUTROPHILS NFR BLD AUTO: 74 % (ref 50–70)
PLATELET # BLD AUTO: 301 THOU/UL (ref 140–440)
PMV BLD AUTO: 8.7 FL (ref 8.5–12.5)
POTASSIUM BLD-SCNC: 4.5 MMOL/L (ref 3.5–5)
RBC # BLD AUTO: 3.52 MILL/UL (ref 4.4–6.2)
SODIUM SERPL-SCNC: 141 MMOL/L (ref 136–145)
WBC: 6.8 THOU/UL (ref 4–11)

## 2018-01-19 ENCOUNTER — RECORDS - HEALTHEAST (OUTPATIENT)
Dept: LAB | Facility: CLINIC | Age: 83
End: 2018-01-19

## 2018-01-22 LAB
ALP SERPL-CCNC: 106 U/L (ref 45–120)
ALT SERPL W P-5'-P-CCNC: <6 U/L (ref 0–45)
AST SERPL W P-5'-P-CCNC: 17 U/L (ref 0–40)
BASOPHILS # BLD AUTO: 0 THOU/UL (ref 0–0.2)
BASOPHILS NFR BLD AUTO: 1 % (ref 0–2)
BILIRUB SERPL-MCNC: 0.5 MG/DL (ref 0–1)
BUN SERPL-MCNC: 23 MG/DL (ref 8–28)
C REACTIVE PROTEIN LHE: 1.2 MG/DL (ref 0–0.8)
CREAT SERPL-MCNC: 1.09 MG/DL (ref 0.7–1.3)
EOSINOPHIL # BLD AUTO: 0.2 THOU/UL (ref 0–0.4)
EOSINOPHIL NFR BLD AUTO: 4 % (ref 0–6)
ERYTHROCYTE [DISTWIDTH] IN BLOOD BY AUTOMATED COUNT: 16.3 % (ref 11–14.5)
ERYTHROCYTE [SEDIMENTATION RATE] IN BLOOD BY WESTERGREN METHOD: 32 MM/HR (ref 0–15)
GFR SERPL CREATININE-BSD FRML MDRD: >60 ML/MIN/1.73M2
HCT VFR BLD AUTO: 37 % (ref 40–54)
HGB BLD-MCNC: 11.2 G/DL (ref 14–18)
LYMPHOCYTES # BLD AUTO: 1 THOU/UL (ref 0.8–4.4)
LYMPHOCYTES NFR BLD AUTO: 17 % (ref 20–40)
MCH RBC QN AUTO: 29.3 PG (ref 27–34)
MCHC RBC AUTO-ENTMCNC: 30.3 G/DL (ref 32–36)
MCV RBC AUTO: 97 FL (ref 80–100)
MONOCYTES # BLD AUTO: 0.5 THOU/UL (ref 0–0.9)
MONOCYTES NFR BLD AUTO: 9 % (ref 2–10)
NEUTROPHILS # BLD AUTO: 4.1 THOU/UL (ref 2–7.7)
NEUTROPHILS NFR BLD AUTO: 69 % (ref 50–70)
PLATELET # BLD AUTO: 213 THOU/UL (ref 140–440)
PMV BLD AUTO: 9.2 FL (ref 8.5–12.5)
POTASSIUM BLD-SCNC: 5.3 MMOL/L (ref 3.5–5)
RBC # BLD AUTO: 3.82 MILL/UL (ref 4.4–6.2)
SODIUM SERPL-SCNC: 139 MMOL/L (ref 136–145)
WBC: 5.9 THOU/UL (ref 4–11)

## 2018-01-22 NOTE — DISCHARGE SUMMARY
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             Discharge Medications:     Discharge Medication List as of 1/2/2018  1:32 PM      START taking these medications    Details   rifampin (RIFADIN) 300 MG capsule Take 1 capsule (300 mg) by mouth every 12 hours, Transitional      ceFAZolin (ANCEF) intermittent infusion 2 g in 100 mL dextrose PRE-MIX Inject 100 mLs (2 g) into the vein every 8 hours, Transitional      oxyCODONE IR (ROXICODONE) 5 MG tablet Take 1 tablet (5 mg) by mouth every 4 hours as needed for moderate to severe pain, Disp-80 tablet, R-0, Transitional      acetaminophen (TYLENOL) 325 MG tablet Take 3 tablets (975 mg) by mouth every 8 hours, Disp-100 tablet, OTC      enoxaparin (LOVENOX) 40 MG/0.4ML injection Inject 0.4 mLs (40 mg) Subcutaneous every 24 hours for 10 days, Transitional      ferrous gluconate (FERGON) 324 (38 FE) MG tablet Take 1 tablet (324 mg) by mouth daily (with breakfast), Disp-60 tablet, R-0, E-Prescribe      docusate sodium (COLACE) 100 MG tablet Take 100 mg by mouth daily, Disp-60 tablet, R-1, Transitional         CONTINUE these medications which have CHANGED    Details   ceFAZolin (ANCEF) 1 GM vial Inject 2 g into the vein every 8 hours CBC with differential, creatinine, SGOT weekly while on this medication to be faxed to Dr. Mccoy  office., Disp-1 each, R-1, Transitional      aspirin 325 MG tablet Take 1 tablet (325 mg) by mouth daily, Disp-60 tablet, OTC         CONTINUE these medications which have NOT CHANGED    Details   CYANOCOBALAMIN PO Take 1,000 mcg by mouth daily, Historical      gabapentin (NEURONTIN) 800 MG tablet Take 1 tablet (800 mg) by mouth 2 times daily, Historical      DULoxetine (CYMBALTA) 30 MG capsule Take 30 mg by mouth daily, Historical      tamsulosin (FLOMAX) 0.4 MG 24 hr capsule Take 0.8 mg by mouth At Bedtime , Historical      metFORMIN (GLUCOPHAGE-XR) 500 MG 24 hr tablet Take 500 mg by mouth daily (with dinner) 1/2 tab bid, Historical      simvastatin (ZOCOR) 40 MG tablet Take 20 mg by mouth At Bedtime Takes 1/2 of a 40mg tablet, Historical      lisinopril (PRINIVIL,ZESTRIL) 10 MG tablet Take 10 mg by mouth daily, Historical         STOP taking these medications       sulfamethoxazole-trimethoprim (BACTRIM DS/SEPTRA DS) 800-160 MG per tablet Comments:   Reason for Stopping:         naproxen (NAPROSYN) 500 MG tablet Comments:   Reason for Stopping:                     Consultations:   Consultation during this admission received from infectious disease and internal medicine          Brief History of Illness:   Reason for admission requiring a surgical or invasive procedure:   Inf  Total hip   The patient underwent the following procedure(s):   Partial  Revisin,   Beads, synovectomy,   Poly  exchange   There were no immediate complications during this procedure.    Please refer to the full operative summary for details.  yes             Hospital Course:   The patient's hospital course was unremarkable.  He recovered as anticipated and experienced no post-operative complications.           Discharge Instructions and Follow-Up:   Discharge diet: Regular   Discharge activity: Activity as tolerated   Discharge follow-up: Follow up with me in 2 weeks   Wound care: Apply bandage daily  Keep wound clean and dry  May get  incision wet in shower but do not soak or scrub           Discharge Disposition:   Discharged to short-term care facilityn  Due  To lack  Of  abx  Coverage so  Will be there 56  weeks      Attestation:  Face-to-face time: 20 minutes    Kirk Summers MD

## 2018-01-23 ENCOUNTER — RECORDS - HEALTHEAST (OUTPATIENT)
Dept: LAB | Facility: CLINIC | Age: 83
End: 2018-01-23

## 2018-01-23 LAB — POTASSIUM BLD-SCNC: 4.3 MMOL/L (ref 3.5–5)

## 2018-01-29 ENCOUNTER — RECORDS - HEALTHEAST (OUTPATIENT)
Dept: LAB | Facility: CLINIC | Age: 83
End: 2018-01-29

## 2018-01-29 LAB
ALP SERPL-CCNC: 96 U/L (ref 45–120)
ALT SERPL W P-5'-P-CCNC: <9 U/L (ref 0–45)
AST SERPL W P-5'-P-CCNC: 15 U/L (ref 0–40)
BASOPHILS # BLD AUTO: 0 THOU/UL (ref 0–0.2)
BASOPHILS NFR BLD AUTO: 1 % (ref 0–2)
BILIRUB SERPL-MCNC: 0.3 MG/DL (ref 0–1)
BUN SERPL-MCNC: 25 MG/DL (ref 8–28)
C REACTIVE PROTEIN LHE: 1.7 MG/DL (ref 0–0.8)
CREAT SERPL-MCNC: 0.92 MG/DL (ref 0.7–1.3)
EOSINOPHIL # BLD AUTO: 0.5 THOU/UL (ref 0–0.4)
EOSINOPHIL NFR BLD AUTO: 8 % (ref 0–6)
ERYTHROCYTE [DISTWIDTH] IN BLOOD BY AUTOMATED COUNT: 17 % (ref 11–14.5)
ERYTHROCYTE [SEDIMENTATION RATE] IN BLOOD BY WESTERGREN METHOD: 20 MM/HR (ref 0–15)
GFR SERPL CREATININE-BSD FRML MDRD: >60 ML/MIN/1.73M2
HCT VFR BLD AUTO: 36.1 % (ref 40–54)
HGB BLD-MCNC: 11.1 G/DL (ref 14–18)
LYMPHOCYTES # BLD AUTO: 0.8 THOU/UL (ref 0.8–4.4)
LYMPHOCYTES NFR BLD AUTO: 15 % (ref 20–40)
MCH RBC QN AUTO: 29.9 PG (ref 27–34)
MCHC RBC AUTO-ENTMCNC: 30.7 G/DL (ref 32–36)
MCV RBC AUTO: 97 FL (ref 80–100)
MONOCYTES # BLD AUTO: 0.5 THOU/UL (ref 0–0.9)
MONOCYTES NFR BLD AUTO: 9 % (ref 2–10)
NEUTROPHILS # BLD AUTO: 3.7 THOU/UL (ref 2–7.7)
NEUTROPHILS NFR BLD AUTO: 67 % (ref 50–70)
PLATELET # BLD AUTO: 157 THOU/UL (ref 140–440)
PMV BLD AUTO: 9.2 FL (ref 8.5–12.5)
POTASSIUM BLD-SCNC: 4.6 MMOL/L (ref 3.5–5)
RBC # BLD AUTO: 3.71 MILL/UL (ref 4.4–6.2)
SODIUM SERPL-SCNC: 142 MMOL/L (ref 136–145)
WBC: 5.6 THOU/UL (ref 4–11)

## 2018-02-02 ENCOUNTER — RECORDS - HEALTHEAST (OUTPATIENT)
Dept: LAB | Facility: CLINIC | Age: 83
End: 2018-02-02

## 2018-02-05 LAB
ALP SERPL-CCNC: 101 U/L (ref 45–120)
ALT SERPL W P-5'-P-CCNC: <9 U/L (ref 0–45)
AST SERPL W P-5'-P-CCNC: 14 U/L (ref 0–40)
BASOPHILS # BLD AUTO: 0 THOU/UL (ref 0–0.2)
BASOPHILS NFR BLD AUTO: 1 % (ref 0–2)
BILIRUB SERPL-MCNC: 0.2 MG/DL (ref 0–1)
BUN SERPL-MCNC: 30 MG/DL (ref 8–28)
C REACTIVE PROTEIN LHE: 2.8 MG/DL (ref 0–0.8)
CREAT SERPL-MCNC: 1.08 MG/DL (ref 0.7–1.3)
EOSINOPHIL # BLD AUTO: 0.3 THOU/UL (ref 0–0.4)
EOSINOPHIL NFR BLD AUTO: 5 % (ref 0–6)
ERYTHROCYTE [DISTWIDTH] IN BLOOD BY AUTOMATED COUNT: 16.7 % (ref 11–14.5)
ERYTHROCYTE [SEDIMENTATION RATE] IN BLOOD BY WESTERGREN METHOD: 26 MM/HR (ref 0–15)
GFR SERPL CREATININE-BSD FRML MDRD: >60 ML/MIN/1.73M2
HCT VFR BLD AUTO: 35.9 % (ref 40–54)
HGB BLD-MCNC: 11.2 G/DL (ref 14–18)
LYMPHOCYTES # BLD AUTO: 1.1 THOU/UL (ref 0.8–4.4)
LYMPHOCYTES NFR BLD AUTO: 20 % (ref 20–40)
MCH RBC QN AUTO: 30.6 PG (ref 27–34)
MCHC RBC AUTO-ENTMCNC: 31.2 G/DL (ref 32–36)
MCV RBC AUTO: 98 FL (ref 80–100)
MONOCYTES # BLD AUTO: 0.7 THOU/UL (ref 0–0.9)
MONOCYTES NFR BLD AUTO: 12 % (ref 2–10)
NEUTROPHILS # BLD AUTO: 3.5 THOU/UL (ref 2–7.7)
NEUTROPHILS NFR BLD AUTO: 63 % (ref 50–70)
PLATELET # BLD AUTO: 186 THOU/UL (ref 140–440)
PMV BLD AUTO: 9.2 FL (ref 8.5–12.5)
POTASSIUM BLD-SCNC: 5 MMOL/L (ref 3.5–5)
RBC # BLD AUTO: 3.66 MILL/UL (ref 4.4–6.2)
SODIUM SERPL-SCNC: 143 MMOL/L (ref 136–145)
WBC: 5.5 THOU/UL (ref 4–11)

## 2018-04-15 ENCOUNTER — ALLIED HEALTH/NURSE VISIT (OUTPATIENT)
Dept: CARDIOLOGY | Facility: CLINIC | Age: 83
End: 2018-04-15
Payer: MEDICARE

## 2018-04-15 DIAGNOSIS — Z95.0 CARDIAC PACEMAKER IN SITU: Primary | ICD-10-CM

## 2018-04-15 PROCEDURE — 93296 REM INTERROG EVL PM/IDS: CPT | Performed by: INTERNAL MEDICINE

## 2018-04-15 PROCEDURE — 93294 REM INTERROG EVL PM/LDLS PM: CPT | Performed by: INTERNAL MEDICINE

## 2018-04-15 NOTE — MR AVS SNAPSHOT
"              After Visit Summary   4/15/2018    Mark Hernández    MRN: [id number]           Patient Information     Date Of Birth          1933        Visit Information        Provider Department      4/15/2018 4:30 PM ALEJANDRO TECH1 [facility]        Today's Diagnoses     Cardiac pacemaker in situ    -  1       Follow-ups after your visit        Who to contact     If you have questions or need follow up information about today's clinic visit or your schedule please contact [facility] directly at [phone].  Normal or non-critical lab and imaging results will be communicated to you by SugarSynchart, letter or phone within 4 business days after the clinic has received the results. If you do not hear from us within 7 days, please contact the clinic through Artimplant ABt or phone. If you have a critical or abnormal lab result, we will notify you by phone as soon as possible.  Submit refill requests through Trunkbow or call your pharmacy and they will forward the refill request to us. Please allow 3 business days for your refill to be completed.          Additional Information About Your Visit        MyChart Information     Trunkbow lets you send messages to your doctor, view your test results, renew your prescriptions, schedule appointments and more. To sign up, go to [email or web address] . Click on \"Log in\" on the left side of the screen, which will take you to the Welcome page. Then click on \"Sign up Now\" on the right side of the page.     You will be asked to enter the access code listed below, as well as some personal information. Please follow the directions to create your username and password.     Your access code is: [email or web address]  Expires: 7/15/2018 11:41 AM     Your access code will  in 90 days. If you need help or a new code, please call your [place] clinic or [phone].        Care EveryWhere ID     This is your Care EveryWhere " ID. This could be used by other organizations to access your [place] medical records  [id number]         Blood Pressure from Last 3 Encounters:   01/02/18 115/63   09/28/17 122/46   01/05/17 138/78    Weight from Last 3 Encounters:   12/28/17 89.4 kg (197 lb)   09/28/17 95.8 kg (211 lb 4.8 oz)   01/05/17 97.5 kg (215 lb)              We Performed the Following     INTERROGATION DEVICE EVAL REMOTE, PACER/ICD (64609)     PM DEVICE INTERROGATE REMOTE (34579)        Primary Care Provider Office Phone # Fax #    Rebecca Noyola [phone]       [facility] [address]  [facility] [address]        Equal Access to Services     [facility] : Hadii aad ku hadasho Soomaali, waaxda luqadaha, qaybta kaalmada adeegyada, waxay idiin haynael matthew . So [facility] [phone].    ATENCIÓN: Si habla español, tiene a gonzales disposición servicios gratuitos de asistencia lingüística. Camille al [phone].    We comply with applicable federal civil rights laws and [place] laws. We do not discriminate on the basis of race, color, national origin, age, disability, sex, sexual orientation, or gender identity.            Thank you!     Thank you for choosing [facility]  for your care. Our goal is always to provide you with excellent care. Hearing back from our patients is one way we can continue to improve our services. Please take a few minutes to complete the written survey that you may receive in the mail after your visit with us. Thank you!             Your Updated Medication List - Protect others around you: Learn how to safely use, store and throw away your medicines at www.disposemymeds.org.          This list is accurate as of 4/15/18 11:59 PM.  Always use your most recent med list.                   Brand Name Dispense Instructions for use Diagnosis    acetaminophen 325 MG tablet    TYLENOL    100 tablet    Take 3 tablets (975 mg) by mouth  every 8 hours    Infection of prosthetic total hip joint, initial encounter (H)       aspirin 325 MG tablet     60 tablet    Take 1 tablet (325 mg) by mouth daily    Infection of prosthetic total hip joint, initial encounter (H)       ceFAZolin-dextrose 2-4 GM/100ML-% Soln infusion    ANCEF     Inject 100 mLs (2 g) into the vein every 8 hours    Infection of prosthetic total hip joint, initial encounter (H)       CYANOCOBALAMIN PO      Take 1,000 mcg by mouth daily        docusate sodium 100 MG tablet    COLACE    60 tablet    Take 100 mg by mouth daily    Infection of prosthetic total hip joint, initial encounter (H)       DULoxetine 30 MG EC capsule    CYMBALTA     Take 30 mg by mouth daily        ferrous gluconate 324 (38 Fe) MG tablet    FERGON    60 tablet    Take 1 tablet (324 mg) by mouth daily (with breakfast)    Infection of prosthetic total hip joint, initial encounter (H)       gabapentin 800 MG tablet    NEURONTIN     Take 1 tablet (800 mg) by mouth 2 times daily    Polyneuropathy associated with underlying disease (H)       lisinopril 10 MG tablet    PRINIVIL/ZESTRIL     Take 10 mg by mouth daily        metFORMIN 500 MG 24 hr tablet    GLUCOPHAGE-XR     Take 500 mg by mouth daily (with dinner) 1/2 tab bid        oxyCODONE IR 5 MG tablet    ROXICODONE    80 tablet    Take 1 tablet (5 mg) by mouth every 4 hours as needed for moderate to severe pain    Infection of prosthetic total hip joint, initial encounter (H)       simvastatin 40 MG tablet    ZOCOR     Take 20 mg by mouth At Bedtime Takes 1/2 of a 40mg tablet        tamsulosin 0.4 MG capsule    FLOMAX     Take 0.8 mg by mouth At Bedtime

## 2018-04-16 NOTE — PROGRESS NOTES
[employer] Accolade MRI EL L331 Remote PPM Device Check  AP: 0% : 99%  Mode: DDD        Presenting Rhythm: AS/  Heart Rate: adequate heart rates per histogram  Sensing: stable    Pacing Threshold: RA: no performed RV: stable    Impedance: stable  Battery Status: 13 years remaining  Atrial Arrhythmia: 1 mode switch episode for 4 second burst of PAT.  Ventricular Arrhythmia: 1 vent high rate for NSVT lasting 3 seconds, rate starting at 170bpm and ramping up to 210bpm before returning to AS/. EF 50 - 55% (2016). Reviewed finding with Keli SALAMANCA.       Care Plan: F/U Lat[facility] NXT q 3 months. Order in for annual threshold and OV to be scheduled in September. No answer, left message with results and next transmission date. Michelle ROWLEY   .

## 2018-07-23 ENCOUNTER — ALLIED HEALTH/NURSE VISIT (OUTPATIENT)
Dept: CARDIOLOGY | Facility: CLINIC | Age: 83
End: 2018-07-23
Payer: MEDICARE

## 2018-07-23 DIAGNOSIS — Z95.0 CARDIAC PACEMAKER IN SITU: Primary | ICD-10-CM

## 2018-07-23 PROCEDURE — 93294 REM INTERROG EVL PM/LDLS PM: CPT | Performed by: INTERNAL MEDICINE

## 2018-07-23 PROCEDURE — 93296 REM INTERROG EVL PM/IDS: CPT | Performed by: INTERNAL MEDICINE

## 2018-07-23 NOTE — MR AVS SNAPSHOT
"              After Visit Summary   7/23/2018    Mark Hernández    MRN: [id number]           Patient Information     Date Of Birth          9/23/1933        Visit Information        Provider Department      7/23/2018 2:45 PM ALEJANDRO TECH1 [facility]        Today's Diagnoses     Cardiac pacemaker in situ    -  1       Follow-ups after your visit        Your next 10 appointments already scheduled     Aug 21, 2018  1:00 PM CDT   Pacemaker Check with ALEJANDRO DCR2   [facility] ([facility])    [address]  [facility] [id number] [phone] OPT 2            Aug 21, 2018  1:45 PM CDT   [facility] EP RETURN with Justin Zamora MD   [facility] ([facility])    [id number]  [facility] [id number] [phone] OPT 2              Who to contact     If you have questions or need follow up information about today's clinic visit or your schedule please contact [facility] directly at [phone].  Normal or non-critical lab and imaging results will be communicated to you by Sol[employer]ice Biologicshart, letter or phone within 4 business days after the clinic has received the results. If you do not hear from us within 7 days, please contact the clinic through [employer] Com[employer] or phone. If you have a critical or abnormal lab result, we will notify you by phone as soon as possible.  Submit refill requests through wmbly or call your pharmacy and they will forward the refill request to us. Please allow 3 business days for your refill to be completed.          Additional Information About Your Visit        Solstice Biologicshart Information     wmbly lets you send messages to your doctor, view your test results, renew your prescriptions, schedule appointments and more. To sign up, go to [email or web address] . Click on \"Log in\" on the left side of the screen, which will take you to " "the Welcome page. Then click on \"Sign up Now\" on the right side of the page.     You will be asked to enter the access code listed below, as well as some personal information. Please follow the directions to create your username and password.     Your access code is: [email or web address]  Expires: 10/22/2018  9:13 AM     Your access code will  in 90 days. If you need help or a new code, please call your [place] clinic or [phone].        Care EveryWhere ID     This is your Care EveryWhere ID. This could be used by other organizations to access your [place] medical records  [id number]         Blood Pressure from Last 3 Encounters:   18 115/63   17 122/46   17 138/78    Weight from Last 3 Encounters:   17 89.4 kg (197 lb)   17 95.8 kg (211 lb 4.8 oz)   17 97.5 kg (215 lb)              We Performed the Following     INTERROGATION DEVICE EVAL REMOTE, PACER/ICD (12029)     PM DEVICE INTERROGATE REMOTE (07979)        Primary Care Provider Office Phone # Fax #    Rebecca Noyola [phone]       [facility] [address]  [facility] [address]        Equal Access to Services     JENIFFER MENJIVAR : Hadii aad ku hadasho Soomaali, waaxda luqadaha, qaybta kaalmada adeegyada, lito ravi. So [facility] [phone].    ATENCIÓN: Si habla español, tiene a gonzales disposición servicios gratuitos de asistencia lingüística. Camille al [phone].    We comply with applicable federal civil rights laws and [place] laws. We do not discriminate on the basis of race, color, national origin, age, disability, sex, sexual orientation, or gender identity.            Thank you!     Thank you for choosing [facility]  for your care. Our goal is always to provide you with excellent care. Hearing back from our patients is one way we can continue to improve our services. Please take a few minutes to complete the " written survey that you may receive in the mail after your visit with us. Thank you!             Your Updated Medication List - Protect others around you: Learn how to safely use, store and throw away your medicines at www.disposemymeds.org.          This list is accurate as of 7/23/18 11:59 PM.  Always use your most recent med list.                   Brand Name Dispense Instructions for use Diagnosis    acetaminophen 325 MG tablet    TYLENOL    100 tablet    Take 3 tablets (975 mg) by mouth every 8 hours    Infection of prosthetic total hip joint, initial encounter (H)       aspirin 325 MG tablet     60 tablet    Take 1 tablet (325 mg) by mouth daily    Infection of prosthetic total hip joint, initial encounter (H)       ceFAZolin-dextrose 2-4 GM/100ML-% Soln infusion    ANCEF     Inject 100 mLs (2 g) into the vein every 8 hours    Infection of prosthetic total hip joint, initial encounter (H)       CYANOCOBALAMIN PO      Take 1,000 mcg by mouth daily        docusate sodium 100 MG tablet    COLACE    60 tablet    Take 100 mg by mouth daily    Infection of prosthetic total hip joint, initial encounter (H)       DULoxetine 30 MG EC capsule    CYMBALTA     Take 30 mg by mouth daily        ferrous gluconate 324 (38 Fe) MG tablet    FERGON    60 tablet    Take 1 tablet (324 mg) by mouth daily (with breakfast)    Infection of prosthetic total hip joint, initial encounter (H)       gabapentin 800 MG tablet    NEURONTIN     Take 1 tablet (800 mg) by mouth 2 times daily    Polyneuropathy associated with underlying disease (H)       lisinopril 10 MG tablet    PRINIVIL/ZESTRIL     Take 10 mg by mouth daily        metFORMIN 500 MG 24 hr tablet    GLUCOPHAGE-XR     Take 500 mg by mouth daily (with dinner) 1/2 tab bid        oxyCODONE IR 5 MG tablet    ROXICODONE    80 tablet    Take 1 tablet (5 mg) by mouth every 4 hours as needed for moderate to severe pain    Infection of prosthetic total hip joint, initial encounter (H)        simvastatin 40 MG tablet    ZOCOR     Take 20 mg by mouth At Bedtime Takes 1/2 of a 40mg tablet        tamsulosin 0.4 MG capsule    FLOMAX     Take 0.8 mg by mouth At Bedtime

## 2018-07-24 NOTE — PROGRESS NOTES
[place] [employer] MRI EL L331 (D) Remote PPM Device Check  AP: 0% : 100%  Mode: DDD        Presenting Rhythm: AS/  Heart Rate: adequate heart rates per histogram  Sensing: RA: stable RV: not performed    Pacing Threshold: RA: not performed RV: stable    Impedance: stable  Battery Status: 12.5 years remaining  Atrial Arrhythmia: no mode switch episodes. 5 PMT episodes. EGMs show AS/ at max tracking rate of 130bpm   Ventricular Arrhythmia: none     Care Plan: Annual threshold and OV scheduled in August.  Gave results over the phone to nadia ROWLEY

## 2018-07-27 ENCOUNTER — TRANSFERRED RECORDS (OUTPATIENT)
Dept: HEALTH INFORMATION MANAGEMENT | Facility: CLINIC | Age: 83
End: 2018-07-27

## 2018-08-21 ENCOUNTER — OFFICE VISIT (OUTPATIENT)
Dept: CARDIOLOGY | Facility: CLINIC | Age: 83
End: 2018-08-21
Payer: MEDICARE

## 2018-08-21 ENCOUNTER — ALLIED HEALTH/NURSE VISIT (OUTPATIENT)
Dept: CARDIOLOGY | Facility: CLINIC | Age: 83
End: 2018-08-21
Attending: PHYSICIAN ASSISTANT
Payer: MEDICARE

## 2018-08-21 VITALS
OXYGEN SATURATION: 96 % | SYSTOLIC BLOOD PRESSURE: 134 MMHG | WEIGHT: 212 LBS | HEIGHT: 70 IN | HEART RATE: 86 BPM | BODY MASS INDEX: 30.35 KG/M2 | DIASTOLIC BLOOD PRESSURE: 74 MMHG

## 2018-08-21 DIAGNOSIS — I44.2 ATRIOVENTRICULAR BLOCK, COMPLETE (H): Primary | ICD-10-CM

## 2018-08-21 DIAGNOSIS — I44.1 ATRIOVENTRICULAR BLOCK, SECOND DEGREE: ICD-10-CM

## 2018-08-21 DIAGNOSIS — Z95.0 CARDIAC PACEMAKER IN SITU: Primary | ICD-10-CM

## 2018-08-21 PROCEDURE — 93280 PM DEVICE PROGR EVAL DUAL: CPT | Performed by: INTERNAL MEDICINE

## 2018-08-21 PROCEDURE — 99213 OFFICE O/P EST LOW 20 MIN: CPT | Mod: 25 | Performed by: INTERNAL MEDICINE

## 2018-08-21 NOTE — MR AVS SNAPSHOT
After Visit Summary   8/21/2018    Mark Hernández    MRN: [id number]           Patient Information     Date Of Birth          9/23/1933        Visit Information        Provider Department      8/21/2018 1:00 PM ALEJANDRO DCR2 [facility]        Today's Diagnoses     Cardiac pacemaker in situ    -  1    Atrioventricular block, second degree           Follow-ups after your visit        Your next 10 appointments already scheduled     Aug 21, 2018  1:45 PM CDT   [facility] EP RETURN with Justin Zamora MD   [facility] ([facility])    [id number]  [facility] [address] [phone] OPT 2            Nov 28, 2018  1:30 PM CST   Remote PPM Check with ALEJANDRO TECH1   [facility] ([facility])    [id number]  [facility] [address] [phone] OPT 2           This appointment is for a remote check of your pacemaker.  This is not an appointment at the office.              Who to contact     If you have questions or need follow up information about today's clinic visit or your schedule please contact [facility] directly at [phone].  Normal or non-critical lab and imaging results will be communicated to you by SunPodshart, letter or phone within 4 business days after the clinic has received the results. If you do not hear from us within 7 days, please contact the clinic through SunPodshart or phone. If you have a critical or abnormal lab result, we will notify you by phone as soon as possible.  Submit refill requests through [employer] or call your pharmacy and they will forward the refill request to us. Please allow 3 business days for your refill to be completed.          Additional Information About Your Visit        SunPodshart Information     [employer] lets you send messages to your doctor, view your test results, renew your  "prescriptions, schedule appointments and more. To sign up, go to [email or web address] . Click on \"Log in\" on the left side of the screen, which will take you to the Welcome page. Then click on \"Sign up Now\" on the right side of the page.     You will be asked to enter the access code listed below, as well as some personal information. Please follow the directions to create your username and password.     Your access code is: [email or web address]  Expires: 10/22/2018  9:13 AM     Your access code will  in 90 days. If you need help or a new code, please call your [facility] clinic or [phone].        Care EveryWhere ID     This is your Care EveryWhere ID. This could be used by other organizations to access your [facility] medical records  [id number]         Blood Pressure from Last 3 Encounters:   18 115/63   17 122/46   17 138/78    Weight from Last 3 Encounters:   17 89.4 kg (197 lb)   17 95.8 kg (211 lb 4.8 oz)   17 97.5 kg (215 lb)              We Performed the Following     Follow-Up with Device Clinic     PM DEVICE PROGRAMMING EVAL, DUAL LEAD PACER ([id number])        Primary Care Provider Office Phone # Fax #    Rebecca Noyola [phone]       [facility] [address]  [facility] [address]        Equal Access to Services     JENIFFER MENJIVAR AH: Hadii aad ku hadasho Soomaali, waaxda luqadaha, qaybta kaalmada adeegyada, waxay idiin haynael ravi. So [facility] [phone].    ATENCIÓN: Si habla español, tiene a gonzales disposición servicios gratuitos de asistencia lingüística. Llame al [phone].    We comply with applicable federal civil rights laws and [place] laws. We do not discriminate on the basis of race, color, national origin, age, disability, sex, sexual orientation, or gender identity.            Thank you!     Thank you for choosing [facility]  for your care. Our goal is always to " provide you with excellent care. Hearing back from our patients is one way we can continue to improve our services. Please take a few minutes to complete the written survey that you may receive in the mail after your visit with us. Thank you!             Your Updated Medication List - Protect others around you: Learn how to safely use, store and throw away your medicines at www.disposemymeds.org.          This list is accurate as of 8/21/18  1:30 PM.  Always use your most recent med list.                   Brand Name Dispense Instructions for use Diagnosis    acetaminophen 325 MG tablet    TYLENOL    100 tablet    Take 3 tablets (975 mg) by mouth every 8 hours    Infection of prosthetic total hip joint, initial encounter (H)       aspirin 325 MG tablet     60 tablet    Take 1 tablet (325 mg) by mouth daily    Infection of prosthetic total hip joint, initial encounter (H)       ceFAZolin-dextrose 2-4 GM/100ML-% Soln infusion    ANCEF     Inject 100 mLs (2 g) into the vein every 8 hours    Infection of prosthetic total hip joint, initial encounter (H)       CYANOCOBALAMIN PO      Take 1,000 mcg by mouth daily        docusate sodium 100 MG tablet    COLACE    60 tablet    Take 100 mg by mouth daily    Infection of prosthetic total hip joint, initial encounter (H)       DULoxetine 30 MG EC capsule    CYMBALTA     Take 30 mg by mouth daily        ferrous gluconate 324 (38 Fe) MG tablet    FERGON    60 tablet    Take 1 tablet (324 mg) by mouth daily (with breakfast)    Infection of prosthetic total hip joint, initial encounter (H)       gabapentin 800 MG tablet    NEURONTIN     Take 1 tablet (800 mg) by mouth 2 times daily    Polyneuropathy associated with underlying disease (H)       lisinopril 10 MG tablet    PRINIVIL/ZESTRIL     Take 10 mg by mouth daily        metFORMIN 500 MG 24 hr tablet    GLUCOPHAGE-XR     Take 500 mg by mouth daily (with dinner) 1/2 tab bid        oxyCODONE IR 5 MG tablet    ROXICODONE    80  tablet    Take 1 tablet (5 mg) by mouth every 4 hours as needed for moderate to severe pain    Infection of prosthetic total hip joint, initial encounter (H)       simvastatin 40 MG tablet    ZOCOR     Take 20 mg by mouth At Bedtime Takes 1/2 of a 40mg tablet        tamsulosin 0.4 MG capsule    FLOMAX     Take 0.8 mg by mouth At Bedtime

## 2018-08-21 NOTE — PROGRESS NOTES
Service Date: 2018      HISTORY OF PRESENT ILLNESS:  I saw Mr. Thompson for followup of AV block status post pacemaker implantation.  He is now in complete AV block through pacemaker interrogation, although his initial implantation was for second-degree AV block.  The pacemaker interrogation shows normal device function and good battery status.  There is no ventricular arrhythmia or atrial arrhythmia.      Symptomatically, he is doing well without shortness of breath, chest pain or dizziness.      PHYSICAL EXAMINATION:   VITAL SIGNS:  Blood pressure was 134/74, heart rate 86 beats per minute, body weight 212 pounds.   CHEST:  The pacemaker site looked well.   LUNGS:  Clear.   CARDIAC:  Rhythm was regular and heart sounds were normal without murmur.   ABDOMINAL EXAMINATION:  Showed moderate obesity.   EXTREMITIES:  There was no pedal edema.      ASSESSMENT AND RECOMMENDATIONS:  Mr. Thompson is doing well symptomatically.  The pacemaker function is normal and he is in complete AV block at the present time.  His blood pressure is acceptable and his weight is stable.  He can continue the current medications and return for followup in 1 year.      cc:   [place] [address]         TO VERDE MD             D: 2018   T: 2018   MT: KALI      Name:     FRANKIE THOMPSON   MRN:      [id number]        Account:      [id number]   :      1933           Service Date: 2018      Document: [id number]

## 2018-08-21 NOTE — LETTER
2018      Rebecca Noyola MD  [facility] [address]  [facility] [address]      RE: Mark Hernández       Dear Colleague,    I had the [facility].    Service Date: 2018      HISTORY OF PRESENT ILLNESS:  I saw Mr. Hernández for followup of AV block status post pacemaker implantation.  He is now in complete AV block through pacemaker interrogation, although his initial implantation was for second-degree AV block.  The pacemaker interrogation shows normal device function and good battery status.  There is no ventricular arrhythmia or atrial arrhythmia.      Symptomatically, he is doing well without shortness of breath, chest pain or dizziness.      PHYSICAL EXAMINATION:   VITAL SIGNS:  Blood pressure was 134/74, heart rate 86 beats per minute, body weight 212 pounds.   CHEST:  The pacemaker site looked well.   LUNGS:  Clear.   CARDIAC:  Rhythm was regular and heart sounds were normal without murmur.   ABDOMINAL EXAMINATION:  Showed moderate obesity.   EXTREMITIES:  There was no pedal edema.      ASSESSMENT AND RECOMMENDATIONS:  Mr. Hernández is doing well symptomatically.  The pacemaker function is normal and he is in complete AV block at the present time.  His blood pressure is acceptable and his weight is stable.  He can continue the current medications and return for followup in 1 year.      cc:   [place] [address]         TO VERDE MD             D: 2018   T: 2018   MT: KALI      Name:     MARK HERNÁNDEZ   MRN:      [id number]        Account:      [id number]   :      1933           Service Date: 2018      Document: [id number]           Outpatient Encounter Prescriptions as of 2018   Medication Sig Dispense Refill     acetaminophen (TYLENOL) 325 MG tablet Take 3 tablets (975 mg) by mouth every 8 hours 100 tablet       aspirin 325 MG tablet Take 1 tablet (325 mg) by mouth daily 60 tablet      CYANOCOBALAMIN PO Take 1,000 mcg by mouth daily       DULoxetine (CYMBALTA) 30 MG capsule Take 30 mg by mouth daily       ferrous gluconate (FERGON) 324 (38 FE) MG tablet Take 1 tablet (324 mg) by mouth daily (with breakfast) 60 tablet 0     gabapentin (NEURONTIN) 800 MG tablet Take 1 tablet (800 mg) by mouth 2 times daily       lisinopril (PRINIVIL,ZESTRIL) 10 MG tablet Take 10 mg by mouth daily       metFORMIN (GLUCOPHAGE-XR) 500 MG 24 hr tablet Take 500 mg by mouth daily (with dinner) 1/2 tab bid       simvastatin (ZOCOR) 40 MG tablet Take 20 mg by mouth At Bedtime Takes 1/2 of a 40mg tablet       tamsulosin (FLOMAX) 0.4 MG 24 hr capsule Take 0.8 mg by mouth At Bedtime        ceFAZolin (ANCEF) intermittent infusion 2 g in 100 mL dextrose PRE-MIX Inject 100 mLs (2 g) into the vein every 8 hours (Patient not taking: Reported on 8/21/2018)       oxyCODONE IR (ROXICODONE) 5 MG tablet Take 1 tablet (5 mg) by mouth every 4 hours as needed for moderate to severe pain (Patient not taking: Reported on 8/21/2018) 80 tablet 0     [DISCONTINUED] docusate sodium (COLACE) 100 MG tablet Take 100 mg by mouth daily 60 tablet 1     No facility-administered encounter medications on file as of 8/21/2018.        Again, thank you for allowing me to participate in the care of your patient.      Sincerely,    Justin Zamora MD     [facility]

## 2018-08-21 NOTE — MR AVS SNAPSHOT
After Visit Summary   8/21/2018    Mark Hernández    MRN: [id number]           Patient Information     Date Of Birth          9/23/1933        Visit Information        Provider Department      8/21/2018 1:45 PM Justin Zamora MD [facility]        Today's Diagnoses     Atrioventricular block, complete (H)    -  1       Follow-ups after your visit        Additional Services     Follow-Up with Cardiac Advanced Practice Provider           Follow-Up with Electrophysiologist                 Your next 10 appointments already scheduled     Nov 28, 2018  1:30 PM CST   Remote PPM Check with ALEJANDRO TECH1   [facility] ([facility])    [address]  [facility] [address] [phone] OPT 2           This appointment is for a remote check of your pacemaker.  This is not an appointment at the office.              Future tests that were ordered for you today     Open Future Orders        Priority Expected Expires Ordered    Follow-Up with Electrophysiologist Routine 8/21/2020 1/3/2021 8/21/2018    Follow-Up with Cardiac Advanced Practice Provider Routine 8/21/2019 1/3/2020 8/21/2018            Who to contact     If you have questions or need follow up information about today's clinic visit or your schedule please contact [facility] directly at [phone].  Normal or non-critical lab and imaging results will be communicated to you by MyChart, letter or phone within 4 business days after the clinic has received the results. If you do not hear from us within 7 days, please contact the clinic through MyChart or phone. If you have a critical or abnormal lab result, we will notify you by phone as soon as possible.  Submit refill requests through BareedEE or call your pharmacy and they will forward the refill request to us. Please allow 3 business days for your refill to be completed.     "      Additional Information About Your Visit        MyChart Information     [employer] lets you send messages to your doctor, view your test results, renew your prescriptions, schedule appointments and more. To sign up, go to [email or web address] . Click on \"Log in\" on the left side of the screen, which will take you to the Welcome page. Then click on \"Sign up Now\" on the right side of the page.     You will be asked to enter the access code listed below, as well as some personal information. Please follow the directions to create your username and password.     Your access code is: [email or web address]  Expires: 10/22/2018  9:13 AM     Your access code will  in 90 days. If you need help or a new code, please call your [facility] clinic or [phone].        Care EveryWhere ID     This is your Care EveryWhere ID. This could be used by other organizations to access your [facility] medical records  [id number]        Your Vitals Were     Pulse Height Pulse Oximetry BMI (Body Mass Index)          86 1.778 m (5' 10\") 96% 30.42 kg/m2         Blood Pressure from Last 3 Encounters:   18 134/74   18 115/63   17 122/46    Weight from Last 3 Encounters:   18 96.2 kg (212 lb)   17 89.4 kg (197 lb)   17 95.8 kg (211 lb 4.8 oz)              We Performed the Following     EKG 12-lead complete w/read - Clinics (performed today)        Primary Care Provider Office Phone # Fax #    Rebecca Noyola [phone]       [facility] [address]  [facility] [address]        Equal Access to Services     JENIFFER MENJIVAR : Brea Gutierrez, mario lomas, xenia smythmaniki payan, lito ravi. So [facility] [phone].    ATENCIÓN: Si habla español, tiene a gonzales disposición servicios gratuitos de asistencia lingüística. Llame al [phone].    We comply with applicable federal civil rights laws and [place] laws. We do not " discriminate on the basis of race, color, national origin, age, disability, sex, sexual orientation, or gender identity.            Thank you!     Thank you for choosing [facility]  for your care. Our goal is always to provide you with excellent care. Hearing back from our patients is one way we can continue to improve our services. Please take a few minutes to complete the written survey that you may receive in the mail after your visit with us. Thank you!             Your Updated Medication List - Protect others around you: Learn how to safely use, store and throw away your medicines at www.disposemymeds.org.          This list is accurate as of 8/21/18 11:59 PM.  Always use your most recent med list.                   Brand Name Dispense Instructions for use Diagnosis    acetaminophen 325 MG tablet    TYLENOL    100 tablet    Take 3 tablets (975 mg) by mouth every 8 hours    Infection of prosthetic total hip joint, initial encounter (H)       aspirin 325 MG tablet     60 tablet    Take 1 tablet (325 mg) by mouth daily    Infection of prosthetic total hip joint, initial encounter (H)       ceFAZolin-dextrose 2-4 GM/100ML-% Soln infusion    ANCEF     Inject 100 mLs (2 g) into the vein every 8 hours    Infection of prosthetic total hip joint, initial encounter (H)       CYANOCOBALAMIN PO      Take 1,000 mcg by mouth daily        DULoxetine 30 MG EC capsule    CYMBALTA     Take 30 mg by mouth daily        ferrous gluconate 324 (38 Fe) MG tablet    FERGON    60 tablet    Take 1 tablet (324 mg) by mouth daily (with breakfast)    Infection of prosthetic total hip joint, initial encounter (H)       gabapentin 800 MG tablet    NEURONTIN     Take 1 tablet (800 mg) by mouth 2 times daily    Polyneuropathy associated with underlying disease (H)       lisinopril 10 MG tablet    PRINIVIL/ZESTRIL     Take 10 mg by mouth daily        metFORMIN 500 MG 24 hr tablet    GLUCOPHAGE-XR     Take  500 mg by mouth daily (with dinner) 1/2 tab bid        oxyCODONE IR 5 MG tablet    ROXICODONE    80 tablet    Take 1 tablet (5 mg) by mouth every 4 hours as needed for moderate to severe pain    Infection of prosthetic total hip joint, initial encounter (H)       simvastatin 40 MG tablet    ZOCOR     Take 20 mg by mouth At Bedtime Takes 1/2 of a 40mg tablet        tamsulosin 0.4 MG capsule    FLOMAX     Take 0.8 mg by mouth At Bedtime

## 2018-08-21 NOTE — LETTER
8/21/2018    Rebecca Noyola MD  [facility] [address]  [facility] [address]    RE: Mark Hernández       Dear Colleague,    I had the pleasure of seeing Mark Hernández in the [facility].    HPI and Plan:   See dictation    Orders Placed This Encounter   Procedures     Follow-Up with Electrophysiologist     Follow-Up with Cardiac Advanced Practice Provider     EKG 12-lead complete w/read - Clinics (performed today)       No orders of the defined types were placed in this encounter.      Medications Discontinued During This Encounter   Medication Reason     docusate sodium (COLACE) 100 MG tablet Medication Reconciliation Clean Up         Encounter Diagnosis   Name Primary?     Atrioventricular block, complete (H) Yes       CURRENT MEDICATIONS:  Current Outpatient Prescriptions   Medication Sig Dispense Refill     acetaminophen (TYLENOL) 325 MG tablet Take 3 tablets (975 mg) by mouth every 8 hours 100 tablet      aspirin 325 MG tablet Take 1 tablet (325 mg) by mouth daily 60 tablet      CYANOCOBALAMIN PO Take 1,000 mcg by mouth daily       DULoxetine (CYMBALTA) 30 MG capsule Take 30 mg by mouth daily       ferrous gluconate (FERGON) 324 (38 FE) MG tablet Take 1 tablet (324 mg) by mouth daily (with breakfast) 60 tablet 0     gabapentin (NEURONTIN) 800 MG tablet Take 1 tablet (800 mg) by mouth 2 times daily       lisinopril (PRINIVIL,ZESTRIL) 10 MG tablet Take 10 mg by mouth daily       metFORMIN (GLUCOPHAGE-XR) 500 MG 24 hr tablet Take 500 mg by mouth daily (with dinner) 1/2 tab bid       simvastatin (ZOCOR) 40 MG tablet Take 20 mg by mouth At Bedtime Takes 1/2 of a 40mg tablet       tamsulosin (FLOMAX) 0.4 MG 24 hr capsule Take 0.8 mg by mouth At Bedtime        ceFAZolin (ANCEF) intermittent infusion 2 g in 100 mL dextrose PRE-MIX Inject 100 mLs (2 g) into the vein every 8 hours (Patient not taking: Reported on 8/21/2018)       oxyCODONE IR (ROXICODONE) 5 MG  tablet Take 1 tablet (5 mg) by mouth every 4 hours as needed for moderate to severe pain (Patient not taking: Reported on 8/21/2018) 80 tablet 0       ALLERGIES   No Known Allergies    PAST MEDICAL HISTORY:  Past Medical History:   Diagnosis Date     Acquired hydrocele      Arthritis      BPH (benign prostatic hyperplasia)      Diverticulosis      Hemorrhoids      Hyperlipidemia      Hyperlipidemia      Hypertension      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)      Mononeuropathy due to underlying disease      Peripheral neuropathy      Peripheral neuropathy      Second degree AV block 9-    Implantation of a dual-chamber pacemaker     Second degree AV block      Sensorineural hearing loss of both ears      Thrombocytopenia (H)      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 ;  Surgeon: Kirk Summers MD;  Location:  OR     KNEE SURGERY       ORTHOPEDIC SURGERY       revision total hip arthroplasty       rivision total hip arthroplasty       rotator cuff syndrome         FAMILY HISTORY:  Family History   Problem Relation Age of Onset     Family History Negative Mother      Myocardial Infarction Father 80     Unknown/Adopted Maternal Grandmother      Unknown/Adopted Maternal Grandfather      Unknown/Adopted Paternal Grandmother      Unknown/Adopted Paternal Grandfather   "    Rheumatoid Arthritis Sister      Myocardial Infarction Sister      Family History Negative Son      Family History Negative Daughter        SOCIAL HISTORY:  Social History     Social History     Marital status:      Spouse name: N/A     Number of children: N/A     Years of education: N/A     Social History Main Topics     Smoking status: Former Smoker     Packs/day: 1.00     Years: 24.00     Types: Cigarettes     Start date: 1949     Quit date: 1973     Smokeless tobacco: Never Used     Alcohol use Yes      Comment: wine daily: 2      Drug use: None     Sexual activity: Not Asked     Other Topics Concern     Caffeine Concern No     coffee- occ decaf: 2-3 cups a day     Sleep Concern No     Stress Concern No     Weight Concern No     Special Diet No     Exercise Yes     everyday, range of motion     Social History Narrative       Review of Systems:  Skin:  Negative       Eyes:  Positive for glasses    ENT:  Negative      Respiratory:  Negative for dyspnea on exertion;shortness of breath     Cardiovascular:  Negative for;palpitations;chest pain;exercise intolerance;fatigue;lightheadedness;syncope or near-syncope Positive for;edema    Gastroenterology: Negative      Genitourinary:  Negative      Musculoskeletal:  Positive for arthritis;back pain;joint pain    Neurologic:  Negative      Psychiatric:  Negative      Heme/Lymph/Imm:  Negative      Endocrine:  Positive for diabetes      Physical Exam:  Vitals: /74 (BP Location: Right arm, Patient Position: Chair, Cuff Size: Adult Regular)  Pulse 86  Ht 1.778 m (5' 10\")  Wt 96.2 kg (212 lb)  SpO2 96%  BMI 30.42 kg/m2    Constitutional:  cooperative, alert and oriented, well developed, well nourished, in no acute distress        Skin:  warm and dry to the touch, no apparent skin lesions or masses noted   pacemaker incision in the left infraclavicular area was well-healed      Head:  normocephalic, no masses or lesions        Eyes:  pupils equal and " round;conjunctivae and lids unremarkable;sclera white;no xanthalasma   eye glasses    Lymph:      ENT:  no pallor or cyanosis, dentition good hearing aide(s) present      Neck:  JVP normal;no carotid bruit        Respiratory:  normal breath sounds, clear to auscultation, normal A-P diameter, normal symmetry, normal respiratory excursion, no use of accessory muscles         Cardiac: regular rhythm;no murmurs, gallops or rubs detected                pulses full and equal                                        GI:  abdomen soft        Extremities and Muscular Skeletal:  no deformities, clubbing, cyanosis, erythema observed   1+;bilateral LE edema;trace          Neurological:           Psych:           CC  No referring provider defined for this encounter.                Service Date: 08/21/2018      HISTORY OF PRESENT ILLNESS:  I saw Mr. Hernández for followup of AV block status post pacemaker implantation.  He is now in complete AV block through pacemaker interrogation, although his initial implantation was for second-degree AV block.  The pacemaker interrogation shows normal device function and good battery status.  There is no ventricular arrhythmia or atrial arrhythmia.      Symptomatically, he is doing well without shortness of breath, chest pain or dizziness.      PHYSICAL EXAMINATION:   VITAL SIGNS:  Blood pressure was 134/74, heart rate 86 beats per minute, body weight 212 pounds.   CHEST:  The pacemaker site looked well.   LUNGS:  Clear.   CARDIAC:  Rhythm was regular and heart sounds were normal without murmur.   ABDOMINAL EXAMINATION:  Showed moderate obesity.   EXTREMITIES:  There was no pedal edema.      ASSESSMENT AND RECOMMENDATIONS:  Mr. Hernández is doing well symptomatically.  The pacemaker function is normal and he is in complete AV block at the present time.  His blood pressure is acceptable and his weight is stable.  He can continue the current medications and return for followup in 1 year.      cc:    [place] [address]         TO ZAMORA MD             D: 2018   T: 2018   MT: KALI      Name:     FRANKIE THOMPSON   MRN:      [id number]        Account:      [id number]   :      1933           Service Date: 2018      Document: [id number]         Thank you for allowing me to participate in the care of your patient.      Sincerely,     To Zamora MD     [facility]    cc:   No referring provider defined for this encounter.

## 2018-08-21 NOTE — PROGRESS NOTES
[employer] Accolade MRI EL L331 Pacemaker Device Check  AP: <! % : 100 %  Mode: DDD  bpm        Underlying Rhythm: SR with CHB, junctional escape in the 40's  Heart Rate: Stable with good variability.  Sensing: WNL    Pacing Threshold: WNL   Impedance: WNL  Battery Status: Approximately 12.5 years longevity.  Device Site: Intact  Atrial Arrhythmia: 0  Ventricular Arrhythmia: 5 PMT- the device tracking at the upper limit-all on the same day.  Setting Change: 0    Care Plan: Seeing Dr. Zamora today. Follow up with Q 3 month remote checks. JADYN José

## 2018-08-21 NOTE — PROGRESS NOTES
HPI and Plan:   See dictation    Orders Placed This Encounter   Procedures     Follow-Up with Electrophysiologist     Follow-Up with Cardiac Advanced Practice Provider     EKG 12-lead complete w/read - Clinics (performed today)       No orders of the defined types were placed in this encounter.      Medications Discontinued During This Encounter   Medication Reason     docusate sodium (COLACE) 100 MG tablet Medication Reconciliation Clean Up         Encounter Diagnosis   Name Primary?     Atrioventricular block, complete (H) Yes       CURRENT MEDICATIONS:  Current Outpatient Prescriptions   Medication Sig Dispense Refill     acetaminophen (TYLENOL) 325 MG tablet Take 3 tablets (975 mg) by mouth every 8 hours 100 tablet      aspirin 325 MG tablet Take 1 tablet (325 mg) by mouth daily 60 tablet      CYANOCOBALAMIN PO Take 1,000 mcg by mouth daily       DULoxetine (CYMBALTA) 30 MG capsule Take 30 mg by mouth daily       ferrous gluconate (FERGON) 324 (38 FE) MG tablet Take 1 tablet (324 mg) by mouth daily (with breakfast) 60 tablet 0     gabapentin (NEURONTIN) 800 MG tablet Take 1 tablet (800 mg) by mouth 2 times daily       lisinopril (PRINIVIL,ZESTRIL) 10 MG tablet Take 10 mg by mouth daily       metFORMIN (GLUCOPHAGE-XR) 500 MG 24 hr tablet Take 500 mg by mouth daily (with dinner) 1/2 tab bid       simvastatin (ZOCOR) 40 MG tablet Take 20 mg by mouth At Bedtime Takes 1/2 of a 40mg tablet       tamsulosin (FLOMAX) 0.4 MG 24 hr capsule Take 0.8 mg by mouth At Bedtime        ceFAZolin (ANCEF) intermittent infusion 2 g in 100 mL dextrose PRE-MIX Inject 100 mLs (2 g) into the vein every 8 hours (Patient not taking: Reported on 8/21/2018)       oxyCODONE IR (ROXICODONE) 5 MG tablet Take 1 tablet (5 mg) by mouth every 4 hours as needed for moderate to severe pain (Patient not taking: Reported on 8/21/2018) 80 tablet 0       ALLERGIES   No Known Allergies    PAST MEDICAL HISTORY:  Past Medical History:   Diagnosis Date      Acquired hydrocele      Arthritis      BPH (benign prostatic hyperplasia)      Diverticulosis      Hemorrhoids      Hyperlipidemia      Hyperlipidemia      Hypertension      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)      Mononeuropathy due to underlying disease      Peripheral neuropathy      Peripheral neuropathy      Second degree AV block 9-    Implantation of a dual-chamber pacemaker     Second degree AV block      Sensorineural hearing loss of both ears      Thrombocytopenia (H)      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 ;  Surgeon: Kirk Summers MD;  Location:  OR     KNEE SURGERY       ORTHOPEDIC SURGERY       revision total hip arthroplasty       rivision total hip arthroplasty       rotator cuff syndrome         FAMILY HISTORY:  Family History   Problem Relation Age of Onset     Family History Negative Mother      Myocardial Infarction Father 80     Unknown/Adopted Maternal Grandmother      Unknown/Adopted Maternal Grandfather      Unknown/Adopted Paternal Grandmother      Unknown/Adopted Paternal Grandfather      Rheumatoid Arthritis Sister      Myocardial Infarction Sister      Family History Negative Son      Family History Negative Daughter        SOCIAL HISTORY:  Social History     Social History     Marital status:      Spouse name: N/A      "Number of children: N/A     Years of education: N/A     Social History Main Topics     Smoking status: Former Smoker     Packs/day: 1.00     Years: 24.00     Types: Cigarettes     Start date: 1949     Quit date: 1973     Smokeless tobacco: Never Used     Alcohol use Yes      Comment: wine daily: 2      Drug use: None     Sexual activity: Not Asked     Other Topics Concern     Caffeine Concern No     coffee- occ decaf: 2-3 cups a day     Sleep Concern No     Stress Concern No     Weight Concern No     Special Diet No     Exercise Yes     everyday, range of motion     Social History Narrative       Review of Systems:  Skin:  Negative       Eyes:  Positive for glasses    ENT:  Negative      Respiratory:  Negative for dyspnea on exertion;shortness of breath     Cardiovascular:  Negative for;palpitations;chest pain;exercise intolerance;fatigue;lightheadedness;syncope or near-syncope Positive for;edema    Gastroenterology: Negative      Genitourinary:  Negative      Musculoskeletal:  Positive for arthritis;back pain;joint pain    Neurologic:  Negative      Psychiatric:  Negative      Heme/Lymph/Imm:  Negative      Endocrine:  Positive for diabetes      Physical Exam:  Vitals: /74 (BP Location: Right arm, Patient Position: Chair, Cuff Size: Adult Regular)  Pulse 86  Ht 1.778 m (5' 10\")  Wt 96.2 kg (212 lb)  SpO2 96%  BMI 30.42 kg/m2    Constitutional:  cooperative, alert and oriented, well developed, well nourished, in no acute distress        Skin:  warm and dry to the touch, no apparent skin lesions or masses noted   pacemaker incision in the left infraclavicular area was well-healed      Head:  normocephalic, no masses or lesions        Eyes:  pupils equal and round;conjunctivae and lids unremarkable;sclera white;no xanthalasma   eye glasses    Lymph:      ENT:  no pallor or cyanosis, dentition good hearing aide(s) present      Neck:  JVP normal;no carotid bruit        Respiratory:  normal breath sounds, " clear to auscultation, normal A-P diameter, normal symmetry, normal respiratory excursion, no use of accessory muscles         Cardiac: regular rhythm;no murmurs, gallops or rubs detected                pulses full and equal                                        GI:  abdomen soft        Extremities and Muscular Skeletal:  no deformities, clubbing, cyanosis, erythema observed   1+;bilateral LE edema;trace          Neurological:           Psych:           CC  No referring provider defined for this encounter.

## 2018-11-28 ENCOUNTER — ALLIED HEALTH/NURSE VISIT (OUTPATIENT)
Dept: CARDIOLOGY | Facility: CLINIC | Age: 83
End: 2018-11-28
Payer: MEDICARE

## 2018-11-28 DIAGNOSIS — Z95.0 CARDIAC PACEMAKER IN SITU: Primary | ICD-10-CM

## 2018-11-28 PROCEDURE — 93294 REM INTERROG EVL PM/LDLS PM: CPT | Performed by: INTERNAL MEDICINE

## 2018-11-28 PROCEDURE — 93296 REM INTERROG EVL PM/IDS: CPT | Performed by: INTERNAL MEDICINE

## 2018-11-28 NOTE — PROGRESS NOTES
[employer] Accolade (D) Remote PPM Device Check  AP: 0 % : 100 %  Mode: DDDR        Presenting Rhythm: AS/  Heart Rate: Adequate rates per histogram  Sensing: Stable    Pacing Threshold: Stable    Impedance: Stable  Battery Status: 12.5 years  Atrial Arrhythmia: 10 PMT episodes detected. EGMs show atrial rate at max tracking rate of 130bpm  Ventricular Arrhythmia: None     Care Plan: F/u PPM Latitude q 3 months. LM with results. NEY AllenT

## 2018-11-28 NOTE — MR AVS SNAPSHOT
"              After Visit Summary   11/28/2018    Mark Hernández    MRN: [id number]           Patient Information     Date Of Birth          9/23/1933        Visit Information        Provider Department      11/28/2018 1:30 PM JAVON ONEILL [facility]        Today's Diagnoses     Cardiac pacemaker in situ    -  1       Follow-ups after your visit        Your next 10 appointments already scheduled     Nov 28, 2018  1:30 PM CST   Remote PPM Check with ALEJANDRO TECH1   [facility] ([facility])    [address] [place], [place] [address]  [facility] [address] [phone] OPT 2           This appointment is for a remote check of your pacemaker.  This is not an appointment at the office.              Who to contact     If you have questions or need follow up information about today's clinic visit or your schedule please contact [facility] directly at [phone].  Normal or non-critical lab and imaging results will be communicated to you by [employer], letter or phone within 4 business days after the clinic has received the results. If you do not hear from us within 7 days, please contact the clinic through [employer] or phone. If you have a critical or abnormal lab result, we will notify you by phone as soon as possible.  Submit refill requests through [employer] or call your pharmacy and they will forward the refill request to us. Please allow 3 business days for your refill to be completed.          Additional Information About Your Visit        [employer] Information     [employer] lets you send messages to your doctor, view your test results, renew your prescriptions, schedule appointments and more. To sign up, go to [email or web address] . Click on \"Log in\" on the left side of the screen, which will take you to the Welcome page. Then click on \"Sign up Now\" on the right side of the page.     You will be asked to enter " the access code listed below, as well as some personal information. Please follow the directions to create your username and password.     Your access code is: [email or web address]  Expires: 2019  9:16 AM     Your access code will  in 90 days. If you need help or a new code, please call your [place] clinic or [phone].        Care EveryWhere ID     This is your Care EveryWhere ID. This could be used by other organizations to access your [place] medical records  [id number]         Blood Pressure from Last 3 Encounters:   18 134/74   18 115/63   17 122/46    Weight from Last 3 Encounters:   18 96.2 kg (212 lb)   17 89.4 kg (197 lb)   17 95.8 kg (211 lb 4.8 oz)              We Performed the Following     INTERROGATION DEVICE EVAL REMOTE, PACER/ICD (51502)     PM DEVICE INTERROGATE REMOTE (76804)        Primary Care Provider Office Phone # Fax #    Rebecca Noyola [phone]       [facility] [address]  [facility] [address]        Equal Access to Services     JENIFFER MENJIVAR : Hadii aad ku hadasho Soomaali, waaxda luqadaha, qaybta kaalmada adeegyada, waxay idiin hayaan gisella ravi. So [facility] [phone].    ATENCIÓN: Si habla español, tiene a gonzales disposición servicios gratuitos de asistencia lingüística. Llame al [phone].    We comply with applicable federal civil rights laws and [place] laws. We do not discriminate on the basis of race, color, national origin, age, disability, sex, sexual orientation, or gender identity.            Thank you!     Thank you for choosing [facility]  for your care. Our goal is always to provide you with excellent care. Hearing back from our patients is one way we can continue to improve our services. Please take a few minutes to complete the written survey that you may receive in the mail after your visit with us. Thank you!             Your Updated  Medication List - Protect others around you: Learn how to safely use, store and throw away your medicines at www.disposemymeds.org.          This list is accurate as of 11/28/18  9:16 AM.  Always use your most recent med list.                   Brand Name Dispense Instructions for use Diagnosis    acetaminophen 325 MG tablet    TYLENOL    100 tablet    Take 3 tablets (975 mg) by mouth every 8 hours    Infection of prosthetic total hip joint, initial encounter (H)       aspirin 325 MG tablet    ASA    60 tablet    Take 1 tablet (325 mg) by mouth daily    Infection of prosthetic total hip joint, initial encounter (H)       ceFAZolin-dextrose 2-4 GM/100ML-% Soln infusion    ANCEF     Inject 100 mLs (2 g) into the vein every 8 hours    Infection of prosthetic total hip joint, initial encounter (H)       CYANOCOBALAMIN PO      Take 1,000 mcg by mouth daily        DULoxetine 30 MG capsule    CYMBALTA     Take 30 mg by mouth daily        ferrous gluconate 324 (38 Fe) MG tablet    FERGON    60 tablet    Take 1 tablet (324 mg) by mouth daily (with breakfast)    Infection of prosthetic total hip joint, initial encounter (H)       gabapentin 800 MG tablet    NEURONTIN     Take 1 tablet (800 mg) by mouth 2 times daily    Polyneuropathy associated with underlying disease (H)       lisinopril 10 MG tablet    PRINIVIL/ZESTRIL     Take 10 mg by mouth daily        metFORMIN 500 MG 24 hr tablet    GLUCOPHAGE-XR     Take 500 mg by mouth daily (with dinner) 1/2 tab bid        oxyCODONE 5 MG tablet    ROXICODONE    80 tablet    Take 1 tablet (5 mg) by mouth every 4 hours as needed for moderate to severe pain    Infection of prosthetic total hip joint, initial encounter (H)       simvastatin 40 MG tablet    ZOCOR     Take 20 mg by mouth At Bedtime Takes 1/2 of a 40mg tablet        tamsulosin 0.4 MG capsule    FLOMAX     Take 0.8 mg by mouth At Bedtime

## 2019-03-13 ENCOUNTER — ANCILLARY PROCEDURE (OUTPATIENT)
Dept: CARDIOLOGY | Facility: CLINIC | Age: 84
End: 2019-03-13
Attending: INTERNAL MEDICINE
Payer: MEDICARE

## 2019-03-13 DIAGNOSIS — I44.1 ATRIOVENTRICULAR BLOCK, SECOND DEGREE: ICD-10-CM

## 2019-03-13 PROCEDURE — 93296 REM INTERROG EVL PM/IDS: CPT | Performed by: INTERNAL MEDICINE

## 2019-03-25 ENCOUNTER — TRANSFERRED RECORDS (OUTPATIENT)
Dept: HEALTH INFORMATION MANAGEMENT | Facility: CLINIC | Age: 84
End: 2019-03-25

## 2019-03-25 LAB
CREAT SERPL-MCNC: 1.37 MG/DL (ref 0.73–1.18)
GFR SERPL CREATININE-BSD FRML MDRD: 47 ML/MIN/1.73M2
GLUCOSE SERPL-MCNC: 120 MG/DL (ref 70–100)
HBA1C MFR BLD: 6.6 % (ref 4–5.6)
MDC_IDC_EPISODE_DTM: NORMAL
MDC_IDC_EPISODE_ID: NORMAL
MDC_IDC_EPISODE_TYPE: NORMAL
MDC_IDC_LEAD_IMPLANT_DT: NORMAL
MDC_IDC_LEAD_IMPLANT_DT: NORMAL
MDC_IDC_LEAD_LOCATION: NORMAL
MDC_IDC_LEAD_LOCATION: NORMAL
MDC_IDC_LEAD_LOCATION_DETAIL_1: NORMAL
MDC_IDC_LEAD_LOCATION_DETAIL_1: NORMAL
MDC_IDC_LEAD_MFG: NORMAL
MDC_IDC_LEAD_MFG: NORMAL
MDC_IDC_LEAD_MODEL: NORMAL
MDC_IDC_LEAD_MODEL: NORMAL
MDC_IDC_LEAD_POLARITY_TYPE: NORMAL
MDC_IDC_LEAD_POLARITY_TYPE: NORMAL
MDC_IDC_LEAD_SERIAL: NORMAL
MDC_IDC_LEAD_SERIAL: NORMAL
MDC_IDC_MSMT_BATTERY_DTM: NORMAL
MDC_IDC_MSMT_BATTERY_REMAINING_LONGEVITY: 150 MO
MDC_IDC_MSMT_BATTERY_REMAINING_PERCENTAGE: 100 %
MDC_IDC_MSMT_BATTERY_STATUS: NORMAL
MDC_IDC_MSMT_LEADCHNL_RA_IMPEDANCE_VALUE: 680 OHM
MDC_IDC_MSMT_LEADCHNL_RV_IMPEDANCE_VALUE: 772 OHM
MDC_IDC_MSMT_LEADCHNL_RV_PACING_THRESHOLD_AMPLITUDE: 0.7 V
MDC_IDC_MSMT_LEADCHNL_RV_PACING_THRESHOLD_PULSEWIDTH: 0.4 MS
MDC_IDC_PG_IMPLANT_DTM: NORMAL
MDC_IDC_PG_MFG: NORMAL
MDC_IDC_PG_MODEL: NORMAL
MDC_IDC_PG_SERIAL: NORMAL
MDC_IDC_PG_TYPE: NORMAL
MDC_IDC_SESS_CLINIC_NAME: NORMAL
MDC_IDC_SESS_DTM: NORMAL
MDC_IDC_SESS_TYPE: NORMAL
MDC_IDC_SET_BRADY_AT_MODE_SWITCH_MODE: NORMAL
MDC_IDC_SET_BRADY_AT_MODE_SWITCH_RATE: 170 {BEATS}/MIN
MDC_IDC_SET_BRADY_LOWRATE: 60 {BEATS}/MIN
MDC_IDC_SET_BRADY_MAX_SENSOR_RATE: 130 {BEATS}/MIN
MDC_IDC_SET_BRADY_MAX_TRACKING_RATE: 130 {BEATS}/MIN
MDC_IDC_SET_BRADY_MODE: NORMAL
MDC_IDC_SET_BRADY_PAV_DELAY_HIGH: 150 MS
MDC_IDC_SET_BRADY_PAV_DELAY_LOW: 200 MS
MDC_IDC_SET_BRADY_SAV_DELAY_HIGH: 150 MS
MDC_IDC_SET_BRADY_SAV_DELAY_LOW: 200 MS
MDC_IDC_SET_LEADCHNL_RA_PACING_AMPLITUDE: 2 V
MDC_IDC_SET_LEADCHNL_RA_PACING_CAPTURE_MODE: NORMAL
MDC_IDC_SET_LEADCHNL_RA_PACING_POLARITY: NORMAL
MDC_IDC_SET_LEADCHNL_RA_PACING_PULSEWIDTH: 0.4 MS
MDC_IDC_SET_LEADCHNL_RA_SENSING_ADAPTATION_MODE: NORMAL
MDC_IDC_SET_LEADCHNL_RA_SENSING_POLARITY: NORMAL
MDC_IDC_SET_LEADCHNL_RA_SENSING_SENSITIVITY: 0.25 MV
MDC_IDC_SET_LEADCHNL_RV_PACING_AMPLITUDE: 1.2 V
MDC_IDC_SET_LEADCHNL_RV_PACING_CAPTURE_MODE: NORMAL
MDC_IDC_SET_LEADCHNL_RV_PACING_POLARITY: NORMAL
MDC_IDC_SET_LEADCHNL_RV_PACING_PULSEWIDTH: 0.4 MS
MDC_IDC_SET_LEADCHNL_RV_SENSING_ADAPTATION_MODE: NORMAL
MDC_IDC_SET_LEADCHNL_RV_SENSING_POLARITY: NORMAL
MDC_IDC_SET_LEADCHNL_RV_SENSING_SENSITIVITY: 1.5 MV
MDC_IDC_SET_ZONE_DETECTION_INTERVAL: 375 MS
MDC_IDC_SET_ZONE_TYPE: NORMAL
MDC_IDC_SET_ZONE_VENDOR_TYPE: NORMAL
MDC_IDC_STAT_AT_BURDEN_PERCENT: 0 %
MDC_IDC_STAT_AT_DTM_END: NORMAL
MDC_IDC_STAT_AT_DTM_START: NORMAL
MDC_IDC_STAT_BRADY_DTM_END: NORMAL
MDC_IDC_STAT_BRADY_DTM_START: NORMAL
MDC_IDC_STAT_BRADY_RA_PERCENT_PACED: 1 %
MDC_IDC_STAT_BRADY_RV_PERCENT_PACED: 100 %
MDC_IDC_STAT_EPISODE_RECENT_COUNT: 0
MDC_IDC_STAT_EPISODE_RECENT_COUNT_DTM_END: NORMAL
MDC_IDC_STAT_EPISODE_RECENT_COUNT_DTM_START: NORMAL
MDC_IDC_STAT_EPISODE_TYPE: NORMAL
MDC_IDC_STAT_EPISODE_VENDOR_TYPE: NORMAL
POTASSIUM SERPL-SCNC: 4.6 MMOL/L (ref 3.5–5.2)

## 2019-04-25 ENCOUNTER — ANESTHESIA (OUTPATIENT)
Dept: SURGERY | Facility: CLINIC | Age: 84
End: 2019-04-25
Payer: MEDICARE

## 2019-04-25 ENCOUNTER — HOSPITAL ENCOUNTER (OUTPATIENT)
Facility: CLINIC | Age: 84
Discharge: HOME | End: 2019-04-25
Attending: ORTHOPAEDIC SURGERY | Admitting: ORTHOPAEDIC SURGERY
Payer: MEDICARE

## 2019-04-25 ENCOUNTER — ANESTHESIA EVENT (OUTPATIENT)
Dept: SURGERY | Facility: CLINIC | Age: 84
End: 2019-04-25
Payer: MEDICARE

## 2019-04-25 VITALS
BODY MASS INDEX: 33.74 KG/M2 | RESPIRATION RATE: 16 BRPM | WEIGHT: 215 LBS | TEMPERATURE: 97.4 F | HEIGHT: 67 IN | OXYGEN SATURATION: 98 % | SYSTOLIC BLOOD PRESSURE: 120 MMHG | DIASTOLIC BLOOD PRESSURE: 62 MMHG | HEART RATE: 66 BPM

## 2019-04-25 DIAGNOSIS — G56.02 CARPAL TUNNEL SYNDROME ON LEFT: Primary | ICD-10-CM

## 2019-04-25 PROCEDURE — 25000125 ZZHC RX 250: Performed by: NURSE ANESTHETIST, CERTIFIED REGISTERED

## 2019-04-25 PROCEDURE — 25000125 ZZHC RX 250: Performed by: ORTHOPAEDIC SURGERY

## 2019-04-25 PROCEDURE — 36000050 ZZH SURGERY LEVEL 2 1ST 30 MIN: Performed by: ORTHOPAEDIC SURGERY

## 2019-04-25 PROCEDURE — 36000052 ZZH SURGERY LEVEL 2 EA 15 ADDTL MIN: Performed by: ORTHOPAEDIC SURGERY

## 2019-04-25 PROCEDURE — 37000009 ZZH ANESTHESIA TECHNICAL FEE, EACH ADDTL 15 MIN: Performed by: ORTHOPAEDIC SURGERY

## 2019-04-25 PROCEDURE — 25000128 H RX IP 250 OP 636: Performed by: NURSE ANESTHETIST, CERTIFIED REGISTERED

## 2019-04-25 PROCEDURE — 25800030 ZZH RX IP 258 OP 636: Performed by: NURSE ANESTHETIST, CERTIFIED REGISTERED

## 2019-04-25 PROCEDURE — 25000128 H RX IP 250 OP 636: Performed by: ORTHOPAEDIC SURGERY

## 2019-04-25 PROCEDURE — 40000170 ZZH STATISTIC PRE-PROCEDURE ASSESSMENT II: Performed by: ORTHOPAEDIC SURGERY

## 2019-04-25 PROCEDURE — 25800030 ZZH RX IP 258 OP 636: Performed by: ANESTHESIOLOGY

## 2019-04-25 PROCEDURE — 27210794 ZZH OR GENERAL SUPPLY STERILE: Performed by: ORTHOPAEDIC SURGERY

## 2019-04-25 PROCEDURE — 71000012 ZZH RECOVERY PHASE 1 LEVEL 1 FIRST HR: Performed by: ORTHOPAEDIC SURGERY

## 2019-04-25 PROCEDURE — 71000027 ZZH RECOVERY PHASE 2 EACH 15 MINS: Performed by: ORTHOPAEDIC SURGERY

## 2019-04-25 PROCEDURE — 37000008 ZZH ANESTHESIA TECHNICAL FEE, 1ST 30 MIN: Performed by: ORTHOPAEDIC SURGERY

## 2019-04-25 RX ORDER — NALOXONE HYDROCHLORIDE 0.4 MG/ML
.1-.4 INJECTION, SOLUTION INTRAMUSCULAR; INTRAVENOUS; SUBCUTANEOUS
Status: DISCONTINUED | OUTPATIENT
Start: 2019-04-25 | End: 2019-04-25 | Stop reason: HOSPADM

## 2019-04-25 RX ORDER — SODIUM CHLORIDE, SODIUM LACTATE, POTASSIUM CHLORIDE, CALCIUM CHLORIDE 600; 310; 30; 20 MG/100ML; MG/100ML; MG/100ML; MG/100ML
INJECTION, SOLUTION INTRAVENOUS CONTINUOUS
Status: DISCONTINUED | OUTPATIENT
Start: 2019-04-25 | End: 2019-04-25 | Stop reason: HOSPADM

## 2019-04-25 RX ORDER — CEFAZOLIN SODIUM 1 G/3ML
1 INJECTION, POWDER, FOR SOLUTION INTRAMUSCULAR; INTRAVENOUS SEE ADMIN INSTRUCTIONS
Status: DISCONTINUED | OUTPATIENT
Start: 2019-04-25 | End: 2019-04-25 | Stop reason: HOSPADM

## 2019-04-25 RX ORDER — LIDOCAINE HYDROCHLORIDE 5 MG/ML
INJECTION, SOLUTION INFILTRATION; PERINEURAL PRN
Status: DISCONTINUED | OUTPATIENT
Start: 2019-04-25 | End: 2019-04-25

## 2019-04-25 RX ORDER — HYDROCODONE BITARTRATE AND ACETAMINOPHEN 5; 325 MG/1; MG/1
1 TABLET ORAL EVERY 4 HOURS PRN
Qty: 10 TABLET | Refills: 0 | Status: SHIPPED | OUTPATIENT
Start: 2019-04-25 | End: 2019-04-28

## 2019-04-25 RX ORDER — ONDANSETRON 2 MG/ML
INJECTION INTRAMUSCULAR; INTRAVENOUS PRN
Status: DISCONTINUED | OUTPATIENT
Start: 2019-04-25 | End: 2019-04-25

## 2019-04-25 RX ORDER — ONDANSETRON 4 MG/1
4 TABLET, ORALLY DISINTEGRATING ORAL EVERY 30 MIN PRN
Status: DISCONTINUED | OUTPATIENT
Start: 2019-04-25 | End: 2019-04-25 | Stop reason: HOSPADM

## 2019-04-25 RX ORDER — BUPIVACAINE HYDROCHLORIDE 5 MG/ML
INJECTION, SOLUTION PERINEURAL PRN
Status: DISCONTINUED | OUTPATIENT
Start: 2019-04-25 | End: 2019-04-25 | Stop reason: HOSPADM

## 2019-04-25 RX ORDER — ONDANSETRON 2 MG/ML
4 INJECTION INTRAMUSCULAR; INTRAVENOUS EVERY 30 MIN PRN
Status: DISCONTINUED | OUTPATIENT
Start: 2019-04-25 | End: 2019-04-25 | Stop reason: HOSPADM

## 2019-04-25 RX ORDER — MAGNESIUM HYDROXIDE 1200 MG/15ML
LIQUID ORAL PRN
Status: DISCONTINUED | OUTPATIENT
Start: 2019-04-25 | End: 2019-04-25 | Stop reason: HOSPADM

## 2019-04-25 RX ORDER — PROPOFOL 10 MG/ML
INJECTION, EMULSION INTRAVENOUS CONTINUOUS PRN
Status: DISCONTINUED | OUTPATIENT
Start: 2019-04-25 | End: 2019-04-25

## 2019-04-25 RX ORDER — HYDROMORPHONE HYDROCHLORIDE 1 MG/ML
.3-.5 INJECTION, SOLUTION INTRAMUSCULAR; INTRAVENOUS; SUBCUTANEOUS EVERY 10 MIN PRN
Status: DISCONTINUED | OUTPATIENT
Start: 2019-04-25 | End: 2019-04-25 | Stop reason: HOSPADM

## 2019-04-25 RX ORDER — FENTANYL CITRATE 50 UG/ML
25-50 INJECTION, SOLUTION INTRAMUSCULAR; INTRAVENOUS
Status: DISCONTINUED | OUTPATIENT
Start: 2019-04-25 | End: 2019-04-25 | Stop reason: HOSPADM

## 2019-04-25 RX ORDER — FENTANYL CITRATE 50 UG/ML
INJECTION, SOLUTION INTRAMUSCULAR; INTRAVENOUS PRN
Status: DISCONTINUED | OUTPATIENT
Start: 2019-04-25 | End: 2019-04-25

## 2019-04-25 RX ADMIN — ONDANSETRON 4 MG: 2 INJECTION INTRAMUSCULAR; INTRAVENOUS at 14:55

## 2019-04-25 RX ADMIN — LIDOCAINE HYDROCHLORIDE 210 MG: 5 INJECTION, SOLUTION INFILTRATION; PERINEURAL at 14:46

## 2019-04-25 RX ADMIN — CEFAZOLIN 2 G: 1 INJECTION, POWDER, FOR SOLUTION INTRAMUSCULAR; INTRAVENOUS at 14:45

## 2019-04-25 RX ADMIN — DEXMEDETOMIDINE HYDROCHLORIDE 8 MCG: 100 INJECTION, SOLUTION INTRAVENOUS at 14:44

## 2019-04-25 RX ADMIN — PROPOFOL 75 MCG/KG/MIN: 10 INJECTION, EMULSION INTRAVENOUS at 14:45

## 2019-04-25 RX ADMIN — FENTANYL CITRATE 25 MCG: 50 INJECTION, SOLUTION INTRAMUSCULAR; INTRAVENOUS at 14:45

## 2019-04-25 RX ADMIN — SODIUM CHLORIDE, POTASSIUM CHLORIDE, SODIUM LACTATE AND CALCIUM CHLORIDE: 600; 310; 30; 20 INJECTION, SOLUTION INTRAVENOUS at 14:41

## 2019-04-25 ASSESSMENT — ENCOUNTER SYMPTOMS: SEIZURES: 0

## 2019-04-25 ASSESSMENT — MIFFLIN-ST. JEOR: SCORE: 1618.86

## 2019-04-25 ASSESSMENT — LIFESTYLE VARIABLES: TOBACCO_USE: 0

## 2019-04-25 NOTE — DISCHARGE INSTRUCTIONS
Same Day Surgery Discharge Instructions for  Sedation and General Anesthesia       It's not unusual to feel dizzy, light-headed or faint for up to 24 hours after surgery or while taking pain medication.  If you have these symptoms: sit for a few minutes before standing and have someone assist you when you get up to walk or use the bathroom.      You should rest and relax for the next 24 hours. We recommend you make arrangements to have an adult stay with you for at least 24 hours after your discharge.  Avoid hazardous and strenuous activity.      DO NOT DRIVE any vehicle or operate mechanical equipment for 24 hours following the end of your surgery.  Even though you may feel normal, your reactions may be affected by the medication you have received.      Do not drink alcoholic beverages for 24 hours following surgery.       Slowly progress to your regular diet as you feel able. It's not unusual to feel nauseated and/or vomit after receiving anesthesia.  If you develop these symptoms, drink clear liquids (apple juice, ginger ale, broth, 7-up, etc. ) until you feel better.  If your nausea and vomiting persists for 24 hours, please notify your surgeon.        All narcotic pain medications, along with inactivity and anesthesia, can cause constipation. Drinking plenty of liquids and increasing fiber intake will help.      For any questions of a medical nature, call your surgeon.      Do not make important decisions for 24 hours.      If you had general anesthesia, you may have a sore throat for a couple of days related to the breathing tube used during surgery.  You may use Cepacol lozenges to help with this discomfort.  If it worsens or if you develop a fever, contact your surgeon.       If you feel your pain is not well managed with the pain medications prescribed by your surgeon, please contact your surgeon's office to let them know so they can address your concerns.   Discharge Instructions for Carpal Tunnel  Repair    Wear your bandage, splint, or cast as directed by your surgeon.  Always keep the dressing, splint, or cast clean and dry.  When showering, cover your hand and wrist with plastic bag and tape it securely to your skin to always keep the dressing, splint, or cast dry.    Use an ice pack or bag of frozen peas--or something similar--wrapped in a thin towel on your wrist to reduce swelling for the first 48 hours. Leave the ice pack on for 20 minutes; then take it off for 20 minutes. Repeat as needed.  Keep your arm elevated above your heart for 24-48 hours after surgery.  Take pain medication as directed.  Don t drive until your doctor says it s OK. DO NOT drive while you are taking narcotic pain medication.  Ask your surgeon. when you can return to work.       Call your surgeon if you have any of the following:  Increased bleeding or drainage from the incision (cut)  Opening of the incision  Fever above 100.4 F (38.9 C) taken by mouth, or shaking chills  Any new numbness in the fingers or thumb  Increased pain not controlled by prescribed pain medication  Increased redness, drainage, or swelling of the incision     Follow-Up  Make a follow-up appointment as directed by your surgeon.    **If you have questions or concerns about your procedure,  call Dr. López at [phone]**

## 2019-04-25 NOTE — ANESTHESIA POSTPROCEDURE EVALUATION
Patient: Mark Hernández    Procedure(s):  LEFT CARPAL TUNNEL RELEASE    Diagnosis:LEFT CARPAL TUNNEL SYNDROME  Diagnosis Additional Information: No value filed.    Anesthesia Type:  IV Regional Anesthesia    Note:  Anesthesia Post Evaluation    Patient location during evaluation: PACU  Patient participation: Able to fully participate in evaluation  Level of consciousness: awake  Pain management: adequate  Airway patency: patent  Cardiovascular status: acceptable  Respiratory status: acceptable  Hydration status: acceptable  PONV: none     Anesthetic complications: None          Last vitals:  Vitals:    04/25/19 1240 04/25/19 1515   BP: 123/67 104/59   Pulse:  62   Resp: 16 13   Temp: 36.4  C (97.6  F) 36.3  C (97.4  F)   SpO2: 98% 95%         Electronically Signed By: Lynda Hilario  April 25, 2019  3:49 PM

## 2019-04-25 NOTE — ANESTHESIA CARE TRANSFER NOTE
Patient: Mark Hernández    Procedure(s):  LEFT CARPAL TUNNEL RELEASE    Diagnosis: LEFT CARPAL TUNNEL SYNDROME  Diagnosis Additional Information: No value filed.    Anesthesia Type:   IV Regional Anesthesia     Note:  Airway :Room Air  Patient transferred to:PACU  Comments: Level of Conscious:Awake  Vital Signs   BP:104/59   HR:62   RR:14   O2 Saturation:96   Oxygen LPM:room air   Temp:96.6  Dentition:Unchanged from preop  Patient Status:Stable  Report to PACU RN.Handoff Report: Identifed the Patient, Identified the Reponsible Provider, Reviewed the pertinent medical history, Discussed the surgical course, Reviewed Intra-OP anesthesia mangement and issues during anesthesia, Set expectations for post-procedure period and Allowed opportunity for questions and acknowledgement of understanding      Vitals: (Last set prior to Anesthesia Care Transfer)    CRNA VITALS  4/25/2019 1442 - 4/25/2019 1518      4/25/2019             Pulse:  62    SpO2:  99 %    Resp Rate (set):  10                Electronically Signed By: Christine Marie Volp Hodgkins, CRNA, APRN CRNA  April 25, 2019  3:18 PM

## 2019-04-25 NOTE — OP NOTE
Procedure Date: 04/25/2019      PREOPERATIVE DIAGNOSIS:  Left carpal tunnel syndrome.      POSTOPERATIVE DIAGNOSIS:  Left carpal tunnel syndrome.      PROCEDURE:  Open left carpal tunnel release.      SURGEON:  Mynor López MD      ASSISTANT:  JARETH Vásquez.      ANESTHESIA:  [place] block.      OPERATIVE PROCEDURE:  I identified the patient.  He was brought to the operating room and placed on the OR table where a well-padded tourniquet was applied to the left upper extremity.  He received preoperative IV antibiotics.  The arm was exsanguinated by Esmarch and tourniquet elevated at 300 mmHg.  [place] block was instilled by the anesthesiologist, and the left hand and thumb were prepped and draped in standard fashion.      A standard 1-inch longitudinal incision was made in the left palm line at the radial border of the fourth ray.  This was taken down through the underlying palmar fascia so as to expose the transverse carpal ligament.  A curved Roberts scissors was then used to free soft tissue off the proximal ligament and distal antebrachial fascia to further improve exposure.      A curved mosquito was then placed under the distal portion of the transverse ligament.  The ligament was then incised in a distal to proximal fashion with a 15-blade.  The most proximal ligament and distal antebrachial fascia were then released under direct vision with a curved Roberts scissors.      A moistened finger introduced in the wound confirmed that the nerve had been completely decompressed.  The nerve itself, including the motor branch, was unharmed by the procedure.  No abnormal contents in the floor of the carpal tunnel.      The wound was then irrigated with saline.  Skin was closed with interrupted 4-0 nylon.  The wound was infiltrated with 0.5% Marcaine for postoperative analgesia.      A sterile bulky dressing then applied along with a volar plaster splint.  The tourniquet was deflated, and the patient was taken to recovery  room having tolerated surgery well.         CHRISTO DOWNEY MD             D: 2019   T: 2019   MT: JAISON      Name:     FRANKIE THOMPSON   MRN:      [id number]        Account:        [id number]   :      1933           Procedure Date: 2019      Document: [id number]       cc: Christo Downey MD

## 2019-04-25 NOTE — BRIEF OP NOTE
[facility]    Brief Operative Note    Pre-operative diagnosis: LEFT CARPAL TUNNEL SYNDROME  Post-operative diagnosis Left carpal tunnel syndrome  Procedure: Procedure(s):  LEFT CARPAL TUNNEL RELEASE  Surgeon: Surgeon(s) and Role:     * Mynor López MD - Primary  Anesthesia: [place] Block   Estimated blood loss: Minimal  Drains: None  Specimens: * No specimens in log *  Findings:   Tight transverse ligament.  Complications: None.  Implants:  * No implants in log *

## 2019-04-25 NOTE — ANESTHESIA POSTPROCEDURE EVALUATION
Patient: Mark Hernández    Procedure(s):  LEFT CARPAL TUNNEL RELEASE    Diagnosis:LEFT CARPAL TUNNEL SYNDROME  Diagnosis Additional Information: No value filed.    Anesthesia Type:  IV Regional Anesthesia    Note:  Anesthesia Post Evaluation    Patient location during evaluation: PACU  Patient participation: Able to fully participate in evaluation  Level of consciousness: awake  Pain management: adequate  Airway patency: patent  Cardiovascular status: acceptable  Respiratory status: acceptable  Hydration status: acceptable  PONV: none     Anesthetic complications: None          Last vitals:  Vitals:    04/25/19 1515 04/25/19 1530 04/25/19 1545   BP: 104/59 105/59 126/65   Pulse: 62 74 66   Resp: 13 13 16   Temp: 36.3  C (97.4  F)     SpO2: 95% 95% 96%         Electronically Signed By: Lynda Hilario  April 25, 2019  4:03 PM

## 2019-04-25 NOTE — ANESTHESIA PREPROCEDURE EVALUATION
Anesthesia Pre-Procedure Evaluation    Patient: Mark Hernández   MRN: [id number] : 1933          Preoperative Diagnosis: LEFT CARPAL TUNNEL SYNDROME    Procedure(s):  LEFT CARPAL TUNNEL RELEASE    Past Medical History:   Diagnosis Date     Acquired hydrocele      BPH (benign prostatic hyperplasia)      Complete AV block (H) 2016    Implantation of a dual-chamber pacemaker     Diverticulosis      Hemorrhoids      Hyperlipidemia      Hypertension      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)      Pacemaker      Peripheral neuropathy      Prostate cancer (H)      Renal disease      Sensorineural hearing loss of both ears      Thrombocytopenia (H)      Thrombocytopenia (H)      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)      Vitamin B12 deficiency      Past Surgical History:   Procedure Laterality Date     ARTHROPLASTY REVISION HIP Right 2017    Procedure: ARTHROPLASTY REVISION HIP;;  Surgeon: Kirk Summers MD;  Location:  OR     BACK SURGERY       ENT SURGERY      tonsillectomy     GENITOURINARY SURGERY      S/P NEPHRECTOMY     IRRIGATION AND DEBRIDEMENT HIP, PLACE ANTIBIOTIC CEMENT BEADS / SPACER Right 2017    Procedure: COMBINED IRRIGATION AND DEBRIDEMENT HIP, PLACE ANTIBIOTIC CEMENT BEADS / SPACER;  IRRIGATION AND DEBRIDEMENT RIGHT HIP WITH PLACEMENT ANTIBIOTIC BEADS, POLY EXCHANGE, AND PARTIAL HIP REVISION. ;  Surgeon: Kirk Summers MD;  Location:  OR     KNEE SURGERY       ORTHOPEDIC SURGERY       revision total hip arthroplasty       rivision total hip arthroplasty       rotator cuff syndrome         Anesthesia Evaluation     . Pt has had prior anesthetic.     No history of anesthetic complications          ROS/MED HX    ENT/Pulmonary:      (-) tobacco use, asthma and sleep apnea   Neurologic:     (+)neuropathy (mononeuropathy)    (-) seizures and CVA   Cardiovascular:     (+) hypertension----. : . . . pacemaker :. .      "  METS/Exercise Tolerance:     Hematologic: Comments: thrombocytopenia        Musculoskeletal:         GI/Hepatic:        (-) GERD   Renal/Genitourinary:     (+) chronic renal disease,       Endo:     (+) type I DM, .      Psychiatric:         Infectious Disease:         Malignancy:   (+) Malignancy History of Prostate          Other:                          Physical Exam  Normal systems: dental    Airway   Mallampati: III  TM distance: >3 FB  Neck ROM: full    Dental     Cardiovascular   Rhythm and rate: regular and normal      Pulmonary    breath sounds clear to auscultation            Lab Results   Component Value Date    WBC 8.3 12/31/2017    HGB 9.1 (L) 12/31/2017    HCT 27.8 (L) 12/31/2017     12/31/2017    SED 80 (H) 12/31/2017     01/01/2018    POTASSIUM 4.2 01/01/2018    CHLORIDE 103 01/01/2018    CO2 30 01/01/2018    BUN 33 (H) 01/01/2018    CR 1.11 01/01/2018     (H) 01/01/2018    ESAU 9.0 01/01/2018    INR 1.11 12/27/2017       Preop Vitals  BP Readings from Last 3 Encounters:   04/25/19 123/67   08/21/18 134/74   01/02/18 115/63    Pulse Readings from Last 3 Encounters:   08/21/18 86   01/01/18 82   09/28/17 83      Resp Readings from Last 3 Encounters:   04/25/19 16   01/02/18 16   09/23/16 12    SpO2 Readings from Last 3 Encounters:   04/25/19 98%   08/21/18 96%   01/02/18 94%      Temp Readings from Last 1 Encounters:   04/25/19 36.4  C (97.6  F) (Temporal)    Ht Readings from Last 1 Encounters:   04/25/19 1.702 m (5' 7\")      Wt Readings from Last 1 Encounters:   04/25/19 97.5 kg (215 lb)    Estimated body mass index is 33.67 kg/m  as calculated from the following:    Height as of this encounter: 1.702 m (5' 7\").    Weight as of this encounter: 97.5 kg (215 lb).       Anesthesia Plan      History & Physical Review  History and physical reviewed and following examination; no interval change.    ASA Status:  3 .        Plan for IV Regional Anesthesia with Intravenous induction. "   PONV prophylaxis:  Ondansetron (or other 5HT-3)       Postoperative Care  Postoperative pain management:  IV analgesics.      Consents  Anesthetic plan, risks, benefits and alternatives discussed with:  Patient..                 Lynda Hilario

## 2019-06-19 ENCOUNTER — DOCUMENTATION ONLY (OUTPATIENT)
Dept: CARDIOLOGY | Facility: CLINIC | Age: 84
End: 2019-06-19

## 2019-06-19 NOTE — TELEPHONE ENCOUNTER
[facility] consult -     [employer] PPM     AP: 1 %    : 100 %    Mode: DDD     Presenting Rhythm: AS/    Heart Rate: adequate variability     Sensing: A-a little low at 1.2 mv, V- not obtained    Pacing Threshold: A-not obtained, V-WNL  Impedance: WNL    Battery Status: estimated 12 years longevity     Atrial Arrhythmia: 5 PMT episodes - no EGMs     Ventricular Arrhythmia: 1 NSVT logged-no EGM    Care Plan: Remote on 6/26. SK

## 2019-08-19 LAB — EJECTION FRACTION: 42

## 2020-02-09 ENCOUNTER — HOSPITAL ENCOUNTER (EMERGENCY)
Facility: CLINIC | Age: 85
Discharge: HOME | End: 2020-02-09
Attending: EMERGENCY MEDICINE | Admitting: EMERGENCY MEDICINE
Payer: MEDICARE

## 2020-02-09 ENCOUNTER — APPOINTMENT (OUTPATIENT)
Dept: GENERAL RADIOLOGY | Facility: CLINIC | Age: 85
End: 2020-02-09
Attending: EMERGENCY MEDICINE
Payer: MEDICARE

## 2020-02-09 VITALS
OXYGEN SATURATION: 100 % | BODY MASS INDEX: 31.48 KG/M2 | TEMPERATURE: 97.4 F | SYSTOLIC BLOOD PRESSURE: 158 MMHG | WEIGHT: 201 LBS | HEART RATE: 84 BPM | RESPIRATION RATE: 20 BRPM | DIASTOLIC BLOOD PRESSURE: 88 MMHG

## 2020-02-09 DIAGNOSIS — S73.004A DISLOCATION OF RIGHT HIP, INITIAL ENCOUNTER (H): ICD-10-CM

## 2020-02-09 PROCEDURE — 96375 TX/PRO/DX INJ NEW DRUG ADDON: CPT

## 2020-02-09 PROCEDURE — 40000965 ZZH STATISTIC END TITIAL CO2 MONITORING

## 2020-02-09 PROCEDURE — 25000128 H RX IP 250 OP 636: Performed by: EMERGENCY MEDICINE

## 2020-02-09 PROCEDURE — 99285 EMERGENCY DEPT VISIT HI MDM: CPT | Mod: 25

## 2020-02-09 PROCEDURE — 40000275 ZZH STATISTIC RCP TIME EA 10 MIN

## 2020-02-09 PROCEDURE — 27265 TREAT HIP DISLOCATION: CPT | Mod: RT

## 2020-02-09 PROCEDURE — 40000986 XR PELVIS AND HIP RIGHT 1 VIEW

## 2020-02-09 PROCEDURE — 73502 X-RAY EXAM HIP UNI 2-3 VIEWS: CPT

## 2020-02-09 PROCEDURE — 96374 THER/PROPH/DIAG INJ IV PUSH: CPT

## 2020-02-09 RX ORDER — PROPOFOL 10 MG/ML
INJECTION, EMULSION INTRAVENOUS
Status: DISCONTINUED
Start: 2020-02-09 | End: 2020-02-09 | Stop reason: HOSPADM

## 2020-02-09 RX ORDER — PROPOFOL 10 MG/ML
170 INJECTION, EMULSION INTRAVENOUS ONCE
Status: COMPLETED | OUTPATIENT
Start: 2020-02-09 | End: 2020-02-09

## 2020-02-09 RX ORDER — MORPHINE SULFATE 4 MG/ML
4 INJECTION, SOLUTION INTRAMUSCULAR; INTRAVENOUS ONCE
Status: COMPLETED | OUTPATIENT
Start: 2020-02-09 | End: 2020-02-09

## 2020-02-09 RX ORDER — PROPOFOL 10 MG/ML
170 INJECTION, EMULSION INTRAVENOUS ONCE
Status: DISCONTINUED | OUTPATIENT
Start: 2020-02-09 | End: 2020-02-09

## 2020-02-09 RX ADMIN — PROPOFOL 170 MG: 10 INJECTION, EMULSION INTRAVENOUS at 18:47

## 2020-02-09 RX ADMIN — MORPHINE SULFATE 4 MG: 4 INJECTION INTRAVENOUS at 17:27

## 2020-02-09 NOTE — ED NOTES
Bed: ED01  Expected date:   Expected time:   Means of arrival:   Comments:  [facility] [address] - 56M hip dislocation - ETA 8min

## 2020-02-09 NOTE — ED AVS SNAPSHOT
Emergency Department  [address] [facility] [address]  Phone:  [phone]  Fax:  [phone]                                    Mark Hernández   MRN: [id number]    Department:   Emergency Department   Date of Visit:  2/9/2020           After Visit Summary Signature Page    I have received my discharge instructions, and my questions have been answered. I have discussed any challenges I see with this plan with the nurse or doctor.    ..........................................................................................................................................  Patient/Patient Representative Signature      ..........................................................................................................................................  Patient Representative Print Name and Relationship to Patient    ..................................................               ................................................  Date                                   Time    ..........................................................................................................................................  Reviewed by Signature/Title    ...................................................              ..............................................  Date                                               Time          22EPIC Rev 08/18

## 2020-02-10 NOTE — ED PROVIDER NOTES
History     Chief Complaint:  Hip Pain    HPI   Mark Hernández is an 86 year old male with a history of hypertension, hyperlipidemia, type II diabetes, and prostate cancer among others status post total hip arthroplasty with revision who presents to the emergency department today via EMS for evaluation of right hip pain. The patient reports that he was sitting in a folding chair and leaned forward when he developed right sided hip pain, prompting a call to EMS out of concern for dislocation. Here he denies any new tingling to his toes or any other concerns.     Allergies:  No Known Drug Allergies     Medications:    Cymbalta  Fergon  Neurontin  Lisinopril  Glucophage XR  Zocor  Flomax    Past Medical History:    Infection of prosthetic hip joint  Acute kidney failure  Acquired hydrocele  Benign prostatic hyperplasia  Complete AV block  Diverticulosis  Hemorrhoids  Hyperlipidemia  Hypertension  Malignant neoplasm of anterior wall of urinary bladder  Peripheral neuropathy  Prostate cancer  Renal disease  Sensorineural hearing loss  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  Vitamin B12 deficiency   Chronic systolic congestive heart failure  Malignant neoplasm of lateral wall of urinary bladder  Chronic kidney disease  Malignant neoplasm of right ureter  Type II diabetes  Mononeuropathy     Past Surgical History:    Arthroplasty revision hip  Back surgery  Tonsillectomy  Genitourinary surgery  Irrigation and debridement hip, place antibiotic cement beads/spacer  Knee surgery  Orthopedic surgery  Release carpal tunnel  Revision total hip arthroplasty multiple  Rotator cuff syndrome  Spine surgery  Pacemaker placement  Bladder tumor excision - multiple  Hydrocele excision  Laparoscopic right nephroureterectomy     Family History:    Father: myocardial infarction   Sister: rheumatoid arthritis, myocardial infarction   Mother: stroke, schizophrenia    Social History:  The patient was  accompanied to the ED by EMS.  Smoking Status: Former Smoker   Packs per day: 1   Years: 24   Types: Cigarettes  Smokeless Tobacco: Never Used  Alcohol Use: Positive  PCP: Rebecca Noyola  Marital Status:        Review of Systems   Musculoskeletal:        Right hip pain   Neurological:        No new tingling   All other systems reviewed and are negative.    Physical Exam     Patient Vitals for the past 24 hrs:   BP Temp Pulse Heart Rate Resp SpO2 Weight   02/09/20 1940 -- -- -- 86 20 -- --   02/09/20 1915 (!) 158/88 -- 84 84 16 100 % --   02/09/20 1910 (!) 145/69 -- 81 82 9 100 % --   02/09/20 1905 136/73 -- 81 83 9 99 % --   02/09/20 1900 (!) 143/69 -- 82 84 9 99 % --   02/09/20 1855 (!) 160/83 -- -- 86 16 96 % --   02/09/20 1855 (!) 162/86 -- 87 84 12 95 % --   02/09/20 1852 -- -- -- -- 16 -- --   02/09/20 1850 (!) 165/83 -- 85 87 12 100 % --   02/09/20 1845 (!) 166/76 -- 85 84 12 100 % --   02/09/20 1840 (!) 170/90 -- 85 87 11 97 % --   02/09/20 1830 (!) 156/88 -- 85 84 9 95 % --   02/09/20 1821 -- -- -- -- -- 96 % --   02/09/20 1730 -- -- -- -- -- 95 % --   02/09/20 1715 (!) 156/74 -- 84 -- -- 98 % --   02/09/20 1714 (!) 156/74 97.4  F (36.3  C) 84 -- 18 -- 91.2 kg (201 lb)     Physical Exam   Nursing note and vitals reviewed.  Constitutional:  Oriented to person, place, and time. Cooperative.   HENT:   Nose:    Nose normal.   Mouth/Throat:   Mucous membranes are normal.   Eyes:    Conjunctivae normal and EOM are normal.      Pupils are equal, round, and reactive to light.   Neck:    Trachea normal.   Cardiovascular:  Normal rate, regular rhythm, normal heart sounds and normal pulses. No murmur heard.  Pulmonary/Chest:  Effort normal and breath sounds normal.   Abdominal:   Soft. Normal appearance and bowel sounds are normal.      There is no tenderness.      There is no rebound and no CVA tenderness.   Musculoskeletal:  Right lower extremity shortened and internally rotated.  Tenderness to palpation over  the right hip and with attempts at range of motion of the right hip.  Extremities otherwise atraumatic x 4.   Lymphadenopathy:  No cervical adenopathy.   Neurological:   Alert and oriented to person, place, and time. Normal strength.      No cranial nerve deficit or sensory deficit other than chronic peripheral neuropathy in his lower extremities. GCS eye subscore is 4. GCS verbal subscore is 5. GCS motor subscore is 6.  Distal CMS intact in the right foot with the exception of his chronic and unchanged decreased sensation to light touch.  Skin:    Skin is intact. No rash noted.   Psychiatric:   Normal mood and affect.    Emergency Department Course     Imaging:  Radiology findings were communicated with the patient who voiced understanding of the findings.    XR Pelvis w Hip Right 1 View  The femoral component of the right total hip arthroplasty is dislocated posterosuperiorly in relation to the acetabular component. The greater trochanter is fractured and displaced several centimeters cephalad, as on the prior study.  Heterotopic ossification about the hip, increased. No other change.  Reading per radiology    XR Pelvis w Hip Right 1 View  The right total hip arthroplasty has been reduced. There is now anatomic alignment. There has otherwise been no change.  Reading per radiology      [facility]    -Dislocation - Lower Extremity  Date/Time: 2/9/2020 7:55 PM  Performed by: Jonas Springer MD  Authorized by: Jonas Springer MD     ED EVALUATION:      Assessment Time: 2/9/2020 6:43 PM      I have performed an Emergency Department Evaluation including taking a history and physical examination, this evaluation will be documented in the electronic medical record for this ED encounter.      ASA Class: Class 2- mild systemic disease, no acute problems, no functional limitations    NPO Status: not NPO, emergent situation  UNIVERSAL PROTOCOL   Site Marked: NA  Prior Images Obtained and Reviewed:   Yes  Required items: Required blood products, implants, devices and special equipment available    Patient identity confirmed:  Verbally with patient and arm band  Patient was reevaluated immediately before administering moderate or deep sedation or anesthesia  Confirmation Checklist:  Patient's identity using two indicators, relevant allergies and procedure was appropriate and matched the consent or emergent situation  Time out: Immediately prior to the procedure a time out was called    Universal Protocol: the Joint Commission Universal Protocol was followed          LOCATION     Location:  Hip    Hip injury location:  R hip    Hip dislocation type comment:  Posterosuperiorly    Etiology: spontaneous      Prosthesis: yes      PRE PROCEDURE DETAILS:     Distal perfusion: normal      Range of motion: reduced      SEDATION    Patient Sedated: Yes    Sedation Type:  Deep  Sedation:  Propofol  Vital signs: Vital signs monitored during sedation      PROCEDURE DETAILS      Manipulation performed: yes      Hip reduction method:  Traction and counter traction (traction/counter traction)    Reduction successful: yes      Reduction confirmed with imaging: yes      Immobilization:  Brace (knee immobilizer)    POST PROCEDURE DETAILS      Neurological function: normal      Distal perfusion: normal      Range of motion: improved      PROCEDURE   Patient Tolerance:  Patient tolerated the procedure well with no immediate complications    Length of time physician/provider present for 1:1 monitoring during sedation: 15    Interventions:  1727 Morphine 4 mg IV  1847 Propofol 170 mg IV    Emergency Department Course:    1733 Nursing notes and vitals reviewed.    1734 I performed an exam of the patient as documented above.     1736 Patient left for an xray of the pelvis with hip right 1 view.     1848 I performed the sedation and hip reduction procedures as documented above.    1945 Patient rechecked and updated.      Findings and plan  explained to the patient. Patient discharged home with instructions regarding supportive care, medications, and reasons to return. The importance of close follow-up was reviewed.     Impression & Plan      Medical Decision Making:  Mark Hernández is an 86 year old male who presents to the emergency department today for evaluation of right hip pain and concern for a recurrent dislocation.  This was confirmed by x-ray.  I subsequently relocated the hip per the above procedure notes.  Post procedure x-rays confirmed the relocation.  I instructed him to follow-up with his orthopedist as soon as possible given the recurrence of this.  He was placed in a knee immobilizer as well to help prevent any recurrence.    Diagnosis:    ICD-10-CM    1. Dislocation of right hip, initial encounter (H) S73.004A      Disposition:   The patient is discharged to home.    Scribe Disclosure:  I, Michaela Childs, am serving as a scribe at 5:42 PM on 2/9/2020 to document services personally performed by Jonas Springer MD based on my observations and the provider's statements to me.     EMERGENCY DEPARTMENT       Jonas Springer MD  02/09/20 2054

## 2020-02-10 NOTE — ED NOTES
Bed: ST02  Expected date:   Expected time:   Means of arrival:   Comments:  Hold for sedation for room 1

## 2020-02-19 ENCOUNTER — HOSPITAL ENCOUNTER (EMERGENCY)
Facility: CLINIC | Age: 85
Discharge: HOME | End: 2020-02-20
Attending: EMERGENCY MEDICINE | Admitting: EMERGENCY MEDICINE
Payer: MEDICARE

## 2020-02-19 DIAGNOSIS — S73.004A DISLOCATION OF RIGHT HIP, INITIAL ENCOUNTER (H): ICD-10-CM

## 2020-02-19 PROCEDURE — 99285 EMERGENCY DEPT VISIT HI MDM: CPT | Mod: 25

## 2020-02-19 NOTE — ED AVS SNAPSHOT
Emergency Department  [address] [facility] [address]  Phone:  [phone]  Fax:  [phone]                                    Mark Hernández   MRN: [id number]    Department:   Emergency Department   Date of Visit:  2/19/2020           After Visit Summary Signature Page    I have received my discharge instructions, and my questions have been answered. I have discussed any challenges I see with this plan with the nurse or doctor.    ..........................................................................................................................................  Patient/Patient Representative Signature      ..........................................................................................................................................  Patient Representative Print Name and Relationship to Patient    ..................................................               ................................................  Date                                   Time    ..........................................................................................................................................  Reviewed by Signature/Title    ...................................................              ..............................................  Date                                               Time          22EPIC Rev 08/18

## 2020-02-20 ENCOUNTER — APPOINTMENT (OUTPATIENT)
Dept: GENERAL RADIOLOGY | Facility: CLINIC | Age: 85
End: 2020-02-20
Attending: EMERGENCY MEDICINE
Payer: MEDICARE

## 2020-02-20 VITALS
DIASTOLIC BLOOD PRESSURE: 79 MMHG | BODY MASS INDEX: 29.92 KG/M2 | SYSTOLIC BLOOD PRESSURE: 137 MMHG | TEMPERATURE: 98 F | OXYGEN SATURATION: 100 % | RESPIRATION RATE: 18 BRPM | HEIGHT: 69 IN | HEART RATE: 68 BPM | WEIGHT: 202 LBS

## 2020-02-20 PROCEDURE — 99152 MOD SED SAME PHYS/QHP 5/>YRS: CPT

## 2020-02-20 PROCEDURE — 73502 X-RAY EXAM HIP UNI 2-3 VIEWS: CPT

## 2020-02-20 PROCEDURE — 40000986 XR HIP PORT RT 1 VW

## 2020-02-20 PROCEDURE — 27265 TREAT HIP DISLOCATION: CPT | Mod: RT

## 2020-02-20 PROCEDURE — 96360 HYDRATION IV INFUSION INIT: CPT

## 2020-02-20 PROCEDURE — 25000128 H RX IP 250 OP 636: Performed by: EMERGENCY MEDICINE

## 2020-02-20 PROCEDURE — 25800030 ZZH RX IP 258 OP 636: Performed by: EMERGENCY MEDICINE

## 2020-02-20 PROCEDURE — 96361 HYDRATE IV INFUSION ADD-ON: CPT | Mod: 59

## 2020-02-20 RX ORDER — MORPHINE SULFATE 4 MG/ML
4 INJECTION, SOLUTION INTRAMUSCULAR; INTRAVENOUS ONCE
Status: COMPLETED | OUTPATIENT
Start: 2020-02-20 | End: 2020-02-20

## 2020-02-20 RX ORDER — SODIUM CHLORIDE 9 MG/ML
INJECTION, SOLUTION INTRAVENOUS CONTINUOUS PRN
Status: DISCONTINUED | OUTPATIENT
Start: 2020-02-20 | End: 2020-02-20 | Stop reason: HOSPADM

## 2020-02-20 RX ORDER — PROPOFOL 10 MG/ML
INJECTION, EMULSION INTRAVENOUS
Status: DISCONTINUED
Start: 2020-02-20 | End: 2020-02-20 | Stop reason: HOSPADM

## 2020-02-20 RX ORDER — PROPOFOL 10 MG/ML
INJECTION, EMULSION INTRAVENOUS DAILY PRN
Status: DISCONTINUED | OUTPATIENT
Start: 2020-02-20 | End: 2020-02-20 | Stop reason: HOSPADM

## 2020-02-20 RX ORDER — MORPHINE SULFATE 4 MG/ML
INJECTION, SOLUTION INTRAMUSCULAR; INTRAVENOUS
Status: DISCONTINUED
Start: 2020-02-20 | End: 2020-02-20 | Stop reason: HOSPADM

## 2020-02-20 RX ADMIN — MORPHINE SULFATE 4 MG: 4 INJECTION INTRAVENOUS at 01:00

## 2020-02-20 RX ADMIN — PROPOFOL 80 MG: 10 INJECTION, EMULSION INTRAVENOUS at 01:47

## 2020-02-20 RX ADMIN — SODIUM CHLORIDE 100 ML/HR: 9 INJECTION, SOLUTION INTRAVENOUS at 01:47

## 2020-02-20 ASSESSMENT — ENCOUNTER SYMPTOMS
ARTHRALGIAS: 1
WEAKNESS: 0
NUMBNESS: 0
ABDOMINAL PAIN: 0

## 2020-02-20 ASSESSMENT — MIFFLIN-ST. JEOR: SCORE: 1586.65

## 2020-02-20 NOTE — ED NOTES
Bed: ED13  Expected date: 2/19/20  Expected time: 11:55 PM  Means of arrival: Ambulance  Comments:  Tomeka [address] 86M hip dislocation

## 2020-02-20 NOTE — ED PROVIDER NOTES
History     Chief Complaint:  Hip Pain      HPI   Mark Hernándze is a 86 year old male with a history of three recent hip dislocations and three hip arthoplasties who presents with hip pain. The patient's most recent hip dislocation was 11 days ago. He was instructed to wear his knee immobilizer but he was not wearing it tonight when he bent over on the toilet and his right hip dislocated. His hip replacements were done by Dr. Summers and he is attempting to follow up.     Allergies:  No Known Allergies     Medications:    Tylenol   Cymbalta  Cyanocobalamin  Ferrous gluconate   Gabapentin   Lisinopril  Metformin  Simvastatin     Past Medical History:    Acquired hydrocele  Bengin prostatic hyperplasia  Complete AV block  Diverticulosis  Hemorrhoids  Hyperlipidemia   Pacemaker   Neuropathy   Prostate cancer   Renal disease   Thrombocytopenia  Diabetes   Vitamin B12 deficiency   Prosthetic hip infection    Past Surgical History:    Arthoplasty hip x3  Tonsillectomy   Nephrectomy   Knee surgery   Rotator cuff repair      Family History:    No past pertinent family history.    Social History:  Smoking Status: former smoker   Smokeless Tobacco: Never Used  Alcohol Use: Yes  Drug Use: No  PCP: Rebecca Noyola  Marital Status:      Review of Systems   Gastrointestinal: Negative for abdominal pain.   Musculoskeletal: Positive for arthralgias (positive right hip pain).   Neurological: Negative for weakness and numbness.   All other systems reviewed and are negative.        Physical Exam     Patient Vitals for the past 24 hrs:   BP Temp Pulse Heart Rate Resp SpO2 Height Weight   02/20/20 0320 137/79 -- -- 70 18 100 % -- --   02/20/20 0245 (!) 146/73 -- 68 67 8 99 % -- --   02/20/20 0230 131/80 -- 74 79 (!) 52 98 % -- --   02/20/20 0215 (!) 150/115 -- 85 80 10 97 % -- --   02/20/20 0201 124/74 -- -- 70 9 94 % -- --   02/20/20 0200 115/64 -- 70 -- -- -- -- --   02/20/20 0155 -- -- -- 73 11 (!) 85 % -- --  "  02/20/20 0154 -- -- -- 68 12 100 % -- --   02/20/20 0153 -- -- -- 71 8 100 % -- --   02/20/20 0152 -- -- -- 71 8 99 % -- --   02/20/20 0150 122/64 -- -- 71 10 93 % -- --   02/20/20 0149 -- -- -- 71 8 96 % -- --   02/20/20 0148 -- -- -- 70 10 90 % -- --   02/20/20 0147 -- -- -- 70 10 98 % -- --   02/20/20 0146 -- -- -- 71 8 100 % -- --   02/20/20 0145 127/75 -- -- -- -- -- -- --   02/20/20 0144 -- -- -- 70 10 99 % -- --   02/20/20 0143 -- -- -- 76 28 100 % -- --   02/20/20 0142 -- -- -- 72 11 97 % -- --   02/20/20 0141 -- -- -- 73 9 (!) 85 % -- --   02/20/20 0140 (!) 144/102 -- -- 73 10 96 % -- --   02/20/20 0004 -- 98  F (36.7  C) -- -- -- -- -- --   02/20/20 0001 137/72 -- -- 75 18 99 % 1.753 m (5' 9\") 91.6 kg (202 lb)         Physical Exam  General: Appears well-developed and well-nourished.   Head: No signs of trauma.   CV: Normal rate and regular rhythm.    Resp: Effort normal and breath sounds normal. No respiratory distress.   GI: Soft. There is no tenderness.  No rebound or guarding.  Normal bowel sounds.    MSK: Pain and deformity to right hip. +neurovascularly intact distally.  Pain with ROM of hip.    Neuro: The patient is alert and oriented.  Strength in upper/lower extremities normal and symmetrical.   Sensation normal. Speech normal.  GCS 15  Skin: Skin is warm and dry. No rash noted.   Psych: normal mood and affect. behavior is normal.       Emergency Department Course     Imaging:  Radiology findings were communicated with the patient who voiced understanding of the findings.    XR Pelvis w Hip Right 1 View  Postoperative changes of right MATTY. There is complete dislocation of the arthroplasty with the femoral head dislocated superiorly and laterally relative to the acetabulum. Nothing definite for fracture. Hypertrophic changes about the right   hip arthroplasty. Mild degenerative changes left hip. Fusion hardware partially visualized in the lower lumbar spine. Remainder unremarkable.  Reading per " "radiology    XR Pelvis w Hip Right 1 View Portable  1. Interval reduction of what was a dislocated right hip arthroplasty.  2. No other significant interval change.   Reading per radiology    Procedures    Right Hip Reduction     SITE: Right Hip     PROCEDURE PROVIDER: Dr. Amaya     CONSENT: Risks (including but not limited to: decreased respirations, oxygen perfusion, aspiration, hypotension), benefits, and alternatives were discussed with patient and consent for procedure was obtained.     MONITORING: Monitoring consisted of heart rate, cardiac monitor, continuous pulse oximetry, continuous capnometry, frequent blood pressure checks, level of consciousness, IV access, constant attendance by RN until patient recovered, constant attendance by MD until patient stable and intubation and emergency airway management equipment available.     REDUCTION COMMENTS: The patient's right hip was held in traction and internally and externally rotated until a \"pop\" was felt. The patient's right hip then appeared reduced with improved alignment. Post reductions X-rays were obtained and showed good reduction.     PATIENT STATUS: Dislocation alignment improved post procedure and both sensation and circulation are intact. Vital signs remained stable, airway patent, and O2 saturations remained above 95%. Post-procedure, the patient was alert and responds to verbal stimuli. Patient was monitored during recovery and returned to pre-procedure baseline.        Sedation     Performed by: Dr. Amaya  Authorized by: Dr. Amaya    Pre-Procedure Assessment done at 0130.    Expected Level:  Procedural sedation    Indication:  Sedation is required to allow for joint reduction    Consent obtained from patient after discussing the risks, benefits and alternatives.    PO Intake:  Not NPO but emergent condition outweighs risk.    ASA Class:  Class 2 - MILD SYSTEMIC DISEASE, NO ACUTE PROBLEMS, NO FUNCTIONAL LIMITATIONS.    Mallampati:  " Grade 2:  Soft palate, base of uvula, tonsillar pillars, and portion of posterior pharyngeal wall visible    Lungs: Lungs Clear with good breath sounds bilaterally.     Heart: Normal heart sounds and rate    History and physical reviewed and no updates needed. I have reviewed the lab findings, diagnostic data, medications, and the plan for sedation. I have determined this patient to be an appropriate candidate for the planned sedation and procedure and have reassessed the patient IMMEDIATELY PRIOR to sedation and procedure.      Sedation Post Procedure Summary:    Prior to the start of the procedure and with procedural staff participation, I verbally confirmed the patient s identity using two indicators, relevant allergies, that the procedure was appropriate and matched the consent or emergent situation, and that the correct equipment/implants were available. Immediately prior to starting the procedure I conducted the Time Out with the procedural staff and re-confirmed the patient s name, procedure, and site/side. (The Joint Commission universal protocol was followed.)  Yes      Sedatives: Propofol and Morphine    Vital signs, airway, End Tidal CO2 and pulse oximetry were monitored and remained stable throughout the procedure and sedation was maintained until the procedure was complete.  The patient was monitored by staff until sedation discharge criteria were met.    Patient tolerance: Patient tolerated the procedure well with no immediate complications.    Time of sedation in minutes:  15 minutes from beginning to end of physician one to one monitoring.    Interventions:  0100 Morphine 4 mg IV    Emergency Department Course:  Nursing notes and vitals reviewed.    0010 I performed an exam of the patient as documented above.     The patient was sent for a XR pelvic w hip right while in the emergency department, results above.      0139 I preformed the procedure as documented above.     0232  I spoke with   Tara  of the Orthopedics service from [facility] regarding patient's presentation, findings, and plan of care.    0320 I returned to update the patient about their plan for discharge.     Findings and plan explained to the Patient. Patient discharged home with instructions regarding supportive care, medications, and reasons to return. The importance of close follow-up was reviewed.     Impression & Plan      Medical Decision Making:  Mark Hernández is a 86 year old male who presents to the emergency department today for evaluation of right hip pain.  He was moving on the toilet and felt a pop consistent similar to prior dislocations.  On my evaluation, he did have a deformity of the hip consistent with a hip dislocation.  X-ray did confirm this.  Patient had 2 prior hip dislocations over the last month and a half.  I did sedate the patient and was able to reduce the hip quite easily he was neurovascular intact distally following.  Given the patient is unable to manage with the knee immobilizer given his age and the impact it has on his mobility, I did speak with orthopedics.  We did discuss option for admission as, orthopedics may consider surgery, but it may take a day or 2 but also discussed that they may have some other braces that they could try.  I did discuss with the patient, but he much preferred to go home.  Did discuss positions that may put the hip at risk for dislocating once again and he was recommended to follow-up very closely with either Dr. De Los Santos or for any other orthopedic doctors for further evaluation and discussion of treatment going forward, and to return for any further concerns.    Diagnosis:    ICD-10-CM    1. Dislocation of right hip, initial encounter (H) S73.004A        Disposition:   The patient is discharged to home.      Scribe Disclosure:  Mariaa JOHNSON, am serving as a scribe at 12:31 AM on 2/20/2020 to document services personally performed  by Serg Amaya MD based on my observations and the provider's statements to me.      EMERGENCY DEPARTMENT       Serg Amaya MD  02/20/20 0809

## 2020-02-20 NOTE — ED TRIAGE NOTES
Pt was sitting on toilet in restroom, pt reported he moved and dislocated left hip. Pt given 100 mcg Fent in route.

## 2020-02-20 NOTE — SEDATION DOCUMENTATION
Pt tolerated procedure well, Given 80 Mg of Propofol IVP. Pt appropriately sedated with initial dose. Hip reduced into position by Dr. Amaya. Post reduction xray obtain. Hip verified to be back in joint by MD. Pt awakening to light stimuli. Pt talking to wife at bedside.

## 2020-02-27 ENCOUNTER — HOSPITAL LABORATORY (OUTPATIENT)
Dept: OTHER | Facility: CLINIC | Age: 85
End: 2020-02-27

## 2020-02-27 LAB
APPEARANCE FLD: NORMAL
COLOR FLD: NORMAL
EOSINOPHIL NFR FLD MANUAL: 1 %
GRAM STN SPEC: NORMAL
LYMPHOCYTES NFR FLD MANUAL: 65 %
MONOS+MACROS NFR FLD MANUAL: 34 %
SPECIMEN SOURCE FLD: NORMAL
SPECIMEN SOURCE: NORMAL
WBC # FLD AUTO: 1278 /UL

## 2020-03-03 LAB
BACTERIA SPEC CULT: NO GROWTH
SPECIMEN SOURCE: NORMAL

## 2020-03-12 LAB
BACTERIA SPEC CULT: NORMAL
Lab: NORMAL
SPECIMEN SOURCE: NORMAL

## 2020-04-06 ENCOUNTER — APPOINTMENT (OUTPATIENT)
Dept: GENERAL RADIOLOGY | Facility: CLINIC | Age: 85
End: 2020-04-06
Attending: EMERGENCY MEDICINE
Payer: MEDICARE

## 2020-04-06 ENCOUNTER — HOSPITAL ENCOUNTER (EMERGENCY)
Facility: CLINIC | Age: 85
Discharge: HOME | End: 2020-04-07
Attending: EMERGENCY MEDICINE | Admitting: EMERGENCY MEDICINE
Payer: MEDICARE

## 2020-04-06 VITALS
SYSTOLIC BLOOD PRESSURE: 107 MMHG | BODY MASS INDEX: 29.65 KG/M2 | TEMPERATURE: 99.6 F | OXYGEN SATURATION: 90 % | HEART RATE: 75 BPM | DIASTOLIC BLOOD PRESSURE: 61 MMHG | WEIGHT: 200.8 LBS | RESPIRATION RATE: 13 BRPM

## 2020-04-06 DIAGNOSIS — S73.004A CLOSED DISLOCATION OF RIGHT HIP, INITIAL ENCOUNTER (H): ICD-10-CM

## 2020-04-06 PROCEDURE — 40000275 ZZH STATISTIC RCP TIME EA 10 MIN

## 2020-04-06 PROCEDURE — 99285 EMERGENCY DEPT VISIT HI MDM: CPT | Mod: 25

## 2020-04-06 PROCEDURE — 99152 MOD SED SAME PHYS/QHP 5/>YRS: CPT

## 2020-04-06 PROCEDURE — 27265 TREAT HIP DISLOCATION: CPT | Mod: RT

## 2020-04-06 PROCEDURE — 25000128 H RX IP 250 OP 636: Performed by: EMERGENCY MEDICINE

## 2020-04-06 PROCEDURE — 40000986 XR HIP PORT RT 1 VW

## 2020-04-06 PROCEDURE — 96374 THER/PROPH/DIAG INJ IV PUSH: CPT | Mod: 59

## 2020-04-06 PROCEDURE — 73502 X-RAY EXAM HIP UNI 2-3 VIEWS: CPT

## 2020-04-06 RX ORDER — PROPOFOL 10 MG/ML
INJECTION, EMULSION INTRAVENOUS
Status: DISCONTINUED
Start: 2020-04-06 | End: 2020-04-06 | Stop reason: HOSPADM

## 2020-04-06 RX ORDER — NALOXONE HYDROCHLORIDE 0.4 MG/ML
.1-.4 INJECTION, SOLUTION INTRAMUSCULAR; INTRAVENOUS; SUBCUTANEOUS
Status: DISCONTINUED | OUTPATIENT
Start: 2020-04-06 | End: 2020-04-07 | Stop reason: HOSPADM

## 2020-04-06 RX ORDER — HYDROMORPHONE HYDROCHLORIDE 1 MG/ML
0.5 INJECTION, SOLUTION INTRAMUSCULAR; INTRAVENOUS; SUBCUTANEOUS ONCE
Status: COMPLETED | OUTPATIENT
Start: 2020-04-06 | End: 2020-04-06

## 2020-04-06 RX ORDER — PROPOFOL 10 MG/ML
1 INJECTION, EMULSION INTRAVENOUS ONCE
Status: DISCONTINUED | OUTPATIENT
Start: 2020-04-06 | End: 2020-04-07 | Stop reason: HOSPADM

## 2020-04-06 RX ORDER — PROPOFOL 10 MG/ML
INJECTION, EMULSION INTRAVENOUS DAILY PRN
Status: COMPLETED | OUTPATIENT
Start: 2020-04-06 | End: 2020-04-06

## 2020-04-06 RX ADMIN — PROPOFOL 20 MG: 10 INJECTION, EMULSION INTRAVENOUS at 22:09

## 2020-04-06 RX ADMIN — PROPOFOL 40 MG: 10 INJECTION, EMULSION INTRAVENOUS at 22:09

## 2020-04-06 RX ADMIN — PROPOFOL 20 MG: 10 INJECTION, EMULSION INTRAVENOUS at 22:10

## 2020-04-06 RX ADMIN — HYDROMORPHONE HYDROCHLORIDE 0.5 MG: 1 INJECTION, SOLUTION INTRAMUSCULAR; INTRAVENOUS; SUBCUTANEOUS at 20:49

## 2020-04-06 ASSESSMENT — ENCOUNTER SYMPTOMS
SHORTNESS OF BREATH: 0
ARTHRALGIAS: 1
COUGH: 0
ABDOMINAL PAIN: 0

## 2020-04-06 NOTE — ED AVS SNAPSHOT
Emergency Department  [address] [facility] [address]  Phone:  [phone]  Fax:  [phone]                                    Mark Hernández   MRN: [id number]    Department:   Emergency Department   Date of Visit:  4/6/2020           After Visit Summary Signature Page    I have received my discharge instructions, and my questions have been answered. I have discussed any challenges I see with this plan with the nurse or doctor.    ..........................................................................................................................................  Patient/Patient Representative Signature      ..........................................................................................................................................  Patient Representative Print Name and Relationship to Patient    ..................................................               ................................................  Date                                   Time    ..........................................................................................................................................  Reviewed by Signature/Title    ...................................................              ..............................................  Date                                               Time          22EPIC Rev 08/18

## 2020-04-07 NOTE — ED PROVIDER NOTES
History     Chief Complaint:  Right Hip Pain    HPI   Mark Hernández is a 86 year old male with a history of hip replacement, hip dislocation, Type 2 diabetes, hypertension, and prostate cancer who presents with right hip pain. He arrived via EMS. He was sitting in a chair and felt like his right hip popped out of place when he tried to get up. Notably, he has had 4 hip dislocations in the last 2 months; all successfully reduced in the emergency department; history of right total hip replacement. He denies any cough, abdominal pain, shortness of breath, or other injury/complaint. He currently lives at home with his wife and does not have any at home care.  Pain is currently moderate to severe, greatest in the right hip; worse with movement.    Allergies:  No known drug allergies     Medications:    Flomax  Metformin  Fergon  Zestril  Zocor  Cymbalta    Past Medical History:    Acquired hydrocele  Benign prostatic hyperplasia  Complete AV block  diverticulosis  Hemorrhoids  Hyperlipidemia  Hypertension  Malignant neoplasm of anterior wall of urinary bladder  Peripheral neuropathy  Prostate cancer  Renal disease  Sensorineural hearing loss of both ears  Thrombocytopenia  T2 diabetes  Vitamin B12 deficiency  Prosthetic hip infection    Past Surgical History:    Right hip arthroplasty  Back surgery  Tonsillectomy  Nephrectomy  Knee surgery  Release carpal tunnel  Revision total hip arthroplasty  Irrigation and debridement hip    Family History:    Myocardial infarction - father, sister  Rheumatoid arthritis - sister    Social History:  This patient presented alone.  Smoking status: quit 1973  Smokeless tobacco: never  Alcohol use: 2 g;lasses of wine daily  Drug use: no  Marital Status:   [2]     Review of Systems   Respiratory: Negative for cough and shortness of breath.    Gastrointestinal: Negative for abdominal pain.   Musculoskeletal: Positive for arthralgias.   All other systems reviewed and are  negative.      Physical Exam     Patient Vitals for the past 24 hrs:   BP Temp Temp src Pulse Heart Rate Resp SpO2 Weight   04/06/20 2238 107/61 -- -- 75 -- -- 90 % --   04/06/20 2231 -- -- -- -- 76 13 99 % --   04/06/20 2230 127/55 -- -- 77 74 26 99 % --   04/06/20 2218 -- -- -- -- -- -- 98 % --   04/06/20 2217 -- -- -- -- 75 -- 98 % --   04/06/20 2216 -- -- -- -- -- 16 100 % --   04/06/20 2216 -- -- -- -- 74 12 98 % --   04/06/20 2215 119/63 -- -- -- -- -- -- --   04/06/20 2213 -- -- -- -- 79 13 99 % --   04/06/20 2211 120/59 -- -- -- -- -- -- --   04/06/20 2209 -- -- -- -- 80 -- 100 % --   04/06/20 2205 116/84 -- -- 74 81 14 100 % --   04/06/20 2200 117/89 -- -- -- 75 -- 100 % --   04/06/20 2155 (!) 140/64 -- -- -- 72 16 100 % --   04/06/20 2150 126/61 -- -- 75 74 13 -- --   04/06/20 2148 -- -- -- -- 73 10 -- --   04/06/20 2145 127/69 -- -- -- 72 13 95 % --   04/06/20 2122 -- -- -- -- -- -- 94 % --   04/06/20 2102 -- -- -- -- -- -- 96 % --   04/06/20 2037 110/77 99.6  F (37.6  C) Oral 75 75 18 100 % 91.1 kg (200 lb 12.8 oz)       Physical Exam  General: Alert and cooperative with exam. Patient in moderate distress. Normal mentation.  Head:  Scalp is NC/AT  Eyes:  No scleral icterus, PERRL  ENT:  The external nose and ears are normal. The oropharynx is normal and without erythema; mucus membranes are moist.   Neck:  Normal range of motion without rigidity.  CV:  Regular rate and rhythm   Resp:  Breath sounds are clear bilaterally    Non-labored, no retractions or accessory muscle use  GI:  Abdomen is soft, no distension, no tenderness. No peritoneal signs  MS:  He has chronic dark discoloration to legs (baseline). RLE: CMS intact, bruising to R lateral hip, R leg shortened and internally rotated.  Skin:  Warm and dry, No rash or lesions noted.  Neuro: Oriented x 3. No gross motor deficits.    Emergency Department Course   Imaging:  Radiology findings were communicated with the patient who voiced understanding of  "the findings.    XR Hip Port Right 1 View   Preliminary Result   IMPRESSION: The right hip arthroplasty has been successfully reduced. No fracture.       XR Pelvis w Hip Right 1 View   Final Result   IMPRESSION: Right MATTY with longstem femoral component. The femoral component is dislocated superiorly and posterior to the acetabular component. Stable fracture of the greater tuberosity and heterotopic ossification. Degenerative changes in the left hip    and SI joints. Postoperative changes and degenerative changes lower lumbar spine. Osteopenia.            Procedures:    Reduction - dislocated right hip     SITE: Right hip     PROCEDURE PROVIDER: Dr. Hawley     CONSENT: Risks (including but not limited to: decreased respirations, oxygen perfusion, aspiration, hypotension), benefits, and alternatives were discussed with patient and consent for procedure was obtained.     MONITORING: Monitoring consisted of heart rate, cardiac monitor, continuous pulse oximetry, continuous capnometry, frequent blood pressure checks, level of consciousness, IV access, constant attendance by RN until patient recovered, constant attendance by MD until patient stable and intubation and emergency airway management equipment available.     REDUCTION COMMENTS: The patient's right leg was held in traction and internally and externally rotated until a \"pop\" was felt. The patient's hip then appeared reduced with improved alignment. Post reductions X-rays were obtained and showed good reduction.     PATIENT STATUS: Dislocation alignment improved post procedure and both sensation and circulation are intact. Vital signs remained stable, airway patent, and O2 saturations remained above 95%. Post-procedure, the patient was alert and responds to verbal stimuli. Patient was monitored during recovery and returned to pre-procedure baseline.      [facility] Procedure Note        Sedation     Performed by: Atif Hawley DO  Authorized by: " Atif Hawley DO    Pre-Procedure Assessment done at 2200.    Expected Level:  Moderate Sedation    Indication:  Sedation is required to allow for joint reduction    Consent obtained from patient after discussing the risks, benefits and alternatives.    PO Intake:  Appropriately NPO for procedure    ASA Class:  Class 2 - MILD SYSTEMIC DISEASE, NO ACUTE PROBLEMS, NO FUNCTIONAL LIMITATIONS.    Mallampati:  Grade 2:  Soft palate, base of uvula, tonsillar pillars, and portion of posterior pharyngeal wall visible    Lungs: Lungs Clear with good breath sounds bilaterally.     Heart: Normal heart sounds and rate    History and physical reviewed and no updates needed. I have reviewed the lab findings, diagnostic data, medications, and the plan for sedation. I have determined this patient to be an appropriate candidate for the planned sedation and procedure and have reassessed the patient IMMEDIATELY PRIOR to sedation and procedure.    Sedation Post Procedure Summary:    Prior to the start of the procedure and with procedural staff participation, I verbally confirmed the patient s identity using two indicators, relevant allergies, that the procedure was appropriate and matched the consent or emergent situation, and that the correct equipment/implants were available. Immediately prior to starting the procedure I conducted the Time Out with the procedural staff and re-confirmed the patient s name, procedure, and site/side. (The Joint Commission universal protocol was followed.)  Yes      Sedatives: Propofol; 80 mg total    Vital signs, airway, End Tidal CO2 and pulse oximetry were monitored and remained stable throughout the procedure and sedation was maintained until the procedure was complete.  The patient was monitored by staff until sedation discharge criteria were met.    Patient tolerance: Patient tolerated the procedure well with no immediate complications.    Time of sedation in minutes:  10 minutes from  beginning to end of physician one to one monitoring.      Interventions:  2049 Dilaudid 0.5mg IV    Emergency Department Course:  Past medical records, nursing notes, and vitals reviewed.  2036: I performed an exam of the patient and obtained history, as documented above.     The patient was sent for a pelvis and right hip XR while in the emergency department, results above.     2140: I rechecked the patient. Explained findings to patient.    2204: I performed a hip reduction as stated above.    2230: I rechecked the patient. Explained findings to patient.    2247: I discussed the patient with Dr. Bennett, of ortho.    2318: I rechecked the patient. I reviewed the results with the Patient and answered all related questions prior to discharge.     Findings and plan explained to the Patient. Patient discharged home with instructions regarding supportive care, medications, and reasons to return. The importance of close follow-up was reviewed.  Impression & Plan   Medical Decision Making:  Mark Hernández is a 86 year old male who presents for evaluation of right hip pain after attempting to get out of a chair earlier today.  This is consistent by clinical and radiological exam with a hip dislocation of a previous MATTY. The dislocation was successfully reduced and a knee immobilizer was placed, the fit was checked.  The neurovascular exam is normal pre and post-reduction.  I will have patient stay in the immobilizer until follow-up with orthopedics in 3-5 days.  The head to toe trauma exam is otherwise normal and I doubt serious underlying spinal, intracerebral, chest, abdominal, pelvic or extremity trauma.  Did discuss patient with orthopedics who agrees with plan.  Repeat xray shows no fracture.  Doubt sciatic nerve or other nerve injury, vascular injury, or other complication of dislocation.  Ambulated with KI on and did well.       Diagnosis:    ICD-10-CM    1. Closed dislocation of right hip, initial encounter  (H)  S73.004A        Disposition:  Discharged to home.    Mega Aguilera  4/6/2020    EMERGENCY DEPARTMENT    Scribe Disclosure:  I, Mega Aguilera, am serving as a scribe at 8:36 PM on 4/6/2020 to document services personally performed by Atif Hawley DO based on my observations and the provider's statements to me.          Atif Hawley DO  04/06/20 0322

## 2020-04-07 NOTE — PROGRESS NOTES
Conscious sedation done with patient. RT, RN , Physician present. Etc remained normal, Patient tolerated well with procedure. Patient is now alert and communication with RN, RT.     RT to follow up with patient.     Vince Ann, RT  4/6/2020

## 2020-04-07 NOTE — ED NOTES
Bed: ED26  Expected date: 4/6/20  Expected time: 8:20 PM  Means of arrival: Ambulance  Comments:  carl 534 86M hip dislocation; non covid

## 2020-04-08 ENCOUNTER — TRANSFERRED RECORDS (OUTPATIENT)
Dept: HEALTH INFORMATION MANAGEMENT | Facility: CLINIC | Age: 85
End: 2020-04-08

## 2020-04-26 ENCOUNTER — APPOINTMENT (OUTPATIENT)
Dept: GENERAL RADIOLOGY | Facility: CLINIC | Age: 85
End: 2020-04-26
Attending: EMERGENCY MEDICINE
Payer: MEDICARE

## 2020-04-26 ENCOUNTER — HOSPITAL ENCOUNTER (EMERGENCY)
Facility: CLINIC | Age: 85
Discharge: HOME | End: 2020-04-26
Attending: EMERGENCY MEDICINE | Admitting: EMERGENCY MEDICINE
Payer: MEDICARE

## 2020-04-26 VITALS
RESPIRATION RATE: 23 BRPM | SYSTOLIC BLOOD PRESSURE: 150 MMHG | DIASTOLIC BLOOD PRESSURE: 114 MMHG | HEIGHT: 68 IN | BODY MASS INDEX: 30.62 KG/M2 | HEART RATE: 69 BPM | TEMPERATURE: 98.4 F | OXYGEN SATURATION: 100 % | WEIGHT: 202 LBS

## 2020-04-26 DIAGNOSIS — S73.004A CLOSED DISLOCATION OF RIGHT HIP, INITIAL ENCOUNTER (H): ICD-10-CM

## 2020-04-26 PROCEDURE — 99285 EMERGENCY DEPT VISIT HI MDM: CPT | Mod: 25

## 2020-04-26 PROCEDURE — 25000128 H RX IP 250 OP 636: Performed by: EMERGENCY MEDICINE

## 2020-04-26 PROCEDURE — 40000986 XR HIP PORT RT 1 VW

## 2020-04-26 PROCEDURE — 73502 X-RAY EXAM HIP UNI 2-3 VIEWS: CPT

## 2020-04-26 PROCEDURE — 27265 TREAT HIP DISLOCATION: CPT | Mod: RT

## 2020-04-26 PROCEDURE — 96374 THER/PROPH/DIAG INJ IV PUSH: CPT | Mod: 59

## 2020-04-26 RX ORDER — PROPOFOL 10 MG/ML
50 INJECTION, EMULSION INTRAVENOUS ONCE
Status: COMPLETED | OUTPATIENT
Start: 2020-04-26 | End: 2020-04-26

## 2020-04-26 RX ORDER — PROPOFOL 10 MG/ML
90 INJECTION, EMULSION INTRAVENOUS ONCE
Status: COMPLETED | OUTPATIENT
Start: 2020-04-26 | End: 2020-04-26

## 2020-04-26 RX ADMIN — PROPOFOL 90 MG: 10 INJECTION, EMULSION INTRAVENOUS at 16:37

## 2020-04-26 RX ADMIN — PROPOFOL 50 MG: 10 INJECTION, EMULSION INTRAVENOUS at 16:39

## 2020-04-26 ASSESSMENT — MIFFLIN-ST. JEOR: SCORE: 1570.77

## 2020-04-26 NOTE — ED NOTES
Bed: ED04  Expected date:   Expected time:   Means of arrival:   Comments:  533  86 M hip dislocation/no covid concern  1901

## 2020-04-26 NOTE — ED TRIAGE NOTES
"Lives with wife, [place]. Reaching for toilet paper while sitting and felt R hip \"pop\". Denies fall/trauma. History of repeated dislocations. EMS gave fentanyl 100mcg total.  "

## 2020-04-26 NOTE — SEDATION DOCUMENTATION
Pt became apnic and required assistance ventilating for approximately 3 minutes. RN and RT bagged. Pt O2 sats remained % assistance and CO2 remained 30-33. MD was bedside with EDT. Pt is A&Ox4 now and breathing independently. VSS.

## 2020-04-26 NOTE — ED PROVIDER NOTES
"  History     Chief Complaint:  Hip Pain      The history is provided by the patient and medical records.      Mark Hernández is a 86 year old male, with a history of infection of prosthetic total hip joint, HTN, hyperlipidemia, DM2, and Acute kidney failure and on Plavix with a pacemaker in place, who presents by EMS with hip pain.  He was at home in a private residence that he shares with his wife just prior to arrival when he was on the toilet and was reaching for toilet paper when he felt his right hip \"pop\".  This feels just like his multiple prior hip dislocations.  This is now his fifth dislocation of the right hip in 2020.  His orthopedist is Dr. Summers with [facility].  He had been scheduled for surgical revision but this got postponed due to policies regarding elective procedures in light of the pandemic.  He had been feeling fine up until this sudden dislocation today.  He denies prior problems with anesthesia.  He was given 100 mcg of fentanyl in route with partial relief.  Prior dislocations in the [email or web address] system occurred on February 9, February 19, and April 6.      Allergies:  No known drug allergies     Medications:    Acetaminophen  Gabapentin  Metformin  Simvastatin  Lisinopril  Aspirin 325 mg  Lasix  Coreg    Past Medical History:    Acquired hydrocele  BPH  Complete AV block  Diverticulosis  Hemorrhoids  Hyperlipidemia  Hypertension   Infection of prosthetic total hip joint  Acute kidney failure  Pacemaker  Malignant neoplasm of anterior wall of urinary bladder  Peripheral neuropathy  Renal disease  Prostate cancer  Sensorineural hearing loss of both ears  Thrombocytopenia  DM2    Past Surgical History:    Arthroplasty revision hip, right x2  Back surgery  Tonsillectomy  Nephrectomy  I&D Hip right  Knee surgery  Release carpal tunnel, left  Rotator cuff syndrome    Family History:    MI, father at age 80  Rheumatoid arthritis, sister  MI, sister    Social History:  Smoking status: Former " "smoker PPD 1.00 for 47.3 yrs  Alcohol use: Yes 2 daily  Drug use: No  PCP: Rebecca Noyola  Marital Status:   [2]    Review of Systems   All other systems reviewed and are negative.    Physical Exam     Patient Vitals for the past 24 hrs:   BP Temp Temp src Pulse Heart Rate Resp SpO2 Height Weight   04/26/20 1705 (!) 147/87 -- -- 63 -- 15 100 % -- --   04/26/20 1655 131/77 -- -- 64 -- 10 98 % -- --   04/26/20 1650 133/82 -- -- -- 66 10 100 % -- --   04/26/20 1645 (!) 143/89 -- -- -- 100 12 100 % -- --   04/26/20 1640 (!) 143/89 -- -- 84 66 11 95 % -- --   04/26/20 1638 -- -- -- -- -- 14 -- -- --   04/26/20 1633 (!) 165/90 -- -- 69 -- 14 100 % -- --   04/26/20 1624 (!) 163/81 -- -- -- -- 25 100 % -- --   04/26/20 1623 -- -- -- -- -- -- 100 % -- --   04/26/20 1530 -- -- -- -- -- -- 95 % -- --   04/26/20 1515 -- -- -- -- -- -- 96 % -- --   04/26/20 1500 -- -- -- -- -- -- 94 % -- --   04/26/20 1445 -- -- -- -- -- -- 99 % -- --   04/26/20 1437 (!) 153/69 98.4  F (36.9  C) Oral 65 -- 20 95 % 1.727 m (5' 8\") 91.6 kg (202 lb)        Physical Exam  General: Uncomfortable appearing male semirecumbent in room 13  HENT: MMM, no signs of facial trauma, OP clear  CV:  regular rhythm, soft compartments, cap refill normal  Resp: normal effort, speaks in full phrases  GI: abdomen soft,  nontender  MSK:  Spine: nontender  Extremities: R hip with deformity and decreased ROM, held in partial flexion  Remainder of extremities nontender  Skin:   No abrasion  No laceration  Neuro: awake, alert, GCS 15, responds appropriately to commands, SILT all extremities  Psych: cooperative    Emergency Department Course     Imaging:  Radiology findings were communicated with the patient who voiced understanding of the findings.    XR Pelvis and Hip right:  Dislocation right hip arthroplasty, the prosthetic femoral head is dislocated superolaterally. No acute appearing fracture. Heterotopic bone adjacent to the right hip.     Mild " degenerative change left hip. Degenerative postoperative changes in the visualized lower lumbar spine.      Reading per radiology      XR Pelvis and Hip right Post reduction PORT:  Interval reduction of the right hip dislocation. There is normal alignment of the femoral and acetabular components, status post right total hip arthroplasty. No acute fracture is identified. Surrounding heterotopic ossification. Osteopenia.      Reading per radiology        [facility]    Procedure: Procedural sedation for closed reduction of R hip dislocation    Date/Time: 4/26/2020 3:10 PM  Performed by: David Mobley MD  Authorized by: David Mobley MD     UNIVERSAL PROTOCOL   Site Marked: No  Prior Images Obtained and Reviewed:  Yes  Required items: Required blood products, implants, devices and special equipment available    Patient identity confirmed:  Verbally with patient  Patient was reevaluated immediately before administering moderate or deep sedation or anesthesia  Confirmation Checklist:  Patient's identity using two indicators, relevant allergies, procedure was appropriate and matched the consent or emergent situation and correct equipment/implants were available  Time out: Immediately prior to the procedure a time out was called    Universal Protocol: the Joint Commission Universal Protocol was followed    Preparation: Patient was prepped and draped in usual sterile fashion          SEDATION    Patient Sedated: Yes    Sedation:  See MAR for details  Vital signs: Vital signs monitored during sedation    PROCEDURE Describe Procedure: Sedation was maintained until the procedure was complete. The patient was monitored by staff until recovered.  Patient complications:  Apnea  Respiratory Interventions:  Need for positive pressure ventilation   Patient experienced brief apnea, resulting in SaO2 88% for approximately 15 seconds.  BVM ventilation performed with jaw thrust, patient responded  "promptly.  Did not require oral airway or other measures.  No vomiting.  Length of time physician/provider present for 1:1 monitoring during sedation: 15      Reduction     SITE: Right Hip     PROCEDURE PROVIDER: Dr. David Mobley    CONSENT: Risks (including but not limited to: decreased respirations, oxygen perfusion, aspiration, hypotension), benefits, and alternatives were discussed with the patient and consent for procedure was obtained.     MONITORING: Monitoring consisted of heart rate, cardiac monitor, continuous pulse oximetry, continuous capnometry, frequent blood pressure checks, level of consciousness, IV access, constant attendance by RN until patient recovered, constant attendance by MD until patient stable and intubation and emergency airway management equipment available.     REDUCTION COMMENTS: The patient's right leg was held in traction and internally and externally rotated until a \"pop\" was felt. The patient's right hip then appeared reduced with improved alignment. Post reductions X-rays were obtained and showed good reduction.     PATIENT STATUS: Dislocation alignment improved post procedure and both sensation and circulation are intact. Vital signs remained stable, airway patent.  Brief hypoxia to 88% lasting about 15 seconds, required brief bagging due to apnea, responded promptly. Post-procedure, the patient was alert and responds to verbal stimuli. Patient was monitored during recovery and returned to pre-procedure baseline.        Interventions:  1637 Propofol, 90mg, IV  1639 Propofol, 50mg, IV    Emergency Department Course:   Nursing notes and vitals reviewed.    1435 I performed an exam of the patient as documented above.     1543 The patient was sent for a XR Pelvis and Hip Right while in the emergency department, results above.      1634 I performed a Hip reduction, see note above.     1651 The patient had a PORT XR Pelvis and Hip Right while in the emergency department, " results above.      1700 I spoke with Dr. Fox, on call for Ortho, who agrees with plan.    1737 I personally reviewed the results with the patient and answered all related questions prior to discharge.     Impression & Plan      Medical Decision Making:  He unfortunately suffered his fifth closed right hip dislocation today after very minor mechanism.  This was reduced under sedation as documented above.  I note that he had brief apnea and mild hypoxia after the second dose of propofol, though he responded well to basic airway maneuvers.  Subsequent x-rays confirmed good reduction.  The patient is understanding frustrated that his previously planned revision surgery of this right hip by Dr. Summers was postponed indefinitely due to the current pandemic, and I spoke with the on-call orthopedist who considers that the frequency of his dislocations is such that his revision can be deemed urgent, and may be able to proceed in the fairly near future given that the risks of recurrent dislocations and subsequent need for sedation and ER visits is not trivial.  However, he does not require hospitalization.  The patient is ambulatory and comfortable with this plan.  He declined a knee immobilizer, as he is done in the past, I made it clear that this does increase his risk of recurrent dislocations.  No signs of acute neurovascular compromise or additional injury.  He was discharged home in improved condition.    Diagnosis:    ICD-10-CM    1. Closed dislocation of right hip, initial encounter (H)  S73.004A        Disposition:   The patient is discharged to home.       Scribe Disclosure:  I, Vicky Whittaker, am serving as a scribe at 1435 on 4/26/2020 to document services personally performed by David Mobley MD based on my observations and the provider's statements to me.   EMERGENCY DEPARTMENT    This record was created at least in part using electronic voice recognition software, so please excuse any  typographical errors.       David Mobley MD  04/26/20 2013

## 2020-04-26 NOTE — ED AVS SNAPSHOT
Emergency Department  [address] [address]  Phone:  [phone]  Fax:  [phone]                                    Mark Hernández   MRN: [id number]    Department:   Emergency Department   Date of Visit:  4/26/2020           After Visit Summary Signature Page    I have received my discharge instructions, and my questions have been answered. I have discussed any challenges I see with this plan with the nurse or doctor.    ..........................................................................................................................................  Patient/Patient Representative Signature      ..........................................................................................................................................  Patient Representative Print Name and Relationship to Patient    ..................................................               ................................................  Date                                   Time    ..........................................................................................................................................  Reviewed by Signature/Title    ...................................................              ..............................................  Date                                               Time          22EPIC Rev 08/18

## 2020-05-04 ENCOUNTER — TRANSFERRED RECORDS (OUTPATIENT)
Dept: HEALTH INFORMATION MANAGEMENT | Facility: CLINIC | Age: 85
End: 2020-05-04

## 2020-05-04 DIAGNOSIS — Z11.59 ENCOUNTER FOR SCREENING FOR OTHER VIRAL DISEASES: Primary | ICD-10-CM

## 2020-05-04 LAB
CREAT SERPL-MCNC: 1.11 MG/DL (ref 0.73–1.18)
GFR SERPL CREATININE-BSD FRML MDRD: 60 ML/MIN/1.73M2
GLUCOSE SERPL-MCNC: 108 MG/DL (ref 70–100)
HBA1C MFR BLD: 6.6 % (ref 0–5.7)
POTASSIUM SERPL-SCNC: 4.8 MMOL/L (ref 3.5–5.1)

## 2020-05-11 ENCOUNTER — OFFICE VISIT (OUTPATIENT)
Dept: URGENT CARE | Facility: URGENT CARE | Age: 85
End: 2020-05-11
Attending: ORTHOPAEDIC SURGERY
Payer: MEDICARE

## 2020-05-11 DIAGNOSIS — Z11.59 ENCOUNTER FOR SCREENING FOR OTHER VIRAL DISEASES: ICD-10-CM

## 2020-05-11 LAB
SARS-COV-2 PCR COMMENT: NORMAL
SARS-COV-2 RNA SPEC QL NAA+PROBE: NEGATIVE
SARS-COV-2 RNA SPEC QL NAA+PROBE: NORMAL
SPECIMEN SOURCE: NORMAL
SPECIMEN SOURCE: NORMAL

## 2020-05-11 PROCEDURE — 99207 ZZC NO BILLABLE SERVICE THIS VISIT: CPT

## 2020-05-11 PROCEDURE — 87635 SARS-COV-2 COVID-19 AMP PRB: CPT | Performed by: ORTHOPAEDIC SURGERY

## 2020-05-12 ENCOUNTER — ANESTHESIA EVENT (OUTPATIENT)
Dept: SURGERY | Facility: CLINIC | Age: 85
DRG: 467 | End: 2020-05-12
Payer: MEDICARE

## 2020-05-12 RX ORDER — FERROUS SULFATE 325(65) MG
325 TABLET ORAL
COMMUNITY

## 2020-05-12 RX ORDER — ACETAMINOPHEN 500 MG
500-1000 TABLET ORAL EVERY 6 HOURS PRN
COMMUNITY
End: 2022-07-10

## 2020-05-12 RX ORDER — CARVEDILOL 12.5 MG/1
12.5 TABLET ORAL 2 TIMES DAILY WITH MEALS
COMMUNITY

## 2020-05-12 RX ORDER — FUROSEMIDE 20 MG
20 TABLET ORAL DAILY
COMMUNITY

## 2020-05-12 RX ORDER — MINOCYCLINE HYDROCHLORIDE 100 MG/1
100 CAPSULE ORAL 2 TIMES DAILY
Status: ON HOLD | COMMUNITY
End: 2020-05-30

## 2020-05-12 NOTE — CONSULTS
Plan  Is  To  Hopefully   rivise  The  Cup    And  Will  Use  A  Constrained hip   May  Require cables  And  Troch  Plate  If  Has  Enough bone  To   Hold  It   Is  At a  Big  Risk  For  instability   Due  To  Significant  Bone and  Soft tissue  deficiencies  Along  With  Inability  To    Adhere to  Necessary  Precautions.

## 2020-05-12 NOTE — PROGRESS NOTES
PTA medications updated by Medication Scribe day before surgery via phone call with patient      -LAST DOSES ENTERED BY NURSE-    Medication history sources: Patient, Surescripts and H&P  Medication history source reliability: Good  Adherence assessment: N/A Not Observed    Significant changes made to the medication list:  None      Additional medication history information:   Pt states he takes cymbalta for Neuropathy        Prior to Admission medications    Medication Sig Last Dose Taking? Auth Provider   acetaminophen (TYLENOL) 500 MG tablet Take 500-1,000 mg by mouth every 6 hours as needed for mild pain  at PRN Yes Reported, Patient   carvedilol (COREG) 12.5 MG tablet Take 12.5 mg by mouth 2 times daily (with meals)  Yes Reported, Patient   Coenzyme Q10 (COQ-10 PO) Take 1 tablet by mouth daily 5/12/2020 Yes Reported, Patient   CYANOCOBALAMIN PO Take 1,000 mcg by mouth every other day  5/12/2020 at AM Yes Unknown, Entered By History   DULoxetine (CYMBALTA) 30 MG capsule Take 30 mg by mouth daily Takes for neuropathy  at PM Yes Reported, Patient   ferrous sulfate (FEROSUL) 325 (65 Fe) MG tablet Take 325 mg by mouth daily (with breakfast)  Yes Reported, Patient   furosemide (LASIX) 20 MG tablet Take 20 mg by mouth daily  at AM Yes Reported, Patient   gabapentin (NEURONTIN) 800 MG tablet Take 1 tablet (800 mg) by mouth 2 times daily  Yes Sho Hines PA-C   lisinopril (ZESTRIL) 2.5 MG tablet Take 2.5 mg by mouth daily   at AM Yes Reported, Patient   metFORMIN (GLUCOPHAGE) 500 MG tablet Take 250 mg by mouth 2 times daily (with meals) (takes 0.5 x 500mg)  Yes Reported, Patient   minocycline (MINOCIN) 100 MG capsule Take 100 mg by mouth 2 times daily  Yes Reported, Patient   Omega-3 Fatty Acids (OMEGA 3 PO) Take 1 teaspoonful by mouth daily 5/10/2020 at AM Yes Reported, Patient   Probiotic Product (PROBIOTIC PO) Take 1 capsule by mouth daily  at AM Yes Reported, Patient   simvastatin (ZOCOR) 40 MG tablet  Take 20 mg by mouth At Bedtime (Takes 1/2 of a 40mg tablet)  at PM Yes Reported, Patient   tamsulosin (FLOMAX) 0.4 MG 24 hr capsule Take 0.8 mg by mouth At Bedtime (takes 2 x 0.4mg)  at PM Yes Reported, Patient   VITAMIN D PO Take 1 tablet by mouth 2 times daily  Yes Reported, Patient

## 2020-05-13 ENCOUNTER — HOSPITAL ENCOUNTER (INPATIENT)
Facility: CLINIC | Age: 85
LOS: 3 days | Discharge: HOME HEALTH | DRG: 467 | End: 2020-05-16
Attending: ORTHOPAEDIC SURGERY | Admitting: ORTHOPAEDIC SURGERY
Payer: MEDICARE

## 2020-05-13 ENCOUNTER — ANESTHESIA (OUTPATIENT)
Dept: SURGERY | Facility: CLINIC | Age: 85
DRG: 467 | End: 2020-05-13
Payer: MEDICARE

## 2020-05-13 ENCOUNTER — APPOINTMENT (OUTPATIENT)
Dept: GENERAL RADIOLOGY | Facility: CLINIC | Age: 85
DRG: 467 | End: 2020-05-13
Attending: ORTHOPAEDIC SURGERY
Payer: MEDICARE

## 2020-05-13 ENCOUNTER — APPOINTMENT (OUTPATIENT)
Dept: PHYSICAL THERAPY | Facility: CLINIC | Age: 85
DRG: 467 | End: 2020-05-13
Attending: ORTHOPAEDIC SURGERY
Payer: MEDICARE

## 2020-05-13 DIAGNOSIS — Z96.641 HISTORY OF REVISION OF TOTAL REPLACEMENT OF RIGHT HIP JOINT: Primary | ICD-10-CM

## 2020-05-13 LAB
BLD PROD TYP BPU: NORMAL
BLD PROD TYP BPU: NORMAL
BLD UNIT ID BPU: 0
BLD UNIT ID BPU: 0
BLOOD PRODUCT CODE: NORMAL
BLOOD PRODUCT CODE: NORMAL
BPU ID: NORMAL
BPU ID: NORMAL
ERYTHROCYTE [DISTWIDTH] IN BLOOD BY AUTOMATED COUNT: 13.5 % (ref 10–15)
GLUCOSE BLDC GLUCOMTR-MCNC: 121 MG/DL (ref 70–99)
GLUCOSE BLDC GLUCOMTR-MCNC: 141 MG/DL (ref 70–99)
GLUCOSE BLDC GLUCOMTR-MCNC: 170 MG/DL (ref 70–99)
GLUCOSE BLDC GLUCOMTR-MCNC: 175 MG/DL (ref 70–99)
GLUCOSE SERPL-MCNC: 135 MG/DL (ref 70–99)
HBA1C MFR BLD: 6.5 % (ref 0–5.6)
HCT VFR BLD AUTO: 34.4 % (ref 40–53)
HGB BLD-MCNC: 10.1 G/DL (ref 13.3–17.7)
HGB BLD-MCNC: 11.3 G/DL (ref 13.3–17.7)
MCH RBC QN AUTO: 32.1 PG (ref 26.5–33)
MCHC RBC AUTO-ENTMCNC: 32.8 G/DL (ref 31.5–36.5)
MCV RBC AUTO: 98 FL (ref 78–100)
PLATELET # BLD AUTO: 192 10E9/L (ref 150–450)
POTASSIUM SERPL-SCNC: 4.3 MMOL/L (ref 3.4–5.3)
RBC # BLD AUTO: 3.52 10E12/L (ref 4.4–5.9)
TRANSFUSION STATUS PATIENT QL: NORMAL
WBC # BLD AUTO: 6 10E9/L (ref 4–11)

## 2020-05-13 PROCEDURE — 25000131 ZZH RX MED GY IP 250 OP 636 PS 637: Mod: GY | Performed by: PHYSICIAN ASSISTANT

## 2020-05-13 PROCEDURE — 25800030 ZZH RX IP 258 OP 636: Performed by: SURGERY

## 2020-05-13 PROCEDURE — 87075 CULTR BACTERIA EXCEPT BLOOD: CPT | Performed by: ORTHOPAEDIC SURGERY

## 2020-05-13 PROCEDURE — 36415 COLL VENOUS BLD VENIPUNCTURE: CPT | Performed by: ORTHOPAEDIC SURGERY

## 2020-05-13 PROCEDURE — 71000014 ZZH RECOVERY PHASE 1 LEVEL 2 FIRST HR: Performed by: ORTHOPAEDIC SURGERY

## 2020-05-13 PROCEDURE — 84132 ASSAY OF SERUM POTASSIUM: CPT | Performed by: ORTHOPAEDIC SURGERY

## 2020-05-13 PROCEDURE — 40000985 XR PELVIS PORT 1/2 VW

## 2020-05-13 PROCEDURE — 86900 BLOOD TYPING SEROLOGIC ABO: CPT | Performed by: ORTHOPAEDIC SURGERY

## 2020-05-13 PROCEDURE — 25000132 ZZH RX MED GY IP 250 OP 250 PS 637: Mod: GY | Performed by: ORTHOPAEDIC SURGERY

## 2020-05-13 PROCEDURE — 86923 COMPATIBILITY TEST ELECTRIC: CPT | Performed by: ORTHOPAEDIC SURGERY

## 2020-05-13 PROCEDURE — 0SRA0JA REPLACEMENT OF RIGHT HIP JOINT, ACETABULAR SURFACE WITH SYNTHETIC SUBSTITUTE, UNCEMENTED, OPEN APPROACH: ICD-10-PCS | Performed by: ORTHOPAEDIC SURGERY

## 2020-05-13 PROCEDURE — 25800030 ZZH RX IP 258 OP 636: Performed by: ORTHOPAEDIC SURGERY

## 2020-05-13 PROCEDURE — C1776 JOINT DEVICE (IMPLANTABLE): HCPCS | Performed by: ORTHOPAEDIC SURGERY

## 2020-05-13 PROCEDURE — 00000146 ZZHCL STATISTIC GLUCOSE BY METER IP

## 2020-05-13 PROCEDURE — 25000132 ZZH RX MED GY IP 250 OP 250 PS 637: Mod: GY | Performed by: PHYSICIAN ASSISTANT

## 2020-05-13 PROCEDURE — 97530 THERAPEUTIC ACTIVITIES: CPT | Mod: GP

## 2020-05-13 PROCEDURE — 82947 ASSAY GLUCOSE BLOOD QUANT: CPT | Performed by: ORTHOPAEDIC SURGERY

## 2020-05-13 PROCEDURE — 40000170 ZZH STATISTIC PRE-PROCEDURE ASSESSMENT II: Performed by: ORTHOPAEDIC SURGERY

## 2020-05-13 PROCEDURE — 83036 HEMOGLOBIN GLYCOSYLATED A1C: CPT | Performed by: ORTHOPAEDIC SURGERY

## 2020-05-13 PROCEDURE — 27210794 ZZH OR GENERAL SUPPLY STERILE: Performed by: ORTHOPAEDIC SURGERY

## 2020-05-13 PROCEDURE — 0SPA0JZ REMOVAL OF SYNTHETIC SUBSTITUTE FROM RIGHT HIP JOINT, ACETABULAR SURFACE, OPEN APPROACH: ICD-10-PCS | Performed by: ORTHOPAEDIC SURGERY

## 2020-05-13 PROCEDURE — 99207 ZZC CONSULT E&M CHANGED TO INITIAL LEVEL: CPT | Performed by: PHYSICIAN ASSISTANT

## 2020-05-13 PROCEDURE — 25000128 H RX IP 250 OP 636: Performed by: ANESTHESIOLOGY

## 2020-05-13 PROCEDURE — 37000008 ZZH ANESTHESIA TECHNICAL FEE, 1ST 30 MIN: Performed by: ORTHOPAEDIC SURGERY

## 2020-05-13 PROCEDURE — 36000069 ZZH SURGERY LEVEL 5 EA 15 ADDTL MIN: Performed by: ORTHOPAEDIC SURGERY

## 2020-05-13 PROCEDURE — 25800025 ZZH RX 258: Performed by: ORTHOPAEDIC SURGERY

## 2020-05-13 PROCEDURE — 25000128 H RX IP 250 OP 636: Performed by: NURSE ANESTHETIST, CERTIFIED REGISTERED

## 2020-05-13 PROCEDURE — 12000000 ZZH R&B MED SURG/OB

## 2020-05-13 PROCEDURE — 85018 HEMOGLOBIN: CPT | Performed by: ORTHOPAEDIC SURGERY

## 2020-05-13 PROCEDURE — 87070 CULTURE OTHR SPECIMN AEROBIC: CPT | Performed by: ORTHOPAEDIC SURGERY

## 2020-05-13 PROCEDURE — 87176 TISSUE HOMOGENIZATION CULTR: CPT | Performed by: ORTHOPAEDIC SURGERY

## 2020-05-13 PROCEDURE — 85027 COMPLETE CBC AUTOMATED: CPT | Performed by: ORTHOPAEDIC SURGERY

## 2020-05-13 PROCEDURE — 36000067 ZZH SURGERY LEVEL 5 1ST 30 MIN: Performed by: ORTHOPAEDIC SURGERY

## 2020-05-13 PROCEDURE — 25000128 H RX IP 250 OP 636: Performed by: ORTHOPAEDIC SURGERY

## 2020-05-13 PROCEDURE — 71000015 ZZH RECOVERY PHASE 1 LEVEL 2 EA ADDTL HR: Performed by: ORTHOPAEDIC SURGERY

## 2020-05-13 PROCEDURE — 25000125 ZZHC RX 250: Performed by: ORTHOPAEDIC SURGERY

## 2020-05-13 PROCEDURE — 86901 BLOOD TYPING SEROLOGIC RH(D): CPT | Performed by: ORTHOPAEDIC SURGERY

## 2020-05-13 PROCEDURE — 25800030 ZZH RX IP 258 OP 636: Performed by: NURSE ANESTHETIST, CERTIFIED REGISTERED

## 2020-05-13 PROCEDURE — 97161 PT EVAL LOW COMPLEX 20 MIN: CPT | Mod: GP

## 2020-05-13 PROCEDURE — 99222 1ST HOSP IP/OBS MODERATE 55: CPT | Performed by: PHYSICIAN ASSISTANT

## 2020-05-13 PROCEDURE — 25000125 ZZHC RX 250: Performed by: NURSE ANESTHETIST, CERTIFIED REGISTERED

## 2020-05-13 PROCEDURE — P9041 ALBUMIN (HUMAN),5%, 50ML: HCPCS | Performed by: NURSE ANESTHETIST, CERTIFIED REGISTERED

## 2020-05-13 PROCEDURE — 25000566 ZZH SEVOFLURANE, EA 15 MIN: Performed by: ORTHOPAEDIC SURGERY

## 2020-05-13 PROCEDURE — 37000009 ZZH ANESTHESIA TECHNICAL FEE, EACH ADDTL 15 MIN: Performed by: ORTHOPAEDIC SURGERY

## 2020-05-13 PROCEDURE — 86850 RBC ANTIBODY SCREEN: CPT | Performed by: ORTHOPAEDIC SURGERY

## 2020-05-13 DEVICE — ACETABULAR INSERT
Type: IMPLANTABLE DEVICE | Site: HIP | Status: FUNCTIONAL
Brand: ACETABULAR INSERT

## 2020-05-13 RX ORDER — ONDANSETRON 2 MG/ML
4 INJECTION INTRAMUSCULAR; INTRAVENOUS EVERY 30 MIN PRN
Status: DISCONTINUED | OUTPATIENT
Start: 2020-05-13 | End: 2020-05-13 | Stop reason: HOSPADM

## 2020-05-13 RX ORDER — MAGNESIUM HYDROXIDE 1200 MG/15ML
LIQUID ORAL PRN
Status: DISCONTINUED | OUTPATIENT
Start: 2020-05-13 | End: 2020-05-13 | Stop reason: HOSPADM

## 2020-05-13 RX ORDER — LIDOCAINE HYDROCHLORIDE 20 MG/ML
INJECTION, SOLUTION INFILTRATION; PERINEURAL PRN
Status: DISCONTINUED | OUTPATIENT
Start: 2020-05-13 | End: 2020-05-13

## 2020-05-13 RX ORDER — CEFAZOLIN SODIUM 1 G/3ML
1 INJECTION, POWDER, FOR SOLUTION INTRAMUSCULAR; INTRAVENOUS EVERY 8 HOURS
Status: COMPLETED | OUTPATIENT
Start: 2020-05-13 | End: 2020-05-15

## 2020-05-13 RX ORDER — CARVEDILOL 6.25 MG/1
12.5 TABLET ORAL 2 TIMES DAILY WITH MEALS
Status: DISCONTINUED | OUTPATIENT
Start: 2020-05-13 | End: 2020-05-16 | Stop reason: HOSPADM

## 2020-05-13 RX ORDER — TRANEXAMIC ACID 10 MG/ML
1 INJECTION, SOLUTION INTRAVENOUS ONCE
Status: COMPLETED | OUTPATIENT
Start: 2020-05-13 | End: 2020-05-13

## 2020-05-13 RX ORDER — CEFAZOLIN SODIUM 1 G/3ML
1 INJECTION, POWDER, FOR SOLUTION INTRAMUSCULAR; INTRAVENOUS SEE ADMIN INSTRUCTIONS
Status: DISCONTINUED | OUTPATIENT
Start: 2020-05-13 | End: 2020-05-13 | Stop reason: HOSPADM

## 2020-05-13 RX ORDER — TAMSULOSIN HYDROCHLORIDE 0.4 MG/1
0.8 CAPSULE ORAL AT BEDTIME
Status: DISCONTINUED | OUTPATIENT
Start: 2020-05-13 | End: 2020-05-16 | Stop reason: HOSPADM

## 2020-05-13 RX ORDER — DULOXETIN HYDROCHLORIDE 30 MG/1
30 CAPSULE, DELAYED RELEASE ORAL DAILY
Status: DISCONTINUED | OUTPATIENT
Start: 2020-05-14 | End: 2020-05-16 | Stop reason: HOSPADM

## 2020-05-13 RX ORDER — CEFAZOLIN SODIUM 1 G/3ML
INJECTION, POWDER, FOR SOLUTION INTRAMUSCULAR; INTRAVENOUS PRN
Status: DISCONTINUED | OUTPATIENT
Start: 2020-05-13 | End: 2020-05-13

## 2020-05-13 RX ORDER — NALOXONE HYDROCHLORIDE 0.4 MG/ML
.1-.4 INJECTION, SOLUTION INTRAMUSCULAR; INTRAVENOUS; SUBCUTANEOUS
Status: DISCONTINUED | OUTPATIENT
Start: 2020-05-13 | End: 2020-05-13

## 2020-05-13 RX ORDER — ACETAMINOPHEN 325 MG/1
975 TABLET ORAL EVERY 8 HOURS
Status: COMPLETED | OUTPATIENT
Start: 2020-05-13 | End: 2020-05-16

## 2020-05-13 RX ORDER — SODIUM CHLORIDE, SODIUM LACTATE, POTASSIUM CHLORIDE, CALCIUM CHLORIDE 600; 310; 30; 20 MG/100ML; MG/100ML; MG/100ML; MG/100ML
INJECTION, SOLUTION INTRAVENOUS CONTINUOUS PRN
Status: DISCONTINUED | OUTPATIENT
Start: 2020-05-13 | End: 2020-05-13

## 2020-05-13 RX ORDER — HYDROXYZINE HYDROCHLORIDE 25 MG/1
25 TABLET, FILM COATED ORAL EVERY 6 HOURS PRN
Status: DISCONTINUED | OUTPATIENT
Start: 2020-05-13 | End: 2020-05-16 | Stop reason: HOSPADM

## 2020-05-13 RX ORDER — HYDROMORPHONE HYDROCHLORIDE 2 MG/1
2-4 TABLET ORAL
Status: DISCONTINUED | OUTPATIENT
Start: 2020-05-13 | End: 2020-05-14

## 2020-05-13 RX ORDER — POLYETHYLENE GLYCOL 3350 17 G/17G
17 POWDER, FOR SOLUTION ORAL DAILY
Status: DISCONTINUED | OUTPATIENT
Start: 2020-05-13 | End: 2020-05-16 | Stop reason: HOSPADM

## 2020-05-13 RX ORDER — DEXAMETHASONE SODIUM PHOSPHATE 4 MG/ML
INJECTION, SOLUTION INTRA-ARTICULAR; INTRALESIONAL; INTRAMUSCULAR; INTRAVENOUS; SOFT TISSUE PRN
Status: DISCONTINUED | OUTPATIENT
Start: 2020-05-13 | End: 2020-05-13

## 2020-05-13 RX ORDER — BUPIVACAINE HYDROCHLORIDE 2.5 MG/ML
INJECTION, SOLUTION INFILTRATION; PERINEURAL PRN
Status: DISCONTINUED | OUTPATIENT
Start: 2020-05-13 | End: 2020-05-13 | Stop reason: HOSPADM

## 2020-05-13 RX ORDER — PROPOFOL 10 MG/ML
INJECTION, EMULSION INTRAVENOUS PRN
Status: DISCONTINUED | OUTPATIENT
Start: 2020-05-13 | End: 2020-05-13

## 2020-05-13 RX ORDER — CEFAZOLIN SODIUM 2 G/100ML
2 INJECTION, SOLUTION INTRAVENOUS
Status: COMPLETED | OUTPATIENT
Start: 2020-05-13 | End: 2020-05-13

## 2020-05-13 RX ORDER — LISINOPRIL 2.5 MG/1
2.5 TABLET ORAL DAILY
Status: DISCONTINUED | OUTPATIENT
Start: 2020-05-14 | End: 2020-05-16 | Stop reason: HOSPADM

## 2020-05-13 RX ORDER — PROCHLORPERAZINE MALEATE 5 MG
5 TABLET ORAL EVERY 6 HOURS PRN
Status: DISCONTINUED | OUTPATIENT
Start: 2020-05-13 | End: 2020-05-16 | Stop reason: HOSPADM

## 2020-05-13 RX ORDER — SODIUM CHLORIDE, SODIUM LACTATE, POTASSIUM CHLORIDE, CALCIUM CHLORIDE 600; 310; 30; 20 MG/100ML; MG/100ML; MG/100ML; MG/100ML
INJECTION, SOLUTION INTRAVENOUS CONTINUOUS
Status: DISCONTINUED | OUTPATIENT
Start: 2020-05-13 | End: 2020-05-13 | Stop reason: HOSPADM

## 2020-05-13 RX ORDER — ONDANSETRON 2 MG/ML
INJECTION INTRAMUSCULAR; INTRAVENOUS PRN
Status: DISCONTINUED | OUTPATIENT
Start: 2020-05-13 | End: 2020-05-13

## 2020-05-13 RX ORDER — VANCOMYCIN HYDROCHLORIDE 1 G/20ML
INJECTION, POWDER, LYOPHILIZED, FOR SOLUTION INTRAVENOUS PRN
Status: DISCONTINUED | OUTPATIENT
Start: 2020-05-13 | End: 2020-05-13 | Stop reason: HOSPADM

## 2020-05-13 RX ORDER — FUROSEMIDE 20 MG
20 TABLET ORAL DAILY
Status: DISCONTINUED | OUTPATIENT
Start: 2020-05-13 | End: 2020-05-13

## 2020-05-13 RX ORDER — NICOTINE POLACRILEX 4 MG
15-30 LOZENGE BUCCAL
Status: DISCONTINUED | OUTPATIENT
Start: 2020-05-13 | End: 2020-05-16 | Stop reason: HOSPADM

## 2020-05-13 RX ORDER — HYDROMORPHONE HYDROCHLORIDE 1 MG/ML
.3-.5 INJECTION, SOLUTION INTRAMUSCULAR; INTRAVENOUS; SUBCUTANEOUS
Status: DISCONTINUED | OUTPATIENT
Start: 2020-05-13 | End: 2020-05-16 | Stop reason: HOSPADM

## 2020-05-13 RX ORDER — NEOSTIGMINE METHYLSULFATE 1 MG/ML
VIAL (ML) INJECTION PRN
Status: DISCONTINUED | OUTPATIENT
Start: 2020-05-13 | End: 2020-05-13

## 2020-05-13 RX ORDER — GABAPENTIN 800 MG/1
800 TABLET ORAL 2 TIMES DAILY
Status: DISCONTINUED | OUTPATIENT
Start: 2020-05-13 | End: 2020-05-16 | Stop reason: HOSPADM

## 2020-05-13 RX ORDER — ONDANSETRON 4 MG/1
4 TABLET, ORALLY DISINTEGRATING ORAL EVERY 30 MIN PRN
Status: DISCONTINUED | OUTPATIENT
Start: 2020-05-13 | End: 2020-05-13 | Stop reason: HOSPADM

## 2020-05-13 RX ORDER — CEFAZOLIN SODIUM 1 G/3ML
1 INJECTION, POWDER, FOR SOLUTION INTRAMUSCULAR; INTRAVENOUS EVERY 8 HOURS
Status: DISCONTINUED | OUTPATIENT
Start: 2020-05-13 | End: 2020-05-13

## 2020-05-13 RX ORDER — HYDRALAZINE HYDROCHLORIDE 20 MG/ML
10 INJECTION INTRAMUSCULAR; INTRAVENOUS EVERY 4 HOURS PRN
Status: DISCONTINUED | OUTPATIENT
Start: 2020-05-13 | End: 2020-05-16 | Stop reason: HOSPADM

## 2020-05-13 RX ORDER — AMOXICILLIN 250 MG
2 CAPSULE ORAL 2 TIMES DAILY
Status: DISCONTINUED | OUTPATIENT
Start: 2020-05-13 | End: 2020-05-16 | Stop reason: HOSPADM

## 2020-05-13 RX ORDER — SODIUM CHLORIDE 9 MG/ML
INJECTION, SOLUTION INTRAVENOUS CONTINUOUS
Status: DISCONTINUED | OUTPATIENT
Start: 2020-05-13 | End: 2020-05-14

## 2020-05-13 RX ORDER — FENTANYL CITRATE 50 UG/ML
25-50 INJECTION, SOLUTION INTRAMUSCULAR; INTRAVENOUS
Status: DISCONTINUED | OUTPATIENT
Start: 2020-05-13 | End: 2020-05-13 | Stop reason: HOSPADM

## 2020-05-13 RX ORDER — GLYCOPYRROLATE 0.2 MG/ML
INJECTION, SOLUTION INTRAMUSCULAR; INTRAVENOUS PRN
Status: DISCONTINUED | OUTPATIENT
Start: 2020-05-13 | End: 2020-05-13

## 2020-05-13 RX ORDER — ONDANSETRON 2 MG/ML
4 INJECTION INTRAMUSCULAR; INTRAVENOUS EVERY 6 HOURS PRN
Status: DISCONTINUED | OUTPATIENT
Start: 2020-05-13 | End: 2020-05-16 | Stop reason: HOSPADM

## 2020-05-13 RX ORDER — BUPIVACAINE HYDROCHLORIDE AND EPINEPHRINE 2.5; 5 MG/ML; UG/ML
INJECTION, SOLUTION INFILTRATION; PERINEURAL PRN
Status: DISCONTINUED | OUTPATIENT
Start: 2020-05-13 | End: 2020-05-13 | Stop reason: HOSPADM

## 2020-05-13 RX ORDER — ONDANSETRON 4 MG/1
4 TABLET, ORALLY DISINTEGRATING ORAL EVERY 6 HOURS PRN
Status: DISCONTINUED | OUTPATIENT
Start: 2020-05-13 | End: 2020-05-16 | Stop reason: HOSPADM

## 2020-05-13 RX ORDER — ACETAMINOPHEN 500 MG
500-1000 TABLET ORAL EVERY 6 HOURS PRN
Status: DISCONTINUED | OUTPATIENT
Start: 2020-05-13 | End: 2020-05-13

## 2020-05-13 RX ORDER — HYDROMORPHONE HYDROCHLORIDE 1 MG/ML
0.2 INJECTION, SOLUTION INTRAMUSCULAR; INTRAVENOUS; SUBCUTANEOUS
Status: DISCONTINUED | OUTPATIENT
Start: 2020-05-13 | End: 2020-05-13

## 2020-05-13 RX ORDER — NALOXONE HYDROCHLORIDE 0.4 MG/ML
.1-.4 INJECTION, SOLUTION INTRAMUSCULAR; INTRAVENOUS; SUBCUTANEOUS
Status: DISCONTINUED | OUTPATIENT
Start: 2020-05-13 | End: 2020-05-16 | Stop reason: HOSPADM

## 2020-05-13 RX ORDER — ALBUMIN, HUMAN INJ 5% 5 %
SOLUTION INTRAVENOUS CONTINUOUS PRN
Status: DISCONTINUED | OUTPATIENT
Start: 2020-05-13 | End: 2020-05-13

## 2020-05-13 RX ORDER — HYDROMORPHONE HYDROCHLORIDE 1 MG/ML
.3-.5 INJECTION, SOLUTION INTRAMUSCULAR; INTRAVENOUS; SUBCUTANEOUS EVERY 5 MIN PRN
Status: DISCONTINUED | OUTPATIENT
Start: 2020-05-13 | End: 2020-05-13 | Stop reason: HOSPADM

## 2020-05-13 RX ORDER — SIMVASTATIN 20 MG
20 TABLET ORAL AT BEDTIME
Status: DISCONTINUED | OUTPATIENT
Start: 2020-05-13 | End: 2020-05-16 | Stop reason: HOSPADM

## 2020-05-13 RX ORDER — FERROUS SULFATE 325(65) MG
325 TABLET ORAL
Status: DISCONTINUED | OUTPATIENT
Start: 2020-05-14 | End: 2020-05-16 | Stop reason: HOSPADM

## 2020-05-13 RX ORDER — DEXTROSE MONOHYDRATE 25 G/50ML
25-50 INJECTION, SOLUTION INTRAVENOUS
Status: DISCONTINUED | OUTPATIENT
Start: 2020-05-13 | End: 2020-05-16 | Stop reason: HOSPADM

## 2020-05-13 RX ORDER — LIDOCAINE 40 MG/G
CREAM TOPICAL
Status: DISCONTINUED | OUTPATIENT
Start: 2020-05-13 | End: 2020-05-16 | Stop reason: HOSPADM

## 2020-05-13 RX ORDER — ACETAMINOPHEN 325 MG/1
650 TABLET ORAL EVERY 4 HOURS PRN
Status: DISCONTINUED | OUTPATIENT
Start: 2020-05-16 | End: 2020-05-16 | Stop reason: HOSPADM

## 2020-05-13 RX ORDER — BISACODYL 10 MG
10 SUPPOSITORY, RECTAL RECTAL DAILY PRN
Status: DISCONTINUED | OUTPATIENT
Start: 2020-05-13 | End: 2020-05-16 | Stop reason: HOSPADM

## 2020-05-13 RX ORDER — FENTANYL CITRATE 50 UG/ML
INJECTION, SOLUTION INTRAMUSCULAR; INTRAVENOUS PRN
Status: DISCONTINUED | OUTPATIENT
Start: 2020-05-13 | End: 2020-05-13

## 2020-05-13 RX ADMIN — ONDANSETRON 4 MG: 2 INJECTION INTRAMUSCULAR; INTRAVENOUS at 09:18

## 2020-05-13 RX ADMIN — CEFAZOLIN 1 G: 330 INJECTION, POWDER, FOR SOLUTION INTRAMUSCULAR; INTRAVENOUS at 17:28

## 2020-05-13 RX ADMIN — ACETAMINOPHEN 975 MG: 325 TABLET, FILM COATED ORAL at 14:14

## 2020-05-13 RX ADMIN — PROPOFOL 70 MG: 10 INJECTION, EMULSION INTRAVENOUS at 07:33

## 2020-05-13 RX ADMIN — INSULIN ASPART 1 UNITS: 100 INJECTION, SOLUTION INTRAVENOUS; SUBCUTANEOUS at 18:11

## 2020-05-13 RX ADMIN — NEOSTIGMINE METHYLSULFATE 4.5 MG: 1 INJECTION, SOLUTION INTRAVENOUS at 09:42

## 2020-05-13 RX ADMIN — DEXAMETHASONE SODIUM PHOSPHATE 4 MG: 4 INJECTION, SOLUTION INTRA-ARTICULAR; INTRALESIONAL; INTRAMUSCULAR; INTRAVENOUS; SOFT TISSUE at 08:09

## 2020-05-13 RX ADMIN — TRANEXAMIC ACID 1 G: 10 INJECTION, SOLUTION INTRAVENOUS at 07:54

## 2020-05-13 RX ADMIN — SODIUM CHLORIDE, POTASSIUM CHLORIDE, SODIUM LACTATE AND CALCIUM CHLORIDE: 600; 310; 30; 20 INJECTION, SOLUTION INTRAVENOUS at 07:20

## 2020-05-13 RX ADMIN — SODIUM CHLORIDE, POTASSIUM CHLORIDE, SODIUM LACTATE AND CALCIUM CHLORIDE: 600; 310; 30; 20 INJECTION, SOLUTION INTRAVENOUS at 09:28

## 2020-05-13 RX ADMIN — GABAPENTIN 800 MG: 800 TABLET, FILM COATED ORAL at 20:29

## 2020-05-13 RX ADMIN — PHENYLEPHRINE HYDROCHLORIDE 50 MCG: 10 INJECTION INTRAVENOUS at 08:23

## 2020-05-13 RX ADMIN — FENTANYL CITRATE 100 MCG: 50 INJECTION, SOLUTION INTRAMUSCULAR; INTRAVENOUS at 07:33

## 2020-05-13 RX ADMIN — PHENYLEPHRINE HYDROCHLORIDE 100 MCG: 10 INJECTION INTRAVENOUS at 07:56

## 2020-05-13 RX ADMIN — TAMSULOSIN HYDROCHLORIDE 0.8 MG: 0.4 CAPSULE ORAL at 22:10

## 2020-05-13 RX ADMIN — HYDROMORPHONE HYDROCHLORIDE 0.5 MG: 1 INJECTION, SOLUTION INTRAMUSCULAR; INTRAVENOUS; SUBCUTANEOUS at 10:23

## 2020-05-13 RX ADMIN — ALBUMIN HUMAN: 0.05 INJECTION, SOLUTION INTRAVENOUS at 09:04

## 2020-05-13 RX ADMIN — ROCURONIUM BROMIDE 50 MG: 10 INJECTION INTRAVENOUS at 07:33

## 2020-05-13 RX ADMIN — MIDAZOLAM HYDROCHLORIDE 1 MG: 1 INJECTION, SOLUTION INTRAMUSCULAR; INTRAVENOUS at 07:08

## 2020-05-13 RX ADMIN — PHENYLEPHRINE HYDROCHLORIDE 50 MCG: 10 INJECTION INTRAVENOUS at 09:06

## 2020-05-13 RX ADMIN — SODIUM CHLORIDE, POTASSIUM CHLORIDE, SODIUM LACTATE AND CALCIUM CHLORIDE: 600; 310; 30; 20 INJECTION, SOLUTION INTRAVENOUS at 10:01

## 2020-05-13 RX ADMIN — ROCURONIUM BROMIDE 10 MG: 10 INJECTION INTRAVENOUS at 08:50

## 2020-05-13 RX ADMIN — FENTANYL CITRATE 25 MCG: 50 INJECTION, SOLUTION INTRAMUSCULAR; INTRAVENOUS at 08:55

## 2020-05-13 RX ADMIN — LIDOCAINE HYDROCHLORIDE 40 MG: 20 INJECTION, SOLUTION INFILTRATION; PERINEURAL at 07:33

## 2020-05-13 RX ADMIN — CEFAZOLIN 1 G: 1 INJECTION, POWDER, FOR SOLUTION INTRAMUSCULAR; INTRAVENOUS at 09:38

## 2020-05-13 RX ADMIN — ACETAMINOPHEN 975 MG: 325 TABLET, FILM COATED ORAL at 22:10

## 2020-05-13 RX ADMIN — SENNOSIDES AND DOCUSATE SODIUM 2 TABLET: 8.6; 5 TABLET ORAL at 20:29

## 2020-05-13 RX ADMIN — HYDROMORPHONE HYDROCHLORIDE 0.5 MG: 1 INJECTION, SOLUTION INTRAMUSCULAR; INTRAVENOUS; SUBCUTANEOUS at 14:59

## 2020-05-13 RX ADMIN — HYDROMORPHONE HYDROCHLORIDE 0.5 MG: 1 INJECTION, SOLUTION INTRAMUSCULAR; INTRAVENOUS; SUBCUTANEOUS at 07:57

## 2020-05-13 RX ADMIN — SODIUM CHLORIDE, POTASSIUM CHLORIDE, SODIUM LACTATE AND CALCIUM CHLORIDE: 600; 310; 30; 20 INJECTION, SOLUTION INTRAVENOUS at 07:36

## 2020-05-13 RX ADMIN — ROCURONIUM BROMIDE 10 MG: 10 INJECTION INTRAVENOUS at 09:00

## 2020-05-13 RX ADMIN — PHENYLEPHRINE HYDROCHLORIDE 100 MCG: 10 INJECTION INTRAVENOUS at 09:04

## 2020-05-13 RX ADMIN — PHENYLEPHRINE HYDROCHLORIDE 100 MCG: 10 INJECTION INTRAVENOUS at 08:03

## 2020-05-13 RX ADMIN — HYDROMORPHONE HYDROCHLORIDE 2 MG: 2 TABLET ORAL at 20:29

## 2020-05-13 RX ADMIN — PHENYLEPHRINE HYDROCHLORIDE 50 MCG: 10 INJECTION INTRAVENOUS at 08:01

## 2020-05-13 RX ADMIN — CARVEDILOL 12.5 MG: 6.25 TABLET, FILM COATED ORAL at 20:29

## 2020-05-13 RX ADMIN — SIMVASTATIN 20 MG: 20 TABLET, FILM COATED ORAL at 22:10

## 2020-05-13 RX ADMIN — GLYCOPYRROLATE 0.8 MG: 0.2 INJECTION, SOLUTION INTRAMUSCULAR; INTRAVENOUS at 09:42

## 2020-05-13 RX ADMIN — CEFAZOLIN SODIUM 2 G: 2 INJECTION, SOLUTION INTRAVENOUS at 07:41

## 2020-05-13 RX ADMIN — ROCURONIUM BROMIDE 10 MG: 10 INJECTION INTRAVENOUS at 08:16

## 2020-05-13 RX ADMIN — PHENYLEPHRINE HYDROCHLORIDE 0.25 MCG/KG/MIN: 10 INJECTION INTRAVENOUS at 07:42

## 2020-05-13 RX ADMIN — SODIUM CHLORIDE: 9 INJECTION, SOLUTION INTRAVENOUS at 14:21

## 2020-05-13 ASSESSMENT — ACTIVITIES OF DAILY LIVING (ADL)
ADLS_ACUITY_SCORE: 17
ADLS_ACUITY_SCORE: 17

## 2020-05-13 ASSESSMENT — ENCOUNTER SYMPTOMS: DYSRHYTHMIAS: 1

## 2020-05-13 ASSESSMENT — MIFFLIN-ST. JEOR: SCORE: 1597.98

## 2020-05-13 ASSESSMENT — LIFESTYLE VARIABLES: TOBACCO_USE: 1

## 2020-05-13 NOTE — PROGRESS NOTES
"SPIRITUAL HEALTH SERVICES: Tele-Encounter  Patient Location (Hospital,Unit): [facility], [facility]  Spoke with (patient, family relationship): pt    Referral Source: request     DATA:  Talked with pt on the phone. Pt is pleasant and shared how he got to the hospital and pt just had a surgery this morning, pt expressed his apparition for staffs who have been helping him. Pt said \"I'm tired because I didn't get enough sleep last night.\" Pt asked 's kerrie background, I shared my kerrie background and prayed for pt. Pt also blessed me and ask me to call back, I will not be working until Sunday.     PLAN:  Fellow up with pt on Sunday if pt is still in hospital     IVANA Green    ______________________________    Type of service:  Telephone Visit     has received verbal consent for a TelephoneVisit from the patient? \"Yes\"    Distance Provider Location: designated [place] office or home office (secure setting)    Mode of Communication: telephone (via [employer] phone or [employer] tele-call-number ([phone]))      "

## 2020-05-13 NOTE — PROGRESS NOTES
05/13/20 1600   Quick Adds   Type of Visit Initial PT Evaluation   Living Environment   Lives With spouse   Living Arrangements house   Home Accessibility stairs to enter home   Number of Stairs, Main Entrance 4   Stair Railings, Main Entrance railings safe and in good condition   Transportation Anticipated family or friend will provide   Living Environment Comment Pt lives with spouse in a town house with 4 LETICIA with 1 rail support. Pt has a chair lift to access upstairs   Self-Care   Usual Activity Tolerance moderate   Current Activity Tolerance fair   Regular Exercise No   Equipment Currently Used at Home walker, rolling   Activity/Exercise/Self-Care Comment Ind with all ADL's   Functional Level Prior   Ambulation 1-->assistive equipment   Transferring 1-->assistive equipment   Toileting 1-->assistive equipment   Bathing 1-->assistive equipment   Communication 0-->understands/communicates without difficulty   Swallowing 0-->swallows foods/liquids without difficulty   Cognition 0 - no cognition issues reported   Fall history within last six months yes   Number of times patient has fallen within last six months 2   Which of the above functional risks had a recent onset or change? none   Prior Functional Level Comment Pt was mod I with ambulation using RW   General Information   Onset of Illness/Injury or Date of Surgery - Date 05/13/20   Referring Physician Kirk Summers MD    Patient/Family Goals Statement walk, be able to go home   Pertinent History of Current Problem (include personal factors and/or comorbidities that impact the POC)  Pt is a 86 yr old female admitted for revision of R total hip. Pt with hx of 4 dislocations. Pt is to remain WBAT while strictly following R posterior hip precautions.    Precautions/Limitations fall precautions;right hip precautions   Weight-Bearing Status - LLE full weight-bearing   Weight-Bearing Status - RLE weight-bearing as tolerated   General Observations Pleasant and  cooperative   General Info Comments Activity: Ambualte with assist   Cognitive Status Examination   Orientation orientation to person, place and time   Level of Consciousness alert   Follows Commands and Answers Questions 100% of the time   Personal Safety and Judgment at risk behaviors demonstrated   Memory impaired   Pain Assessment   Patient Currently in Pain Yes, see Vital Sign flowsheet  (6/10 pain reported with mobility)   Integumentary/Edema   Integumentary/Edema Comments Skin discoloration noted in BLE from use of an antibiotics   Range of Motion (ROM)   ROM Comment R hip: NT, otherwise: WFL   Strength   Strength Comments R LE:NT,  LLE; 4/5   Bed Mobility   Bed Mobility Comments Supine<>sit: Max assist x 2    Transfer Skills   Transfer Comments STS at mod assist x 2    Gait   Gait Comments NT   Balance   Balance Comments Sitting: good   Sensory Examination   Sensory Perception no deficits were identified   Coordination   Coordination no deficits were identified   Modality Interventions   Planned Modality Interventions Cryotherapy   General Therapy Interventions   Planned Therapy Interventions balance training;bed mobility training;gait training;strengthening;transfer training;wheelchair management/propulsion training;risk factor education;home program guidelines;progressive activity/exercise   Clinical Impression   Criteria for Skilled Therapeutic Intervention yes, treatment indicated   PT Diagnosis Impaired gait   Influenced by the following impairments decreased strength and activity tolerance   Functional limitations due to impairments assistance needed with mobility   Clinical Presentation Stable/Uncomplicated   Clinical Presentation Rationale clinical judgement   Clinical Decision Making (Complexity) Moderate complexity   Therapy Frequency 2x/day   Predicted Duration of Therapy Intervention (days/wks) 7   Anticipated Discharge Disposition Home with Assist;Home with Home Therapy   Risk & Benefits of  "therapy have been explained Yes   Patient, Family & other staff in agreement with plan of care Yes   Clinical Impression Comments Pt presents with impaired strength and activity tolerance along witih hip precuation limiting ind with mobility. Pt will beneift form continued skilled PT to achieve PLOF and independence.    [facility] AM-PAC  \"6 Clicks\" V.2 Basic Mobility Inpatient Short Form   1. Turning from your back to your side while in a flat bed without using bedrails? 2 - A Lot   2. Moving from lying on your back to sitting on the side of a flat bed without using bedrails? 2 - A Lot   3. Moving to and from a bed to a chair (including a wheelchair)? 2 - A Lot   4. Standing up from a chair using your arms (e.g., wheelchair, or bedside chair)? 2 - A Lot   5. To walk in hospital room? 2 - A Lot   6. Climbing 3-5 steps with a railing? 1 - Total   Basic Mobility Raw Score (Score out of 24.Lower scores equate to lower levels of function) 11   Total Evaluation Time   Total Evaluation Time (Minutes) 15     "

## 2020-05-13 NOTE — ANESTHESIA PROCEDURE NOTES
ARTERIAL LINE PROCEDURE NOTE:  Staff -   Anesthesiologist:  Tamir Pretty MD      Performed By: anesthesiologist         Pre-Procedure  Performed by Tamir Pretty MD  Location: pre-op      Pre-Anesthestic Checklist: patient identified, IV checked, site marked, risks and benefits discussed, informed consent, monitors and equipment checked and pre-op evaluation    Timeout  Correct Patient: Yes   Correct Procedure: Yes   Correct Site: Yes   Correct Laterality: Yes   Correct Position: Yes   Site Marked: Yes   .   Procedure Documentation  Procedure: arterial line    Supine  Insertion Site:radial.Skin infiltrated with 2 mL of 1% lidocaine. Injection technique: ultrasound guided  .  Artery evaluated via ultrasound confirming patency.  Using realtime imaging the artery was punctured and needle was observed entering artery..  A permanent image is entered into the patient's record.Patient Prep/Sterile Barriers; all elements of maximal sterile barrier technique followed, mask, hat, sterile gown, sterile gloves, draped, hand hygiene    Assessment/Narrative    Catheter: 20 gauge, 12 cm     Secured by transparent dressing  Tegaderm dressing used.        Comments:  A line, no complications.

## 2020-05-13 NOTE — OP NOTE
Procedure Date: 05/13/2020      PREOPERATIVE DIAGNOSIS:  Failed arthroplasty, right hip, secondary to frequent recurrent dislocations.      POSTOPERATIVE DIAGNOSIS:  Failed arthroplasty, right hip, secondary to frequent recurrent dislocations.      PROCEDURES:  Partial revision right total hip arthroplasty, repair of abductor mechanism.      SURGEON:  Kirk Summers MD      FIRST ASSISTANT:  Serg Montoya PA-C      ANESTHESIA:  General.      ESTIMATED BLOOD LOSS:  200-300 mL.      DRAINS:  One.      ANTIBIOTICS:  Given preop 2 grams Ancef.      DEEP VENOUS THROMBOSIS PROPHYLAXIS:  Xarelto, which will start tomorrow; sequential calf compression devices and BERNARDINO hose, which will start today.      CULTURES:  Two cultures sent, soft tissue from the deep synovium.      IMPLANT REPLACED:  [employer] acetabular component 52 liner with a constrained head and neck, 1 locking ring.  The head was 22 + 10 along.  Femoral component was left.  Abductus repaired with multiple drill holes and #5 FiberWire sutures.      DESCRIPTION OF PROCEDURE:  Risks, options and alternatives gone over with the patient.  He has suffered frequent dislocations.  He is made well aware once again that his recovery postop will require extremely extra cautious use and protection including perhaps even a brace.  Trying to get him home, so he does not need to go to a nursing home.  I anticipate getting home in 2-3 days postop.  Identification made, brought into the OR, rolled to right lateral decubitus, left hip up.  Prepped and draped in the usual fashion.  The patient is on chronic minocycline, so he has discoloration of his skin.  Opened up a portion of original scar from his second or third operation.  I injected along the way with Marcaine with epinephrine.  Self-retaining retractors placed, opened up the deep fascia.  Copious bloody fluid came out, as he did have a recent dislocation.  Got in there and the abductors were chronically retracted, the  trochanter with a smooth avulsed fragment and very tight, contracted, atrophied abductors.  The bony part was viable and living.  I then proceeded to do a synovectomy.  Dislocated the hip, pulled the head off, tried to extend the hip and anteriorly position it.  Sharp retractors were used in the supra-acetabular area.  Removed the liner, very worn posterior superior.  Surprisingly deep in there was not.  Metal cup itself shows some varnishing around the edges, but it was intact.      We irrigated out copiously.  Synovectomy performed.  Then elected to go ahead and try to place a constrained acetabular component in there.  Placed the liner in there and then reduced it, went from the +5 and +10 just from experience, we felt it was going to need it; that is as long as we could go.  Very stable.  Cup was lined up well.  Irrigated copiously, jet lavaged it and then repaired through drill holes and trochanter and around the proximal femur.  Multiple #5 FiberWire were oversewed to the soft tissue, the abductors as well as the deep fascia.  Some of the tension was on that.  Then, 2-0 Stratafix, 2-0 Vicryl, staples.  Drain was brought out.  Total of Marcaine injected was about 50 mL, 1 gram of powdered vancomycin was placed into the joint, powder form.  The patient transferred awake and alert to the recovery room in satisfactory condition.      PLAN:  Knee immobilizer for 4-6 months to avoid internally rotating the hip in flexion.      Otherwise, we will get him an orthosis or a cast which he would not tolerate.      COMPLICATIONS:  None.      ESTIMATED BLOOD LOSS:  200-300 mL.  Grossly it did not look infected.  Evidence of multiple hemarthrosis and a bald trochanter for proximal femur.  Femoral component did not appear to be loose nor was there excessive trunnion wear.      The patient had abductor pillow placed, pressure wrap applied, transferred awake and alert to the recovery room.  I should add, he did have a Mock  catheter placed, which we will leave, and an arterial line was placed by Anesthesia.            SAVANAH SPRAGUE MD             D: 2020   T: 2020   MT: VINCENT      Name:     FRANKIE THOMPSON   MRN:      [id number]        Account:        [id number]   :      1933           Procedure Date: 2020      Document: [id number]

## 2020-05-13 NOTE — BRIEF OP NOTE
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  Complications: None.  Implants:   Implant Name Type Inv. Item Serial No.  Lot No. LRB No. Used Action   IMP HEAD STRK FEMORAL C-TAPER LFIT 32MM +5MM  Total Joint Component/Insert IMP HEAD STRK FEMORAL C-TAPER LFIT 32MM +5MM   [employer] [id number] Right 1 Explanted   CONSTRAINED INSERT ACETABULAR INSERT MATTHEW 50MM ID 22MM    [employer] [id number] Right 1 Implanted   C-TAPER LFIT HEAD LOW FRICTION ION TREATMENT MATTHEW 22MM OFFST +10MM    [employer] [id number] Right 1 Implanted   32mm Omnifit 20  Series II Insert    [employer] [id number] Right 1 Explanted

## 2020-05-13 NOTE — ANESTHESIA CARE TRANSFER NOTE
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    Anesthesia Type:   General     Note:  Airway :Face Mask  Patient transferred to:PACU  Comments: Neuromuscular blockade reversed after TOF 4/4, spontaneous respirations, adequate tidal volumes, followed commands to voice, oropharynx suctioned with soft flexible catheter, extubated atraumatically, airway patent after extubation.  Oxygen via facemask at 6 liters per minute to PACU. Oxygen tubing connected to wall O2 in PACU, SpO2, NiBP, and EKG monitors and alarms on and functioning, Desirae Hugger warmer connected to patient gown, report on patient's clinical status given to PACU RN, RN questions answered.   Handoff Report: Identifed the Patient, Identified the Reponsible Provider, Reviewed the pertinent medical history, Discussed the surgical course, Reviewed Intra-OP anesthesia mangement and issues during anesthesia, Set expectations for post-procedure period and Allowed opportunity for questions and acknowledgement of understanding      Vitals: (Last set prior to Anesthesia Care Transfer)    CRNA VITALS  5/13/2020 0935 - 5/13/2020 1013      5/13/2020             Pulse:  62    ART BP:  151/66    ART Mean:  99    SpO2:  94 %    Resp Rate (set):  10                Electronically Signed By: LUCY Paul CRNA  May 13, 2020  10:13 AM

## 2020-05-13 NOTE — ANESTHESIA PREPROCEDURE EVALUATION
Anesthesia Pre-Procedure Evaluation    Patient: Mark Hernández   MRN: [id number] : 1933          Preoperative Diagnosis: Other mechanical complication of internal right hip prosthesis, initial encounter (H) [T84.090A]    Procedure(s):  REVISE RIGHT TOTAL HIP    Past Medical History:   Diagnosis Date     Acquired hydrocele      BPH (benign prostatic hyperplasia)      CKD (chronic kidney disease) stage 3, GFR 30-59 ml/min (H)      Complete AV block (H) 2016    Implantation of a dual-chamber pacemaker     Congestive heart failure (H)      Diverticulosis      Hemorrhoids      Hyperlipidemia      Hypertension      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)      Pacemaker      Peripheral neuropathy      Prostate cancer (H)      Renal disease      Sensorineural hearing loss of both ears      Thrombocytopenia (H)      Thrombocytopenia (H)      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)      Vitamin B12 deficiency      Past Surgical History:   Procedure Laterality Date     ARTHROPLASTY REVISION HIP Right 2017    Procedure: ARTHROPLASTY REVISION HIP;;  Surgeon: Kirk Summers MD;  Location:  OR     BACK SURGERY       ENT SURGERY      tonsillectomy     GENITOURINARY SURGERY      S/P NEPHRECTOMY     IRRIGATION AND DEBRIDEMENT HIP, PLACE ANTIBIOTIC CEMENT BEADS / SPACER Right 2017    Procedure: COMBINED IRRIGATION AND DEBRIDEMENT HIP, PLACE ANTIBIOTIC CEMENT BEADS / SPACER;  IRRIGATION AND DEBRIDEMENT RIGHT HIP WITH PLACEMENT ANTIBIOTIC BEADS, POLY EXCHANGE, AND PARTIAL HIP REVISION. ;  Surgeon: Kirk Summers MD;  Location:  OR     KNEE SURGERY       ORTHOPEDIC SURGERY       RELEASE CARPAL TUNNEL Left 2019    Procedure: LEFT CARPAL TUNNEL RELEASE;  Surgeon: Mynor López MD;  Location:  OR     revision total hip arthroplasty       rivision total hip arthroplasty       rotator cuff syndrome         Anesthesia Evaluation     . Pt has  had prior anesthetic. Type: General    No history of anesthetic complications          ROS/MED HX    ENT/Pulmonary:     (+)tobacco use, Past use , . .   (-) sleep apnea   Neurologic:     (+)neuropathy     Cardiovascular: Comment: ECHOCARDIOGRAM.  Date: 08/19/2019 Start: 12:01 PM Facility[facility]    CONCLUSIONS  Mild to moderate diffuse LV hypokinesis is present.  Left ventricular ejection fraction is visually estimated at 42%.  Mild valvular abnormalities were identified.    Comparing to echo June 2019, LVEF,MR, RVSP all improved.      FINDINGS    MITRAL VALVE  Mild mitral annular calcification.  Mild mitral regurgitation.    AORTIC VALVE  There is severe aortic sclerosis.  Mild aortic regurgitation.    TRICUSPID VALVE  Normal tricuspid valve structure and function.  Trace (physiologic) tricuspid regurgitation.  Pulmonary artery systolic pressure estimate is 24mmHg above RAP.  (Normal).    PULMONIC VALVE  Normal pulmonic valve structure and function.    LEFT ATRIUM  Left atrial volume index is 45 mL/m^2. (Moderately abnormal  42-48mL/m^2).    LEFT VENTRICLE  Left ventricular chamber size is normal.  Moderate concentric wall thickening consistent with left ventricular  hypertrophy is present.  Mild to moderate diffuse hypokinesis is present.  Left ventricular ejection fraction is visually estimated at 42%.  Left ventricular diastolic function could not be accurately assessed.    RIGHT ATRIUM  Moderate right atrial enlargement.  A pacemaker lead is noted in the right atrium.    RIGHT VENTRICLE  Normal right ventricle size and normal global function.    PERICARDIAL EFFUSION  There is no pericardial effusion.      MISCELLANEOUS  The following segments of the aorta are normal in size: sinuses of  Valsalva.  Mild dilation of the aorta is present involving the ascending aorta.  (Maximal dimension 4.2 cm).  The inferior vena cava is normal.    M-MODE/2D MEASUREMENTS & CALCULATIONS   LV Diastolic  Dimension: 5.35 cm   LV PW Diastolic: 1.16 cm   Septum Diastolic: 1 cm                                              LA Dimension: 4 cm                                              LA Area: 24.4 cm^2   LV Area Diastolic: 29.2 cm^2   LV Area Systolic: 22.9 cm^2                                              Ascending Aorta: 4.2 cm   LV Systolic Dimension: 3.92 cm             LA volume index: 44.6   LV Volume Diastolic: 98.7 ml               ml/m^2   LV Volume Systolic: 63 ml   LV EDV/LV EDV Index: 98.7 ml/48 m^2   LV ESV/LV ESV Index: 63 ml/31 m^2EF        IVC Expiration: 2.14 cm   Estimated: 42 %   LV Length: 7.27 cm   LVOT: 2.3 cm    DOPPLER MEASUREMENTS & CALCULATIONS   MV Peak E-Wave: 0.5 m/s   MV Peak A-Wave: 0.9 m/s   MV E/A Ratio: 0.57   MV Peak Gradient: 0.95 mmHg   MV Deceleration Time: 250 msec   MV P1/2t: 118 msec   MVA by PHT1.86 cm^2   E' Velocity: 0.04 m/s                     TR Velocity:2.5 m/s                                             TR Gradient:24.8 mmHg   AV P1/2t: 601 msec    (+) Dyslipidemia, hypertension----. : . CHF Last EF: 42 . . pacemaker :ICD . dysrhythmias a-fib, valvular problems/murmurs mild AI, mild MR:.       METS/Exercise Tolerance:  3 - Able to walk 1-2 blocks without stopping   Hematologic:     (+) Anemia, -      Musculoskeletal: Comment: Previous back surgery  Multiple hip surgeries  (+) arthritis,  -       GI/Hepatic:  - neg GI/hepatic ROS       Renal/Genitourinary: Comment: S/p nephrectomy, ureterectomy    (+) chronic renal disease,       Endo:     (+) type II DM .      Psychiatric:         Infectious Disease:         Malignancy:   (+) Malignancy History of Prostate and Other  Other CA urothelial status post         Other:                          Physical Exam      Airway   Mallampati: II  TM distance: >3 FB  Neck ROM: full    Dental   (+) upper dentures and lower dentures    Cardiovascular   Rhythm and rate: regular and normal      Pulmonary    breath sounds clear to  "auscultation            Lab Results   Component Value Date    WBC 6.0 05/13/2020    HGB 11.3 (L) 05/13/2020    HCT 34.4 (L) 05/13/2020     05/13/2020    SED 80 (H) 12/31/2017     01/01/2018    POTASSIUM 4.3 05/13/2020    CHLORIDE 103 01/01/2018    CO2 30 01/01/2018    BUN 33 (H) 01/01/2018    CR 1.37 (H) 03/25/2019     (H) 05/13/2020    ESAU 9.0 01/01/2018    INR 1.11 12/27/2017       Preop Vitals  BP Readings from Last 3 Encounters:   05/13/20 (!) 143/69   04/26/20 (!) 150/114   04/06/20 107/61    Pulse Readings from Last 3 Encounters:   05/13/20 60   04/26/20 69   04/06/20 75      Resp Readings from Last 3 Encounters:   05/13/20 16   04/26/20 23   04/06/20 13    SpO2 Readings from Last 3 Encounters:   05/13/20 100%   04/26/20 100%   04/06/20 90%      Temp Readings from Last 1 Encounters:   05/13/20 36.6  C (97.9  F) (Temporal)    Ht Readings from Last 1 Encounters:   05/13/20 1.727 m (5' 8\")      Wt Readings from Last 1 Encounters:   05/13/20 94.3 kg (208 lb)    Estimated body mass index is 31.63 kg/m  as calculated from the following:    Height as of this encounter: 1.727 m (5' 8\").    Weight as of this encounter: 94.3 kg (208 lb).       Anesthesia Plan      History & Physical Review  History and physical reviewed and following examination; no interval change.    ASA Status:  4 .    NPO Status:  > 8 hours    Plan for General with Intravenous and Propofol induction. Maintenance will be Balanced.    PONV prophylaxis:  Ondansetron (or other 5HT-3) and Dexamethasone or Solumedrol  Additional equipment: Videolaryngoscope, 2nd IV and Arterial Line Magnet for ICD    MAP> 80 with phenylephrine and volume optimization        Postoperative Care  Postoperative pain management:  IV analgesics and Oral pain medications.      Consents  Anesthetic plan, risks, benefits and alternatives discussed with:  Patient.  Use of blood products discussed: Yes.   Use of blood products discussed with Patient.  Consented " to blood products.  .                 Tamir Pretty MD

## 2020-05-13 NOTE — PLAN OF CARE
Discharge Planner PT   Patient plan for discharge: Home with spouse and daughter who is an RN.  Current status: PT eval completed, treatment initiated. Pt is a 86 yr old female admitted for revision of R total hip. Pt with hx of 4 dislocations. Pt is to remain WBAT while strictly following R posterior hip precautions.     Pt lives with spouse in an town home with 4STE with rail support. Has a chair lift to access upstairs. Uses a RW at baseline and reported having 2 falls within the past 6 months. Reported being ind with all ADL's.      Pt was received supine in bed and edu on hip precautions and the importance of maintaining them at all times. Pt required max assist x 2 with supine>sit and noted to be very guarded with mobility. Required multiple cues to relax the muscles. Pt sat at EOB x 10 min with SBA. Pt stood with mod assist x 2 and verbal cues on precautions and proper hand placement. Pt was assisted back to supine with max assist x2 and extra time to ensure precautions were maintained. Discussed PT POC and recommendations with pt.     Barriers to return to prior living situation: Fall risk, Level of assist, Hip precautions, Steps to manage at home.     Recommendations for discharge: Home with 2 person heavy assist   for bed mobility, tranfers, gait and stair management.     Rationale for recommendations: Pt is anticipated to make progress in all mobility to min-mod assist x 1 with continued inpatient PT. If pt continues to required heavy assist of two, then pt may need TCU stay prior to returning home.          Entered by: Garry Mckenzie 05/13/2020 4:34 PM

## 2020-05-13 NOTE — PLAN OF CARE
Arrived at 1225 from PACU. VSS. Baseline numbness BLE. Dark blue discoloration on extremities from Minocycline(home med). Mock and Hemovac patent. Pain managed with dilaudid and tylenol. Sat at EOB with PT.  Up with max 2 and walker. A&OX4.

## 2020-05-14 ENCOUNTER — APPOINTMENT (OUTPATIENT)
Dept: OCCUPATIONAL THERAPY | Facility: CLINIC | Age: 85
DRG: 467 | End: 2020-05-14
Attending: ORTHOPAEDIC SURGERY
Payer: MEDICARE

## 2020-05-14 ENCOUNTER — APPOINTMENT (OUTPATIENT)
Dept: PHYSICAL THERAPY | Facility: CLINIC | Age: 85
DRG: 467 | End: 2020-05-14
Attending: ORTHOPAEDIC SURGERY
Payer: MEDICARE

## 2020-05-14 LAB
ABO + RH BLD: NORMAL
ABO + RH BLD: NORMAL
ALBUMIN UR-MCNC: 30 MG/DL
ANION GAP SERPL CALCULATED.3IONS-SCNC: 4 MMOL/L (ref 3–14)
APPEARANCE UR: CLEAR
BILIRUB UR QL STRIP: NEGATIVE
BLD GP AB SCN SERPL QL: NORMAL
BLD PROD TYP BPU: NORMAL
BLOOD BANK CMNT PATIENT-IMP: NORMAL
BUN SERPL-MCNC: 34 MG/DL (ref 7–30)
CALCIUM SERPL-MCNC: 8.2 MG/DL (ref 8.5–10.1)
CHLORIDE SERPL-SCNC: 104 MMOL/L (ref 94–109)
CO2 SERPL-SCNC: 28 MMOL/L (ref 20–32)
COLOR UR AUTO: YELLOW
CREAT SERPL-MCNC: 1.07 MG/DL (ref 0.66–1.25)
ERYTHROCYTE [SEDIMENTATION RATE] IN BLOOD BY WESTERGREN METHOD: 33 MM/H (ref 0–20)
GFR SERPL CREATININE-BSD FRML MDRD: 62 ML/MIN/{1.73_M2}
GLUCOSE BLDC GLUCOMTR-MCNC: 114 MG/DL (ref 70–99)
GLUCOSE BLDC GLUCOMTR-MCNC: 123 MG/DL (ref 70–99)
GLUCOSE BLDC GLUCOMTR-MCNC: 151 MG/DL (ref 70–99)
GLUCOSE BLDC GLUCOMTR-MCNC: 157 MG/DL (ref 70–99)
GLUCOSE SERPL-MCNC: 140 MG/DL (ref 70–99)
GLUCOSE UR STRIP-MCNC: 30 MG/DL
HGB BLD-MCNC: 7.7 G/DL (ref 13.3–17.7)
HGB BLD-MCNC: 8.6 G/DL (ref 13.3–17.7)
HGB UR QL STRIP: ABNORMAL
KETONES UR STRIP-MCNC: 5 MG/DL
LEUKOCYTE ESTERASE UR QL STRIP: ABNORMAL
MUCOUS THREADS #/AREA URNS LPF: PRESENT /LPF
NITRATE UR QL: NEGATIVE
NUM BPU REQUESTED: 1
PH UR STRIP: 5.5 PH (ref 5–7)
POTASSIUM SERPL-SCNC: 4.7 MMOL/L (ref 3.4–5.3)
RBC #/AREA URNS AUTO: 79 /HPF (ref 0–2)
SODIUM SERPL-SCNC: 136 MMOL/L (ref 133–144)
SOURCE: ABNORMAL
SP GR UR STRIP: 1.02 (ref 1–1.03)
SPECIMEN EXP DATE BLD: NORMAL
SQUAMOUS #/AREA URNS AUTO: <1 /HPF (ref 0–1)
UROBILINOGEN UR STRIP-MCNC: NORMAL MG/DL (ref 0–2)
WBC #/AREA URNS AUTO: 0 /HPF (ref 0–5)

## 2020-05-14 PROCEDURE — 12000000 ZZH R&B MED SURG/OB

## 2020-05-14 PROCEDURE — 85018 HEMOGLOBIN: CPT | Performed by: ORTHOPAEDIC SURGERY

## 2020-05-14 PROCEDURE — 85652 RBC SED RATE AUTOMATED: CPT | Performed by: ORTHOPAEDIC SURGERY

## 2020-05-14 PROCEDURE — 25800030 ZZH RX IP 258 OP 636: Performed by: ORTHOPAEDIC SURGERY

## 2020-05-14 PROCEDURE — 81001 URINALYSIS AUTO W/SCOPE: CPT | Performed by: ORTHOPAEDIC SURGERY

## 2020-05-14 PROCEDURE — 97530 THERAPEUTIC ACTIVITIES: CPT | Mod: GP

## 2020-05-14 PROCEDURE — 87086 URINE CULTURE/COLONY COUNT: CPT | Performed by: ORTHOPAEDIC SURGERY

## 2020-05-14 PROCEDURE — 99232 SBSQ HOSP IP/OBS MODERATE 35: CPT | Performed by: PHYSICIAN ASSISTANT

## 2020-05-14 PROCEDURE — 00000146 ZZHCL STATISTIC GLUCOSE BY METER IP

## 2020-05-14 PROCEDURE — P9016 RBC LEUKOCYTES REDUCED: HCPCS | Performed by: ORTHOPAEDIC SURGERY

## 2020-05-14 PROCEDURE — 25000128 H RX IP 250 OP 636: Performed by: ORTHOPAEDIC SURGERY

## 2020-05-14 PROCEDURE — 97165 OT EVAL LOW COMPLEX 30 MIN: CPT | Mod: GO

## 2020-05-14 PROCEDURE — 80048 BASIC METABOLIC PNL TOTAL CA: CPT | Performed by: ORTHOPAEDIC SURGERY

## 2020-05-14 PROCEDURE — 36415 COLL VENOUS BLD VENIPUNCTURE: CPT | Performed by: ORTHOPAEDIC SURGERY

## 2020-05-14 PROCEDURE — 25000132 ZZH RX MED GY IP 250 OP 250 PS 637: Mod: GY | Performed by: ORTHOPAEDIC SURGERY

## 2020-05-14 PROCEDURE — 97116 GAIT TRAINING THERAPY: CPT | Mod: GP

## 2020-05-14 PROCEDURE — 25000132 ZZH RX MED GY IP 250 OP 250 PS 637: Mod: GY | Performed by: PHYSICIAN ASSISTANT

## 2020-05-14 PROCEDURE — 97535 SELF CARE MNGMENT TRAINING: CPT | Mod: GO

## 2020-05-14 PROCEDURE — 97530 THERAPEUTIC ACTIVITIES: CPT | Mod: GO

## 2020-05-14 RX ORDER — CIPROFLOXACIN 250 MG/1
250 TABLET, FILM COATED ORAL EVERY 12 HOURS SCHEDULED
Status: DISCONTINUED | OUTPATIENT
Start: 2020-05-14 | End: 2020-05-16 | Stop reason: HOSPADM

## 2020-05-14 RX ORDER — FUROSEMIDE 20 MG
20 TABLET ORAL DAILY
Status: DISCONTINUED | OUTPATIENT
Start: 2020-05-14 | End: 2020-05-16 | Stop reason: HOSPADM

## 2020-05-14 RX ORDER — TRAMADOL HYDROCHLORIDE 50 MG/1
50 TABLET ORAL EVERY 6 HOURS PRN
Status: DISCONTINUED | OUTPATIENT
Start: 2020-05-14 | End: 2020-05-16 | Stop reason: HOSPADM

## 2020-05-14 RX ADMIN — ACETAMINOPHEN 975 MG: 325 TABLET, FILM COATED ORAL at 13:10

## 2020-05-14 RX ADMIN — FERROUS SULFATE TAB 325 MG (65 MG ELEMENTAL FE) 325 MG: 325 (65 FE) TAB at 08:09

## 2020-05-14 RX ADMIN — TAMSULOSIN HYDROCHLORIDE 0.8 MG: 0.4 CAPSULE ORAL at 22:24

## 2020-05-14 RX ADMIN — DULOXETINE HYDROCHLORIDE 30 MG: 30 CAPSULE, DELAYED RELEASE ORAL at 08:10

## 2020-05-14 RX ADMIN — CARVEDILOL 12.5 MG: 6.25 TABLET, FILM COATED ORAL at 08:09

## 2020-05-14 RX ADMIN — HYDROMORPHONE HYDROCHLORIDE 2 MG: 2 TABLET ORAL at 02:18

## 2020-05-14 RX ADMIN — ACETAMINOPHEN 975 MG: 325 TABLET, FILM COATED ORAL at 06:21

## 2020-05-14 RX ADMIN — CEFAZOLIN 1 G: 330 INJECTION, POWDER, FOR SOLUTION INTRAMUSCULAR; INTRAVENOUS at 18:30

## 2020-05-14 RX ADMIN — SODIUM CHLORIDE: 9 INJECTION, SOLUTION INTRAVENOUS at 04:21

## 2020-05-14 RX ADMIN — FUROSEMIDE 20 MG: 20 TABLET ORAL at 14:16

## 2020-05-14 RX ADMIN — CEFAZOLIN 1 G: 330 INJECTION, POWDER, FOR SOLUTION INTRAMUSCULAR; INTRAVENOUS at 10:03

## 2020-05-14 RX ADMIN — TRAMADOL HYDROCHLORIDE 50 MG: 50 TABLET, FILM COATED ORAL at 14:14

## 2020-05-14 RX ADMIN — CIPROFLOXACIN HYDROCHLORIDE 250 MG: 250 TABLET, FILM COATED ORAL at 20:59

## 2020-05-14 RX ADMIN — SIMVASTATIN 20 MG: 20 TABLET, FILM COATED ORAL at 22:24

## 2020-05-14 RX ADMIN — CARVEDILOL 12.5 MG: 6.25 TABLET, FILM COATED ORAL at 18:28

## 2020-05-14 RX ADMIN — GABAPENTIN 800 MG: 800 TABLET, FILM COATED ORAL at 08:10

## 2020-05-14 RX ADMIN — CEFAZOLIN 1 G: 330 INJECTION, POWDER, FOR SOLUTION INTRAMUSCULAR; INTRAVENOUS at 02:14

## 2020-05-14 RX ADMIN — HYDROMORPHONE HYDROCHLORIDE 2 MG: 2 TABLET ORAL at 08:10

## 2020-05-14 RX ADMIN — LISINOPRIL 2.5 MG: 2.5 TABLET ORAL at 08:09

## 2020-05-14 RX ADMIN — SENNOSIDES AND DOCUSATE SODIUM 2 TABLET: 8.6; 5 TABLET ORAL at 08:10

## 2020-05-14 RX ADMIN — ACETAMINOPHEN 975 MG: 325 TABLET, FILM COATED ORAL at 22:24

## 2020-05-14 RX ADMIN — RIVAROXABAN 10 MG: 10 TABLET, FILM COATED ORAL at 08:10

## 2020-05-14 RX ADMIN — SENNOSIDES AND DOCUSATE SODIUM 2 TABLET: 8.6; 5 TABLET ORAL at 20:59

## 2020-05-14 RX ADMIN — GABAPENTIN 800 MG: 800 TABLET, FILM COATED ORAL at 20:59

## 2020-05-14 ASSESSMENT — ACTIVITIES OF DAILY LIVING (ADL)
ADLS_ACUITY_SCORE: 17

## 2020-05-14 ASSESSMENT — MIFFLIN-ST. JEOR: SCORE: 1604.5

## 2020-05-14 NOTE — PLAN OF CARE
VSS. Baseline numbness BLE. Up with 2 and walker. Pain managed with tylenol and tramadol. Dressing changed, incision looked good. Méndez patent, draining tea color urine. On IV and oral antibiotic. A&OX4. Dr. Summers order to discontinue méndez POD 2.

## 2020-05-14 NOTE — PROGRESS NOTES
05/14/20 1024   Quick Adds   Type of Visit Initial Occupational Therapy Evaluation   Living Environment   Lives With spouse   Living Arrangements house   Home Accessibility stairs to enter home   Number of Stairs, Main Entrance 4   Transportation Anticipated family or friend will provide   Living Environment Comment Daughter who is RN may stay with them for 2 weeks   Self-Care   Usual Activity Tolerance moderate   Current Activity Tolerance fair   Regular Exercise No   Equipment Currently Used at Home walker, rolling;grab bar, tub/shower;raised toilet;shower chair  (toilet safety frame; reacher; long shoe horn)   Functional Level   Ambulation 1-->assistive equipment   Transferring 1-->assistive equipment   Toileting 1-->assistive equipment   Bathing 1-->assistive equipment   Dressing 0-->independent   Eating 0-->independent   Fall history within last six months yes   Number of times patient has fallen within last six months 4   Prior Functional Level Comment recently spouse assisting with LB dressing   General Information   Onset of Illness/Injury or Date of Surgery - Date 05/13/20   Referring Physician Kirk Summers MD   Patient/Family Goals Statement Not have another hip surgery   Additional Occupational Profile Info/Pertinent History of Current Problem past medical history significant for 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    Precautions/Limitations right hip precautions;fall precautions   Cognitive Status Examination   Orientation orientation to person, place and time   Level of Consciousness alert   Follows Commands (Cognition) follows two step commands   Memory impaired   Attention No deficits were identified   Executive Function Working memory impaired, decreased storage of information for performing tasks   Visual Perception   Visual Perception No deficits were identified   Sensory Examination   Sensory Comments Baseline BUE/BLE numbness and tingling   Pain Assessment   Patient Currently in Pain Yes, see Vital Sign flowsheet   Strength   Strength Comments Generalized weakness in BUEs   Transfer Skill: Sit to Stand   Level of [place]: Sit/Stand minimum assist (75% patients effort)   Physical Assist/Nonphysical Assist: Sit/Stand 1 person assist;set-up required;supervision   Assistive Device for Transfer: Sit/Stand rolling walker   Transfer Skill: Toilet Transfer   Level of [place]: Toilet minimum assist (75% patients effort)   Physical Assist/Nonphysical Assist: Toilet 1 person assist   Assistive Device rolling walker;seat riser;grab bars   Lower Body Dressing   Level of [place]: Dress Lower Body dependent (less than 25% patients effort)   Physical Assist/Nonphysical Assist: Dress Lower Body 1 person assist   Grooming   Level of [place]: Grooming contact guard   Physical Assist/Nonphysical Assist: Grooming supervision;verbal cues   Eating/Self Feeding   Level of [place]: Eating independent   Activities of Daily Living Analysis   Impairments Contributing to Impaired Activities of Daily Living balance impaired;pain;post surgical precautions;strength decreased;sensation decreased   General Therapy Interventions   Planned Therapy Interventions ADL retraining   Clinical Impression   Criteria for Skilled Therapeutic Interventions Met yes, treatment indicated   OT Diagnosis decline function  "  Influenced by the following impairments OT: Upon arrival pt semi-supine in bed and agreeable to therapy. Pt independent in bed mobility. Pt participated in education regarding safety benefits of using a shower chair and grab bars at home and was issued an education handout. Pt completed sit to stand with SBA and a cue for safe hand placement on walker. Pt ambulated approximately 30 ft with walker with CGA for balance safety and cues to stand closer to walker. Pt completed stand to sit with SBA and a cue for safe hand placement. Writer emphasized need for walker use. Transport arrived to take patient. Pt needed max reminder cues to use walker to ambulate to transport chair. Increased time needed during session for Irish interpretation via Jabber   Assessment of Occupational Performance 5 or more Performance Deficits   Identified Performance Deficits dressing, grooming, toileting, bathing   Clinical Decision Making (Complexity) Low complexity   Therapy Frequency Daily   Predicted Duration of Therapy Intervention (days/wks) one time   Anticipated Equipment Needs at Discharge   (Pt currently  has recommended equipment)   Anticipated Discharge Disposition Home with Assist;Home with Home Therapy   Risks and Benefits of Treatment have been explained. Yes   Patient, Family & other staff in agreement with plan of care Yes   Clinical Impression Comments Pt functioning below baseline and will benefit from continued skilled OT to maximize safety and independence   [facility] AM-PAC TM \"6 Clicks\"   2016, Trustees of [facility], under license to [employer].  All rights reserved.   6 Clicks Short Forms Daily Activity Inpatient Short Form   [facility] AM-PAC  \"6 Clicks\" Daily Activity Inpatient Short Form   1. Putting on and taking off regular lower body clothing? 2 - A Lot   2. Bathing (including washing, rinsing, drying)? 2 - A Lot   3. Toileting, which includes using toilet, bedpan or urinal? 3 - A " Little   4. Putting on and taking off regular upper body clothing? 4 - None   5. Taking care of personal grooming such as brushing teeth? 4 - None   6. Eating meals? 4 - None   Daily Activity Raw Score (Score out of 24.Lower scores equate to lower levels of function) 19   Total Evaluation Time   Total Evaluation Time (Minutes) 10

## 2020-05-14 NOTE — PLAN OF CARE
Patient plan: Home with spouse and daughter who is an RN. Agreeable to home OT    Current status: Writer reinforced at length pt's hip precautions. Pt was given instructional handout which pt read aloud line for line. Completed sit to stand with [place] and one cue for technique. Ambulated 10 ft to bathroom sink with CGA and cue for posture using FWW. Stood at sink for approximately 5 minutes to complete grooming. Pt careful to not complete internal rotation when turning in bathroom. Completed stand to sit with CGA and no cues. Pt reports plan to have spouse assist with LB dressing. Pt educated regarding fall prevention strategies and how to progress his activity tolerance.    Anticipated status at discharge: Assist of one with toileting, grooming, LB dressing, bathing, bed mobility. Continued use of self-care adaptive equipment he has: shower chair, grab bars, hand held shower head, raised toilet, toilet safety frame, reacher, long shoe horn, sock aid         Occupational Therapy Discharge Summary    Reason for therapy discharge:    All goals and outcomes met, no further needs identified.    Progress towards therapy goal(s). See goals on Care Plan in [facility] electronic health record for goal details.  Goals met    Therapy recommendation(s):    No further OT services indicated

## 2020-05-14 NOTE — PROGRESS NOTES
Mark Schmidtdrick Edgar  2020  POD   1    Doing well.  No immediate surgical complications identified.  I / o  ok  Temperatures:  Current - Temp: 97.5  F (36.4  C); Max - Temp  Av.7  F (35.9  C)  Min: 94  F (34.4  C)  Max: 97.9  F (36.6  C)  Pulse range: Pulse  Av.9  Min: 59  Max: 83  Blood pressure range: Systolic (24hrs), Av , Min:101 , Max:152   ; Diastolic (24hrs), Av, Min:59, Max:82    CMS:   intact  Labs:   Results for orders placed or performed during the hospital encounter of 20 (from the past 24 hour(s))   Glucose by meter   Result Value Ref Range    Glucose 121 (H) 70 - 99 mg/dL   XR Pelvis Port 1/2 Views    Narrative    PELVIS PORTABLE ONE TO TWO VIEWS 2020 10:45 AM     HISTORY: Postop.    COMPARISON: 2020.      Impression    IMPRESSION: Interval right hip arthroplasty revision with placement of  a tripolar constrained cup type of arthroplasty. Components are in  expected locations. Again seen is a large amount of heterotopic bone  lateral to the right hip.    PROSPER MARMOLEJO MD   Hemoglobin   Result Value Ref Range    Hemoglobin 10.1 (L) 13.3 - 17.7 g/dL   Hemoglobin A1c   Result Value Ref Range    Hemoglobin A1C 6.5 (H) 0 - 5.6 %   Glucose by meter   Result Value Ref Range    Glucose 141 (H) 70 - 99 mg/dL   Glucose by meter   Result Value Ref Range    Glucose 175 (H) 70 - 99 mg/dL   Glucose by meter   Result Value Ref Range    Glucose 170 (H) 70 - 99 mg/dL   Glucose by meter   Result Value Ref Range    Glucose 157 (H) 70 - 99 mg/dL   Hemoglobin   Result Value Ref Range    Hemoglobin 8.6 (L) 13.3 - 17.7 g/dL   Basic metabolic panel   Result Value Ref Range    Sodium 136 133 - 144 mmol/L    Potassium 4.7 3.4 - 5.3 mmol/L    Chloride 104 94 - 109 mmol/L    Carbon Dioxide 28 20 - 32 mmol/L    Anion Gap 4 3 - 14 mmol/L    Glucose 140 (H) 70 - 99 mg/dL    Urea Nitrogen 34 (H) 7 - 30 mg/dL    Creatinine 1.07 0.66 - 1.25 mg/dL    GFR Estimate 62 >60 mL/min/[1.73_m2]     GFR Estimate If Black 72 >60 mL/min/[1.73_m2]    Calcium 8.2 (L) 8.5 - 10.1 mg/dL       PLAN:  Gram stain  Many  Wbcs   Somewhat  Concerning  Run ancef  For 48 hrs  Awaiting  Cultures         Re ck  hgb   His  8.6  Is porb  Some mild  Fluid    Overload        He is  Going  Home   fri  Possible  But  Sat  More likely      There  Is  Home care   Pt  And  rn  Coverage  Being  Set up,  If  Culture  Turn  Pos  Then  Have  To add  IV abx  And prob a  Nursing  Home  His  Pre op  Asp  Was neg  So  Remain  Hopefull  Re ck  hgb  At  1400

## 2020-05-14 NOTE — PROGRESS NOTES
Order  Placed  For  One unit  Of  Blood this  Izabela  Slowly  Has   Moderate  Coronary  Risk  Factors  To let  hgb  Get  A lot  Lower    Still plan on  Sat discharge

## 2020-05-14 NOTE — PROGRESS NOTES
[facility]    Hospitalist Progress Note    Assessment & Plan   Mark Hernández is a 86 year old male who was admitted on 5/13/2020.     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, ureter cancer, and prostate cancer, status post multiple TURBTs, right ureterectomy and nephrectomy, severe aortic sclerosis, heart failure with reduced EF with noted EF of 42%, hypertension, hyperlipidemia, history of type 2 AV block, status post pacemaker implantation, type 2 diabetes, neuropathy, CKD stage III, BPH, B12 deficiency, iron deficient anemia, osteoarthritis with degenerative joint disease, sensorineural hearing loss, who is being evaluated by the Hospitalist Service to assist with co-medical management following a partial revision of right total hip arthroplasty and repair of abductor mechanism due to recurrent right total hip arthroplasty dislocations.      Osteoarthritis with degenerative changes and recurrent right total hip arthroplasty dislocations, status post partial revision of right total hip arthroplasty and repair of abductor mechanism:    Orthopedic Service will be managing at this point.  POD #1.     --Defer analgesic management, DVT prophylaxis (Xarelto 10 mg daily), and PT/OT assessment to their service.    --Strongly encourage utilization of incentive spirometer.     Abnormal UA:  Orthopedic service ordered UA and Ucx.  UA with glucose of 30, ketones of 5, protein albumin of 30, moderate blood, moderate leukocyte esterase, 0 WBC, 79 RBC.  At this time do not believe he has a UTI.  --Monitor Urine culture.      Acute blood loss anemia in setting of chronic iron deficiency and B12 anemia:  HGB previously around 9.1 (2017), pre-op HGB at 11.3.  Post-op at 8.6.    --Monitor.    --Daily morning iron supplement continued (325 mg).    --Hold B12 supplement at this point.  This can be resumed at time of  discharge.     History of right total hip arthroplasty infection:    The patient is on chronic suppression with minocycline.  The patient has received intraoperative cefazolin with plans to continue 1 gram every 8 hours for a total of 5 doses.    --Continue with Ancef and hold minocycline with plans to resume at discharge.     Heart failure with reduced ejection fraction:    Most recent ejection fraction was noted to be 42%.  Previous ejection fractions were noted to be 25%, and patient had an upgrade of his pacemaker to a biventricular pacemaker with improvement in ejection fraction function.  At this point, the patient appears to be on IV fluids with sodium chloride at 75 mL an hour.    --Hold prior to admit Lasix 20 mg daily.    --Plan to resume tomorrow 5/15 with hold parameters.     --Resume prior to admit lisinopril 2.5 mg daily with hold parameters, as well as continuation of Coreg 12.5 mg 2 times daily with hold parameters.    --Monitoring of daily weights and strict I's and O's.    --Stop IVF today and saline lock.   --Check creatinine tomorrow morning.      Essential hypertension:    --Prior to admit lisinopril and carvedilol will be continued as stated above.    --Monitor blood pressure closely and have PRN IV hydralazine made available for systolic blood pressures greater than 180.   --PTA lasix held, plan to resume tomorrow.      Hyperlipidemia:    Patient's prior to admit simvastatin 20 mg every evening at bedtime will be continued.     Chronic kidney disease, stage III:   This appears to be stable.    --Monitor renal function closely and attempt to avoid nephrotoxic agents as able.    --Resume PTA lisinopril with hold parameters.   --Stop IVF today and saline lock.    --Plan to restart PTA lasix tomorrow.      Severe aortic sclerosis:    This was noted in electronic medical record.  The patient denies having any valve replacement or any concern regarding this.    --No interventions.      History of  second-degree atrioventricular block:    Patient is status post permanent pacemaker initially implanted in 2016 with an upgrade in 2018 with a biventricular pacemaker.    --Continue with Coreg and monitor.     Type 2 diabetes with associated neuropathy:    A1c of 6.5% 5/13.  Notably, patient did received IV Dexamethasone 4 mg intraoperatively.    Recent Labs   Lab 05/14/20  1143 05/14/20  0614 05/14/20  0217 05/13/20  2156 05/13/20  1700 05/13/20  1330 05/13/20  1033 05/13/20  0608   GLC  --  140*  --   --   --   --   --  135*   *  --  157* 170* 175* 141* 121*  --    --Hold prior to admit metformin 250 mg 2 times daily.   --Resume Cymbalta 30 mg daily and gabapentin 800 mg 2 times daily.    --Monitor blood glucose 4 times daily with low intensity sliding scale insulin available with meals and at bedtime.  --Hypoglycemic protocol is in place.     Sensorineural hearing loss:    Patient currently utilizes hearing aids.    --Would recommend utilization of this during this hospitalization.     Benign prostatic hypertrophy:    --Resume prior to admit Flomax 0.4 mg daily.     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 Pepper, and I believe he is receiving Lupron injections.    --No interventions appear necessary at this point.     Diet: Combination Diet 3227-1915 Calories: Moderate Consistent CHO (4-6 CHO units/meal); Low Saturated Fat Na <2400mg Diet     DVT Prophylaxis: Xarelto   Mock Catheter: in place, indication: Other (Comment)(day of surgery)  Code Status: Full Code  Disposition Plan    Expected discharge: Per Ortho.  Entered: Eleazar Thomas PA-C 05/14/2020, 8:19 AM        The patient has been discussed with Dr. Angel, who agrees with the assessment and plan at this time.  Dr. Angel will evaluate the patient independently.       Ivan Thomas PA-C   [phone]    Interval History   Patient seated in bed upon arrival and just finished lunch.  He denied fever, chills, chest pain, shortness of breath or abdominal pain.  He continues to have méndez catheter per his request.  He rated his pain 5/10.      He had no questions at this time.      -Data reviewed today: I reviewed all new labs and imaging results over the last 24 hours. I personally reviewed no images or EKG's today.    Physical Exam   Temp: 97.5  F (36.4  C) Temp src: Axillary BP: 120/64 Pulse: 63 Heart Rate: 76 Resp: 16 SpO2: 94 % O2 Device: None (Room air) Oxygen Delivery: 2 LPM  Vitals:    05/13/20 0541 05/14/20 0548   Weight: 94.3 kg (208 lb) 95 kg (209 lb 7 oz)     Vital Signs with Ranges  Temp:  [94  F (34.4  C)-97.9  F (36.6  C)] 97.5  F (36.4  C)  Pulse:  [59-83] 63  Heart Rate:  [59-85] 76  Resp:  [9-16] 16  BP: (101-152)/(59-82) 120/64  MAP:  [91 mmHg-107 mmHg] 107 mmHg  Arterial Line BP: (142-164)/(61-72) 164/72  SpO2:  [93 %-100 %] 95 %  I/O last 3 completed shifts:  In: 3470 [P.O.:120; I.V.:3100]  Out: 3695 [Urine:3050; Drains:345; Blood:300]      Constitutional: Awake, alert, cooperative, no apparent distress.    ENT: Normocephalic, without obvious abnormality, atraumatic, oral pharynx with moist mucus membranes, tonsils without erythema or exudates.  Neck: Supple, symmetrical, trachea midline, no adenopathy.  Pulmonary: No increased work of breathing, good air exchange, clear to auscultation bilaterally, no crackles or wheezing.  Cardiovascular: Regular rate and rhythm, normal S1 and S2, no S3 or S4, and no murmur noted.  GI: Normal bowel sounds, soft, non-distended, non-tender.  Skin/Integumen: Lower extremity skin changes (blue tinged hypopigmentation).    Neuro: CN II-XII grossly intact.  Psych:  Alert and oriented x 3. Normal affect.  Extremities: No lower extremity edema noted, and calves are non-TTP bilaterally.   : Méndez catheter in place with clear yellow  urine.        Medications       acetaminophen  975 mg Oral Q8H     carvedilol  12.5 mg Oral BID w/meals     ceFAZolin  1 g Intravenous Q8H     ciprofloxacin  250 mg Oral Q12H VERNON     DULoxetine  30 mg Oral Daily     ferrous sulfate  325 mg Oral Daily with breakfast     furosemide  20 mg Oral Daily     gabapentin  800 mg Oral BID     insulin aspart  1-3 Units Subcutaneous TID AC     insulin aspart  1-3 Units Subcutaneous At Bedtime     lisinopril  2.5 mg Oral Daily     polyethylene glycol  17 g Oral Daily     rivaroxaban ANTICOAGULANT  10 mg Oral Daily     senna-docusate  2 tablet Oral BID     simvastatin  20 mg Oral At Bedtime     sodium chloride (PF)  3 mL Intracatheter Q8H     tamsulosin  0.8 mg Oral At Bedtime       Data   Recent Labs   Lab 05/14/20  0614 05/13/20  1102 05/13/20  0608   WBC  --   --  6.0   HGB 8.6* 10.1* 11.3*   MCV  --   --  98   PLT  --   --  192     --   --    POTASSIUM 4.7  --  4.3   CHLORIDE 104  --   --    CO2 28  --   --    BUN 34*  --   --    CR 1.07  --   --    ANIONGAP 4  --   --    ESAU 8.2*  --   --    *  --  135*       No results found for this or any previous visit (from the past 24 hour(s)).

## 2020-05-14 NOTE — PLAN OF CARE
Patient A&Ox4, VSS on 2L oxygen . Dressing CDI. Baseline Numbness BLE . Iv infusing. Mod carb diet. T&R Q2hrs. Hemovac patent. Mock intact. Skin dark blue discoloration of BLE baseline . Pain managed with po dilaudid & scheduled tylenol. Will continue monitoring.

## 2020-05-14 NOTE — PLAN OF CARE
Discharge Planner PT     Patient plan for discharge: Home with spouse and daughter who is an RN.    Current status: Pt was received supine in bed and agreed to PT interventions. Pt was pre-medicated and reported minimal pain during PT session. Pt required min assist x 2 and verbal cues to maintain hip precuaitons for supine>sit using bed rails and increased time to complete task. Pt sat at EOB x 5 min with SBA. STS x 2 with min-mod assist x 2 and verbal cues for LLE placement, hand placement and to adhere with hip precautions. Pt ambulated 100 ft x 2 using RW for support and CGA for safety. Pt went up/down 3 steps using B rail support and min assist x 2 with verbal cues. Pt is edu on proper sequencing and demo good understanding. Pt is left in chair at the end of PT session with all needs within reach, alarm engaged, and abd pillow between legs.      Barriers to return to prior living situation: Fall risk, Level of assist, Hip precautions, Steps to manage at home.      Recommendations for discharge: Home with 2 person  assist  for bed mobility, tranfers, and stair management, HH PT.      Rationale for recommendations: Pt is anticipated to make progress in all mobility to min-mod assist x 1 with continued inpatient PT. If pt continues to required heavy assist of two, or family is unable to provide assist consistently then pt may need TCU stay prior to returning home.            Entered by: Garry Mckenzie 05/14/2020 9:59 AM

## 2020-05-15 ENCOUNTER — APPOINTMENT (OUTPATIENT)
Dept: PHYSICAL THERAPY | Facility: CLINIC | Age: 85
DRG: 467 | End: 2020-05-15
Attending: ORTHOPAEDIC SURGERY
Payer: MEDICARE

## 2020-05-15 LAB
ANION GAP SERPL CALCULATED.3IONS-SCNC: 5 MMOL/L (ref 3–14)
BACTERIA SPEC CULT: NO GROWTH
BASOPHILS # BLD AUTO: 0 10E9/L (ref 0–0.2)
BASOPHILS NFR BLD AUTO: 0.3 %
BLD PROD TYP BPU: NORMAL
BLD UNIT ID BPU: 0
BLOOD PRODUCT CODE: NORMAL
BPU ID: NORMAL
BUN SERPL-MCNC: 31 MG/DL (ref 7–30)
CALCIUM SERPL-MCNC: 8.3 MG/DL (ref 8.5–10.1)
CHLORIDE SERPL-SCNC: 103 MMOL/L (ref 94–109)
CO2 SERPL-SCNC: 29 MMOL/L (ref 20–32)
CREAT SERPL-MCNC: 1.06 MG/DL (ref 0.66–1.25)
DIFFERENTIAL METHOD BLD: ABNORMAL
EOSINOPHIL # BLD AUTO: 0.4 10E9/L (ref 0–0.7)
EOSINOPHIL NFR BLD AUTO: 6.2 %
ERYTHROCYTE [DISTWIDTH] IN BLOOD BY AUTOMATED COUNT: 14.6 % (ref 10–15)
GFR SERPL CREATININE-BSD FRML MDRD: 63 ML/MIN/{1.73_M2}
GLUCOSE BLDC GLUCOMTR-MCNC: 106 MG/DL (ref 70–99)
GLUCOSE BLDC GLUCOMTR-MCNC: 109 MG/DL (ref 70–99)
GLUCOSE BLDC GLUCOMTR-MCNC: 119 MG/DL (ref 70–99)
GLUCOSE BLDC GLUCOMTR-MCNC: 146 MG/DL (ref 70–99)
GLUCOSE BLDC GLUCOMTR-MCNC: 91 MG/DL (ref 70–99)
GLUCOSE SERPL-MCNC: 124 MG/DL (ref 70–99)
HCT VFR BLD AUTO: 29 % (ref 40–53)
HGB BLD-MCNC: 9.3 G/DL (ref 13.3–17.7)
IMM GRANULOCYTES # BLD: 0 10E9/L (ref 0–0.4)
IMM GRANULOCYTES NFR BLD: 0.3 %
LYMPHOCYTES # BLD AUTO: 0.9 10E9/L (ref 0.8–5.3)
LYMPHOCYTES NFR BLD AUTO: 15 %
Lab: NORMAL
MCH RBC QN AUTO: 31.6 PG (ref 26.5–33)
MCHC RBC AUTO-ENTMCNC: 32.1 G/DL (ref 31.5–36.5)
MCV RBC AUTO: 99 FL (ref 78–100)
MONOCYTES # BLD AUTO: 0.8 10E9/L (ref 0–1.3)
MONOCYTES NFR BLD AUTO: 12.6 %
NEUTROPHILS # BLD AUTO: 4.1 10E9/L (ref 1.6–8.3)
NEUTROPHILS NFR BLD AUTO: 65.6 %
NRBC # BLD AUTO: 0 10*3/UL
NRBC BLD AUTO-RTO: 0 /100
PLATELET # BLD AUTO: 162 10E9/L (ref 150–450)
POTASSIUM SERPL-SCNC: 3.9 MMOL/L (ref 3.4–5.3)
RBC # BLD AUTO: 2.94 10E12/L (ref 4.4–5.9)
SODIUM SERPL-SCNC: 137 MMOL/L (ref 133–144)
SPECIMEN SOURCE: NORMAL
TRANSFUSION STATUS PATIENT QL: NORMAL
TRANSFUSION STATUS PATIENT QL: NORMAL
WBC # BLD AUTO: 6.3 10E9/L (ref 4–11)

## 2020-05-15 PROCEDURE — 80048 BASIC METABOLIC PNL TOTAL CA: CPT | Performed by: PHYSICIAN ASSISTANT

## 2020-05-15 PROCEDURE — 25000128 H RX IP 250 OP 636: Performed by: ORTHOPAEDIC SURGERY

## 2020-05-15 PROCEDURE — 25000132 ZZH RX MED GY IP 250 OP 250 PS 637: Mod: GY | Performed by: ORTHOPAEDIC SURGERY

## 2020-05-15 PROCEDURE — 85025 COMPLETE CBC W/AUTO DIFF WBC: CPT | Performed by: PHYSICIAN ASSISTANT

## 2020-05-15 PROCEDURE — 97110 THERAPEUTIC EXERCISES: CPT | Mod: GP | Performed by: PHYSICAL THERAPIST

## 2020-05-15 PROCEDURE — 12000000 ZZH R&B MED SURG/OB

## 2020-05-15 PROCEDURE — 00000146 ZZHCL STATISTIC GLUCOSE BY METER IP

## 2020-05-15 PROCEDURE — 97530 THERAPEUTIC ACTIVITIES: CPT | Mod: GP | Performed by: PHYSICAL THERAPIST

## 2020-05-15 PROCEDURE — 36415 COLL VENOUS BLD VENIPUNCTURE: CPT | Performed by: PHYSICIAN ASSISTANT

## 2020-05-15 PROCEDURE — 99232 SBSQ HOSP IP/OBS MODERATE 35: CPT | Performed by: PHYSICIAN ASSISTANT

## 2020-05-15 PROCEDURE — 97116 GAIT TRAINING THERAPY: CPT | Mod: GP | Performed by: PHYSICAL THERAPIST

## 2020-05-15 PROCEDURE — 25000132 ZZH RX MED GY IP 250 OP 250 PS 637: Mod: GY | Performed by: PHYSICIAN ASSISTANT

## 2020-05-15 RX ORDER — TRAMADOL HYDROCHLORIDE 50 MG/1
50 TABLET ORAL EVERY 6 HOURS PRN
Qty: 40 TABLET | Refills: 0 | Status: SHIPPED | OUTPATIENT
Start: 2020-05-16 | End: 2020-06-15

## 2020-05-15 RX ORDER — HYDROXYZINE HYDROCHLORIDE 25 MG/1
25 TABLET, FILM COATED ORAL EVERY 6 HOURS PRN
Qty: 40 TABLET | Refills: 0 | Status: ON HOLD | OUTPATIENT
Start: 2020-05-15 | End: 2020-05-27

## 2020-05-15 RX ORDER — CIPROFLOXACIN 250 MG/1
250 TABLET, FILM COATED ORAL EVERY 12 HOURS
Qty: 8 TABLET | Refills: 0 | Status: ON HOLD | OUTPATIENT
Start: 2020-05-16 | End: 2020-05-27

## 2020-05-15 RX ORDER — AMOXICILLIN 250 MG
2 CAPSULE ORAL 2 TIMES DAILY
Qty: 40 TABLET | Refills: 0 | Status: SHIPPED | OUTPATIENT
Start: 2020-05-15 | End: 2022-07-10

## 2020-05-15 RX ADMIN — RIVAROXABAN 10 MG: 10 TABLET, FILM COATED ORAL at 09:42

## 2020-05-15 RX ADMIN — FERROUS SULFATE TAB 325 MG (65 MG ELEMENTAL FE) 325 MG: 325 (65 FE) TAB at 09:42

## 2020-05-15 RX ADMIN — SIMVASTATIN 20 MG: 20 TABLET, FILM COATED ORAL at 21:39

## 2020-05-15 RX ADMIN — GABAPENTIN 800 MG: 800 TABLET, FILM COATED ORAL at 09:42

## 2020-05-15 RX ADMIN — ACETAMINOPHEN 975 MG: 325 TABLET, FILM COATED ORAL at 06:45

## 2020-05-15 RX ADMIN — ACETAMINOPHEN 975 MG: 325 TABLET, FILM COATED ORAL at 21:38

## 2020-05-15 RX ADMIN — CIPROFLOXACIN HYDROCHLORIDE 250 MG: 250 TABLET, FILM COATED ORAL at 09:43

## 2020-05-15 RX ADMIN — LISINOPRIL 2.5 MG: 2.5 TABLET ORAL at 09:42

## 2020-05-15 RX ADMIN — SENNOSIDES AND DOCUSATE SODIUM 2 TABLET: 8.6; 5 TABLET ORAL at 21:38

## 2020-05-15 RX ADMIN — DULOXETINE HYDROCHLORIDE 30 MG: 30 CAPSULE, DELAYED RELEASE ORAL at 09:42

## 2020-05-15 RX ADMIN — TAMSULOSIN HYDROCHLORIDE 0.8 MG: 0.4 CAPSULE ORAL at 21:39

## 2020-05-15 RX ADMIN — CARVEDILOL 12.5 MG: 6.25 TABLET, FILM COATED ORAL at 09:41

## 2020-05-15 RX ADMIN — TRAMADOL HYDROCHLORIDE 50 MG: 50 TABLET, FILM COATED ORAL at 10:34

## 2020-05-15 RX ADMIN — CEFAZOLIN 1 G: 330 INJECTION, POWDER, FOR SOLUTION INTRAMUSCULAR; INTRAVENOUS at 02:28

## 2020-05-15 RX ADMIN — ACETAMINOPHEN 975 MG: 325 TABLET, FILM COATED ORAL at 14:13

## 2020-05-15 RX ADMIN — CIPROFLOXACIN HYDROCHLORIDE 250 MG: 250 TABLET, FILM COATED ORAL at 21:39

## 2020-05-15 RX ADMIN — POLYETHYLENE GLYCOL 3350 17 G: 17 POWDER, FOR SOLUTION ORAL at 10:34

## 2020-05-15 RX ADMIN — FUROSEMIDE 20 MG: 20 TABLET ORAL at 09:42

## 2020-05-15 RX ADMIN — SENNOSIDES AND DOCUSATE SODIUM 2 TABLET: 8.6; 5 TABLET ORAL at 09:42

## 2020-05-15 RX ADMIN — GABAPENTIN 800 MG: 800 TABLET, FILM COATED ORAL at 21:39

## 2020-05-15 ASSESSMENT — ACTIVITIES OF DAILY LIVING (ADL)
ADLS_ACUITY_SCORE: 17

## 2020-05-15 NOTE — PLAN OF CARE
A&Ox4. VSS on RA. Denied pain.  Mod carb diet. ,106. Up with assist of 2, turned/repositioned q2h overnight. CMS intact, ex for baseline neuropathy and +1 pulses in BLE. +2 edema in bilateral ankles/feet. Dressing CDI. 1 unit of blood given, tolerated well, recheck this AM. Mock discontinued at 0600, due to void. IV saline locked.

## 2020-05-15 NOTE — PROGRESS NOTES
Mark Funez Edgar  5/15/2020  POD #  2    Doing well.  Clean wound without signs of infection.  Normal healing wound.  No immediate surgical complications identified.  Pain well-controlled.  Tolerating physical therapy and rehabilitation well.  Temperatures:  Current - Temp: 97.6  F (36.4  C); Max - Temp  Av.7  F (36.5  C)  Min: 97.4  F (36.3  C)  Max: 97.9  F (36.6  C)  Pulse range: Pulse  Av  Min: 65  Max: 70  Blood pressure range: Systolic (24hrs), Av , Min:91 , Max:136   ; Diastolic (24hrs), Av, Min:32, Max:60    CMS: intact  Labs:   Results for orders placed or performed during the hospital encounter of 20 (from the past 24 hour(s))   Hemoglobin   Result Value Ref Range    Hemoglobin 7.7 (L) 13.3 - 17.7 g/dL   Erythrocyte sedimentation rate auto   Result Value Ref Range    Sed Rate 33 (H) 0 - 20 mm/h   Glucose by meter   Result Value Ref Range    Glucose 114 (H) 70 - 99 mg/dL   Glucose by meter   Result Value Ref Range    Glucose 151 (H) 70 - 99 mg/dL   Glucose by meter   Result Value Ref Range    Glucose 106 (H) 70 - 99 mg/dL   Glucose by meter   Result Value Ref Range    Glucose 109 (H) 70 - 99 mg/dL   CBC with platelets differential   Result Value Ref Range    WBC 6.3 4.0 - 11.0 10e9/L    RBC Count 2.94 (L) 4.4 - 5.9 10e12/L    Hemoglobin 9.3 (L) 13.3 - 17.7 g/dL    Hematocrit 29.0 (L) 40.0 - 53.0 %    MCV 99 78 - 100 fl    MCH 31.6 26.5 - 33.0 pg    MCHC 32.1 31.5 - 36.5 g/dL    RDW 14.6 10.0 - 15.0 %    Platelet Count 162 150 - 450 10e9/L    Diff Method Automated Method     % Neutrophils 65.6 %    % Lymphocytes 15.0 %    % Monocytes 12.6 %    % Eosinophils 6.2 %    % Basophils 0.3 %    % Immature Granulocytes 0.3 %    Nucleated RBCs 0 0 /100    Absolute Neutrophil 4.1 1.6 - 8.3 10e9/L    Absolute Lymphocytes 0.9 0.8 - 5.3 10e9/L    Absolute Monocytes 0.8 0.0 - 1.3 10e9/L    Absolute Eosinophils 0.4 0.0 - 0.7 10e9/L    Absolute Basophils 0.0 0.0 - 0.2 10e9/L    Abs Immature  Granulocytes 0.0 0 - 0.4 10e9/L    Absolute Nucleated RBC 0.0    Basic metabolic panel   Result Value Ref Range    Sodium 137 133 - 144 mmol/L    Potassium 3.9 3.4 - 5.3 mmol/L    Chloride 103 94 - 109 mmol/L    Carbon Dioxide 29 20 - 32 mmol/L    Anion Gap 5 3 - 14 mmol/L    Glucose 124 (H) 70 - 99 mg/dL    Urea Nitrogen 31 (H) 7 - 30 mg/dL    Creatinine 1.06 0.66 - 1.25 mg/dL    GFR Estimate 63 >60 mL/min/[1.73_m2]    GFR Estimate If Black 73 >60 mL/min/[1.73_m2]    Calcium 8.3 (L) 8.5 - 10.1 mg/dL   Glucose by meter   Result Value Ref Range    Glucose 91 70 - 99 mg/dL       PLAN:  Deep venous thrombosis prophylaxis - xeralto  For  2  Weeks  Then asa  Start physical therapy  Discharge plan: sat  So  Far  cx  Are neg  From wound  And  Us  A  But  ua  Was loaded  wih wbcs  And  Had  Received  Plenty  Of antibiotics  For   The  Surgery  So  A false  Neg  Is poss      Needs  A  Raised  Toilet seat

## 2020-05-15 NOTE — PROGRESS NOTES
Pt getting one unit of blood.  Mock patent.  Knee drsg dry and intact.  CMS baseline neuropathy to ft. Up with 2 assist.  Denies nausea.  Having moderate pain--on Tramadol.

## 2020-05-15 NOTE — PLAN OF CARE
Pt is A & O x 4. Lungs sound clear, bowel sounds active, cms intact. Up with 1 and walker. Dressing was changed. Received tylenol and tramadol for pain. Bruises and skin discoloration. Voiding adequately. BG covered per order. Will continue to monitor.

## 2020-05-15 NOTE — PROGRESS NOTES
[facility]    Hospitalist Progress Note    Assessment & Plan   Mark Hernández is a 86 year old male who was admitted on 5/13/2020.     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, ureter cancer, and prostate cancer, status post multiple TURBTs, right ureterectomy and nephrectomy, severe aortic sclerosis, heart failure with reduced EF with noted EF of 42%, hypertension, hyperlipidemia, history of type 2 AV block, status post pacemaker implantation, type 2 diabetes, neuropathy, CKD stage III, BPH, B12 deficiency, iron deficient anemia, osteoarthritis with degenerative joint disease, sensorineural hearing loss, who is being evaluated by the Hospitalist Service to assist with co-medical management following a partial revision of right total hip arthroplasty and repair of abductor mechanism due to recurrent right total hip arthroplasty dislocations.      Osteoarthritis with degenerative changes and recurrent right total hip arthroplasty dislocations, status post partial revision of right total hip arthroplasty and repair of abductor mechanism:    Orthopedic Service will be managing at this point.  POD #2.     --Defer analgesic management, DVT prophylaxis (Xarelto 10 mg daily), and PT/OT assessment to their service.    --Strongly encourage utilization of incentive spirometer.     Abnormal UA:  Orthopedic service ordered UA and Ucx.  UA with glucose of 30, ketones of 5, protein albumin of 30, moderate blood, moderate leukocyte esterase, 0 WBC, 79 RBC.  At this time do not believe he has a UTI.  Urine culture with no growth.    --Per Ortho:  Start PO Cipro 250 mg BID for 5 day course (also received 7 doses of ancef).   --Discussed with Dr. Summers will continue with course of PO Cipro.      Acute blood loss anemia in setting of chronic iron deficiency and B12 anemia:  Received 1 unit PRBC 5/14.    Recent Labs    Lab 05/15/20  0714 05/14/20  1551 05/14/20  0614 05/13/20  1102 05/13/20  0608   HGB 9.3* 7.7* 8.6* 10.1* 11.3*   --Monitor.    --Daily morning iron supplement continued (325 mg).    --Hold B12 supplement at this point.  This can be resumed at time of discharge.     History of right total hip arthroplasty infection:    The patient is on chronic suppression with minocycline.  Right hip cultures no growth to date.    --Hold PTA minocycline, unclear if minocycline will be resumed at discharge.  Defer to Ortho.     --Discussed with Dr. Summers and plan to resume minocycline at discharge.    --Patient received 7 doses of ancef; is now on PO Cipro per Ortho.      Heart failure with reduced ejection fraction:    Most recent ejection fraction was noted to be 42%.  Previous ejection fractions were noted to be 25%, and patient had an upgrade of his pacemaker to a biventricular pacemaker with improvement in ejection fraction function.  At this point, the patient appears to be on IV fluids with sodium chloride at 75 mL an hour.    --Resume PTA Lasix (5/15) with hold parameters.     --Resume prior to admit lisinopril 2.5 mg daily with hold parameters, as well as continuation of Coreg 12.5 mg 2 times daily with hold parameters.    --Monitoring of daily weights and strict I's and O's.    --Monitor.    --IVF stopped 5/14.      Essential hypertension:    --Prior to admit lisinopril and carvedilol will be continued as stated above.    --Monitor blood pressure closely and have PRN IV hydralazine made available for systolic blood pressures greater than 180.   --Resume PTA Lasix (5/15) with hold parameters.      Hyperlipidemia:    Patient's prior to admit simvastatin 20 mg every evening at bedtime will be continued.     Chronic kidney disease, stage III:   This appears to be stable.    --Monitor renal function closely and attempt to avoid nephrotoxic agents as able.    --Resume PTA lisinopril with hold parameters.   --IVF stopped 5/14.     --Resume PTA Lasix (5/15) with hold parameters.       Severe aortic sclerosis:    Documented on ECHO 8/2019.  The patient denies having any valve replacement or any concern regarding this.    --No interventions.      History of second-degree atrioventricular block:    Patient is status post permanent pacemaker initially implanted in 2016 with an upgrade in 2018 with a biventricular pacemaker.    --Continue with Coreg and monitor.     Type 2 diabetes with associated neuropathy:    A1c of 6.5% 5/13.  Notably, patient did received IV Dexamethasone 4 mg intraoperatively.    Recent Labs   Lab 05/15/20  1207 05/15/20  0714 05/15/20  0545 05/15/20  0224 05/14/20  2142 05/14/20  1739 05/14/20  1143 05/14/20  0614  05/13/20  0608   GLC  --  124*  --   --   --   --   --  140*  --  135*   BGM 91  --  109* 106* 151* 114* 123*  --    < >  --     < > = values in this interval not displayed.   --Hold prior to admit metformin 250 mg 2 times daily.   --Resume Cymbalta 30 mg daily and gabapentin 800 mg 2 times daily.    --Monitor blood glucose 4 times daily with low intensity sliding scale insulin available with meals and at bedtime.  --Hypoglycemic protocol is in place.     Sensorineural hearing loss:    Patient currently utilizes hearing aids.    --Would recommend utilization of this during this hospitalization.     Benign prostatic hypertrophy:    --Resume prior to admit Flomax 0.4 mg daily.     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 Pepper, and I believe he is receiving Lupron injections.    --No interventions appear necessary at this point.     Diet: Combination Diet 5552-5990 Calories: Moderate Consistent CHO (4-6 CHO units/meal); Low Saturated Fat Na <2400mg Diet     DVT Prophylaxis: Xarelto   Mock Catheter: not present  Code Status: Full  Code  Disposition Plan    Expected discharge: Per Ortho.  Entered: Eleazar Thomas PA-C 05/15/2020, 7:12 AM        The patient has been discussed with Dr. Angel, who agrees with the assessment and plan at this time.  Dr. Angel will evaluate the patient independently.      Ivan Thomas PA-C   [phone]    Interval History   Patient was asleep in bed upon arrival.  He awoke easily.  He denied fever, chills, chest pain, shortness of breath, or abdominal pain.  He complained of increased urinary frequency but said it is because of lasix, notably the méndez was removed this morning.      He had no questions at this time.      Discussed with Dr. Summers of Ortho.      -Data reviewed today: I reviewed all new labs and imaging results over the last 24 hours. I personally reviewed no images or EKG's today.    Physical Exam   Temp: 97.5  F (36.4  C) Temp src: Oral BP: 108/46 Pulse: 66 Heart Rate: 68 Resp: 16 SpO2: 95 % O2 Device: None (Room air)    Vitals:    05/13/20 0541 05/14/20 0548   Weight: 94.3 kg (208 lb) 95 kg (209 lb 7 oz)     Vital Signs with Ranges  Temp:  [97.4  F (36.3  C)-97.9  F (36.6  C)] 97.5  F (36.4  C)  Pulse:  [66] 66  Heart Rate:  [62-76] 68  Resp:  [14-18] 16  BP: ()/(32-64) 108/46  SpO2:  [91 %-97 %] 95 %  I/O last 3 completed shifts:  In: 1040 [P.O.:740]  Out: 3600 [Urine:3525; Drains:75]      Constitutional: Asleep upon arrival.  He awakened easily, alert, cooperative, NAD.    ENT: NCAT, oral pharynx with moist mucus membranes, tonsils without erythema or exudates.  Neck: Supple, symmetrical, trachea midline, no adenopathy.  Pulmonary: No increased work of breathing, good air exchange, CTA bilaterally, no crackles or wheezing.  Cardiovascular: R/R/R, normal S1 and S2, no S3 or S4, and no murmur noted.  GI: Normal bowel sounds, soft, non-distended, non-tender.  Skin/Integumen: Lower extremity skin changes (blue tinged hypopigmentation).    Neuro: CN II-XII grossly intact.  Psych:   Alert and oriented x 3. Normal affect.  Extremities: Left lower extremity edema noted (1+), no edema noted on the right lower extremity.  Calves non-tender to palpation.        Medications       acetaminophen  975 mg Oral Q8H     carvedilol  12.5 mg Oral BID w/meals     ciprofloxacin  250 mg Oral Q12H VERNON     DULoxetine  30 mg Oral Daily     ferrous sulfate  325 mg Oral Daily with breakfast     furosemide  20 mg Oral Daily     gabapentin  800 mg Oral BID     insulin aspart  1-3 Units Subcutaneous TID AC     insulin aspart  1-3 Units Subcutaneous At Bedtime     lisinopril  2.5 mg Oral Daily     polyethylene glycol  17 g Oral Daily     rivaroxaban ANTICOAGULANT  10 mg Oral Daily     senna-docusate  2 tablet Oral BID     simvastatin  20 mg Oral At Bedtime     sodium chloride (PF)  3 mL Intracatheter Q8H     tamsulosin  0.8 mg Oral At Bedtime       Data   Recent Labs   Lab 05/15/20  0714 05/14/20  1551 05/14/20  0614  05/13/20  0608   WBC 6.3  --   --   --  6.0   HGB 9.3* 7.7* 8.6*   < > 11.3*   MCV 99  --   --   --  98     --   --   --  192     --  136  --   --    POTASSIUM 3.9  --  4.7  --  4.3   CHLORIDE 103  --  104  --   --    CO2 29  --  28  --   --    BUN 31*  --  34*  --   --    CR 1.06  --  1.07  --   --    ANIONGAP 5  --  4  --   --    ESAU 8.3*  --  8.2*  --   --    *  --  140*  --  135*    < > = values in this interval not displayed.       No results found for this or any previous visit (from the past 24 hour(s)).

## 2020-05-15 NOTE — PLAN OF CARE
Patient plan: Return home with spouse and assist of daughter  Current status: Pt reported R hip pain of 4/10 at rest. Pt completed bed mobility with Katy. Transfers with FWW and Katy, pt required cues for safe technique. Pt tolerated ambulation of 200 feet x 2 with FWW and CGA, frequent cues provided for keeping FWW close to body. Pt navigated 3 stairs with 1 rail and HHA, Katy.   Anticipated status at discharge: Katy for bed mobility, transfers with use of FWW, CGA for ambulation with FWW, Katy to navigate stairs

## 2020-05-16 ENCOUNTER — APPOINTMENT (OUTPATIENT)
Dept: PHYSICAL THERAPY | Facility: CLINIC | Age: 85
DRG: 467 | End: 2020-05-16
Attending: ORTHOPAEDIC SURGERY
Payer: MEDICARE

## 2020-05-16 VITALS
TEMPERATURE: 97.9 F | BODY MASS INDEX: 31.07 KG/M2 | HEIGHT: 68 IN | HEART RATE: 70 BPM | SYSTOLIC BLOOD PRESSURE: 119 MMHG | DIASTOLIC BLOOD PRESSURE: 61 MMHG | RESPIRATION RATE: 16 BRPM | OXYGEN SATURATION: 93 % | WEIGHT: 205 LBS

## 2020-05-16 LAB
ANION GAP SERPL CALCULATED.3IONS-SCNC: 6 MMOL/L (ref 3–14)
BUN SERPL-MCNC: 30 MG/DL (ref 7–30)
CALCIUM SERPL-MCNC: 8.8 MG/DL (ref 8.5–10.1)
CHLORIDE SERPL-SCNC: 103 MMOL/L (ref 94–109)
CO2 SERPL-SCNC: 29 MMOL/L (ref 20–32)
CREAT SERPL-MCNC: 1.07 MG/DL (ref 0.66–1.25)
GFR SERPL CREATININE-BSD FRML MDRD: 62 ML/MIN/{1.73_M2}
GLUCOSE BLDC GLUCOMTR-MCNC: 111 MG/DL (ref 70–99)
GLUCOSE BLDC GLUCOMTR-MCNC: 114 MG/DL (ref 70–99)
GLUCOSE SERPL-MCNC: 176 MG/DL (ref 70–99)
HGB BLD-MCNC: 9 G/DL (ref 13.3–17.7)
POTASSIUM SERPL-SCNC: 3.9 MMOL/L (ref 3.4–5.3)
SODIUM SERPL-SCNC: 138 MMOL/L (ref 133–144)

## 2020-05-16 PROCEDURE — 36415 COLL VENOUS BLD VENIPUNCTURE: CPT | Performed by: ORTHOPAEDIC SURGERY

## 2020-05-16 PROCEDURE — 00000146 ZZHCL STATISTIC GLUCOSE BY METER IP

## 2020-05-16 PROCEDURE — 97110 THERAPEUTIC EXERCISES: CPT | Mod: GP

## 2020-05-16 PROCEDURE — 85018 HEMOGLOBIN: CPT | Performed by: ORTHOPAEDIC SURGERY

## 2020-05-16 PROCEDURE — 97530 THERAPEUTIC ACTIVITIES: CPT | Mod: GP

## 2020-05-16 PROCEDURE — 80048 BASIC METABOLIC PNL TOTAL CA: CPT | Performed by: ORTHOPAEDIC SURGERY

## 2020-05-16 PROCEDURE — 25000132 ZZH RX MED GY IP 250 OP 250 PS 637: Mod: GY | Performed by: PHYSICIAN ASSISTANT

## 2020-05-16 PROCEDURE — 25000132 ZZH RX MED GY IP 250 OP 250 PS 637: Mod: GY | Performed by: ORTHOPAEDIC SURGERY

## 2020-05-16 PROCEDURE — 97116 GAIT TRAINING THERAPY: CPT | Mod: GP

## 2020-05-16 RX ADMIN — LISINOPRIL 2.5 MG: 2.5 TABLET ORAL at 08:27

## 2020-05-16 RX ADMIN — ACETAMINOPHEN 975 MG: 325 TABLET, FILM COATED ORAL at 06:48

## 2020-05-16 RX ADMIN — CARVEDILOL 12.5 MG: 6.25 TABLET, FILM COATED ORAL at 08:27

## 2020-05-16 RX ADMIN — SENNOSIDES AND DOCUSATE SODIUM 2 TABLET: 8.6; 5 TABLET ORAL at 08:27

## 2020-05-16 RX ADMIN — DULOXETINE HYDROCHLORIDE 30 MG: 30 CAPSULE, DELAYED RELEASE ORAL at 08:27

## 2020-05-16 RX ADMIN — GABAPENTIN 800 MG: 800 TABLET, FILM COATED ORAL at 08:27

## 2020-05-16 RX ADMIN — CIPROFLOXACIN HYDROCHLORIDE 250 MG: 250 TABLET, FILM COATED ORAL at 08:27

## 2020-05-16 RX ADMIN — FERROUS SULFATE TAB 325 MG (65 MG ELEMENTAL FE) 325 MG: 325 (65 FE) TAB at 08:27

## 2020-05-16 RX ADMIN — FUROSEMIDE 20 MG: 20 TABLET ORAL at 08:27

## 2020-05-16 RX ADMIN — POLYETHYLENE GLYCOL 3350 17 G: 17 POWDER, FOR SOLUTION ORAL at 08:27

## 2020-05-16 RX ADMIN — RIVAROXABAN 10 MG: 10 TABLET, FILM COATED ORAL at 08:27

## 2020-05-16 ASSESSMENT — ACTIVITIES OF DAILY LIVING (ADL)
ADLS_ACUITY_SCORE: 17

## 2020-05-16 ASSESSMENT — MIFFLIN-ST. JEOR: SCORE: 1584.37

## 2020-05-16 NOTE — PLAN OF CARE
Pt A&Ox4, VSS on RA. Pt up w/1, gait belt, and walker, voiding in urinal. Tolerating moderate carb. diet well. Baseline neuropathy to all extremities present. Blue/purple discoloration present to BLE, BUE, and coccyx, bruising present on BUE.  at 2200 and 111 at 0200, no sliding scale insulin given. Pt denied pain this shift. Will continue to follow plan of care.

## 2020-05-16 NOTE — PLAN OF CARE
[id number]: Pt alert and oriented x 4. Denies pain when not moving, is on prn tramadol for pain. Up with SBA/1 transfer. Has multiple bruises on arms, legs, and coccyx. Dressing on (R) hip done. Discharge instructions reviewed and all questions answered appropriately. Discharged to home with wife via pvt ride.

## 2020-05-16 NOTE — PLAN OF CARE
Patient plan: home with spouse and assist of daughter   Current status: Pt performed supine <> sit x 2 with Min A and increased time. STS transfer to FWW completed with Min A and cues progressing to CGA with repetition. Ambulated 10 ft x 1 and 160 ft x 2 with FWW and CGA progressing to SBA. Went up/down 3 stairs x 1 with Min A using single rail and HHA. Up in chair at end of session.  Anticipated status at discharge: Anticipate Min A for bed mobility and transfers and stair navigation. SBA for ambulation. Will issue raised toilet seat at discharge.     Pt discharged to home. PT goals not met.

## 2020-05-16 NOTE — PROGRESS NOTES
Mark Schmidtdrick Edgar  2020  POD #3    Doing well.  Normal healing wound.  No immediate surgical complications identified.  Tolerating physical therapy and rehabilitation well.  Very  Sleepy  This  Am  arrouses  Well   Has had no  narcs  Temperatures:  Current - Temp: 97.9  F (36.6  C); Max - Temp  Av.7  F (36.5  C)  Min: 97.5  F (36.4  C)  Max: 97.9  F (36.6  C)  Pulse range: Pulse  Av.3  Min: 67  Max: 86  Blood pressure range: Systolic (24hrs), Av , Min:93 , Max:128   ; Diastolic (24hrs), Av, Min:50, Max:61    CMS: intact  Labs:   Results for orders placed or performed during the hospital encounter of 20 (from the past 24 hour(s))   Glucose by meter   Result Value Ref Range    Glucose 91 70 - 99 mg/dL   Glucose by meter   Result Value Ref Range    Glucose 119 (H) 70 - 99 mg/dL   Glucose by meter   Result Value Ref Range    Glucose 146 (H) 70 - 99 mg/dL   Glucose by meter   Result Value Ref Range    Glucose 111 (H) 70 - 99 mg/dL   Glucose by meter   Result Value Ref Range    Glucose 114 (H) 70 - 99 mg/dL   Hemoglobin   Result Value Ref Range    Hemoglobin 9.0 (L) 13.3 - 17.7 g/dL       PLAN: discharge  Home              Home    Pt  And  rn  coverge  Set  Up    His  dtr is  Also an rn  michael  Be looking in  On  Them    Advised  To limited  Sedated  Drugs   Raised  Toilet  Seat  Will be  Order

## 2020-05-16 NOTE — PROGRESS NOTES
Ortho           Ready for  discharge  Home  After pt  This  Am        Needs  Raised  Toilet  Seat        Will  Ck hgb one more  Time        Hip  Cultures neg  To  Date   Will watch  For  7  Days

## 2020-05-16 NOTE — DISCHARGE INSTRUCTIONS
TAKE HOME INSTRUCTIONS FOLLOWING FRACTURED HIP REPAIR  Your surgeon will answer any questions about your progress. General guidelines for your care are listed below. Your surgeon may give additional instructions for your care at home. Please follow them carefully.    Activity Level  1. Physical activity may be resumed gradually according to your comfort level and your surgeon s instructions. Follow your exercise program as instructed by your therapist. Do exercises at least twice a day.  2. Walking is important to your recovery. Increase your time and distance as you gain strength. The amount of weight you may place on your fractured hip when walking is determined by your surgeon. Your surgeon (at follow-up visits in the office) will decide when these restrictions will change.  3. Allow yourself rest periods during the day. As you regain strength, you will need fewer rest periods.  4. Please check with your surgeon before you resume work or your normal activity level.    Good Health Practice  1. Maintain an adequate fluid intake and eat a well balanced diet.  2. Be sure to include the basic food groups such as dairy products, meat/fish, vegetables, and fruit. Each of these foods contribute to helping your wound heal and increasing your strength.  3. Surgery, decreased activity and pain medication all contribute to a decrease in bowel activity that can result in constipation. It is recommended that you increase your liquid intake, add fiber to your diet, increase activity, and decrease pain medication use. If you have any problems, notify your surgeon.  4. Wear your BERNARDINO stockings day and night until seen by your surgeon.  Remove twice a day for one hour at a time. You may hand wash and air dry your socks.    Incision and Dressing Care  1. Keep incision clean and dry, change your dressing everyday until seen by your doctor. 2. Ask your surgeon if you may shower when you get home. Be sure  incision is thoroughly dry  before covering with dressing or putting on clothes.    Things to Watch For  1. Notify your surgeon if there is redness or swelling around the incision, or if you experience drainage, fever (above 101 degrees) or chills. This may indicate an infection.  2. Pain or discomfort that is not relieved by Tylenol  or the pain prescription the doctor gave you, should be discussed with your surgeon.  3.  Notify surgeon of sudden onset of calf swelling or pain not associated with over exercising or prolonged sitting.       Call your doctor's office to schedule your follow up appointment.    WEAR KNEE IMMOBILIZER WHEN NOT BEING SUPERVISED, ESPECIALLY AT NIGHT.         [id number] Rev 01/2014

## 2020-05-16 NOTE — PROVIDER NOTIFICATION
Patient wanted writer to clarify brace that he has at home in terms of when to wear it. Per Dr. Summers, pt to wear knee immob on when pt is not being supervised. Added instruction under discharge.

## 2020-05-16 NOTE — DISCHARGE SUMMARY
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      HOSPITAL COURSE:  The patient was brought into the operating room, had an exchange arthroplasty done and revised the acetabulum.  Changed it to a constrained hip.  The other hip really was not that worn.  We attempted repair the abductors, but he has had a chronic avulsion and retraction and marked atrophy of his abductors for 20+ years.  So probability of that working well is not good.  I elected not to put wires in place in there because he failed that about 15-18 years ago which led to the infection.  Cultures at 3 days are negative in the hip.  He did have a grossly abnormal looking UA.  Apparently he has had a lot of bladder problems related to bladder cancer, so we put him on low dose Cipro for the 5 days.  Culture is negative, but he had already been given preoperative antibiotics for prophylaxis so elected to treat that because of the obvious abnormalities on the UA.  Hemoglobin today postop day #3 was 9.0.  He did get 1 unit of blood.  He is chronically anemic.  He will continue iron, vitamin C, vitamins, etc.      Wound is healing per primum.  I went over it daily the necessary precautions.  One of the reasons we are here is I do not think he could adhere to necessary precautions and it would be a bigger problem if it dislocates now.  He has an abductor pillow for night and knee immobilizer, which was reinforced both with him and his wife while they were still in the office yesterday due to the visitor restrictions that he must wear that and we are going to arrange a raised toilet seat if they do not have one.  I was felt they did not need one, but I want them to get one with arm rests.      Somewhat tired today for some reason, but he did not  sleep well he said but neurologic is intact.   He is oriented to person, place and time and there is no focal abnormalities.  His labs are WNL.  I think we will check electrolytes before he goes home just to make sure the sodium is not out of sync but his hemoglobin today is 9.0.  His sugars have been healing well.      He will resume all prehospital care plans, diets, meds. We will see him back in the office in 4 weeks.  Home nursing is going to see him from [facility] as well as PT.  He is aware not to do any abductor strengthening exercises so we gave him the green light.         SAVANAH SPRAGUE MD             D: 2020   T: 2020   MT: MARU      Name:     FRANKIE THOMPOSN   MRN:      [id number]        Account:        [id number]   :      1933           Admit Date:     2020                                  Discharge Date: 2020      Document: [id number]

## 2020-05-16 NOTE — PROGRESS NOTES
"Plan is for patient to discharge home today. Dr Summers's progress note from this morning, mentions that \"Home Care is all set up,\"  but there is no order for Home Care and patient was not aware of this. Dr Summers was not available to ask. Orthopedic Surgeons have routinely been using [facility]. Call placed to [facility] to check if a referral had been made. Writer received a call back from [facility] noting that [facility] had a referral for Home Care and they had been following along each day. They were aware the patient had stayed another day and were planning on seeing the patient in his home tomorrow if he discharged today. Informed Charge Nurse of [facility]erral.  No further discharge needs at this time.  " Jesica, can you make sure Dr. Nathaniel Soliman sees this? Thank you.

## 2020-05-16 NOTE — PROGRESS NOTES
Hospitalist consult follow up note    86 yr old M POD 3 from partial revision of R MATTY and abductor mechanism repair.   Ortho managing and has discharged patient.     I reviewed chart and discussed with RN. No concerns for hospitalist service.    Will sign off - thanks for the consult.    Dr Spring

## 2020-05-18 LAB
BACTERIA SPEC CULT: NO GROWTH
SPECIMEN SOURCE: NORMAL

## 2020-05-20 LAB
BACTERIA SPEC CULT: NORMAL
Lab: NORMAL
SPECIMEN SOURCE: NORMAL

## 2020-05-27 ENCOUNTER — APPOINTMENT (OUTPATIENT)
Dept: GENERAL RADIOLOGY | Facility: CLINIC | Age: 85
DRG: 908 | End: 2020-05-27
Attending: PHYSICIAN ASSISTANT
Payer: MEDICARE

## 2020-05-27 ENCOUNTER — HOSPITAL ENCOUNTER (EMERGENCY)
Facility: CLINIC | Age: 85
Discharge: STILL ADMITTED OR ADMINISTRATIVE | DRG: 908 | End: 2020-05-27
Payer: MEDICARE

## 2020-05-27 ENCOUNTER — HOSPITAL ENCOUNTER (INPATIENT)
Facility: CLINIC | Age: 85
LOS: 4 days | Discharge: HOME HEALTH | DRG: 908 | End: 2020-05-31
Attending: ORTHOPAEDIC SURGERY | Admitting: ORTHOPAEDIC SURGERY
Payer: MEDICARE

## 2020-05-27 ENCOUNTER — HOSPITAL ENCOUNTER (INPATIENT)
Facility: CLINIC | Age: 85
Setting detail: SURGERY ADMIT
End: 2020-05-27
Attending: ORTHOPAEDIC SURGERY | Admitting: ORTHOPAEDIC SURGERY
Payer: MEDICARE

## 2020-05-27 VITALS
BODY MASS INDEX: 31.71 KG/M2 | RESPIRATION RATE: 16 BRPM | HEIGHT: 67 IN | TEMPERATURE: 98.3 F | DIASTOLIC BLOOD PRESSURE: 37 MMHG | HEART RATE: 63 BPM | SYSTOLIC BLOOD PRESSURE: 112 MMHG | OXYGEN SATURATION: 99 % | WEIGHT: 202 LBS

## 2020-05-27 DIAGNOSIS — S71.101S OPEN WOUND OF RIGHT HIP AND THIGH WITH COMPLICATION, SEQUELA: Primary | ICD-10-CM

## 2020-05-27 DIAGNOSIS — S71.001S OPEN WOUND OF RIGHT HIP AND THIGH WITH COMPLICATION, SEQUELA: Primary | ICD-10-CM

## 2020-05-27 DIAGNOSIS — T81.49XA POSTOPERATIVE WOUND INFECTION OF RIGHT HIP: ICD-10-CM

## 2020-05-27 LAB
ANION GAP SERPL CALCULATED.3IONS-SCNC: 4 MMOL/L (ref 3–14)
BASOPHILS # BLD AUTO: 0 10E9/L (ref 0–0.2)
BASOPHILS NFR BLD AUTO: 0.4 %
BUN SERPL-MCNC: 40 MG/DL (ref 7–30)
CALCIUM SERPL-MCNC: 8.7 MG/DL (ref 8.5–10.1)
CHLORIDE SERPL-SCNC: 103 MMOL/L (ref 94–109)
CO2 SERPL-SCNC: 29 MMOL/L (ref 20–32)
CREAT SERPL-MCNC: 1.08 MG/DL (ref 0.66–1.25)
DIFFERENTIAL METHOD BLD: ABNORMAL
EOSINOPHIL # BLD AUTO: 0.6 10E9/L (ref 0–0.7)
EOSINOPHIL NFR BLD AUTO: 9 %
ERYTHROCYTE [DISTWIDTH] IN BLOOD BY AUTOMATED COUNT: 14.7 % (ref 10–15)
GFR SERPL CREATININE-BSD FRML MDRD: 62 ML/MIN/{1.73_M2}
GLUCOSE BLDC GLUCOMTR-MCNC: 143 MG/DL (ref 70–99)
GLUCOSE SERPL-MCNC: 111 MG/DL (ref 70–99)
HCT VFR BLD AUTO: 27.1 % (ref 40–53)
HGB BLD-MCNC: 8.8 G/DL (ref 13.3–17.7)
IMM GRANULOCYTES # BLD: 0 10E9/L (ref 0–0.4)
IMM GRANULOCYTES NFR BLD: 0.4 %
LYMPHOCYTES # BLD AUTO: 1.3 10E9/L (ref 0.8–5.3)
LYMPHOCYTES NFR BLD AUTO: 18.7 %
MCH RBC QN AUTO: 32.2 PG (ref 26.5–33)
MCHC RBC AUTO-ENTMCNC: 32.5 G/DL (ref 31.5–36.5)
MCV RBC AUTO: 99 FL (ref 78–100)
MONOCYTES # BLD AUTO: 0.6 10E9/L (ref 0–1.3)
MONOCYTES NFR BLD AUTO: 9 %
NEUTROPHILS # BLD AUTO: 4.4 10E9/L (ref 1.6–8.3)
NEUTROPHILS NFR BLD AUTO: 62.5 %
NRBC # BLD AUTO: 0 10*3/UL
NRBC BLD AUTO-RTO: 0 /100
PLATELET # BLD AUTO: 292 10E9/L (ref 150–450)
POTASSIUM SERPL-SCNC: 4.2 MMOL/L (ref 3.4–5.3)
RBC # BLD AUTO: 2.73 10E12/L (ref 4.4–5.9)
SODIUM SERPL-SCNC: 136 MMOL/L (ref 133–144)
WBC # BLD AUTO: 7 10E9/L (ref 4–11)

## 2020-05-27 PROCEDURE — 25800030 ZZH RX IP 258 OP 636: Performed by: ORTHOPAEDIC SURGERY

## 2020-05-27 PROCEDURE — 25000128 H RX IP 250 OP 636: Performed by: ORTHOPAEDIC SURGERY

## 2020-05-27 PROCEDURE — 87086 URINE CULTURE/COLONY COUNT: CPT | Performed by: ORTHOPAEDIC SURGERY

## 2020-05-27 PROCEDURE — 99207 ZZC CONSULT E&M CHANGED TO INITIAL LEVEL: CPT | Performed by: PHYSICIAN ASSISTANT

## 2020-05-27 PROCEDURE — 12000000 ZZH R&B MED SURG/OB

## 2020-05-27 PROCEDURE — 85025 COMPLETE CBC W/AUTO DIFF WBC: CPT | Performed by: PHYSICIAN ASSISTANT

## 2020-05-27 PROCEDURE — 99222 1ST HOSP IP/OBS MODERATE 55: CPT | Performed by: PHYSICIAN ASSISTANT

## 2020-05-27 PROCEDURE — 36415 COLL VENOUS BLD VENIPUNCTURE: CPT | Performed by: PHYSICIAN ASSISTANT

## 2020-05-27 PROCEDURE — 80048 BASIC METABOLIC PNL TOTAL CA: CPT | Performed by: PHYSICIAN ASSISTANT

## 2020-05-27 PROCEDURE — 87040 BLOOD CULTURE FOR BACTERIA: CPT | Performed by: ORTHOPAEDIC SURGERY

## 2020-05-27 PROCEDURE — 00000146 ZZHCL STATISTIC GLUCOSE BY METER IP

## 2020-05-27 PROCEDURE — 36415 COLL VENOUS BLD VENIPUNCTURE: CPT | Performed by: ORTHOPAEDIC SURGERY

## 2020-05-27 PROCEDURE — 72170 X-RAY EXAM OF PELVIS: CPT

## 2020-05-27 PROCEDURE — 25000132 ZZH RX MED GY IP 250 OP 250 PS 637: Mod: GY | Performed by: PHYSICIAN ASSISTANT

## 2020-05-27 RX ORDER — MINOCYCLINE HYDROCHLORIDE 100 MG/1
100 CAPSULE ORAL 2 TIMES DAILY
Status: DISCONTINUED | OUTPATIENT
Start: 2020-05-27 | End: 2020-05-29

## 2020-05-27 RX ORDER — FERROUS SULFATE 325(65) MG
325 TABLET ORAL
Status: DISCONTINUED | OUTPATIENT
Start: 2020-05-28 | End: 2020-05-31 | Stop reason: HOSPADM

## 2020-05-27 RX ORDER — AMOXICILLIN 250 MG
2 CAPSULE ORAL 2 TIMES DAILY
Status: DISCONTINUED | OUTPATIENT
Start: 2020-05-27 | End: 2020-05-30 | Stop reason: DRUGHIGH

## 2020-05-27 RX ORDER — DULOXETIN HYDROCHLORIDE 30 MG/1
30 CAPSULE, DELAYED RELEASE ORAL DAILY
Status: DISCONTINUED | OUTPATIENT
Start: 2020-05-27 | End: 2020-05-31 | Stop reason: HOSPADM

## 2020-05-27 RX ORDER — GABAPENTIN 800 MG/1
800 TABLET ORAL 2 TIMES DAILY
Status: DISCONTINUED | OUTPATIENT
Start: 2020-05-27 | End: 2020-05-31 | Stop reason: HOSPADM

## 2020-05-27 RX ORDER — DEXTROSE MONOHYDRATE 25 G/50ML
25-50 INJECTION, SOLUTION INTRAVENOUS
Status: DISCONTINUED | OUTPATIENT
Start: 2020-05-27 | End: 2020-05-31 | Stop reason: HOSPADM

## 2020-05-27 RX ORDER — SIMVASTATIN 10 MG
20 TABLET ORAL AT BEDTIME
Status: DISCONTINUED | OUTPATIENT
Start: 2020-05-27 | End: 2020-05-31 | Stop reason: HOSPADM

## 2020-05-27 RX ORDER — TAMSULOSIN HYDROCHLORIDE 0.4 MG/1
0.8 CAPSULE ORAL AT BEDTIME
Status: DISCONTINUED | OUTPATIENT
Start: 2020-05-27 | End: 2020-05-31 | Stop reason: HOSPADM

## 2020-05-27 RX ORDER — NICOTINE POLACRILEX 4 MG
15-30 LOZENGE BUCCAL
Status: DISCONTINUED | OUTPATIENT
Start: 2020-05-27 | End: 2020-05-31 | Stop reason: HOSPADM

## 2020-05-27 RX ORDER — CARVEDILOL 12.5 MG/1
12.5 TABLET ORAL 2 TIMES DAILY WITH MEALS
Status: DISCONTINUED | OUTPATIENT
Start: 2020-05-27 | End: 2020-05-31 | Stop reason: HOSPADM

## 2020-05-27 RX ADMIN — MINOCYCLINE HYDROCHLORIDE 100 MG: 100 CAPSULE ORAL at 22:25

## 2020-05-27 RX ADMIN — VANCOMYCIN HYDROCHLORIDE 1500 MG: 5 INJECTION, POWDER, LYOPHILIZED, FOR SOLUTION INTRAVENOUS at 19:34

## 2020-05-27 RX ADMIN — DULOXETINE HYDROCHLORIDE 30 MG: 30 CAPSULE, DELAYED RELEASE ORAL at 22:25

## 2020-05-27 RX ADMIN — TAMSULOSIN HYDROCHLORIDE 0.8 MG: 0.4 CAPSULE ORAL at 22:25

## 2020-05-27 RX ADMIN — SENNOSIDES AND DOCUSATE SODIUM 2 TABLET: 8.6; 5 TABLET ORAL at 22:25

## 2020-05-27 RX ADMIN — SIMVASTATIN 20 MG: 10 TABLET, FILM COATED ORAL at 22:26

## 2020-05-27 RX ADMIN — GABAPENTIN 800 MG: 800 TABLET, FILM COATED ORAL at 22:26

## 2020-05-27 RX ADMIN — CARVEDILOL 12.5 MG: 12.5 TABLET, FILM COATED ORAL at 22:26

## 2020-05-27 ASSESSMENT — ACTIVITIES OF DAILY LIVING (ADL)
BATHING: 1-->ASSISTIVE EQUIPMENT
ADLS_ACUITY_SCORE: 18
COGNITION: 0 - NO COGNITION ISSUES REPORTED
NUMBER_OF_TIMES_PATIENT_HAS_FALLEN_WITHIN_LAST_SIX_MONTHS: 1
FALL_HISTORY_WITHIN_LAST_SIX_MONTHS: YES
AMBULATION: 1-->ASSISTIVE EQUIPMENT
RETIRED_COMMUNICATION: 0-->UNDERSTANDS/COMMUNICATES WITHOUT DIFFICULTY
TRANSFERRING: 1-->ASSISTIVE EQUIPMENT
SWALLOWING: 0-->SWALLOWS FOODS/LIQUIDS WITHOUT DIFFICULTY
DRESS: 0-->INDEPENDENT
RETIRED_EATING: 0-->INDEPENDENT
TOILETING: 1-->ASSISTIVE EQUIPMENT

## 2020-05-27 ASSESSMENT — MIFFLIN-ST. JEOR: SCORE: 1554.9

## 2020-05-27 NOTE — PHARMACY-ADMISSION MEDICATION HISTORY
Pharmacy Medication History  Admission medication history interview status for the 5/27/2020  admission is complete. See EPIC admission navigator for prior to admission medications     Medication history sources: Patient  Medication history source reliability: Good  Adherence assessment: Good    Significant changes made to the medication list:  Deleted xarelto, pt states he stopped taking this. Also deleted cipro, hydroxyzine.      Additional medication history information:   Pt unsure of vit D, co-q 10 doses.    Medication reconciliation completed by provider prior to medication history? No    Time spent in this activity: 15 minutes      Prior to Admission medications    Medication Sig Last Dose Taking? Auth Provider   carvedilol (COREG) 12.5 MG tablet Take 12.5 mg by mouth 2 times daily (with meals) 5/27/2020 at am Yes Reported, Patient   Coenzyme Q10 (COQ-10 PO) Take 1 tablet by mouth daily 5/26/2020 at Unknown time Yes Reported, Patient   CYANOCOBALAMIN PO Take 1,000 mcg by mouth every other day  5/27/2020 at Unknown time Yes Unknown, Entered By History   DULoxetine (CYMBALTA) 30 MG capsule Take 30 mg by mouth daily Takes for neuropathy 5/26/2020 at pm Yes Reported, Patient   ferrous sulfate (FEROSUL) 325 (65 Fe) MG tablet Take 325 mg by mouth daily (with breakfast) 5/27/2020 at am Yes Reported, Patient   furosemide (LASIX) 20 MG tablet Take 20 mg by mouth daily 5/27/2020 at am Yes Reported, Patient   gabapentin (NEURONTIN) 800 MG tablet Take 1 tablet (800 mg) by mouth 2 times daily 5/27/2020 at am Yes Sho Hines PA-C   lisinopril (ZESTRIL) 2.5 MG tablet Take 2.5 mg by mouth daily  5/27/2020 at am Yes Reported, Patient   metFORMIN (GLUCOPHAGE) 500 MG tablet Take 250 mg by mouth 2 times daily (with meals) (takes 0.5 x 500mg) 5/27/2020 at am Yes Reported, Patient   minocycline (MINOCIN) 100 MG capsule Take 100 mg by mouth 2 times daily 5/27/2020 at am Yes Reported, Patient   Omega-3 Fatty Acids  (OMEGA 3 PO) Take 1 teaspoonful by mouth daily 5/27/2020 at am Yes Reported, Patient   Probiotic Product (PROBIOTIC PO) Take 1 capsule by mouth daily 5/27/2020 at am Yes Reported, Patient   senna-docusate (SENOKOT-S/PERICOLACE) 8.6-50 MG tablet Take 2 tablets by mouth 2 times daily 5/27/2020 at am Yes Kirk Summers MD   simvastatin (ZOCOR) 40 MG tablet Take 20 mg by mouth At Bedtime (Takes 1/2 of a 40mg tablet) 5/26/2020 at pm Yes Reported, Patient   tamsulosin (FLOMAX) 0.4 MG 24 hr capsule Take 0.8 mg by mouth At Bedtime (takes 2 x 0.4mg) 5/26/2020 at pm Yes Reported, Patient   traMADol (ULTRAM) 50 MG tablet Take 1 tablet (50 mg) by mouth every 6 hours as needed for moderate pain, moderate to severe pain or severe pain 5/27/2020 at am Yes Kirk Summers MD   VITAMIN D PO Take 1 tablet by mouth 2 times daily 5/27/2020 at am Yes Reported, Patient   acetaminophen (TYLENOL) 500 MG tablet Take 500-1,000 mg by mouth every 6 hours as needed for mild pain prn  Reported, Patient

## 2020-05-27 NOTE — LETTER
Transition Communication Hand-off for Care Transitions to Next Level of Care Provider    Name: Mark Hernández  : 1933  MRN #: [id number]  Primary Care Provider: Rebecca Noyola     Primary Clinic: [facility] [address] [address]     Reason for Hospitalization:  Hip Wound  Open wound of right hip and thigh with complication, sequela  Open wound of right hip and thigh with complication, sequela  Admit Date/Time: 2020  3:47 PM  Discharge Date: 2020  Payor Source: Payor: MEDICARE / Plan: MEDICARE / Product Type: Medicare /     Readmission Assessment Measure (SARA) Risk Score/category: elevated          Reason for Communication Hand-off Referral: Fragility    Discharge Plan: discharge to home w/ home care       Concern for non-adherence with plan of care:   Y/N N  Discharge Needs Assessment:  Needs      Most Recent Value   Equipment Currently Used at Home  walker, rolling   # of Referrals Placed by [facility]  Homecare          Already enrolled in Tele-monitoring program and name of program:  N  Follow-up specialty is recommended: Yes    Follow-up plan:  No future appointments.    Any outstanding tests or procedures:        Referrals     Future Labs/Procedures    Home care nursing referral     Comments:    RN skilled nursing visit. RN to assess patients ability to take and record daily blood pressure, temp and weight, incision for signs/symptoms of infection and diabetic mgt,  Wound  Care   Suture and  Staple removal  3  Weeks  Post op.  Resume  With MUNIRA  Starting  Monday  [facility] [phone]    Home Care PT Referral for Hospital Discharge     Comments:    PT to eval and treat  Post  Repair  Of   Hip  Wound  And  Recent total hip for   Recurrent  dislocations    Your provider has ordered home care - physical therapy. If you have not been contacted within 2 days of your discharge please call the department phone number listed on the top of this document.          MUNIRA  Can   resume            Pardo Recommendations:  Mark Hernández is a 86 year old male who was admitted on 5/27/2020.      Impression:  1. 86 y.o male known to ID service from previous admissions.   2. History of bladder and prostrate cancer.   3. CHF, HTN, PPM  4. Diabetes.   5. CKD  6. 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 On Minocycline for chronic suppression.   7. Recently ( 5/13 ) had revision arthroplasty.   8. Admitted this occasion after a fall leading to wound dehiscence.   9. S/P I and D with cultures pending. No clinical concern for infection.      Recommendations:   Daptomycin for now. Hold Minocycline.   Follow up on the cultures, if negative  No antibiotics, appreciate Ortho clarification       Thanks for following this patient post hospital discharge.     Tori Black RN    AVS/Discharge Summary is the source of truth; this is a helpful guide for improved communication of patient story

## 2020-05-27 NOTE — ED NOTES
Received call from charge nurse that pt will now be a direct admission to 5th floor. Pt informed by other triage RN.

## 2020-05-27 NOTE — ED TRIAGE NOTES
Pt. Had mechanical fall down 10 stairs yesterday. Pt. States no LOC or neck tenderness. Pt. Had recent surgery on right hip and is concerned about incision. Surgeon told him to come in.

## 2020-05-27 NOTE — PHARMACY-VANCOMYCIN DOSING SERVICE
Pharmacy Vancomycin Initial Note  Date of Service May 27, 2020  Patient's  1933  86 year old, male    Indication: traumatic wound    Current estimated CrCl =pending result    Creatinine for last 3 days  No results found for requested labs within last 72 hours.    Recent Vancomycin Level(s) for last 3 days  No results found for requested labs within last 72 hours.      Vancomycin IV Administrations (past 72 hours)      No vancomycin orders with administrations in past 72 hours.                Nephrotoxins and other renal medications (From now, onward)    Start     Dose/Rate Route Frequency Ordered Stop    20 174  vancomycin 1500 mg in 0.9% NaCl 250 ml intermittent infusion 1,500 mg      1,500 mg  over 90 Minutes Intravenous EVERY 24 HOURS 20 174            Contrast Orders - past 72 hours (72h ago, onward)    None                Plan:  1.  Start vancomycin  1500 mg IV q24h.   2.  Goal Trough Level: 10-15 mg/L   3.  Pharmacy will check trough levels as appropriate in 1-3 Days.    4. Serum creatinine levels will be ordered a minimum of twice weekly.    5. [place] method utilized to dose vancomycin therapy: Method 1    Lesly Romero [facility]

## 2020-05-28 ENCOUNTER — ANESTHESIA EVENT (OUTPATIENT)
Dept: SURGERY | Facility: CLINIC | Age: 85
DRG: 908 | End: 2020-05-28
Payer: MEDICARE

## 2020-05-28 ENCOUNTER — ANESTHESIA (OUTPATIENT)
Dept: SURGERY | Facility: CLINIC | Age: 85
DRG: 908 | End: 2020-05-28
Payer: MEDICARE

## 2020-05-28 LAB
ABO + RH BLD: NORMAL
ABO + RH BLD: NORMAL
ANION GAP SERPL CALCULATED.3IONS-SCNC: 2 MMOL/L (ref 3–14)
BACTERIA SPEC CULT: NORMAL
BASOPHILS # BLD AUTO: 0 10E9/L (ref 0–0.2)
BASOPHILS NFR BLD AUTO: 0.4 %
BLD GP AB SCN SERPL QL: NORMAL
BLD PROD TYP BPU: NORMAL
BLD PROD TYP BPU: NORMAL
BLD UNIT ID BPU: 0
BLOOD BANK CMNT PATIENT-IMP: NORMAL
BLOOD PRODUCT CODE: NORMAL
BPU ID: NORMAL
BUN SERPL-MCNC: 31 MG/DL (ref 7–30)
CALCIUM SERPL-MCNC: 8.4 MG/DL (ref 8.5–10.1)
CHLORIDE SERPL-SCNC: 108 MMOL/L (ref 94–109)
CO2 SERPL-SCNC: 30 MMOL/L (ref 20–32)
CREAT SERPL-MCNC: 0.93 MG/DL (ref 0.66–1.25)
DIFFERENTIAL METHOD BLD: ABNORMAL
EOSINOPHIL # BLD AUTO: 0.6 10E9/L (ref 0–0.7)
EOSINOPHIL NFR BLD AUTO: 11.5 %
ERYTHROCYTE [DISTWIDTH] IN BLOOD BY AUTOMATED COUNT: 14.7 % (ref 10–15)
GFR SERPL CREATININE-BSD FRML MDRD: 74 ML/MIN/{1.73_M2}
GLUCOSE BLDC GLUCOMTR-MCNC: 100 MG/DL (ref 70–99)
GLUCOSE BLDC GLUCOMTR-MCNC: 106 MG/DL (ref 70–99)
GLUCOSE BLDC GLUCOMTR-MCNC: 125 MG/DL (ref 70–99)
GLUCOSE SERPL-MCNC: 115 MG/DL (ref 70–99)
HCT VFR BLD AUTO: 24.7 % (ref 40–53)
HGB BLD-MCNC: 8.1 G/DL (ref 13.3–17.7)
IMM GRANULOCYTES # BLD: 0 10E9/L (ref 0–0.4)
IMM GRANULOCYTES NFR BLD: 0.2 %
LYMPHOCYTES # BLD AUTO: 0.8 10E9/L (ref 0.8–5.3)
LYMPHOCYTES NFR BLD AUTO: 14.5 %
Lab: NORMAL
MCH RBC QN AUTO: 32.5 PG (ref 26.5–33)
MCHC RBC AUTO-ENTMCNC: 32.8 G/DL (ref 31.5–36.5)
MCV RBC AUTO: 99 FL (ref 78–100)
MONOCYTES # BLD AUTO: 0.6 10E9/L (ref 0–1.3)
MONOCYTES NFR BLD AUTO: 11.7 %
NEUTROPHILS # BLD AUTO: 3.3 10E9/L (ref 1.6–8.3)
NEUTROPHILS NFR BLD AUTO: 61.7 %
NRBC # BLD AUTO: 0 10*3/UL
NRBC BLD AUTO-RTO: 0 /100
NUM BPU REQUESTED: 2
PLATELET # BLD AUTO: 270 10E9/L (ref 150–450)
POTASSIUM SERPL-SCNC: 3.9 MMOL/L (ref 3.4–5.3)
RBC # BLD AUTO: 2.49 10E12/L (ref 4.4–5.9)
SODIUM SERPL-SCNC: 140 MMOL/L (ref 133–144)
SPECIMEN EXP DATE BLD: NORMAL
SPECIMEN SOURCE: NORMAL
TRANSFUSION STATUS PATIENT QL: NORMAL
TRANSFUSION STATUS PATIENT QL: NORMAL
WBC # BLD AUTO: 5.4 10E9/L (ref 4–11)

## 2020-05-28 PROCEDURE — 00000146 ZZHCL STATISTIC GLUCOSE BY METER IP

## 2020-05-28 PROCEDURE — 71000012 ZZH RECOVERY PHASE 1 LEVEL 1 FIRST HR: Performed by: ORTHOPAEDIC SURGERY

## 2020-05-28 PROCEDURE — 25000132 ZZH RX MED GY IP 250 OP 250 PS 637: Mod: GY | Performed by: PHYSICIAN ASSISTANT

## 2020-05-28 PROCEDURE — 25800030 ZZH RX IP 258 OP 636: Performed by: ORTHOPAEDIC SURGERY

## 2020-05-28 PROCEDURE — 80048 BASIC METABOLIC PNL TOTAL CA: CPT | Performed by: PHYSICIAN ASSISTANT

## 2020-05-28 PROCEDURE — 0JDL0ZZ EXTRACTION OF RIGHT UPPER LEG SUBCUTANEOUS TISSUE AND FASCIA, OPEN APPROACH: ICD-10-PCS | Performed by: ORTHOPAEDIC SURGERY

## 2020-05-28 PROCEDURE — 0SB90ZZ EXCISION OF RIGHT HIP JOINT, OPEN APPROACH: ICD-10-PCS | Performed by: ORTHOPAEDIC SURGERY

## 2020-05-28 PROCEDURE — 86901 BLOOD TYPING SEROLOGIC RH(D): CPT | Performed by: ANESTHESIOLOGY

## 2020-05-28 PROCEDURE — 36415 COLL VENOUS BLD VENIPUNCTURE: CPT | Performed by: ANESTHESIOLOGY

## 2020-05-28 PROCEDURE — 37000009 ZZH ANESTHESIA TECHNICAL FEE, EACH ADDTL 15 MIN: Performed by: ORTHOPAEDIC SURGERY

## 2020-05-28 PROCEDURE — 86900 BLOOD TYPING SEROLOGIC ABO: CPT | Performed by: ANESTHESIOLOGY

## 2020-05-28 PROCEDURE — 36000069 ZZH SURGERY LEVEL 5 EA 15 ADDTL MIN: Performed by: ORTHOPAEDIC SURGERY

## 2020-05-28 PROCEDURE — 25000128 H RX IP 250 OP 636: Performed by: ORTHOPAEDIC SURGERY

## 2020-05-28 PROCEDURE — 25000128 H RX IP 250 OP 636: Performed by: NURSE ANESTHETIST, CERTIFIED REGISTERED

## 2020-05-28 PROCEDURE — 40000170 ZZH STATISTIC PRE-PROCEDURE ASSESSMENT II: Performed by: ORTHOPAEDIC SURGERY

## 2020-05-28 PROCEDURE — 25000566 ZZH SEVOFLURANE, EA 15 MIN: Performed by: ORTHOPAEDIC SURGERY

## 2020-05-28 PROCEDURE — 99232 SBSQ HOSP IP/OBS MODERATE 35: CPT | Performed by: PHYSICIAN ASSISTANT

## 2020-05-28 PROCEDURE — 27210794 ZZH OR GENERAL SUPPLY STERILE: Performed by: ORTHOPAEDIC SURGERY

## 2020-05-28 PROCEDURE — 0SC90ZZ EXTIRPATION OF MATTER FROM RIGHT HIP JOINT, OPEN APPROACH: ICD-10-PCS | Performed by: ORTHOPAEDIC SURGERY

## 2020-05-28 PROCEDURE — 87176 TISSUE HOMOGENIZATION CULTR: CPT | Performed by: ORTHOPAEDIC SURGERY

## 2020-05-28 PROCEDURE — 25000128 H RX IP 250 OP 636: Performed by: ANESTHESIOLOGY

## 2020-05-28 PROCEDURE — 25000125 ZZHC RX 250: Performed by: NURSE ANESTHETIST, CERTIFIED REGISTERED

## 2020-05-28 PROCEDURE — 36000067 ZZH SURGERY LEVEL 5 1ST 30 MIN: Performed by: ORTHOPAEDIC SURGERY

## 2020-05-28 PROCEDURE — 86923 COMPATIBILITY TEST ELECTRIC: CPT | Performed by: ANESTHESIOLOGY

## 2020-05-28 PROCEDURE — 87070 CULTURE OTHR SPECIMN AEROBIC: CPT | Performed by: ORTHOPAEDIC SURGERY

## 2020-05-28 PROCEDURE — 25800030 ZZH RX IP 258 OP 636: Performed by: NURSE ANESTHETIST, CERTIFIED REGISTERED

## 2020-05-28 PROCEDURE — P9016 RBC LEUKOCYTES REDUCED: HCPCS | Performed by: ANESTHESIOLOGY

## 2020-05-28 PROCEDURE — 25000132 ZZH RX MED GY IP 250 OP 250 PS 637: Mod: GY | Performed by: ORTHOPAEDIC SURGERY

## 2020-05-28 PROCEDURE — 25800030 ZZH RX IP 258 OP 636: Performed by: ANESTHESIOLOGY

## 2020-05-28 PROCEDURE — 12000000 ZZH R&B MED SURG/OB

## 2020-05-28 PROCEDURE — 86850 RBC ANTIBODY SCREEN: CPT | Performed by: ANESTHESIOLOGY

## 2020-05-28 PROCEDURE — 87075 CULTR BACTERIA EXCEPT BLOOD: CPT | Performed by: ORTHOPAEDIC SURGERY

## 2020-05-28 PROCEDURE — 85025 COMPLETE CBC W/AUTO DIFF WBC: CPT | Performed by: PHYSICIAN ASSISTANT

## 2020-05-28 PROCEDURE — 37000008 ZZH ANESTHESIA TECHNICAL FEE, 1ST 30 MIN: Performed by: ORTHOPAEDIC SURGERY

## 2020-05-28 PROCEDURE — 25800025 ZZH RX 258: Performed by: ORTHOPAEDIC SURGERY

## 2020-05-28 RX ORDER — ACETAMINOPHEN 325 MG/1
650 TABLET ORAL EVERY 4 HOURS PRN
Status: DISCONTINUED | OUTPATIENT
Start: 2020-05-31 | End: 2020-05-28

## 2020-05-28 RX ORDER — HYDROXYZINE HYDROCHLORIDE 10 MG/1
10 TABLET, FILM COATED ORAL EVERY 6 HOURS PRN
Status: DISCONTINUED | OUTPATIENT
Start: 2020-05-28 | End: 2020-05-31 | Stop reason: HOSPADM

## 2020-05-28 RX ORDER — SODIUM CHLORIDE, SODIUM LACTATE, POTASSIUM CHLORIDE, CALCIUM CHLORIDE 600; 310; 30; 20 MG/100ML; MG/100ML; MG/100ML; MG/100ML
INJECTION, SOLUTION INTRAVENOUS CONTINUOUS
Status: DISCONTINUED | OUTPATIENT
Start: 2020-05-28 | End: 2020-05-28

## 2020-05-28 RX ORDER — ONDANSETRON 2 MG/ML
INJECTION INTRAMUSCULAR; INTRAVENOUS PRN
Status: DISCONTINUED | OUTPATIENT
Start: 2020-05-28 | End: 2020-05-28

## 2020-05-28 RX ORDER — NEOSTIGMINE METHYLSULFATE 1 MG/ML
VIAL (ML) INJECTION PRN
Status: DISCONTINUED | OUTPATIENT
Start: 2020-05-28 | End: 2020-05-28

## 2020-05-28 RX ORDER — FENTANYL CITRATE 50 UG/ML
25-50 INJECTION, SOLUTION INTRAMUSCULAR; INTRAVENOUS EVERY 5 MIN PRN
Status: DISCONTINUED | OUTPATIENT
Start: 2020-05-28 | End: 2020-05-28 | Stop reason: HOSPADM

## 2020-05-28 RX ORDER — TRAMADOL HYDROCHLORIDE 50 MG/1
50 TABLET ORAL EVERY 6 HOURS PRN
Status: DISCONTINUED | OUTPATIENT
Start: 2020-05-28 | End: 2020-05-31 | Stop reason: HOSPADM

## 2020-05-28 RX ORDER — LIDOCAINE 40 MG/G
CREAM TOPICAL
Status: DISCONTINUED | OUTPATIENT
Start: 2020-05-28 | End: 2020-05-31 | Stop reason: HOSPADM

## 2020-05-28 RX ORDER — SODIUM CHLORIDE, SODIUM LACTATE, POTASSIUM CHLORIDE, CALCIUM CHLORIDE 600; 310; 30; 20 MG/100ML; MG/100ML; MG/100ML; MG/100ML
INJECTION, SOLUTION INTRAVENOUS CONTINUOUS PRN
Status: DISCONTINUED | OUTPATIENT
Start: 2020-05-28 | End: 2020-05-28

## 2020-05-28 RX ORDER — VANCOMYCIN HYDROCHLORIDE 1 G/20ML
INJECTION, POWDER, LYOPHILIZED, FOR SOLUTION INTRAVENOUS PRN
Status: DISCONTINUED | OUTPATIENT
Start: 2020-05-28 | End: 2020-05-28 | Stop reason: HOSPADM

## 2020-05-28 RX ORDER — HYDROMORPHONE HYDROCHLORIDE 1 MG/ML
0.3 INJECTION, SOLUTION INTRAMUSCULAR; INTRAVENOUS; SUBCUTANEOUS ONCE
Status: COMPLETED | OUTPATIENT
Start: 2020-05-28 | End: 2020-05-28

## 2020-05-28 RX ORDER — ALBUTEROL SULFATE 0.83 MG/ML
2.5 SOLUTION RESPIRATORY (INHALATION) EVERY 4 HOURS PRN
Status: DISCONTINUED | OUTPATIENT
Start: 2020-05-28 | End: 2020-05-28 | Stop reason: HOSPADM

## 2020-05-28 RX ORDER — EPHEDRINE SULFATE 50 MG/ML
INJECTION, SOLUTION INTRAMUSCULAR; INTRAVENOUS; SUBCUTANEOUS PRN
Status: DISCONTINUED | OUTPATIENT
Start: 2020-05-28 | End: 2020-05-28

## 2020-05-28 RX ORDER — GLYCOPYRROLATE 0.2 MG/ML
INJECTION, SOLUTION INTRAMUSCULAR; INTRAVENOUS PRN
Status: DISCONTINUED | OUTPATIENT
Start: 2020-05-28 | End: 2020-05-28

## 2020-05-28 RX ORDER — ONDANSETRON 2 MG/ML
4 INJECTION INTRAMUSCULAR; INTRAVENOUS EVERY 30 MIN PRN
Status: DISCONTINUED | OUTPATIENT
Start: 2020-05-28 | End: 2020-05-28 | Stop reason: HOSPADM

## 2020-05-28 RX ORDER — ACETAMINOPHEN 325 MG/1
975 TABLET ORAL EVERY 8 HOURS
Status: DISCONTINUED | OUTPATIENT
Start: 2020-05-28 | End: 2020-05-28

## 2020-05-28 RX ORDER — MEPERIDINE HYDROCHLORIDE 25 MG/ML
12.5 INJECTION INTRAMUSCULAR; INTRAVENOUS; SUBCUTANEOUS EVERY 5 MIN PRN
Status: DISCONTINUED | OUTPATIENT
Start: 2020-05-28 | End: 2020-05-28 | Stop reason: HOSPADM

## 2020-05-28 RX ORDER — FENTANYL CITRATE 50 UG/ML
INJECTION, SOLUTION INTRAMUSCULAR; INTRAVENOUS PRN
Status: DISCONTINUED | OUTPATIENT
Start: 2020-05-28 | End: 2020-05-28

## 2020-05-28 RX ORDER — AMOXICILLIN 250 MG
1 CAPSULE ORAL 2 TIMES DAILY
Status: DISCONTINUED | OUTPATIENT
Start: 2020-05-28 | End: 2020-05-31 | Stop reason: HOSPADM

## 2020-05-28 RX ORDER — NALOXONE HYDROCHLORIDE 0.4 MG/ML
.1-.4 INJECTION, SOLUTION INTRAMUSCULAR; INTRAVENOUS; SUBCUTANEOUS
Status: DISCONTINUED | OUTPATIENT
Start: 2020-05-28 | End: 2020-05-31 | Stop reason: HOSPADM

## 2020-05-28 RX ORDER — PROCHLORPERAZINE MALEATE 5 MG
5 TABLET ORAL EVERY 6 HOURS PRN
Status: DISCONTINUED | OUTPATIENT
Start: 2020-05-28 | End: 2020-05-31 | Stop reason: HOSPADM

## 2020-05-28 RX ORDER — METHOCARBAMOL 500 MG/1
500 TABLET, FILM COATED ORAL 4 TIMES DAILY PRN
Status: DISCONTINUED | OUTPATIENT
Start: 2020-05-28 | End: 2020-05-31 | Stop reason: HOSPADM

## 2020-05-28 RX ORDER — ACETAMINOPHEN 325 MG/1
975 TABLET ORAL EVERY 8 HOURS
Status: COMPLETED | OUTPATIENT
Start: 2020-05-28 | End: 2020-05-31

## 2020-05-28 RX ORDER — TRAMADOL HYDROCHLORIDE 50 MG/1
50 TABLET ORAL EVERY 6 HOURS PRN
Status: DISCONTINUED | OUTPATIENT
Start: 2020-05-28 | End: 2020-05-29

## 2020-05-28 RX ORDER — HYDROMORPHONE HYDROCHLORIDE 1 MG/ML
0.3 INJECTION, SOLUTION INTRAMUSCULAR; INTRAVENOUS; SUBCUTANEOUS
Status: DISCONTINUED | OUTPATIENT
Start: 2020-05-28 | End: 2020-05-31 | Stop reason: HOSPADM

## 2020-05-28 RX ORDER — CEFAZOLIN SODIUM 2 G/100ML
2 INJECTION, SOLUTION INTRAVENOUS
Status: COMPLETED | OUTPATIENT
Start: 2020-05-28 | End: 2020-05-28

## 2020-05-28 RX ORDER — LIDOCAINE HYDROCHLORIDE 20 MG/ML
INJECTION, SOLUTION INFILTRATION; PERINEURAL PRN
Status: DISCONTINUED | OUTPATIENT
Start: 2020-05-28 | End: 2020-05-28

## 2020-05-28 RX ORDER — SODIUM CHLORIDE 9 MG/ML
INJECTION, SOLUTION INTRAVENOUS CONTINUOUS PRN
Status: DISCONTINUED | OUTPATIENT
Start: 2020-05-28 | End: 2020-05-28

## 2020-05-28 RX ORDER — SODIUM CHLORIDE, SODIUM LACTATE, POTASSIUM CHLORIDE, CALCIUM CHLORIDE 600; 310; 30; 20 MG/100ML; MG/100ML; MG/100ML; MG/100ML
INJECTION, SOLUTION INTRAVENOUS CONTINUOUS
Status: DISCONTINUED | OUTPATIENT
Start: 2020-05-28 | End: 2020-05-28 | Stop reason: HOSPADM

## 2020-05-28 RX ORDER — SODIUM CHLORIDE 9 MG/ML
INJECTION, SOLUTION INTRAVENOUS CONTINUOUS
Status: DISCONTINUED | OUTPATIENT
Start: 2020-05-28 | End: 2020-05-29

## 2020-05-28 RX ORDER — ACETAMINOPHEN 325 MG/1
650 TABLET ORAL EVERY 4 HOURS PRN
Status: DISCONTINUED | OUTPATIENT
Start: 2020-05-31 | End: 2020-05-31 | Stop reason: HOSPADM

## 2020-05-28 RX ORDER — CEFAZOLIN SODIUM 1 G/3ML
1 INJECTION, POWDER, FOR SOLUTION INTRAMUSCULAR; INTRAVENOUS SEE ADMIN INSTRUCTIONS
Status: DISCONTINUED | OUTPATIENT
Start: 2020-05-28 | End: 2020-05-28

## 2020-05-28 RX ORDER — PROPOFOL 10 MG/ML
INJECTION, EMULSION INTRAVENOUS PRN
Status: DISCONTINUED | OUTPATIENT
Start: 2020-05-28 | End: 2020-05-28

## 2020-05-28 RX ORDER — ONDANSETRON 4 MG/1
4 TABLET, ORALLY DISINTEGRATING ORAL EVERY 30 MIN PRN
Status: DISCONTINUED | OUTPATIENT
Start: 2020-05-28 | End: 2020-05-28 | Stop reason: HOSPADM

## 2020-05-28 RX ORDER — AMOXICILLIN 250 MG
2 CAPSULE ORAL 2 TIMES DAILY
Status: DISCONTINUED | OUTPATIENT
Start: 2020-05-28 | End: 2020-05-31 | Stop reason: HOSPADM

## 2020-05-28 RX ORDER — NALOXONE HYDROCHLORIDE 0.4 MG/ML
.1-.4 INJECTION, SOLUTION INTRAMUSCULAR; INTRAVENOUS; SUBCUTANEOUS
Status: DISCONTINUED | OUTPATIENT
Start: 2020-05-28 | End: 2020-05-28

## 2020-05-28 RX ADMIN — SIMVASTATIN 20 MG: 10 TABLET, FILM COATED ORAL at 22:55

## 2020-05-28 RX ADMIN — MINOCYCLINE HYDROCHLORIDE 100 MG: 100 CAPSULE ORAL at 20:40

## 2020-05-28 RX ADMIN — CARVEDILOL 12.5 MG: 12.5 TABLET, FILM COATED ORAL at 17:38

## 2020-05-28 RX ADMIN — SODIUM CHLORIDE, POTASSIUM CHLORIDE, SODIUM LACTATE AND CALCIUM CHLORIDE: 600; 310; 30; 20 INJECTION, SOLUTION INTRAVENOUS at 11:48

## 2020-05-28 RX ADMIN — ROCURONIUM BROMIDE 40 MG: 10 INJECTION INTRAVENOUS at 12:00

## 2020-05-28 RX ADMIN — Medication 5 MG: at 13:01

## 2020-05-28 RX ADMIN — ACETAMINOPHEN 975 MG: 325 TABLET, FILM COATED ORAL at 17:38

## 2020-05-28 RX ADMIN — DULOXETINE HYDROCHLORIDE 30 MG: 30 CAPSULE, DELAYED RELEASE ORAL at 20:39

## 2020-05-28 RX ADMIN — SODIUM CHLORIDE: 9 INJECTION, SOLUTION INTRAVENOUS at 13:09

## 2020-05-28 RX ADMIN — PROPOFOL 130 MG: 10 INJECTION, EMULSION INTRAVENOUS at 12:00

## 2020-05-28 RX ADMIN — ONDANSETRON 4 MG: 2 INJECTION INTRAMUSCULAR; INTRAVENOUS at 13:29

## 2020-05-28 RX ADMIN — PHENYLEPHRINE HYDROCHLORIDE 200 MCG: 10 INJECTION INTRAVENOUS at 12:14

## 2020-05-28 RX ADMIN — GABAPENTIN 800 MG: 800 TABLET, FILM COATED ORAL at 08:46

## 2020-05-28 RX ADMIN — CEFAZOLIN SODIUM 2 G: 2 INJECTION, SOLUTION INTRAVENOUS at 12:15

## 2020-05-28 RX ADMIN — SODIUM CHLORIDE, SODIUM LACTATE, POTASSIUM CHLORIDE, CALCIUM CHLORIDE: 600; 310; 30; 20 INJECTION, SOLUTION INTRAVENOUS at 12:14

## 2020-05-28 RX ADMIN — Medication 5 MG: at 13:16

## 2020-05-28 RX ADMIN — GLYCOPYRROLATE 0.8 MG: 0.2 INJECTION, SOLUTION INTRAMUSCULAR; INTRAVENOUS at 13:41

## 2020-05-28 RX ADMIN — FENTANYL CITRATE 50 MCG: 50 INJECTION, SOLUTION INTRAMUSCULAR; INTRAVENOUS at 12:45

## 2020-05-28 RX ADMIN — HYDROMORPHONE HYDROCHLORIDE 0.5 MG: 1 INJECTION, SOLUTION INTRAMUSCULAR; INTRAVENOUS; SUBCUTANEOUS at 14:06

## 2020-05-28 RX ADMIN — NEOSTIGMINE METHYLSULFATE 5 MG: 1 INJECTION, SOLUTION INTRAVENOUS at 13:41

## 2020-05-28 RX ADMIN — HYDROMORPHONE HYDROCHLORIDE 0.3 MG: 1 INJECTION, SOLUTION INTRAMUSCULAR; INTRAVENOUS; SUBCUTANEOUS at 14:49

## 2020-05-28 RX ADMIN — SODIUM CHLORIDE: 9 INJECTION, SOLUTION INTRAVENOUS at 22:59

## 2020-05-28 RX ADMIN — TAMSULOSIN HYDROCHLORIDE 0.8 MG: 0.4 CAPSULE ORAL at 22:55

## 2020-05-28 RX ADMIN — VANCOMYCIN HYDROCHLORIDE 1500 MG: 5 INJECTION, POWDER, LYOPHILIZED, FOR SOLUTION INTRAVENOUS at 18:52

## 2020-05-28 RX ADMIN — GABAPENTIN 800 MG: 800 TABLET, FILM COATED ORAL at 20:40

## 2020-05-28 RX ADMIN — PHENYLEPHRINE HYDROCHLORIDE 100 MCG: 10 INJECTION INTRAVENOUS at 13:01

## 2020-05-28 RX ADMIN — MINOCYCLINE HYDROCHLORIDE 100 MG: 100 CAPSULE ORAL at 08:46

## 2020-05-28 RX ADMIN — PHENYLEPHRINE HYDROCHLORIDE 100 MCG: 10 INJECTION INTRAVENOUS at 12:51

## 2020-05-28 RX ADMIN — SENNOSIDES AND DOCUSATE SODIUM 2 TABLET: 8.6; 5 TABLET ORAL at 20:39

## 2020-05-28 RX ADMIN — TRAMADOL HYDROCHLORIDE 50 MG: 50 TABLET, FILM COATED ORAL at 17:39

## 2020-05-28 RX ADMIN — FENTANYL CITRATE 50 MCG: 50 INJECTION, SOLUTION INTRAMUSCULAR; INTRAVENOUS at 12:00

## 2020-05-28 RX ADMIN — LIDOCAINE HYDROCHLORIDE 60 MG: 20 INJECTION, SOLUTION INFILTRATION; PERINEURAL at 12:00

## 2020-05-28 RX ADMIN — ACETAMINOPHEN 975 MG: 325 TABLET, FILM COATED ORAL at 22:54

## 2020-05-28 RX ADMIN — Medication 5 MG: at 13:19

## 2020-05-28 RX ADMIN — CARVEDILOL 12.5 MG: 12.5 TABLET, FILM COATED ORAL at 08:46

## 2020-05-28 RX ADMIN — PHENYLEPHRINE HYDROCHLORIDE 200 MCG: 10 INJECTION INTRAVENOUS at 13:19

## 2020-05-28 RX ADMIN — PHENYLEPHRINE HYDROCHLORIDE 150 MCG: 10 INJECTION INTRAVENOUS at 12:33

## 2020-05-28 RX ADMIN — PHENYLEPHRINE HYDROCHLORIDE 200 MCG: 10 INJECTION INTRAVENOUS at 12:07

## 2020-05-28 RX ADMIN — ASPIRIN 325 MG: 325 TABLET, COATED ORAL at 17:38

## 2020-05-28 ASSESSMENT — ACTIVITIES OF DAILY LIVING (ADL)
ADLS_ACUITY_SCORE: 17

## 2020-05-28 ASSESSMENT — COPD QUESTIONNAIRES: COPD: 0

## 2020-05-28 ASSESSMENT — LIFESTYLE VARIABLES: TOBACCO_USE: 1

## 2020-05-28 ASSESSMENT — ENCOUNTER SYMPTOMS: SEIZURES: 0

## 2020-05-28 NOTE — PROGRESS NOTES
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  Patient is admitted under the orthopedic service with plans for operative intervention on 5/28/2020.  Hospitalist service was consulted for preoperative evaluation.     Status post mechanical fall  Right hip wound dehiscence  Recurrent right total hip arthroplasty dislocation status post partial revision (5/13/2020)  As noted above, patient with recent surgical intervention on right hip.  Patient was discharged to home setting and was reportedly doing quite well until day prior to evaluation when he sustained a mechanical fall down approximately 10 steps.  Patient delayed presentation until approximately 24 hours following fall.  He does report head trauma, however is without abrasions or edema.  Neurologically patient appears intact.  He completed a two-week course of Xarelto for DVT prophylaxis and has been maintained on daily aspirin.  He is on chronic minocycline for history of right total hip arthroplasty infection.  Cultures from recent surgery were negative, patient was discharged on oral Cipro per Ortho.  - Management per Orthopedics  - NPO p MN  - IV antibiotics per orthopedics  - Pt had negative COVID swab in anticipation of prior surgery, has been asymptomatic since  discharge. Will not repeat at this time as low clinical suspicion.    Pre-operative assessment: Revised cardiac risk index score estimated risk of adverse outcome with non-cardiac surgery is low risk. Pt is without active chest pain, SOB, dizziness, lightheadedness. Tolerated most recent orthopedic surgery without adverse effects. No personal or family hx of VTE. No hx of sudden death following surgery. BMP unremarkable. CBC with anemia related to most recent Orthopedic surgery.   - OK to proceed with the above planned surgery without further medical optimization      Acute blood loss anemia  Hx of chronic normocytic anemia: Patient with known chronic iron deficiency as well as B12 anemia.  Pre-op Hgb 11.3. Sustained acute blood loss anemia during recent hospital stay, received 1 unit of PRBCs.  Hemoglobin at time of discharge was stable at 9.3. Hemoglobin on admission is stable at 8.8.  - Monitor     HFrEF  Hypertension  Hyperlipidemia  Hx type II AV block s/p PPM: Most recent EF 42% per echo in 2018. Pt appears euvolemic on exam, is without s/s concerning for HF exacerbation. Pt reports dry weight 202 lbs, but with recent surgery, was up to 215 lbs after receiving IVF.   - Holding PTA lasix 20 mg po qd, lisinopril 2.5 mg po qd in periop period  - BMP in AM  - Continue Coreg 12.5 mg po BID, Simvastatin 20 mg po at bedtime   - Judicious use of IVF  - I&Os, daily weights    Intake/Output Summary (Last 24 hours) at 5/28/2020 0839  Last data filed at 5/28/2020 0700  Gross per 24 hour   Intake 340 ml   Output 2225 ml   Net -1885 ml      Diabetes Mellitus Type II: Most recent Hgb A1c 6.5% on 5/13/2020. PTA regimen consists of metformin.  - Holding PTA metformin 250 mg BID   - sliding scale insulin  - Mod CHO diet  - Accuchecks TID AC & at bedtime    Recent Labs   Lab 05/28/20  0646 05/27/20  2224 05/27/20  1822   *  --  111*   BGM  --  143*  --       Severe aortic sclerosis: Noted on TTE 08/2019, not  hemodynamically significant.  - Defer outpatient monitoring to Cardiology      BPH  - Continue PTA flomax     Recurrent papillary urethral cancer s/p TURBT x3, right ureter and prostate CA s/p ureterectomy and nephrectomy  Follows as an outpatient with Dr. Pepper. Appears stable.    Chronic lower extremity skin discoloration from long term minocycline use: Noted.      Diet: NPO per Anesthesia Guidelines for Procedure/Surgery Except for: Meds    DVT Prophylaxis: Pneumatic Compression Devices and Defer to primary service  Mock Catheter: not present  Code Status: Full Code, discussed with patient at bedside.        Disposition Plan   Expected discharge: Per Ortho  Entered: Faiza Welch PA-C 05/28/2020, 7:05 AM     The patient's care was discussed with the Attending Physician, Dr. Bellamy, Bedside Nurse and Patient.    Faiza Welch PA-C  Hospitalist Service  [facility]    ______________________________________________________________________    Interval History   No acute events overnight. Plan for surgery this AM. Pt denies chest pain (some soreness along right chest wall related to fall), dizziness, lightheadedness, SOB. No recent medication changes. Pain well controlled at present.     Data reviewed today: I reviewed all medications, new labs and imaging results over the last 24 hours. I personally reviewed all labs and imaging since admission.     Physical Exam   Vital Signs: Temp: 98.1  F (36.7  C) Temp src: Oral BP: 124/61 Pulse: 64   Resp: 16 SpO2: 99 % O2 Device: None (Room air)    Weight: 0 lbs 0 oz    CONSTITUTIONAL: Pt laying in bed, dressed in hospital garb. Appears comfortable. Cooperative with interview.   HEENT: Normocephalic, atraumatic. Negative for conjunctival redness or scleral icterus.  Oral mucosa pink and moist; negative for ulcerations, erythema, or exudates.  Dentition in good repair.   CARDIOVASCULAR: RRR, no murmurs, rubs, or extra  heart sounds appreciated. Pulses +2/4 and regular in upper and lower extremities, bilaterally.   RESPIRATORY: No increased work of breathing.  CTA, bilat; no wheezes, rales, or rhonchi appreciated.  GASTROINTESTINAL:  Abdomen soft, non-distended. BS auscultated in all four quadrants. Negative for tenderness to palpation.  No masses or organomegaly noted.  MUSCULOSKELETAL: Strength +5/5 in upper extremities, bilaterally, RLE not tested due to fracture. No gross deformities noted. Normal muscle tone.   HEMATOLOGIC/LYMPHATIC/IMMUNOLOGIC: No anterior or posterior cervical LAD, bilaterally. Negative for lower extremity edema, bilaterally.  NEUROLOGIC: Alert and oriented to person, place, and time.  strength intact. CN's II-XII grossly intact. Sensation equal and intact in upper and lower extremities, bilat.   SKIN: Right hip with bandage at prior surgical site, C/D/I.     Data   Recent Labs   Lab 05/28/20  0646 05/28/20  0645 05/27/20  1822   WBC  --  5.4 7.0   HGB  --  8.1* 8.8*   MCV  --  99 99   PLT  --  270 292     --  136   POTASSIUM 3.9  --  4.2   CHLORIDE 108  --  103   CO2 30  --  29   BUN 31*  --  40*   CR 0.93  --  1.08   ANIONGAP 2*  --  4   ESAU 8.4*  --  8.7   *  --  111*     Recent Results (from the past 24 hour(s))   XR Pelvis 1/2 Views    Narrative    EXAM: XR PELVIS 1/2 VW  LOCATION: [facility]  DATE/TIME: 5/27/2020 5:48 PM    INDICATION: Fall. Rule out fracture. Recent dislocation right hip.  COMPARISON: 05/13/2020 at 1014.      Impression    IMPRESSION: Again seen is a right total hip arthroplasty with a restraining ring. Alignment is normal and there are no acute bony abnormalities in the right hip region or elsewhere. A large amount of heterotopic bone lateral to the prosthesis is stable.   Lateral skin staples are again noted.     Medications       carvedilol  12.5 mg Oral BID w/meals     DULoxetine  30 mg Oral Daily     ferrous sulfate  325 mg Oral Daily with breakfast      gabapentin  800 mg Oral BID     insulin aspart  1-10 Units Subcutaneous TID AC     insulin aspart  1-7 Units Subcutaneous At Bedtime     minocycline  100 mg Oral BID     senna-docusate  2 tablet Oral BID     simvastatin  20 mg Oral At Bedtime     tamsulosin  0.8 mg Oral At Bedtime     vancomycin (VANCOCIN) IV  1,500 mg Intravenous Q24H

## 2020-05-28 NOTE — ANESTHESIA CARE TRANSFER NOTE
Patient: aMrk Hernández    Procedure(s):  IRRIGATION AND DEBRIDEMENT RIGHT HIP WOUND WITH CLOSURE    Diagnosis: Postoperative wound infection of right hip [T81.49XA]  Diagnosis Additional Information: No value filed.    Anesthesia Type:   General     Note:  Airway :Face Mask  Patient transferred to:PACU  Handoff Report: Identifed the Patient, Identified the Reponsible Provider, Reviewed the pertinent medical history, Discussed the surgical course, Reviewed Intra-OP anesthesia mangement and issues during anesthesia, Set expectations for post-procedure period and Allowed opportunity for questions and acknowledgement of understanding      Vitals: (Last set prior to Anesthesia Care Transfer)    CRNA VITALS  5/28/2020 1340 - 5/28/2020 1415      5/28/2020             NIBP:  156/77    Pulse:  60    NIBP Mean:  110    Ht Rate:  60    SpO2:  100 %    Resp Rate (set):  10                Electronically Signed By: LUCY Haskins CRNA  May 28, 2020  2:15 PM

## 2020-05-28 NOTE — BRIEF OP NOTE
[facility]    Brief Operative Note    Pre-operative diagnosis: Postoperative wound   breakdownof right hip   Post-operative diagnosis Same as pre-operative diagnosis    Procedure: Procedure(s):  IRRIGATION AND DEBRIDEMENT RIGHT HIP WOUND WITH CLOSURE  Surgeon: Surgeon(s) and Role:     * Kirk Summers MD - Primary  Anesthesia: General   Estimated blood loss: 300 ml  Drains: Hemovac  Specimens:   ID Type Source Tests Collected by Time Destination   1 : deep sub q tissue right hip Tissue Hip, Right ANAEROBIC BACTERIAL CULTURE, TISSUE CULTURE AEROBIC BACTERIAL Kirk Summers MD 5/28/2020 12:50 PM    2 : deep hip joint right Tissue Hip, Right ANAEROBIC BACTERIAL CULTURE, TISSUE CULTURE AEROBIC BACTERIAL Kirk Summers MD 5/28/2020 12:54 PM      Findings:   None.  Complications: None   .  Implants:     2  Cultures  Sent

## 2020-05-28 NOTE — CONSULTS
Consult Date:  05/27/2020      CHIEF COMPLAINT:  The patient not quite 2 weeks total hip revision on the right side.  Last night, he was winding his cuckoo clock, fell backwards onto the stair rail.  He ruptured open a portion of his wound.  He is on chronic anticoagulants.      He was found by his wife.  She was able to get him into a chair, so there was no loss of consciousness, etc.      Unfortunately this need to be washed out now.      Physical therapy and home nursing, he was telling me he does not live in a very safe home, so we will be seeking out nursing home confinement until this is healed, and we will probably put him in an abductor brace this time.      PLAN:  Irrigation and debridement with primary closure over drains.  Deep cultures.  If there is anything positive, he will require 6 weeks of IV antibiotics.  We will plan to put pt vancomycin powder in there.  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        Cultures from his most recent surgery, I do not have all those results at this time.      PHYSICAL EXAMINATION:   GENERAL:  He is alert, oriented, and a very stoic gentleman.  He has got good control of the leg, but the wound measures about 2-3 inch open area with a large clot in it, presumably from his medications.      I went over the findings, options and alternatives with him.      PLAN:     1.  Irrigation and debridement.     2.  Nursing home placement.      Any concerns, questions, or clarifications, we will make that amends on behalf of medical management to help aid  for clearance.  He did require a unit of blood.  He is chronically anemic.  Get bloods checked in today and medical clearance, he is down to surgery tomorrow at noon.  This is an urgency because of the open wound with a fresh total hip inside the same area.    Plan   Place  abx beeds  Prophylactcally,  Drains  If  Needed  And  Iv  abx  Till   Certain  Of  Deep  Culture  Results.  Will   Push  For  Nursing  Home   Per  Home  Care  Is  Home   Is  Too  Crowded  And  Not  Safe  For  Him there.     SAVANAH SPRAGUE MD             D: 2020   T: 2020   MT:       Name:     FRANKIE THOMPSON   MRN:      [id number]        Account:       [id number]   :      1933           Consult Date:  2020      Document: [id number]

## 2020-05-28 NOTE — ANESTHESIA POSTPROCEDURE EVALUATION
Patient: Mark Hernández    Procedure(s):  IRRIGATION AND DEBRIDEMENT RIGHT HIP WOUND WITH CLOSURE    Diagnosis:Postoperative wound infection of right hip [T81.49XA]  Diagnosis Additional Information: No value filed.    Anesthesia Type:  General    Note:  Anesthesia Post Evaluation    Patient location during evaluation: Bedside  Patient participation: Able to fully participate in evaluation  Level of consciousness: awake and alert  Pain management: adequate  Airway patency: patent  Cardiovascular status: acceptable  Respiratory status: acceptable  Hydration status: acceptable  PONV: none             Last vitals:  Vitals:    05/28/20 1500 05/28/20 1520 05/28/20 1550   BP: 121/59 131/67 135/68   Pulse: 59     Resp: 10 10 10   Temp: 36.8  C (98.2  F) 36.6  C (97.9  F)    SpO2: 97% 100% 100%         Electronically Signed By: Brody Chadwick MD  May 28, 2020  4:31 PM

## 2020-05-28 NOTE — PLAN OF CARE
Pt arrived to unit at 1630 today, A&Ox4 with minor forgetfulness, up with assist of one and walker, voiding well, had BM, denies pain, hip incision has small area of dehiscence/hematoma, other staples intact, small amount of bloody drainage, tolerating regular diet, NPO after midnight for OR tomorrow, on IV vanco.

## 2020-05-28 NOTE — PROVIDER NOTIFICATION
MD Notification    Notified Person: MD    Notified Person Name: ANGIE Sorto    Notification Date/Time:5/27/2020 2000    Notification Interaction: paged    Purpose of Notification: nursing orders and medications not entered except for pre-op orders.    Orders Received: medication orders and nursing orders entered.    Comments:

## 2020-05-28 NOTE — CONSULTS
[facility]  Consult Note - Hospitalist Service     Date of Admission:  5/27/2020  Consult Requested by: Dr. 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  Patient is admitted under the orthopedic service with plans for operative intervention on 5/28/2020.  Hospitalist service was consulted for preoperative evaluation.    Status post mechanical fall  Right hip wound dehiscence  Recurrent right total hip arthroplasty dislocation status post partial revision (5/13/2020)  As noted above, patient with recent surgical intervention on right hip.  Patient was discharged to home setting and was reportedly doing quite well until day prior to evaluation when he sustained a mechanical fall down approximately 10 steps.  Patient delayed presentation until approximately 24 hours following fall.  He does report head trauma, however is without abrasions or edema.  Neurologically patient appears intact.  He completed a two-week course of Xarelto for DVT prophylaxis and has been maintained on daily aspirin.  He is on chronic minocycline for history of right total hip arthroplasty infection.  Cultures from recent surgery were negative, patient was discharged on oral Cipro per Ortho.  - Management per Orthopedics  - NPO p MN  - IV antibiotics per orthopedics  - Pt had negative COVID swab in  anticipation of prior surgery, has been asymptomatic since discharge. Will not repeat at this time as low clinical suspicion.  - Patient has been at baseline health since prior surgery without concerning s/sx, is able to tolerate mild physical activity without development of symptoms. Pt is medically optimized for procedure.    Anemia  Patient with known chronic iron deficiency as well as B12 anemia.  Sustained acute blood loss anemia during recent hospital stay, received 1 unit of PRBCs.  Hemoglobin at time of discharge was stable at 9.3. Hemoglobin on admission is stable at 8.8.  - Monitor    HFrEF  Hypertension  Hyperlipidemia  Hx type II AV block s/p PPM  Most recent EF 42%, improved following pacemaker upgrade to BiV. Pt appears euvolemic on exam, is without s/s concerning for HF exacerbation.   - Holding PTA lasix, lisinopril in periop period  - BMP in AM  - Resume PTA Coreg, statin  - Judicious use of IVF    Diabetes Mellitus Type II  Most recent Hgb A1c 6.5% on 5/13/2020. PTA regimen consists of metformin.  - Holding PTA metformin  - sliding scale insulin  - Mod CHO diet  - Accuchecks TID AC & at bedtime    Severe aortic sclerosis  Noted on TTE 08/2019, not hemodynamically significant.    BPH  - Continue PTA flomax    Recurrent papillary urethral cancer s/p TURBT x3, right ureter and prostate CA s/p ureterectomy and nephrectomy  Follows as an outpatient with Dr. Pepper. Appears stable.    The patient's care was discussed with the Attending Physician, Dr. Vasques, Bedside Nurse and Patient.    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  Patient is admitted under the orthopedic service with plans for operative intervention on 5/28/2020.  Hospitalist service was consulted for preoperative evaluation.    Patient is presently evaluated in hospital room.  He is resting in bed, reports that he is minimal hip pain at this time.  When asked details surrounding fall day prior, patient indicates he was fixing his cuckoo clock in his residence which is located at the top of approximately 10 steps.  He states he was just finishing when he suddenly lost his balance and fell backwards down the stairs.  He does admit to head trauma, denies loss of consciousness.  Denies prodromal symptoms including lightheadedness, dizziness, chest pain, shortness of breath.  He had immediate increasing discomfort to the right hip and noted mild drainage from the surgical wound, which was new.  He otherwise reports that he was doing quite well following surgery.  States that he is typically able to ambulate around his home without experiencing shortness of breath, chest pain.  He does not ambulate upstairs as he has an assistive device.  Denies any recent fever, chills, cough.    Review of Systems   The 10 point Review of Systems is negative other than noted in the HPI or here.     Past Medical History    I have reviewed this patient's medical history and updated it with pertinent information if needed.   Past Medical History:   Diagnosis Date     Acquired hydrocele      BPH (benign prostatic hyperplasia)      CKD (chronic kidney disease) stage 3, GFR 30-59 ml/min (H)      Complete AV block (H) 09/23/2016    Implantation of a  dual-chamber pacemaker     Congestive heart failure (H)      Diverticulosis      Hemorrhoids      Hyperlipidemia      Hypertension      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)      Pacemaker      Peripheral neuropathy      Prostate cancer (H)      Renal disease      Sensorineural hearing loss of both ears      Thrombocytopenia (H)      Thrombocytopenia (H)      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  Past Surgical History:   Procedure Laterality Date     ARTHROPLASTY REVISION HIP Right 12/28/2017    Procedure: ARTHROPLASTY REVISION HIP;;  Surgeon: Kirk Summers MD;  Location:  OR     ARTHROPLASTY REVISION HIP Right 5/13/2020    Procedure: REVISE RIGHT TOTAL HIP;  Surgeon: Kirk Summers MD;  Location:  OR     BACK SURGERY       ENT SURGERY      tonsillectomy     GENITOURINARY SURGERY      S/P NEPHRECTOMY     IRRIGATION AND DEBRIDEMENT HIP, PLACE ANTIBIOTIC CEMENT BEADS / SPACER Right 12/28/2017    Procedure: COMBINED IRRIGATION AND DEBRIDEMENT HIP, PLACE ANTIBIOTIC CEMENT BEADS / SPACER;  IRRIGATION AND DEBRIDEMENT RIGHT HIP WITH PLACEMENT ANTIBIOTIC BEADS, POLY EXCHANGE, AND PARTIAL HIP REVISION. ;  Surgeon: Kirk Summers MD;  Location:  OR     KNEE SURGERY       ORTHOPEDIC SURGERY       RELEASE CARPAL TUNNEL Left 4/25/2019    Procedure: LEFT CARPAL TUNNEL RELEASE;  Surgeon: Mynor López MD;  Location:  OR     revision total hip arthroplasty       rivision total hip arthroplasty       rotator cuff syndrome         Social History   I have reviewed this patient's social history and updated it with pertinent information if needed.  Social History     Tobacco Use     Smoking status: Former Smoker     Packs/day: 1.00     Years: 24.00     Pack years: 24.00     Types: Cigarettes     Start date:  1949     Last attempt to quit: 1973     Years since quittin.4     Smokeless tobacco: Never Used   Substance Use Topics     Alcohol use: Yes     Comment: wine daily: 2      Drug use: Never       Family History   I have reviewed this patient's family history and updated it with pertinent information if needed.   Family History   Problem Relation Age of Onset     Family History Negative Mother      Myocardial Infarction Father 80     Unknown/Adopted Maternal Grandmother      Unknown/Adopted Maternal Grandfather      Unknown/Adopted Paternal Grandmother      Unknown/Adopted Paternal Grandfather      Rheumatoid Arthritis Sister      Myocardial Infarction Sister      Family History Negative Son      Family History Negative Daughter        Medications   Medications Prior to Admission   Medication Sig Dispense Refill Last Dose     carvedilol (COREG) 12.5 MG tablet Take 12.5 mg by mouth 2 times daily (with meals)   2020 at am     Coenzyme Q10 (COQ-10 PO) Take 1 tablet by mouth daily   2020 at Unknown time     CYANOCOBALAMIN PO Take 1,000 mcg by mouth every other day    2020 at Unknown time     DULoxetine (CYMBALTA) 30 MG capsule Take 30 mg by mouth daily Takes for neuropathy   2020 at pm     ferrous sulfate (FEROSUL) 325 (65 Fe) MG tablet Take 325 mg by mouth daily (with breakfast)   2020 at am     furosemide (LASIX) 20 MG tablet Take 20 mg by mouth daily   2020 at am     gabapentin (NEURONTIN) 800 MG tablet Take 1 tablet (800 mg) by mouth 2 times daily   2020 at am     lisinopril (ZESTRIL) 2.5 MG tablet Take 2.5 mg by mouth daily    2020 at am     metFORMIN (GLUCOPHAGE) 500 MG tablet Take 250 mg by mouth 2 times daily (with meals) (takes 0.5 x 500mg)   2020 at am     minocycline (MINOCIN) 100 MG capsule Take 100 mg by mouth 2 times daily   2020 at am     Omega-3 Fatty Acids (OMEGA 3 PO) Take 1 teaspoonful by mouth daily   2020 at am     Probiotic Product (PROBIOTIC  PO) Take 1 capsule by mouth daily   5/27/2020 at am     senna-docusate (SENOKOT-S/PERICOLACE) 8.6-50 MG tablet Take 2 tablets by mouth 2 times daily 40 tablet 0 5/27/2020 at am     simvastatin (ZOCOR) 40 MG tablet Take 20 mg by mouth At Bedtime (Takes 1/2 of a 40mg tablet)   5/26/2020 at pm     tamsulosin (FLOMAX) 0.4 MG 24 hr capsule Take 0.8 mg by mouth At Bedtime (takes 2 x 0.4mg)   5/26/2020 at pm     traMADol (ULTRAM) 50 MG tablet Take 1 tablet (50 mg) by mouth every 6 hours as needed for moderate pain, moderate to severe pain or severe pain 40 tablet 0 5/27/2020 at am     VITAMIN D PO Take 1 tablet by mouth 2 times daily   5/27/2020 at am     acetaminophen (TYLENOL) 500 MG tablet Take 500-1,000 mg by mouth every 6 hours as needed for mild pain   prn       Allergies   No Known Allergies    Physical Exam   Vital Signs: Temp: 97.5  F (36.4  C) Temp src: Oral BP: (!) 157/80 Pulse: 67   Resp: 16 SpO2: 99 % O2 Device: None (Room air)    Weight: 0 lbs 0 oz    GEN: well-developed, well-nourished, appears comfortable  HEENT: NCAT, EOM intact bilaterally, sclera clear, conjunctiva normal, nose & mouth patent, mucous membranes moist  CHEST: lungs CTA bilaterally, no increased work of breathing, no wheeze, rales, rhonchi  HEART: RRR, S1 & S2  ABD: soft, nontender, nondistended, no guarding or rigidity, +BS in all 4 quadrants  MSK: full AROM bilateral UE/LE, pedal & radial pulses 2+ bilaterally  NEURO: awake, alert, oriented to name, place, and time. CN II-XII grossly intact. Sensation grossly intact. Moving all extremities spontaneously  SKIN: warm & dry without rash, trace bilateral pedal edema; BLE with chronic venous stasis changes; surgical wound with staples intact, small amount of blood clot and slight dehiscence to proximal 1/3 of incision, no surrounding erythema/warmth, minimal drainage (serosang)    Data   Results for orders placed or performed during the hospital encounter of 05/27/20 (from the past 24 hour(s))    XR Pelvis 1/2 Views    Narrative    EXAM: XR PELVIS 1/2 VW  LOCATION: [facility]  DATE/TIME: 5/27/2020 5:48 PM    INDICATION: Fall. Rule out fracture. Recent dislocation right hip.  COMPARISON: 05/13/2020 at 1014.      Impression    IMPRESSION: Again seen is a right total hip arthroplasty with a restraining ring. Alignment is normal and there are no acute bony abnormalities in the right hip region or elsewhere. A large amount of heterotopic bone lateral to the prosthesis is stable.   Lateral skin staples are again noted.   CBC with platelets differential   Result Value Ref Range    WBC 7.0 4.0 - 11.0 10e9/L    RBC Count 2.73 (L) 4.4 - 5.9 10e12/L    Hemoglobin 8.8 (L) 13.3 - 17.7 g/dL    Hematocrit 27.1 (L) 40.0 - 53.0 %    MCV 99 78 - 100 fl    MCH 32.2 26.5 - 33.0 pg    MCHC 32.5 31.5 - 36.5 g/dL    RDW 14.7 10.0 - 15.0 %    Platelet Count 292 150 - 450 10e9/L    Diff Method Automated Method     % Neutrophils 62.5 %    % Lymphocytes 18.7 %    % Monocytes 9.0 %    % Eosinophils 9.0 %    % Basophils 0.4 %    % Immature Granulocytes 0.4 %    Nucleated RBCs 0 0 /100    Absolute Neutrophil 4.4 1.6 - 8.3 10e9/L    Absolute Lymphocytes 1.3 0.8 - 5.3 10e9/L    Absolute Monocytes 0.6 0.0 - 1.3 10e9/L    Absolute Eosinophils 0.6 0.0 - 0.7 10e9/L    Absolute Basophils 0.0 0.0 - 0.2 10e9/L    Abs Immature Granulocytes 0.0 0 - 0.4 10e9/L    Absolute Nucleated RBC 0.0    Basic metabolic panel   Result Value Ref Range    Sodium 136 133 - 144 mmol/L    Potassium 4.2 3.4 - 5.3 mmol/L    Chloride 103 94 - 109 mmol/L    Carbon Dioxide 29 20 - 32 mmol/L    Anion Gap 4 3 - 14 mmol/L    Glucose 111 (H) 70 - 99 mg/dL    Urea Nitrogen 40 (H) 7 - 30 mg/dL    Creatinine 1.08 0.66 - 1.25 mg/dL    GFR Estimate 62 >60 mL/min/[1.73_m2]    GFR Estimate If Black 71 >60 mL/min/[1.73_m2]    Calcium 8.7 8.5 - 10.1 mg/dL

## 2020-05-28 NOTE — PLAN OF CARE
calm and cooperative. Discoloration on lower legs due to medications. Hips and coccyx bruised due to fall that reopened his incision. Patient fell from the top of his stairs after trying to reset his cuckoo clock. Full code. VSS on RA. Blood glucose controlled last night without use of insulin. Metformin held for hospital stay. Surgery at 1150 today. Discharge pending outcome.

## 2020-05-28 NOTE — ANESTHESIA PREPROCEDURE EVALUATION
Anesthesia Pre-Procedure Evaluation    Patient: Mark Hernández   MRN: [id number] : 1933          Preoperative Diagnosis: Postoperative wound infection of right hip [T81.49XA]    Procedure(s):  IRRIGATION AND DEBRIDEMENT RIGHT HIP WOUND WITH CLOSURE, POSSIBLE WITH ANTIBIOTIC BEADS    Past Medical History:   Diagnosis Date     Acquired hydrocele      BPH (benign prostatic hyperplasia)      CKD (chronic kidney disease) stage 3, GFR 30-59 ml/min (H)      Complete AV block (H) 2016    Implantation of a dual-chamber pacemaker     Congestive heart failure (H)      Diverticulosis      Hemorrhoids      Hyperlipidemia      Hypertension      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)      Pacemaker      Peripheral neuropathy      Prostate cancer (H)      Renal disease      Sensorineural hearing loss of both ears      Thrombocytopenia (H)      Thrombocytopenia (H)      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)      Vitamin B12 deficiency      Past Surgical History:   Procedure Laterality Date     ARTHROPLASTY REVISION HIP Right 2017    Procedure: ARTHROPLASTY REVISION HIP;;  Surgeon: Kirk Summers MD;  Location:  OR     ARTHROPLASTY REVISION HIP Right 2020    Procedure: REVISE RIGHT TOTAL HIP;  Surgeon: Kirk Summers MD;  Location:  OR     BACK SURGERY       ENT SURGERY      tonsillectomy     GENITOURINARY SURGERY      S/P NEPHRECTOMY     IRRIGATION AND DEBRIDEMENT HIP, PLACE ANTIBIOTIC CEMENT BEADS / SPACER Right 2017    Procedure: COMBINED IRRIGATION AND DEBRIDEMENT HIP, PLACE ANTIBIOTIC CEMENT BEADS / SPACER;  IRRIGATION AND DEBRIDEMENT RIGHT HIP WITH PLACEMENT ANTIBIOTIC BEADS, POLY EXCHANGE, AND PARTIAL HIP REVISION. ;  Surgeon: Kirk Summers MD;  Location:  OR     KNEE SURGERY       ORTHOPEDIC SURGERY       RELEASE CARPAL TUNNEL Left 2019    Procedure: LEFT CARPAL TUNNEL RELEASE;  Surgeon: Mynor López MD;   Location: [facility] OR     revision total hip arthroplasty       [facility] total hip arthroplasty       rotator cuff syndrome         Anesthesia Evaluation     .             ROS/MED HX    ENT/Pulmonary:     (+)tobacco use, Past use , . .   (-) asthma, COPD and sleep apnea   Neurologic:      (-) seizures, CVA and TIA   Cardiovascular:     (+) Dyslipidemia, hypertension----. : . CHF . . pacemaker Reason placed: 2nd degree AVB, Mobitz II:type: Medtronic settings: DDDR . . Previous cardiac testing Echodate:2019results:  CONCLUSIONS  Mild to moderate diffuse LV hypokinesis is present.  Left ventricular ejection fraction is visually estimated at 42%.  Mild valvular abnormalities were identified.    Comparing to echo June 2019, LVEF,MR, RVSP all improved.      FINDINGS    MITRAL VALVE  Mild mitral annular calcification.  Mild mitral regurgitation.    AORTIC VALVE  There is severe aortic sclerosis.  Mild aortic regurgitation.    TRICUSPID VALVE  Normal tricuspid valve structure and function.  Trace (physiologic) tricuspid regurgitation.  Pulmonary artery systolic pressure estimate is 24mmHg above RAP.  (Normal).    PULMONIC VALVE  Normal pulmonic valve structure and function.    LEFT ATRIUM  Left atrial volume index is 45 mL/m^2. (Moderately abnormal  42-48mL/m^2).    LEFT VENTRICLE  Left ventricular chamber size is normal.  Moderate concentric wall thickening consistent with left ventricular  hypertrophy is present.  Mild to moderate diffuse hypokinesis is present.  Left ventricular ejection fraction is visually estimated at 42%.  Left ventricular diastolic function could not be accurately assessed.    RIGHT ATRIUM  Moderate right atrial enlargement.  A pacemaker lead is noted in the right atrium.    RIGHT VENTRICLE  Normal right ventricle size and normal global function.    PERICARDIAL EFFUSION  There is no pericardial effusion.date: results:ECG reviewed date:5/2020 results:Ventricular pacing at 63 bpm date: results:         (-)  "CAD   METS/Exercise Tolerance:     Hematologic:     (+) Anemia, -      Musculoskeletal:   (+) arthritis,  -       GI/Hepatic:        (-) GERD and liver disease   Renal/Genitourinary: Comment: S/p right nephroureterectomy    (+) chronic renal disease, type: CRI, Pt does not require dialysis, BPH,       Endo: Comment: BMI 32    (+) type II DM Not using insulin Obesity, .      Psychiatric:         Infectious Disease:         Malignancy:   (+) Malignancy History of Prostate and Other  Prostate CA status post Surgery, Other CA Bladder status post Surgery         Other:                          Physical Exam      Airway   Mallampati: III  TM distance: >3 FB  Neck ROM: full    Dental   (+) upper dentures, lower dentures and missing    Cardiovascular   Rhythm and rate: regular      Pulmonary    breath sounds clear to auscultation            Lab Results   Component Value Date    WBC 5.4 05/28/2020    HGB 8.1 (L) 05/28/2020    HCT 24.7 (L) 05/28/2020     05/28/2020    SED 33 (H) 05/14/2020     05/28/2020    POTASSIUM 3.9 05/28/2020    CHLORIDE 108 05/28/2020    CO2 30 05/28/2020    BUN 31 (H) 05/28/2020    CR 0.93 05/28/2020     (H) 05/28/2020    ESAU 8.4 (L) 05/28/2020    INR 1.11 12/27/2017       Preop Vitals  BP Readings from Last 3 Encounters:   05/28/20 126/62   05/16/20 119/61   04/26/20 (!) 150/114    Pulse Readings from Last 3 Encounters:   05/28/20 68   05/16/20 70   04/26/20 69      Resp Readings from Last 3 Encounters:   05/28/20 16   05/16/20 16   04/26/20 23    SpO2 Readings from Last 3 Encounters:   05/28/20 99%   05/16/20 93%   04/26/20 100%      Temp Readings from Last 1 Encounters:   05/28/20 36.7  C (98  F) (Oral)    Ht Readings from Last 1 Encounters:   05/13/20 1.727 m (5' 8\")      Wt Readings from Last 1 Encounters:   05/16/20 93 kg (205 lb)    Estimated body mass index is 31.64 kg/m  as calculated from the following:    Height as of an earlier encounter on 5/27/20: 1.702 m (5' 7\").    " Weight as of an earlier encounter on 5/27/20: 91.6 kg (202 lb).       Anesthesia Plan      History & Physical Review  History and physical reviewed and following examination; no interval change.    ASA Status:  3 .    NPO Status:  > 8 hours    Plan for General (ETT) with Intravenous and Propofol induction. Maintenance will be Balanced.    PONV prophylaxis:  Ondansetron (or other 5HT-3) and Dexamethasone or Solumedrol  Additional equipment: 2nd IV        Postoperative Care  Postoperative pain management:  Multi-modal analgesia.      Consents  Anesthetic plan, risks, benefits and alternatives discussed with:  Patient..                 Brody Chadwick MD

## 2020-05-29 ENCOUNTER — APPOINTMENT (OUTPATIENT)
Dept: PHYSICAL THERAPY | Facility: CLINIC | Age: 85
DRG: 908 | End: 2020-05-29
Attending: ORTHOPAEDIC SURGERY
Payer: MEDICARE

## 2020-05-29 ENCOUNTER — APPOINTMENT (OUTPATIENT)
Dept: ULTRASOUND IMAGING | Facility: CLINIC | Age: 85
DRG: 908 | End: 2020-05-29
Attending: SURGERY
Payer: MEDICARE

## 2020-05-29 LAB
ANION GAP SERPL CALCULATED.3IONS-SCNC: 3 MMOL/L (ref 3–14)
BUN SERPL-MCNC: 26 MG/DL (ref 7–30)
CALCIUM SERPL-MCNC: 8.1 MG/DL (ref 8.5–10.1)
CHLORIDE SERPL-SCNC: 107 MMOL/L (ref 94–109)
CO2 SERPL-SCNC: 30 MMOL/L (ref 20–32)
CREAT SERPL-MCNC: 1.02 MG/DL (ref 0.66–1.25)
GFR SERPL CREATININE-BSD FRML MDRD: 66 ML/MIN/{1.73_M2}
GLUCOSE BLDC GLUCOMTR-MCNC: 113 MG/DL (ref 70–99)
GLUCOSE BLDC GLUCOMTR-MCNC: 125 MG/DL (ref 70–99)
GLUCOSE BLDC GLUCOMTR-MCNC: 145 MG/DL (ref 70–99)
GLUCOSE SERPL-MCNC: 125 MG/DL (ref 70–99)
HGB BLD-MCNC: 9.1 G/DL (ref 13.3–17.7)
POTASSIUM SERPL-SCNC: 4.2 MMOL/L (ref 3.4–5.3)
SODIUM SERPL-SCNC: 140 MMOL/L (ref 133–144)

## 2020-05-29 PROCEDURE — 25000132 ZZH RX MED GY IP 250 OP 250 PS 637: Mod: GY | Performed by: PHYSICIAN ASSISTANT

## 2020-05-29 PROCEDURE — 25000128 H RX IP 250 OP 636: Performed by: INTERNAL MEDICINE

## 2020-05-29 PROCEDURE — 25000132 ZZH RX MED GY IP 250 OP 250 PS 637: Mod: GY | Performed by: ORTHOPAEDIC SURGERY

## 2020-05-29 PROCEDURE — 99232 SBSQ HOSP IP/OBS MODERATE 35: CPT | Performed by: PHYSICIAN ASSISTANT

## 2020-05-29 PROCEDURE — 85018 HEMOGLOBIN: CPT | Performed by: ORTHOPAEDIC SURGERY

## 2020-05-29 PROCEDURE — 97530 THERAPEUTIC ACTIVITIES: CPT | Mod: GP | Performed by: PHYSICAL THERAPIST

## 2020-05-29 PROCEDURE — 97110 THERAPEUTIC EXERCISES: CPT | Mod: GP | Performed by: PHYSICAL THERAPIST

## 2020-05-29 PROCEDURE — 80048 BASIC METABOLIC PNL TOTAL CA: CPT | Performed by: ORTHOPAEDIC SURGERY

## 2020-05-29 PROCEDURE — 00000146 ZZHCL STATISTIC GLUCOSE BY METER IP

## 2020-05-29 PROCEDURE — 99221 1ST HOSP IP/OBS SF/LOW 40: CPT | Performed by: SURGERY

## 2020-05-29 PROCEDURE — 97116 GAIT TRAINING THERAPY: CPT | Mod: GP | Performed by: PHYSICAL THERAPIST

## 2020-05-29 PROCEDURE — 25800030 ZZH RX IP 258 OP 636: Performed by: INTERNAL MEDICINE

## 2020-05-29 PROCEDURE — 12000000 ZZH R&B MED SURG/OB

## 2020-05-29 PROCEDURE — 93880 EXTRACRANIAL BILAT STUDY: CPT

## 2020-05-29 PROCEDURE — 36415 COLL VENOUS BLD VENIPUNCTURE: CPT | Performed by: ORTHOPAEDIC SURGERY

## 2020-05-29 PROCEDURE — 97161 PT EVAL LOW COMPLEX 20 MIN: CPT | Mod: GP | Performed by: PHYSICAL THERAPIST

## 2020-05-29 RX ORDER — LISINOPRIL 2.5 MG/1
2.5 TABLET ORAL DAILY
Status: DISCONTINUED | OUTPATIENT
Start: 2020-05-29 | End: 2020-05-31 | Stop reason: HOSPADM

## 2020-05-29 RX ORDER — FUROSEMIDE 20 MG
20 TABLET ORAL DAILY
Status: DISCONTINUED | OUTPATIENT
Start: 2020-05-29 | End: 2020-05-31 | Stop reason: HOSPADM

## 2020-05-29 RX ADMIN — ACETAMINOPHEN 975 MG: 325 TABLET, FILM COATED ORAL at 07:03

## 2020-05-29 RX ADMIN — ACETAMINOPHEN 975 MG: 325 TABLET, FILM COATED ORAL at 17:21

## 2020-05-29 RX ADMIN — DAPTOMYCIN 550 MG: 500 INJECTION, POWDER, LYOPHILIZED, FOR SOLUTION INTRAVENOUS at 12:35

## 2020-05-29 RX ADMIN — ASPIRIN 325 MG: 325 TABLET, COATED ORAL at 09:07

## 2020-05-29 RX ADMIN — SENNOSIDES AND DOCUSATE SODIUM 2 TABLET: 8.6; 5 TABLET ORAL at 21:22

## 2020-05-29 RX ADMIN — FERROUS SULFATE TAB 325 MG (65 MG ELEMENTAL FE) 325 MG: 325 (65 FE) TAB at 09:07

## 2020-05-29 RX ADMIN — CARVEDILOL 12.5 MG: 12.5 TABLET, FILM COATED ORAL at 17:21

## 2020-05-29 RX ADMIN — MINOCYCLINE HYDROCHLORIDE 100 MG: 100 CAPSULE ORAL at 09:07

## 2020-05-29 RX ADMIN — DULOXETINE HYDROCHLORIDE 30 MG: 30 CAPSULE, DELAYED RELEASE ORAL at 21:22

## 2020-05-29 RX ADMIN — DOCUSATE SODIUM 50 MG AND SENNOSIDES 8.6 MG 2 TABLET: 8.6; 5 TABLET, FILM COATED ORAL at 09:07

## 2020-05-29 RX ADMIN — TRAMADOL HYDROCHLORIDE 50 MG: 50 TABLET, FILM COATED ORAL at 09:07

## 2020-05-29 RX ADMIN — GABAPENTIN 800 MG: 800 TABLET, FILM COATED ORAL at 21:22

## 2020-05-29 RX ADMIN — FUROSEMIDE 20 MG: 20 TABLET ORAL at 12:36

## 2020-05-29 RX ADMIN — TAMSULOSIN HYDROCHLORIDE 0.8 MG: 0.4 CAPSULE ORAL at 21:22

## 2020-05-29 RX ADMIN — CARVEDILOL 12.5 MG: 12.5 TABLET, FILM COATED ORAL at 09:07

## 2020-05-29 RX ADMIN — GABAPENTIN 800 MG: 800 TABLET, FILM COATED ORAL at 09:08

## 2020-05-29 RX ADMIN — LISINOPRIL 2.5 MG: 2.5 TABLET ORAL at 12:35

## 2020-05-29 RX ADMIN — SIMVASTATIN 20 MG: 10 TABLET, FILM COATED ORAL at 21:23

## 2020-05-29 ASSESSMENT — ACTIVITIES OF DAILY LIVING (ADL)
ADLS_ACUITY_SCORE: 17
ADLS_ACUITY_SCORE: 17
ADLS_ACUITY_SCORE: 19
ADLS_ACUITY_SCORE: 17

## 2020-05-29 NOTE — PLAN OF CARE
PRN Ultram x1 for pain, up with one assist and walker, voiding via urinal, IVF's were SL'd, carotid US done this shift, ID consult pending, and patient is progressing toward plan of care.

## 2020-05-29 NOTE — PROGRESS NOTES
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  Patient is admitted under the orthopedic service with plans for operative intervention on 5/28/2020.  Hospitalist service was consulted for preoperative evaluation.     Status post mechanical fall  Right hip wound dehiscence  Recurrent right total hip arthroplasty dislocation status post partial revision (5/13/2020)  As noted above, patient with recent surgical intervention on right hip.  Patient was discharged to home setting and was reportedly doing quite well until day prior to evaluation when he sustained a mechanical fall down approximately 10 steps.  Patient delayed presentation until approximately 24 hours following fall.  He does report head trauma, however is without abrasions or edema.  Neurologically patient appears intact.  He completed a two-week course of Xarelto for DVT prophylaxis and has been maintained on daily aspirin.  He is on chronic minocycline for history of right total hip arthroplasty infection.  Cultures from recent surgery were negative, patient was discharged on oral Cipro per Ortho.  - Defer routine post-operative cares to Ortho. Drain management per Ortho.   - Analgesic regimen with tylenol 975 mg TID, tramadol 50 mg q6 hrs PRN, IV dilaudid 0.3 mg q2 hrs  PRN    - DVT prophylaxis with  mg po every day   - Treated with periooperative Ancef, IV Vancomycin currently ordered   - Infectious disease consulted, appreciate recommendations   - Follow surgical cultures   - PT and OT ordered      Acute blood loss anemia  Hx of chronic normocytic anemia: Patient with known chronic iron deficiency as well as B12 anemia.  Pre-op Hgb 11.3. Sustained acute blood loss anemia during recent hospital stay, received 1 unit of PRBCs.  Hemoglobin at time of discharge was stable at 9.3. Hemoglobin on admission is stable at 8.8.  - Monitor    Recent Labs   Lab 05/29/20  0714 05/28/20  0645 05/27/20  1822   HGB 9.1* 8.1* 8.8*      HFrEF  Hypertension  Hyperlipidemia  Hx type II AV block s/p PPM: Most recent EF 42% per echo in 2018. Pt appears euvolemic on exam, is without s/s concerning for HF exacerbation. Pt reports dry weight 202 lbs, but with recent surgery, was up to 215 lbs after receiving IVF.   - Resume PTA PTA lasix 20 mg po qd, lisinopril 2.5 mg po qd on 5/29/2020   - Continue Coreg 12.5 mg po BID, Simvastatin 20 mg po at bedtime   - I&Os, daily weights    Intake/Output Summary (Last 24 hours) at 5/29/2020 1026  Last data filed at 5/29/2020 0646  Gross per 24 hour   Intake 1520 ml   Output 2190 ml   Net -670 ml     Diabetes Mellitus Type II: Most recent Hgb A1c 6.5% on 5/13/2020. PTA regimen consists of metformin.  - Holding PTA metformin 250 mg BID   - sliding scale insulin  - Mod CHO diet  - Accuchecks TID AC & at bedtime    Recent Labs   Lab 05/29/20  0714 05/28/20  2129 05/28/20  1730 05/28/20  1104 05/28/20  0646 05/27/20  2224 05/27/20  1822   *  --   --   --  115*  --  111*   BGM  --  106* 100* 125*  --  143*  --       Severe aortic sclerosis: Noted on TTE 08/2019, not hemodynamically significant.  - Defer outpatient monitoring to Cardiology      BPH  - Continue PTA flomax     Recurrent papillary urethral cancer s/p TURBT x3, right ureter and prostate CA s/p  ureterectomy and nephrectomy  Follows as an outpatient with Dr. Pepper. Appears stable.    Chronic lower extremity skin discoloration from long term minocycline use: Noted.      Diet: Advance Diet as Tolerated: Regular Diet Adult    DVT Prophylaxis: Pneumatic Compression Devices and Defer to primary service  Mock Catheter: not present  Code Status: Full Code, discussed with patient at bedside.        Disposition Plan   Expected discharge: Per Ortho  Entered: Faiza Welch PA-C 05/29/2020, 10:17 AM     The patient's care was discussed with the Attending Physician, Dr. Bellamy, Bedside Nurse and Patient.    Faiza Welch PA-C  Hospitalist Service  [facility]    ______________________________________________________________________    Interval History   No acute events overnight. Uneventful surgery. Pain well managed. Plan for PT at 11. No chest pain, SOB, dizziness. Pt continues to confirm fall was subsequent to loss of balance, NOT dizziness, lightheadedness, or presyncope.     Data reviewed today: I reviewed all medications, new labs and imaging results over the last 24 hours. I personally reviewed all labs and imaging since admission.     Physical Exam   Vital Signs: Temp: 97.9  F (36.6  C) Temp src: Oral BP: (!) 140/72 Pulse: 61 Heart Rate: 71 Resp: 16 SpO2: 91 % O2 Device: None (Room air) Oxygen Delivery: 2 LPM  Weight: 0 lbs 0 oz    CONSTITUTIONAL: Pt laying in bed, dressed in hospital garb. Appears comfortable. Cooperative with interview.   HEENT: Normocephalic, atraumatic. Negative for conjunctival redness or scleral icterus.  Oral mucosa pink and moist; negative for ulcerations, erythema, or exudates.  Dentition in good repair.   CARDIOVASCULAR: RRR, no murmurs, rubs, or extra heart sounds appreciated. Pulses +2/4 and regular in upper and lower extremities, bilaterally.   RESPIRATORY: No increased work of breathing.  CTA, bilat; no wheezes, rales, or  rhonchi appreciated.  GASTROINTESTINAL:  Abdomen soft, non-distended. BS auscultated in all four quadrants. Negative for tenderness to palpation.  No masses or organomegaly noted.  MUSCULOSKELETAL: Strength +5/5 in upper extremities, bilaterally, RLE not tested due to fracture. No gross deformities noted. Normal muscle tone.   HEMATOLOGIC/LYMPHATIC/IMMUNOLOGIC: No anterior or posterior cervical LAD, bilaterally. Negative for lower extremity edema, bilaterally.  NEUROLOGIC: Alert and oriented to person, place, and time.  strength intact. CN's II-XII grossly intact. Sensation equal and intact in upper and lower extremities, bilat.   SKIN: Right hip with bandage at prior surgical site, 2 drains with serosanguinous fluid noted, C/D/I.     Data   Recent Labs   Lab 05/29/20  0714 05/28/20  0646 05/28/20  0645 05/27/20  1822   WBC  --   --  5.4 7.0   HGB 9.1*  --  8.1* 8.8*   MCV  --   --  99 99   PLT  --   --  270 292    140  --  136   POTASSIUM 4.2 3.9  --  4.2   CHLORIDE 107 108  --  103   CO2 30 30  --  29   BUN 26 31*  --  40*   CR 1.02 0.93  --  1.08   ANIONGAP 3 2*  --  4   ESAU 8.1* 8.4*  --  8.7   * 115*  --  111*     No results found for this or any previous visit (from the past 24 hour(s)).  Medications     sodium chloride 75 mL/hr at 05/28/20 9903       acetaminophen  975 mg Oral Q8H     aspirin  325 mg Oral Daily     carvedilol  12.5 mg Oral BID w/meals     DULoxetine  30 mg Oral Daily     ferrous sulfate  325 mg Oral Daily with breakfast     gabapentin  800 mg Oral BID     insulin aspart  1-10 Units Subcutaneous TID AC     insulin aspart  1-7 Units Subcutaneous At Bedtime     minocycline  100 mg Oral BID     senna-docusate  1 tablet Oral BID    Or     senna-docusate  2 tablet Oral BID     senna-docusate  2 tablet Oral BID     simvastatin  20 mg Oral At Bedtime     sodium chloride (PF)  3 mL Intracatheter Q8H     tamsulosin  0.8 mg Oral At Bedtime     vancomycin (VANCOCIN) IV  1,500 mg  Intravenous Q24H

## 2020-05-29 NOTE — PROGRESS NOTES
Mark Schmidtdrick Edgar  2020  POD #  1    Doing well.  Normal healing wound.  Pain well-controlled.  Temperatures:  Current - Temp: 97.9  F (36.6  C); Max - Temp  Av.7  F (36.5  C)  Min: 96.9  F (36.1  C)  Max: 98.2  F (36.8  C)  Pulse range: Pulse  Av.3  Min: 59  Max: 64  Blood pressure range: Systolic (24hrs), Av , Min:109 , Max:153   ; Diastolic (24hrs), Av, Min:54, Max:77    CMS: intact  Labs:   Results for orders placed or performed during the hospital encounter of 20 (from the past 24 hour(s))   Glucose by meter   Result Value Ref Range    Glucose 125 (H) 70 - 99 mg/dL   Tissue Culture Aerobic Bacterial    Specimen: Hip, Right; Tissue    Tissue~SPECIMEN 1   Result Value Ref Range    Specimen Description Right Hip Tissue SPECIMEN 1     Culture Micro Culture negative monitoring continues    Anaerobic bacterial culture    Specimen: Hip, Right; Tissue    Joint~Tissue~SPECIMEN 2   Result Value Ref Range    Specimen Description Right Hip Joint Tissue SPECIMEN 2     Special Requests Received in anaerobic tubes.     Culture Micro PENDING    Tissue Culture Aerobic Bacterial    Specimen: Hip, Right; Tissue    Joint~Tissue~SPECIMEN 2   Result Value Ref Range    Specimen Description Right Hip Joint Tissue SPECIMEN 2     Culture Micro Culture negative monitoring continues    Glucose by meter   Result Value Ref Range    Glucose 100 (H) 70 - 99 mg/dL   Glucose by meter   Result Value Ref Range    Glucose 106 (H) 70 - 99 mg/dL   Hemoglobin   Result Value Ref Range    Hemoglobin 9.1 (L) 13.3 - 17.7 g/dL   Basic metabolic panel   Result Value Ref Range    Sodium 140 133 - 144 mmol/L    Potassium 4.2 3.4 - 5.3 mmol/L    Chloride 107 94 - 109 mmol/L    Carbon Dioxide 30 20 - 32 mmol/L    Anion Gap 3 3 - 14 mmol/L    Glucose 125 (H) 70 - 99 mg/dL    Urea Nitrogen 26 7 - 30 mg/dL    Creatinine 1.02 0.66 - 1.25 mg/dL    GFR Estimate 66 >60 mL/min/[1.73_m2]    GFR Estimate If Black 76 >60  mL/min/[1.73_m2]    Calcium 8.1 (L) 8.5 - 10.1 mg/dL       PLAN:  Weight bearing as tolerated  Have  Consulltr=ed  Dr scanlon  To  See  Regarding  pts  proboable  syncapol  Episode  Causing him  To falldown  Stairs    Poss  Carotid  Vs  Vertebral  Arteries    Will restart   His  Blood  Thinner  When safe  From surgucalwound.

## 2020-05-29 NOTE — PLAN OF CARE
Discharge Planner PT   Patient plan for discharge: Return home. Pt is aware current discharge recommendation is TCU.  Current status: Evaluation completed and treatment initiated. Patient is POD # 1 I & D of R hip wound and closure. Reviewed hip precautions with pt, pt verbalized understanding. PT completed bed mobility and transfers with FWW and [place]. Pt ambulated 200 feet with FWW and CGA, continuous cues for keeping FWW close to body. Pt navigated 3 stairs with 1 railing and SEC, [place].   Barriers to return to prior living situation: Level of assist, Fall risk, Hip Precautions  Recommendations for discharge: TCU  Rationale for recommendations: Patient will benefit from continued skilled PT intervention while IP and at TCU in order to progress independence and safety with functional mobility.       Entered by: Lynn Graff 05/29/2020 12:06 PM

## 2020-05-29 NOTE — CONSULTS
VASCULAR SURGERY    Mark Hernández is a patient of Dr. Summers for complications with his redo right total hip arthroplasty.  He has had recurrent dislocations along with a wound dehiscence being treated for this.    Patient had mentioned that approximately a week ago after winding his clock (looking up at the clock) he noticed diffuse lightheadedness with him collapsing.  Does not feel that he lost consciousness.  No focal neurological issues.  This does not appear to be a recurrent issue.    PMH:    His vascular history is significant for a type II arteriovenous block.  He had a pacemaker placed in the left side and apparently is paced at all times approximately 3 years ago.  Echocardiogram 2018 with ejection fraction of 42%.    Also history of diabetes on metformin at home with recent A1c= 6.5.    History of hypertension on Zestril and Coreg.  Hyperlipidemia on  Zocor  Patient has chronic leg swelling but no history of PAD.  Also with history of chronic normocytic anemia with hemoglobin today= 9.1.    Exam: Examined in his room.  Alert and appropriate.  Very comfortable.  Talkative.  HEENT= unremarkable.  Heavy neck.  Chest= clear.  Left-sided pacemaker  Cardiovascular= regular rate  Extremities= swelling of both calfs.  Normal sensation.                        No ulcers. + PCD                       +3 palpable radial and popliteal pulses bilaterally.                       Pedal pulses difficult to palpate but no ischemic                          Changes.      Impression: Episode of lightheadedness and syncope.  This would be a very unusual vascular etiology as far as carotid or vertebral disease.  More likely orthostatic hypotension associated with medications for his hypertension and the fact that he is paced.  I have discussed this with the patient.               We will perform a carotid duplex ultrasound today.  Patient is at high risk for silent cerebrovascular disease and thus this test would be  important also to rule out any stenosis.  If abnormalities are found we would consider a CTA of his head and neck.      Spent 25 minutes with the patient today with over 50% in counseling.  We will follow-up with him once testing is been completed.      Babar Roe MD

## 2020-05-29 NOTE — PLAN OF CARE
Pt A&Ox4, returned to floor at 1520 from I&D of right hip, VSS, CMS intact, dressing C,D,I, 2 hemovacs in place, patent, méndez patent, IVF, regular diet, bg monitoring, tramadol/tylenol for pain management, O2 RA, capno WNL, continues on IV Vanco,

## 2020-05-29 NOTE — CONSULTS
[facility]    Infectious Disease Consultation     Date of Admission:  5/27/2020  Date of Consult (When I saw the patient): 05/29/20    Assessment & Plan   Mark Hernández is a 86 year old male who was admitted on 5/27/2020.     Impression:  1. 86 y.o male known to ID service from previous admissions.   2. History of bladder and prostrate cancer.   3. CHF, HTN, PPM  4. Diabetes.   5. CKD  6. 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 On Minocycline for chronic suppression.   7. Recently ( 5/13 ) had revision arthroplasty.   8. Admitted this occasion after a fall leading to wound dehiscence.   9. S/P I and D with cultures pending. No clinical concern for infection.     Recommendations:   Daptomycin for now. Hold Minocycline.   Follow up on the cultures, if negative back to Minocycline for discharge.     Dimitris Broussard MD    Reason for Consult   Reason for consult: I was asked to evaluate this patient for joint infection     Primary Care Physician   Rebecca Noyola    Chief Complaint   Fall and wound dehiscence after a syncopal event     History is obtained from the patient and medical records    History of Present Illness   Mark Hernández is a 86 year old male with urethral cancer of the bladder as well as prostate cancer status post multiple TURPs, right ureterectomy and nephrectomy, congestive heart failure, hypertension, hyperlipidemia, AV block status post PPM, diabetes mellitus type 2, CKD stage III, and 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  Patient is admitted under the orthopedic service with plans for operative intervention on 5/28/2020    Past Medical History   I have reviewed this patient's medical history and updated it with pertinent  information if needed.   Past Medical History:   Diagnosis Date     Acquired hydrocele      BPH (benign prostatic hyperplasia)      CKD (chronic kidney disease) stage 3, GFR 30-59 ml/min (H)      Complete AV block (H) 09/23/2016    Implantation of a dual-chamber pacemaker     Congestive heart failure (H)      Diverticulosis      Hemorrhoids      Hyperlipidemia      Hypertension      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)      Pacemaker      Peripheral neuropathy      Prostate cancer (H)      Renal disease      Sensorineural hearing loss of both ears      Thrombocytopenia (H)      Thrombocytopenia (H)      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  Past Surgical History:   Procedure Laterality Date     ARTHROPLASTY REVISION HIP Right 12/28/2017    Procedure: ARTHROPLASTY REVISION HIP;;  Surgeon: Kirk Summers MD;  Location:  OR     ARTHROPLASTY REVISION HIP Right 5/13/2020    Procedure: REVISE RIGHT TOTAL HIP;  Surgeon: Kirk Summers MD;  Location:  OR     ARTHROPLASTY REVISION HIP Right 5/28/2020    Procedure: IRRIGATION AND DEBRIDEMENT RIGHT HIP WOUND WITH CLOSURE;  Surgeon: Kirk Summers MD;  Location:  OR     BACK SURGERY       ENT SURGERY      tonsillectomy     GENITOURINARY SURGERY      S/P NEPHRECTOMY     IRRIGATION AND DEBRIDEMENT HIP, PLACE ANTIBIOTIC CEMENT BEADS / SPACER Right 12/28/2017    Procedure: COMBINED IRRIGATION AND DEBRIDEMENT HIP, PLACE ANTIBIOTIC CEMENT BEADS / SPACER;  IRRIGATION AND DEBRIDEMENT RIGHT HIP WITH PLACEMENT ANTIBIOTIC BEADS, POLY EXCHANGE, AND PARTIAL HIP REVISION. ;  Surgeon: Kirk Summers MD;  Location:  OR     KNEE SURGERY       ORTHOPEDIC SURGERY       RELEASE CARPAL TUNNEL Left 4/25/2019    Procedure: LEFT CARPAL TUNNEL RELEASE;  Surgeon: Rene  Mynor QUINTERO MD;  Location: [facility] OR     revision total hip arthroplasty       rivision total hip arthroplasty       rotator cuff syndrome         Prior to Admission Medications   Prior to Admission Medications   Prescriptions Last Dose Informant Patient Reported? Taking?   CYANOCOBALAMIN PO 5/27/2020 at Unknown time Self Yes Yes   Sig: Take 1,000 mcg by mouth every other day    Coenzyme Q10 (COQ-10 PO) 5/26/2020 at Unknown time Self Yes Yes   Sig: Take 1 tablet by mouth daily   DULoxetine (CYMBALTA) 30 MG capsule 5/26/2020 at pm Self Yes Yes   Sig: Take 30 mg by mouth daily Takes for neuropathy   Omega-3 Fatty Acids (OMEGA 3 PO) 5/27/2020 at am Self Yes Yes   Sig: Take 1 teaspoonful by mouth daily   Probiotic Product (PROBIOTIC PO) 5/27/2020 at am Self Yes Yes   Sig: Take 1 capsule by mouth daily   VITAMIN D PO 5/27/2020 at am Self Yes Yes   Sig: Take 1 tablet by mouth 2 times daily   acetaminophen (TYLENOL) 500 MG tablet prn Self Yes No   Sig: Take 500-1,000 mg by mouth every 6 hours as needed for mild pain   carvedilol (COREG) 12.5 MG tablet 5/27/2020 at am Self Yes Yes   Sig: Take 12.5 mg by mouth 2 times daily (with meals)   ferrous sulfate (FEROSUL) 325 (65 Fe) MG tablet 5/27/2020 at am Self Yes Yes   Sig: Take 325 mg by mouth daily (with breakfast)   furosemide (LASIX) 20 MG tablet 5/27/2020 at am Self Yes Yes   Sig: Take 20 mg by mouth daily   gabapentin (NEURONTIN) 800 MG tablet 5/27/2020 at am Self Yes Yes   Sig: Take 1 tablet (800 mg) by mouth 2 times daily   lisinopril (ZESTRIL) 2.5 MG tablet 5/27/2020 at am Self Yes Yes   Sig: Take 2.5 mg by mouth daily    metFORMIN (GLUCOPHAGE) 500 MG tablet 5/27/2020 at am Self Yes Yes   Sig: Take 250 mg by mouth 2 times daily (with meals) (takes 0.5 x 500mg)   minocycline (MINOCIN) 100 MG capsule 5/27/2020 at am Self Yes Yes   Sig: Take 100 mg by mouth 2 times daily   senna-docusate (SENOKOT-S/PERICOLACE) 8.6-50 MG tablet 5/27/2020 at am Self No Yes   Sig: Take 2 tablets by  mouth 2 times daily   simvastatin (ZOCOR) 40 MG tablet 5/26/2020 at pm Self Yes Yes   Sig: Take 20 mg by mouth At Bedtime (Takes 1/2 of a 40mg tablet)   tamsulosin (FLOMAX) 0.4 MG 24 hr capsule 5/26/2020 at pm Self Yes Yes   Sig: Take 0.8 mg by mouth At Bedtime (takes 2 x 0.4mg)   traMADol (ULTRAM) 50 MG tablet 5/27/2020 at am Self No Yes   Sig: Take 1 tablet (50 mg) by mouth every 6 hours as needed for moderate pain, moderate to severe pain or severe pain      Facility-Administered Medications: None     Allergies   No Known Allergies    Immunization History     There is no immunization history on file for this patient.    Social History   I have reviewed this patient's social history and updated it with pertinent information if needed. Mark Hernández  reports that he quit smoking about 47 years ago. His smoking use included cigarettes. He started smoking about 71 years ago. He has a 24.00 pack-year smoking history. He has never used smokeless tobacco. He reports current alcohol use. He reports that he does not use drugs.    Family History   I have reviewed this patient's family history and updated it with pertinent information if needed.   Family History   Problem Relation Age of Onset     Family History Negative Mother      Myocardial Infarction Father 80     Unknown/Adopted Maternal Grandmother      Unknown/Adopted Maternal Grandfather      Unknown/Adopted Paternal Grandmother      Unknown/Adopted Paternal Grandfather      Rheumatoid Arthritis Sister      Myocardial Infarction Sister      Family History Negative Son      Family History Negative Daughter        Review of Systems   The 10 point Review of Systems is negative other than noted in the HPI or here.     Physical Exam   Temp: 97.9  F (36.6  C) Temp src: Oral BP: (!) 140/72 Pulse: 61 Heart Rate: 71 Resp: 16 SpO2: 91 % O2 Device: None (Room air) Oxygen Delivery: 2 LPM  Vital Signs with Ranges  Temp:  [96.9  F (36.1  C)-98.2  F (36.8  C)] 97.9  F  (36.6  C)  Pulse:  [59-64] 61  Heart Rate:  [59-71] 71  Resp:  [10-16] 16  BP: (109-153)/(54-77) 140/72  SpO2:  [90 %-100 %] 91 %  0 lbs 0 oz  There is no height or weight on file to calculate BMI.    GENERAL APPEARANCE:  alert and no distress  EYES: Eyes grossly normal to inspection, PERRL and conjunctivae and sclerae normal  HENT: ear canals and TM's normal and nose and mouth without ulcers or lesions  NECK: no adenopathy, no asymmetry, masses, or scars and thyroid normal to palpation  RESP: lungs clear to auscultation - no rales, rhonchi or wheezes  CV: regular rates and rhythm, normal S1 S2, no S3 or S4 and no murmur, click or rub  LYMPHATICS: normal ant/post cervical and supraclavicular nodes  ABDOMEN: soft, nontender, without hepatosplenomegaly or masses and bowel sounds normal  MS: extremities normal- no gross deformities noted  SKIN: no suspicious lesions or rashes      Data   Lab Results   Component Value Date    WBC 5.4 05/28/2020    HGB 9.1 (L) 05/29/2020    HCT 24.7 (L) 05/28/2020     05/28/2020     05/29/2020    POTASSIUM 4.2 05/29/2020    CHLORIDE 107 05/29/2020    CO2 30 05/29/2020    BUN 26 05/29/2020    CR 1.02 05/29/2020     (H) 05/29/2020    SED 33 (H) 05/14/2020    INR 1.11 12/27/2017     Recent Labs   Lab 05/28/20  1254 05/28/20  1250 05/27/20  1826 05/27/20  1821 05/27/20  1740   CULT Culture negative monitoring continues  Culture negative monitoring continues Culture negative monitoring continues  Culture negative monitoring continues No growth after 2 days No growth after 2 days <10,000 colonies/mL  urogenital abundio  Susceptibility testing not routinely done       Recent Labs   Lab Test 05/28/20  1254 05/28/20  1250 05/27/20  1826 05/27/20  1821 05/27/20  1740 05/14/20  1042 05/13/20  0817 02/27/20  0925 12/28/17  1910   CULT Culture negative monitoring continues  Culture negative monitoring continues Culture negative monitoring continues  Culture negative monitoring  continues No growth after 2 days No growth after 2 days <10,000 colonies/mL  urogenital abundio  Susceptibility testing not routinely done   No growth No anaerobes isolated  No growth No anaerobes isolated  No growth No growth       Amount of time performed on this consult: 45 minutes. This includes face to face assessment and care coordination with the primary team.

## 2020-05-29 NOTE — PLAN OF CARE
Pt A&Ox4,  I&D of right hip on 5/28, VSS, CMS intact, dressing CDI, 2 hemovacs in place; patent, dev d/c'd in am, regular diet, BG monitoring, tramadol/tylenol for pain management, O2 RA, capno WNL, continues on IV Vanco

## 2020-05-29 NOTE — PLAN OF CARE
Pt is A&Ox4. VSS on RA. Tolerating a regular diet. Up w/ A1, GB and walker. C/o 3/10 R. Hip pain, decreased w/ PRN tylenol. PIV SL. LS clear. Dressing CDI. Hemovac intact w/ dark red output. BLE from knee down is blotchy and black, is baseline due to a medication per pt. Scattered bruising present. Voiding adequately in urinal. HGB 9.1. ID, Hospitalist, PT and SW consulted. Discharge pending progress. Will continue to monitor.

## 2020-05-29 NOTE — PROGRESS NOTES
05/29/20 1113   Quick Adds   Type of Visit Initial PT Evaluation   Living Environment   Lives With spouse   Living Arrangements house   Home Accessibility stairs to enter home;stairs within home   Number of Stairs, Main Entrance 4   Stair Railings, Main Entrance   (Grab bar x 1)   Number of Stairs, Within Home, Primary 10  (Chair lift present)   Stair Railings, Within Home, Primary railing on left side (ascending)   Transportation Anticipated car, drives self;family or friend will provide   Self-Care   Usual Activity Tolerance good   Current Activity Tolerance moderate   Regular Exercise Yes   Activity/Exercise Type   (Home PT)   Equipment Currently Used at Home walker, rolling   Activity/Exercise/Self-Care Comment Pt owns FWW.    Functional Level Prior   Ambulation 1-->assistive equipment   Transferring 1-->assistive equipment   Fall history within last six months yes   Number of times patient has fallen within last six months 1   General Information   Onset of Illness/Injury or Date of Surgery - Date 05/27/20   Referring Physician Kirk Summers MD   Patient/Family Goals Statement Return home.    Pertinent History of Current Problem (include personal factors and/or comorbidities that impact the POC) 87 y/o male POD # 1 I & D of R hip wound with closure.  PMH including R MATTY revision on 5/13/2020.    Precautions/Limitations fall precautions;right hip precautions  (Posterior-Lateral)   Weight-Bearing Status - RLE weight-bearing as tolerated   General Observations Pt in supine upon arrival of therapist.    General Info Comments Ambulate with assist.   Cognitive Status Examination   Orientation orientation to person, place and time   Level of Consciousness alert   Follows Commands and Answers Questions 100% of the time   Personal Safety and Judgment intact   Pain Assessment   Patient Currently in Pain   (R hip pain at rest: 2-3/10)   Integumentary/Edema   Integumentary/Edema Comments R hip incision covered with  "dressing.   Posture    Posture Comments No deficits noted.    Range of Motion (ROM)   ROM Comment Limited R hip ROM d/t pain and hip precautions, otherwise B LEs WFL.    Strength   Strength Comments Not formally assessed. Pt demonstrates at least 3/5 grossly in B LEs with functional mobility.   Bed Mobility   Bed Mobility Comments Supine-sit with Katy.    Transfer Skills   Transfer Comments Sit <> stand with FWW and Katy.    Gait   Gait Comments Pt amb 10' with FWW and CGA.    Balance   Balance Comments Noted good seated and standing balance at FWW.   Sensory Examination   Sensory Perception Comments Pt reports numbness/tingling in B LEs at baseline secondary to neuropathy.   Modality Interventions   Planned Modality Interventions Cryotherapy   Planned Modality Interventions Comments PRN.   General Therapy Interventions   Planned Therapy Interventions bed mobility training;gait training;ROM;strengthening;transfer training   Clinical Impression   Criteria for Skilled Therapeutic Intervention yes, treatment indicated   PT Diagnosis Difficulty with gait.    Influenced by the following impairments Pain, Impaired R hip ROM, Decreased strength, Decreased activity tolerance   Functional limitations due to impairments Limited functional mobility requiring AD and assist.    Clinical Presentation Stable/Uncomplicated   Clinical Presentation Rationale Based on PMH, current presentation, and social support.    Clinical Decision Making (Complexity) Low complexity   Therapy Frequency Daily   Predicted Duration of Therapy Intervention (days/wks) 3 days   Anticipated Discharge Disposition Transitional Care Facility   Risk & Benefits of therapy have been explained Yes   Patient, Family & other staff in agreement with plan of care Yes   [facility] AM-PAC  \"6 Clicks\" V.2 Basic Mobility Inpatient Short Form   1. Turning from your back to your side while in a flat bed without using bedrails? 3 - A Little   2. Moving from lying on " your back to sitting on the side of a flat bed without using bedrails? 3 - A Little   3. Moving to and from a bed to a chair (including a wheelchair)? 3 - A Little   4. Standing up from a chair using your arms (e.g., wheelchair, or bedside chair)? 3 - A Little   5. To walk in hospital room? 3 - A Little   6. Climbing 3-5 steps with a railing? 3 - A Little   Basic Mobility Raw Score (Score out of 24.Lower scores equate to lower levels of function) 18   Total Evaluation Time   Total Evaluation Time (Minutes) 5

## 2020-05-29 NOTE — PLAN OF CARE
Discharge Planner OT   Patient plan for discharge: Current PT recommendation is to TCU for discharge.   Current status: Pt working with PT at this time, needing Min A for mobility and cues for functional mobility and keeping walker close to self for safety.  Ambulated 200 feet last PT session.    Barriers to return to prior living situation: Level of assist, fall risk  Recommendations for discharge: Defer to PT plan for discharge to TCU.  Recommend OT evaluation at TCU to check on safety and independence with ADLS once home.  Rationale for recommendations: Defer to PT.  If plan changes and pt returns home instead please consider reordering OT evaluation for home discharge.       Entered by: Serg Aranda 05/29/2020 1:05 PM

## 2020-05-30 ENCOUNTER — APPOINTMENT (OUTPATIENT)
Dept: PHYSICAL THERAPY | Facility: CLINIC | Age: 85
DRG: 908 | End: 2020-05-30
Attending: ORTHOPAEDIC SURGERY
Payer: MEDICARE

## 2020-05-30 PROBLEM — Z96.649 INFECTION OF PROSTHETIC TOTAL HIP JOINT (H): Status: RESOLVED | Noted: 2017-12-28 | Resolved: 2020-05-30

## 2020-05-30 PROBLEM — T84.59XA INFECTION OF PROSTHETIC TOTAL HIP JOINT (H): Status: RESOLVED | Noted: 2017-12-28 | Resolved: 2020-05-30

## 2020-05-30 LAB
ANION GAP SERPL CALCULATED.3IONS-SCNC: 5 MMOL/L (ref 3–14)
BLD PROD TYP BPU: NORMAL
BLD UNIT ID BPU: 0
BLOOD PRODUCT CODE: NORMAL
BPU ID: NORMAL
BUN SERPL-MCNC: 33 MG/DL (ref 7–30)
CALCIUM SERPL-MCNC: 8 MG/DL (ref 8.5–10.1)
CHLORIDE SERPL-SCNC: 110 MMOL/L (ref 94–109)
CO2 SERPL-SCNC: 28 MMOL/L (ref 20–32)
CREAT SERPL-MCNC: 1.1 MG/DL (ref 0.66–1.25)
GFR SERPL CREATININE-BSD FRML MDRD: 60 ML/MIN/{1.73_M2}
GLUCOSE BLDC GLUCOMTR-MCNC: 113 MG/DL (ref 70–99)
GLUCOSE BLDC GLUCOMTR-MCNC: 127 MG/DL (ref 70–99)
GLUCOSE BLDC GLUCOMTR-MCNC: 174 MG/DL (ref 70–99)
GLUCOSE BLDC GLUCOMTR-MCNC: 98 MG/DL (ref 70–99)
GLUCOSE SERPL-MCNC: 114 MG/DL (ref 70–99)
HGB BLD-MCNC: 8.2 G/DL (ref 13.3–17.7)
POTASSIUM SERPL-SCNC: 4 MMOL/L (ref 3.4–5.3)
SODIUM SERPL-SCNC: 143 MMOL/L (ref 133–144)
TRANSFUSION STATUS PATIENT QL: NORMAL
TRANSFUSION STATUS PATIENT QL: NORMAL

## 2020-05-30 PROCEDURE — 36415 COLL VENOUS BLD VENIPUNCTURE: CPT | Performed by: ORTHOPAEDIC SURGERY

## 2020-05-30 PROCEDURE — 25800030 ZZH RX IP 258 OP 636: Performed by: INTERNAL MEDICINE

## 2020-05-30 PROCEDURE — 25000132 ZZH RX MED GY IP 250 OP 250 PS 637: Mod: GY | Performed by: PHYSICIAN ASSISTANT

## 2020-05-30 PROCEDURE — 25000128 H RX IP 250 OP 636: Performed by: INTERNAL MEDICINE

## 2020-05-30 PROCEDURE — 99207 ZZC CDG-MDM COMPONENT: MEETS MODERATE - UP CODED: CPT | Performed by: INTERNAL MEDICINE

## 2020-05-30 PROCEDURE — 25000132 ZZH RX MED GY IP 250 OP 250 PS 637: Mod: GY | Performed by: ORTHOPAEDIC SURGERY

## 2020-05-30 PROCEDURE — 99231 SBSQ HOSP IP/OBS SF/LOW 25: CPT | Performed by: SURGERY

## 2020-05-30 PROCEDURE — 85018 HEMOGLOBIN: CPT | Performed by: ORTHOPAEDIC SURGERY

## 2020-05-30 PROCEDURE — 12000000 ZZH R&B MED SURG/OB

## 2020-05-30 PROCEDURE — P9016 RBC LEUKOCYTES REDUCED: HCPCS | Performed by: ANESTHESIOLOGY

## 2020-05-30 PROCEDURE — 99232 SBSQ HOSP IP/OBS MODERATE 35: CPT | Performed by: INTERNAL MEDICINE

## 2020-05-30 PROCEDURE — 97110 THERAPEUTIC EXERCISES: CPT | Mod: GP

## 2020-05-30 PROCEDURE — 80048 BASIC METABOLIC PNL TOTAL CA: CPT | Performed by: ORTHOPAEDIC SURGERY

## 2020-05-30 PROCEDURE — 00000146 ZZHCL STATISTIC GLUCOSE BY METER IP

## 2020-05-30 RX ORDER — ASPIRIN 325 MG
325 TABLET, DELAYED RELEASE (ENTERIC COATED) ORAL DAILY
Refills: 0 | COMMUNITY
Start: 2020-05-30 | End: 2022-07-10

## 2020-05-30 RX ORDER — TRAMADOL HYDROCHLORIDE 50 MG/1
50 TABLET ORAL EVERY 6 HOURS PRN
Qty: 10 TABLET | Refills: 0 | Status: SHIPPED | OUTPATIENT
Start: 2020-05-30 | End: 2022-07-10

## 2020-05-30 RX ADMIN — DOCUSATE SODIUM 50 MG AND SENNOSIDES 8.6 MG 2 TABLET: 8.6; 5 TABLET, FILM COATED ORAL at 08:20

## 2020-05-30 RX ADMIN — DOCUSATE SODIUM 50 MG AND SENNOSIDES 8.6 MG 2 TABLET: 8.6; 5 TABLET, FILM COATED ORAL at 21:26

## 2020-05-30 RX ADMIN — SIMVASTATIN 20 MG: 10 TABLET, FILM COATED ORAL at 21:26

## 2020-05-30 RX ADMIN — ACETAMINOPHEN 975 MG: 325 TABLET, FILM COATED ORAL at 07:25

## 2020-05-30 RX ADMIN — CARVEDILOL 12.5 MG: 12.5 TABLET, FILM COATED ORAL at 17:00

## 2020-05-30 RX ADMIN — TAMSULOSIN HYDROCHLORIDE 0.8 MG: 0.4 CAPSULE ORAL at 21:25

## 2020-05-30 RX ADMIN — GABAPENTIN 800 MG: 800 TABLET, FILM COATED ORAL at 21:26

## 2020-05-30 RX ADMIN — ACETAMINOPHEN 975 MG: 325 TABLET, FILM COATED ORAL at 00:50

## 2020-05-30 RX ADMIN — DULOXETINE HYDROCHLORIDE 30 MG: 30 CAPSULE, DELAYED RELEASE ORAL at 21:25

## 2020-05-30 RX ADMIN — FERROUS SULFATE TAB 325 MG (65 MG ELEMENTAL FE) 325 MG: 325 (65 FE) TAB at 08:20

## 2020-05-30 RX ADMIN — ASPIRIN 325 MG: 325 TABLET, COATED ORAL at 08:20

## 2020-05-30 RX ADMIN — LISINOPRIL 2.5 MG: 2.5 TABLET ORAL at 08:20

## 2020-05-30 RX ADMIN — ACETAMINOPHEN 975 MG: 325 TABLET, FILM COATED ORAL at 17:00

## 2020-05-30 RX ADMIN — CARVEDILOL 12.5 MG: 12.5 TABLET, FILM COATED ORAL at 08:20

## 2020-05-30 RX ADMIN — FUROSEMIDE 20 MG: 20 TABLET ORAL at 08:20

## 2020-05-30 RX ADMIN — GABAPENTIN 800 MG: 800 TABLET, FILM COATED ORAL at 08:20

## 2020-05-30 RX ADMIN — DAPTOMYCIN 550 MG: 500 INJECTION, POWDER, LYOPHILIZED, FOR SOLUTION INTRAVENOUS at 12:32

## 2020-05-30 ASSESSMENT — ACTIVITIES OF DAILY LIVING (ADL)
ADLS_ACUITY_SCORE: 19

## 2020-05-30 NOTE — PLAN OF CARE
Pt A/Ox4. VSS on RA. CMS intact. Dressing intact. Hemovac intact with dark red output. Denies pain, scheduled Tylenol given. IV saline locked. Up with Ax1 and walker. Voiding per urinal. Continue to monitor.

## 2020-05-30 NOTE — PROGRESS NOTES
VASCULAR SURGERY    I saw Mark Hernández yesterday for an episode of lightheadedness and syncope.  Wanted to rule out cerebral vascular disease.    Carotid duplex was performed yesterday.  These images were reviewed.  There is very minimal carotid bifurcation disease.  Also normal antegrade flow in both vertebral arteries.    With these findings it would be extremely unlikely that a cerebrovascular  blood flow issue would be the cause of his episodes.  I do not feel that further evaluation is indicated.  This is been discussed with the patient.       Babar Roe MD

## 2020-05-30 NOTE — PLAN OF CARE
Patient stated minimal to no pain - declined pain medication, dressing changed - large amounts of drainage and dressing had to be changed a 2nd time, voiding via urinal, up with one assist/walker/GB, and patient is progressing toward plan of care and will discharge home tomorrow with [facility] and family assist if cultures are negative.

## 2020-05-30 NOTE — PROGRESS NOTES
[facility]    Infectious Disease Progress Note    Date of Service (when I saw the patient): 05/30/2020     Assessment & Plan   Mark Hernández is a 86 year old male who was admitted on 5/27/2020.     Impression:  1. 86 y.o male known to ID service from previous admissions.   2. History of bladder and prostrate cancer.   3. CHF, HTN, PPM  4. Diabetes.   5. CKD  6. 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 On Minocycline for chronic suppression.   7. Recently ( 5/13 ) had revision arthroplasty.   8. Admitted this occasion after a fall leading to wound dehiscence.   9. S/P I and D with cultures pending. No clinical concern for infection.      Recommendations:   Daptomycin for now. Hold Minocycline.   Follow up on the cultures, if negative  No antibiotics, appreciate Ortho clarification           Dimitris Broussard MD    Interval History   Afebrile   No positive micro yet     Physical Exam   Temp: 98.2  F (36.8  C) Temp src: Oral BP: 128/57 Pulse: 71 Heart Rate: 65 Resp: 16 SpO2: 96 % O2 Device: None (Room air)    Vitals:    05/30/20 0636   Weight: 93 kg (205 lb)     Vital Signs with Ranges  Temp:  [97.8  F (36.6  C)-98.2  F (36.8  C)] 98.2  F (36.8  C)  Pulse:  [61-71] 71  Heart Rate:  [61-66] 65  Resp:  [16] 16  BP: (116-138)/(56-65) 128/57  SpO2:  [94 %-97 %] 96 %    Constitutional: Awake, alert, cooperative, no apparent distress  Lungs: Clear to auscultation bilaterally, no crackles or wheezing  Cardiovascular: Regular rate and rhythm, normal S1 and S2, and no murmur noted  Abdomen: Normal bowel sounds, soft, non-distended, non-tender  Skin: No rashes, no cyanosis, no edema  Other:    Medications       acetaminophen  975 mg Oral Q8H     aspirin  325 mg Oral Daily     carvedilol  12.5 mg Oral BID w/meals     DAPTOmycin  6 mg/kg Intravenous Q24H     DULoxetine  30 mg Oral Daily     ferrous sulfate  325 mg Oral Daily with breakfast     furosemide  20 mg Oral Daily     gabapentin  800  mg Oral BID     insulin aspart  1-10 Units Subcutaneous TID AC     insulin aspart  1-7 Units Subcutaneous At Bedtime     lisinopril  2.5 mg Oral Daily     senna-docusate  1 tablet Oral BID    Or     senna-docusate  2 tablet Oral BID     simvastatin  20 mg Oral At Bedtime     sodium chloride (PF)  3 mL Intracatheter Q8H     tamsulosin  0.8 mg Oral At Bedtime       Data   All microbiology laboratory data reviewed.  Recent Labs   Lab Test 05/30/20  0622 05/29/20  0714 05/28/20  0645 05/27/20  1822  05/15/20  0714   WBC  --   --  5.4 7.0  --  6.3   HGB 8.2* 9.1* 8.1* 8.8*   < > 9.3*   HCT  --   --  24.7* 27.1*  --  29.0*   MCV  --   --  99 99  --  99   PLT  --   --  270 292  --  162    < > = values in this interval not displayed.     Recent Labs   Lab Test 05/30/20  0622 05/29/20  0714 05/28/20  0646   CR 1.10 1.02 0.93     Recent Labs   Lab Test 05/14/20  1551   SED 33*     Recent Labs   Lab Test 05/28/20  1254 05/28/20  1250 05/27/20  1826 05/27/20  1821 05/27/20  1740 05/14/20  1042 05/13/20  0817 02/27/20  0925 12/28/17  1910   CULT Culture negative monitoring continues  Culture negative monitoring continues Culture negative monitoring continues  Culture negative monitoring continues No growth after 3 days No growth after 3 days <10,000 colonies/mL  urogenital abundio  Susceptibility testing not routinely done   No growth No anaerobes isolated  No growth No anaerobes isolated  No growth No growth       Attestation:  Total time on the floor involved in the patient's care: 35 minutes. Total time spent in counseling/care coordination: >50%

## 2020-05-30 NOTE — PLAN OF CARE
Discharge Planner PT   Patient plan for discharge: Home  Current status: Greeted patient supine in bed with nursing staff at bedside. Engaged patient in rolling to L & R side Katy for nursing staff to perform wound dressing change. Reviewed hip precautions. Engaged patient in supine leg exercises. Patient about to get a blood transfusion. OOB activity deferred till later. Concluded session with patient supine in bed, with nursing at bedside.   Barriers to return to prior living situation: Level of assist, Fall risk, Hip Precautions  Recommendations for discharge: TCU  Rationale for recommendations: Patient will benefit from continued skilled PT intervention while IP and at TCU in order to progress independence and safety with functional mobility.       Entered by: Lamar Moreira 05/30/2020 12:03 PM

## 2020-05-30 NOTE — PLAN OF CARE
A&O x4. VSS on RA. C/o pain to right hip. Scheduled Tylenol to manage. CMS intact. Pt has baseline darkened skin and numbness to BLE. Drsg to right hip remains CDI. Up A1 with gait belt and walker. Regular diet. Tolerating well. . PIV SL. LS dim in bases. BS active, + Flatus. Voiding adequately in urinal at bedside. Progressing toward plan of care.

## 2020-05-30 NOTE — PROGRESS NOTES
Mark Hernández  2020  POD #  2   Doing well.  Clean wound without signs of infection.  Normal healing wound.  No immediate surgical complications identified.  Temperatures:  Current - Temp: 98.1  F (36.7  C); Max - Temp  Av.9  F (36.6  C)  Min: 97.6  F (36.4  C)  Max: 98.1  F (36.7  C)  Pulse range: Pulse  Av.3  Min: 61  Max: 71  Blood pressure range: Systolic (24hrs), Av , Min:116 , Max:138   ; Diastolic (24hrs), Av, Min:56, Max:66    CMS:  intact  Labs:   Results for orders placed or performed during the hospital encounter of 20 (from the past 24 hour(s))   Infectious Diseases IP Consult: Patient to be seen: Routine - within 24 hours; Wound dehiscence R MATTY s/p I&D; Consultant may enter orders: Yes; Requesting provider? Hospitalist (if different from attending physician); Name: KATHLEEN Maria Dipi, MD     2020  9:01 AM  Done      Glucose by meter   Result Value Ref Range    Glucose 125 (H) 70 - 99 mg/dL   US Carotid Bilateral    Narrative    BILATERAL DUPLEX CAROTID ULTRASOUND 2020 3:09 PM     HISTORY: Episodes of syncope--rule out carotid and vertebral stenosis.    COMPARISON: None.    FINDINGS:  There is mild atherosclerotic plaque in the carotid  bifurcations.  Flow velocities and waveforms show less than 50%  diameter stenosis in both the right and left internal carotid arteries  as assessed by each ICA PSV and EDV and ICA/DCCA ratio.  Antegrade  flow is present in both vertebral and external carotid arteries.       Impression    IMPRESSION:  Less than 50% diameter stenosis of both internal carotid  arteries relative to the distal ICA diameters.    SEBASTIAN ALVARES MD   Glucose by meter   Result Value Ref Range    Glucose 113 (H) 70 - 99 mg/dL   Glucose by meter   Result Value Ref Range    Glucose 145 (H) 70 - 99 mg/dL   Glucose by meter   Result Value Ref Range    Glucose 127 (H) 70 - 99 mg/dL   Hemoglobin   Result Value Ref Range     Hemoglobin 8.2 (L) 13.3 - 17.7 g/dL   Basic metabolic panel   Result Value Ref Range    Sodium 143 133 - 144 mmol/L    Potassium 4.0 3.4 - 5.3 mmol/L    Chloride 110 (H) 94 - 109 mmol/L    Carbon Dioxide 28 20 - 32 mmol/L    Anion Gap 5 3 - 14 mmol/L    Glucose 114 (H) 70 - 99 mg/dL    Urea Nitrogen 33 (H) 7 - 30 mg/dL    Creatinine 1.10 0.66 - 1.25 mg/dL    GFR Estimate 60 (L) >60 mL/min/[1.73_m2]    GFR Estimate If Black 70 >60 mL/min/[1.73_m2]    Calcium 8.0 (L) 8.5 - 10.1 mg/dL       PLAN:  Discharge plan: home  And  Resume  Home care  As prev    If  Cultures  Stay  Neg   I would  Stop iv abx       not  Convinced  We  Need  tetrscycline  anylonger   Both  Sets  Of  Cultures from surg this  Week   And  3  Weeks  Ago were  Also  Neg  For  Staph epi which  Was  About  15 years  Ago,  The  First  Event.  Family  Convinced me  To let him  Go home    They  Cleaned  Up the  Clutter  And have  An  Electric  Chair lift  For  Steps.    If  cx  Convert  To pos will need picc  And  6  Weeks of  Abx.  He  Opened it up at  Home  Not the  Hospital so  owere   Risk  Setting.    Dressing   Change  And  Pull drains  This am.    May  Decide to tranfuse  As he  Had  A  Fall at  Home  prob syncapal  By  History and his hgb  Was  Higher then then it is now.    Vasc  Saw  Pt and he has no  Significant  Carotid  Or  vetebral artery  disease

## 2020-05-30 NOTE — PROGRESS NOTES
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  Patient is admitted under the orthopedic service with plans for operative intervention on 5/28/2020.  Hospitalist service was consulted for preoperative evaluation.     Status post mechanical fall  Right hip wound dehiscence  Recurrent right total hip arthroplasty dislocation status post partial revision (5/13/2020)  As noted above, patient with recent surgical intervention on right hip.  Patient was discharged to home setting and was reportedly doing quite well until day prior to evaluation when he sustained a mechanical fall down approximately 10 steps.  Patient delayed presentation until approximately 24 hours following fall.  He does report head trauma, however is without abrasions or edema.  Neurologically patient appears intact.  He completed a two-week course of Xarelto for DVT prophylaxis and has been maintained on daily aspirin.  He is on chronic minocycline for history of right total hip arthroplasty infection.  Cultures from recent surgery were negative, patient was discharged on oral Cipro per Ortho.  - Defer routine post-operative cares to Ortho. Drain management per Ortho.   - Analgesic regimen with tylenol 975 mg TID, tramadol 50 mg q6 hrs PRN, IV dilaudid 0.3 mg q2 hrs PRN     - DVT prophylaxis with  mg po every day   - Treated with periooperative Ancef, IV Vancomycin currently ordered   - Infectious disease consulted, appreciate recommendations   - Follow surgical cultures   - PT and OT ordered      Acute blood loss anemia  Hx of chronic normocytic anemia: Patient with known chronic iron deficiency as well as B12 anemia.  Pre-op Hgb 11.3. Sustained acute blood loss anemia during recent hospital stay, received 1 unit of PRBCs.  Hemoglobin at time of discharge was stable at 9.3. Hemoglobin on admission is stable at 8.8.  - Monitor     HFrEF  Hypertension  Hyperlipidemia  Hx type II AV block s/p PPM: Most recent EF 42% per echo in 2018. Pt appears euvolemic on exam, is without s/s concerning for HF exacerbation. Pt reports dry weight 202 lbs, but with recent surgery, was up to 215 lbs after receiving IVF.   - Resume PTA PTA lasix 20 mg po qd, lisinopril 2.5 mg po qd on 5/29/2020   - Continue Coreg 12.5 mg po BID, Simvastatin 20 mg po at bedtime   - I&Os, daily weights     Intake/Output Summary (Last 24 hours) at 5/29/2020 1026  Last data filed at 5/29/2020 0646      Gross per 24 hour   Intake 1520 ml   Output 2190 ml   Net -670 ml      Diabetes Mellitus Type II: Most recent Hgb A1c 6.5% on 5/13/2020. PTA regimen consists of metformin.  - Holding PTA metformin 250 mg BID   - sliding scale insulin  - Mod CHO diet  - Accuchecks TID AC & at bedtime    Severe aortic sclerosis: Noted on TTE 08/2019, not hemodynamically significant.  - Defer outpatient monitoring to Cardiology      BPH  - Continue PTA flomax     Recurrent papillary urethral cancer s/p TURBT x3, right ureter and prostate CA s/p ureterectomy and nephrectomy  Follows as an outpatient with Dr. Pepper. Appears stable.     Chronic lower extremity skin discoloration from long term minocycline use: Noted.          Diet: Advance Diet as Tolerated: Regular Diet Adult  Diet    DVT Prophylaxis: Defer to primary service  Mock  Catheter: not present  Code Status: Full Code           Disposition Plan   Expected discharge: Defer to primary service  Entered: Eran Bellamy DO 05/30/2020, 10:39 AM       The patient's care was discussed with the Bedside Nurse, Care Coordinator/ and Patient.    Eran Bellamy DO  Hospitalist Service  [facility]    ______________________________________________________________________    Interval History   Patient seen and examined.  No acute events over night.  No fevers or chills. No chest pain or SOB.  Hip pain is controlled.  No nausea or vomiting.    Data reviewed today: I reviewed all medications, new labs and imaging results over the last 24 hours. I personally reviewed no images or EKG's today.    Physical Exam   Vital Signs: Temp: 98.1  F (36.7  C) Temp src: Oral BP: 134/65 Pulse: 71 Heart Rate: 62 Resp: 16 SpO2: 96 % O2 Device: None (Room air)    Weight: 205 lbs 0 oz  General Appearance: Resting comfortably in bed.  NAD   Respiratory: Clear to auscultation.  No respiratory distress  Cardiovascular: RRR.  No obvious murmurs  GI: Bowel sounds present.  Non-distended.  Non-tender  Skin: Right hip dressing in place. No obvious rashes.  Chronic discoloration to the lower extremities related to minocycline use   Other: No edema.  No calf tenderness     Data   Recent Labs   Lab 05/30/20  0622 05/29/20  0714 05/28/20  0646 05/28/20  0645 05/27/20  1822   WBC  --   --   --  5.4 7.0   HGB 8.2* 9.1*  --  8.1* 8.8*   MCV  --   --   --  99 99   PLT  --   --   --  270 292    140 140  --  136   POTASSIUM 4.0 4.2 3.9  --  4.2   CHLORIDE 110* 107 108  --  103   CO2 28 30 30  --  29   BUN 33* 26 31*  --  40*   CR 1.10 1.02 0.93  --  1.08   ANIONGAP 5 3 2*  --  4   ESAU 8.0* 8.1* 8.4*  --  8.7   * 125* 115*  --  111*     Recent Results (from the past 24 hour(s))   US Carotid Bilateral    Narrative    BILATERAL DUPLEX CAROTID ULTRASOUND 5/29/2020 3:09 PM     HISTORY:  Episodes of syncope--rule out carotid and vertebral stenosis.    COMPARISON: None.    FINDINGS:  There is mild atherosclerotic plaque in the carotid  bifurcations.  Flow velocities and waveforms show less than 50%  diameter stenosis in both the right and left internal carotid arteries  as assessed by each ICA PSV and EDV and ICA/DCCA ratio.  Antegrade  flow is present in both vertebral and external carotid arteries.       Impression    IMPRESSION:  Less than 50% diameter stenosis of both internal carotid  arteries relative to the distal ICA diameters.    SEBASTIAN ALVARES MD

## 2020-05-31 ENCOUNTER — APPOINTMENT (OUTPATIENT)
Dept: PHYSICAL THERAPY | Facility: CLINIC | Age: 85
DRG: 908 | End: 2020-05-31
Attending: ORTHOPAEDIC SURGERY
Payer: MEDICARE

## 2020-05-31 VITALS
SYSTOLIC BLOOD PRESSURE: 146 MMHG | TEMPERATURE: 97.9 F | DIASTOLIC BLOOD PRESSURE: 76 MMHG | WEIGHT: 224.6 LBS | BODY MASS INDEX: 35.18 KG/M2 | HEART RATE: 61 BPM | OXYGEN SATURATION: 97 % | RESPIRATION RATE: 16 BRPM

## 2020-05-31 LAB
CK SERPL-CCNC: 52 U/L (ref 30–300)
GLUCOSE BLDC GLUCOMTR-MCNC: 109 MG/DL (ref 70–99)
GLUCOSE SERPL-MCNC: 119 MG/DL (ref 70–99)
HGB BLD-MCNC: 9.9 G/DL (ref 13.3–17.7)

## 2020-05-31 PROCEDURE — 85018 HEMOGLOBIN: CPT | Performed by: ORTHOPAEDIC SURGERY

## 2020-05-31 PROCEDURE — 25000132 ZZH RX MED GY IP 250 OP 250 PS 637: Mod: GY | Performed by: ORTHOPAEDIC SURGERY

## 2020-05-31 PROCEDURE — 25000132 ZZH RX MED GY IP 250 OP 250 PS 637: Mod: GY | Performed by: PHYSICIAN ASSISTANT

## 2020-05-31 PROCEDURE — 97530 THERAPEUTIC ACTIVITIES: CPT | Mod: GP

## 2020-05-31 PROCEDURE — 97116 GAIT TRAINING THERAPY: CPT | Mod: GP

## 2020-05-31 PROCEDURE — 82947 ASSAY GLUCOSE BLOOD QUANT: CPT | Performed by: ORTHOPAEDIC SURGERY

## 2020-05-31 PROCEDURE — 00000146 ZZHCL STATISTIC GLUCOSE BY METER IP

## 2020-05-31 PROCEDURE — 82550 ASSAY OF CK (CPK): CPT | Performed by: ORTHOPAEDIC SURGERY

## 2020-05-31 PROCEDURE — 36415 COLL VENOUS BLD VENIPUNCTURE: CPT | Performed by: ORTHOPAEDIC SURGERY

## 2020-05-31 RX ADMIN — LISINOPRIL 2.5 MG: 2.5 TABLET ORAL at 08:10

## 2020-05-31 RX ADMIN — GABAPENTIN 800 MG: 800 TABLET, FILM COATED ORAL at 08:11

## 2020-05-31 RX ADMIN — ACETAMINOPHEN 975 MG: 325 TABLET, FILM COATED ORAL at 08:10

## 2020-05-31 RX ADMIN — DOCUSATE SODIUM 50 MG AND SENNOSIDES 8.6 MG 2 TABLET: 8.6; 5 TABLET, FILM COATED ORAL at 08:10

## 2020-05-31 RX ADMIN — FUROSEMIDE 20 MG: 20 TABLET ORAL at 08:11

## 2020-05-31 RX ADMIN — ASPIRIN 325 MG: 325 TABLET, COATED ORAL at 08:10

## 2020-05-31 RX ADMIN — FERROUS SULFATE TAB 325 MG (65 MG ELEMENTAL FE) 325 MG: 325 (65 FE) TAB at 08:10

## 2020-05-31 RX ADMIN — ACETAMINOPHEN 975 MG: 325 TABLET, FILM COATED ORAL at 00:47

## 2020-05-31 RX ADMIN — CARVEDILOL 12.5 MG: 12.5 TABLET, FILM COATED ORAL at 08:11

## 2020-05-31 ASSESSMENT — ACTIVITIES OF DAILY LIVING (ADL)
ADLS_ACUITY_SCORE: 19

## 2020-05-31 NOTE — PLAN OF CARE
Discharge Planner PT   Patient plan for discharge: Home with family at 10:20am  Current status: Pt SBA for bed mobility. Pt progressing to CGA for sit to stand transfers during session. Pt amb 250' x2 with FWW and CGA. Pt up/down 3 stairs with rail and CGA.  Barriers to return to prior living situation: None from a mobility standpoint  Recommendations for discharge: Home with family to assist with transfers as needed (pt generally uses a lift chair) and home PT services.  Rationale for recommendations: Pt is able to complete mobility necessary for discharge to home but continues to fatigue easily and have difficulty with transfers at times. Recommend home PT to maximize functional independence and safety in home environment and leaving house will require assist of another person, use of assistive device, and significant taxing effort.       Entered by: Mana Souza 05/31/2020 9:32 AM      Physical Therapy Discharge Summary    Reason for therapy discharge:    Discharged to home with home therapy.    Progress towards therapy goal(s). See goals on Care Plan in [facility] electronic health record for goal details.  Goals not met.  Barriers to achieving goals:   discharge from facility.    Therapy recommendation(s):    Continued therapy is recommended.  Rationale/Recommendations:  cont PT in home environment to optimize functional recovery.

## 2020-05-31 NOTE — PROGRESS NOTES
Mark Funez Edgar  2020  POD #  3    Doing well.  No immediate surgical complications identified.  Pain well-controlled.  Tolerating physical therapy and rehabilitation well.    Temperatures:  Current - Temp: 97.9  F (36.6  C); Max - Temp  Av  F (36.7  C)  Min: 97.6  F (36.4  C)  Max: 98.2  F (36.8  C)  Pulse range: Pulse  Av.8  Min: 61  Max: 71  Blood pressure range: Systolic (24hrs), Av , Min:106 , Max:154   ; Diastolic (24hrs), Av, Min:53, Max:78    CMS:   Labs:   Results for orders placed or performed during the hospital encounter of 20 (from the past 24 hour(s))   Glucose by meter   Result Value Ref Range    Glucose 98 70 - 99 mg/dL   Glucose by meter   Result Value Ref Range    Glucose 113 (H) 70 - 99 mg/dL   Glucose by meter   Result Value Ref Range    Glucose 174 (H) 70 - 99 mg/dL   Glucose by meter   Result Value Ref Range    Glucose 109 (H) 70 - 99 mg/dL   CK total   Result Value Ref Range    CK Total 52 30 - 300 U/L   Hemoglobin   Result Value Ref Range    Hemoglobin 9.9 (L) 13.3 - 17.7 g/dL   Glucose   Result Value Ref Range    Glucose 119 (H) 70 - 99 mg/dL     Cultures  Appear  Neg   They  Will be  Watched for 7  Days  Usually, so  Will re ck as out pt    Should be  Ready  For discharge home  Today    Will ck  With  Rashaad in  Am  Through  Office  If not  Already  Contacted  But he really  Doesn't need much assitance.   Will discharge  This  Am  Dressing  Supplies  To change  Every other  day  PLAN  discharge home today    Office  Will help  Arrange follow up in  Few  Weeks.      I  Will  Not be  In this am.

## 2020-05-31 NOTE — PROGRESS NOTES
Chart Reviewed    It appears as if the patient is ready for discharge today and is stable for discharge from a medical stand point.  I will review his PTA medications and reconcile as needed.      Please contact for any new questions or concerns      Eran Bellamy DO   Hospitalist

## 2020-05-31 NOTE — PLAN OF CARE
Pt A/Ox4. CMS intact ex baseline neuropathy and blackness to BLE. Up with Ax1 and walker, ambulated in the schrader x1. Denies pain, SOB/CP. Voiding adequately per urinal. BS active, no BM this shift. Discharge home with [facility] pending culture.

## 2020-05-31 NOTE — PLAN OF CARE
Patient is discharged at 10:33am, his wife is at door 6 to pick him up, dressing was changed, discharged medication and instructions given to patient.

## 2020-05-31 NOTE — PROGRESS NOTES
Care Coordination:    Following for discharge planning. Per notes Pt is to discharge home and resume [facility] care.  Confirmed with Rashaad (Nhi) that patient is open to home care RN/PT  Met with patient and explained role. He is open to reusming care with Rashaad and voices no other needs for discharge.  Voices that his wife and daughter (who is an RN) will be with him at discharge.   Orders faxed to [facility] [phone]  Ride coming at 10:00.  Bedside nurse updated.         Tori Black RN BSN  Inpatient Care Coordination  [facility]  [phone]

## 2020-06-02 LAB
BACTERIA SPEC CULT: NO GROWTH
SPECIMEN SOURCE: NORMAL

## 2020-06-03 NOTE — OP NOTE
Procedure Date: 05/27/2020      PREOPERATIVE DIAGNOSIS:  Traumatic wound dehiscence, right total hip.      POSTOPERATIVE DIAGNOSIS:  Traumatic wound dehiscence, right total hip.      PROCEDURES:   1.  Irrigation and debridement and closure.   2.  Removal of large hematoma, subcutaneous and deep.   3.  Deep cultures, deep subcutaneous cultures and primary repair of a drain.      SURGEON:  Kirk Summers MD      ASSISTANT:  PONCHO Fernando      ANESTHESIA:  General.      ESTIMATED BLOOD LOSS:  Most of his own blood but estimate 200 mL.  Hematoma was fairly sizable.      DESCRIPTION OF PROCEDURE:  Risks, options and alternatives were discussed with the patient.  He was brought to the operating room, and timeout was requested.  Documented that he has been on vancomycin for 24 hours because the wound opened up at home; a little more optimistic than if it would have been a traumatic fall in the hospital.      Prepped and draped in the usual fashion.  Removed the staples first, and he was then prepped and draped in the usual fashion.  Sterile draping was done.  Opened up the original and found a very deep hematoma.  Currently, it was left 4 cm open area of the original wound.  We removed that and washed it out thoroughly.  No antibiotic solutions were utilized.  Did send a culture of that area.  We removed the hematoma and then opened the deep structures.  To the naked eye, it looked quite good actually, but we washed it thoroughly, I think with two 3000 mL bags of saline and 3-4 bags of antibiotic solution.  Deep cultures were taken before any antibiotic irrigant.  Hip was stable.  I saw no evidence of loosening.  Abductor repairs was the heart of one of his problems.  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  I did not feel any type of complex reconstruction with allograft site that was indicated here because the open wound.  I closed tightly the deep abductors, the deep retinaculum over vancomycin in  the joint.  Then #5 FiberWire, 0 Ethibond, #1 Ethibond, 0 V-Loc, 2-0 Vicryl, staples and sutures, and 0 Prolene on the skin.  Pressure wrap applied from toes to groin.  Pressure wrap applied over that.  The patient was then transferred awake and alert to the recovery room.  Sponge and needle counts were correct x 2.        PROCEDURE:   1.  Synovectomy, complete and irrigation and debridement of the hip joint.   2.  Removal of large hematoma and primary repair of traumatic disruption of the hip wound.  Total length of the incision post the time of closure was about 12-16 cm.      PLAN:     1.  Continue antibiotics as he has been on it.   2.  Deep venous thrombosis prophylaxis.  Will start aspirin therapy.  Sequential calf compression devices as appropriate.     3.  We are going to keep him in the hospital on vancomycin until the cultures are known.  If they are, he will go home.  If they are positive we may have to go with 6 weeks of IV antibiotics.         SAVANAH SPRAGUE MD             D: 2020   T: 2020   MT:       Name:     FRANKIE THOMPSON   MRN:      [id number]        Account:        [id number]   :      1933           Procedure Date: 2020      Document: [id number]

## 2020-06-04 LAB
BACTERIA SPEC CULT: NORMAL
BACTERIA SPEC CULT: NORMAL
Lab: NORMAL
Lab: NORMAL
SPECIMEN SOURCE: NORMAL
SPECIMEN SOURCE: NORMAL

## 2020-06-04 NOTE — DISCHARGE SUMMARY
Admit Date:     05/27/2020   Discharge Date:     05/31/2020      HOSPITAL COURSE:  The patient was admitted for closure and irrigation/debridement of wound.  He had a revision knee arthroplasty about 2 weeks prior to the fall at home; it was a mechanical fall.  Reaching up to wind a cuckoo clock and it sounds like he had a syncopal episode, fell backwards.  Developed a large hematoma that he ruptured.   Opened a portion of the surgical wound.  For this reason, he was admitted to the hospital rather than waiting.  Could not get him in, in a timely fashion due to the ER being so busy, so we did a direct admit and medical consult.  He was taken to surgery on the 28th.  Deep cultures were taken, deep subcu cultures taken as well.  Irrigation, debridement and revision of the incision and then primary closure over drains.  Powdered vancomycin was placed in the joint as well.      He does have a remote history of Staph coag-negative.  He has been on chronic tetracycline.  We did put him on vancomycin postop just to wait for the cultures.  It was anticipated he was going to be in the hospital 3-4 days.  If the cultures turned positive, we were going to have to put him on 6 weeks of IV antibiotics and probably go to a rehab center.  At this point, the way it is now, his daughter, who is a nurse is coming from [place] to help them out.  They have arranged their house, got rid of a lot of the obstacles.  They do have an elevated chair and a stairwell elevator chair.      Wound looks fine.  Cultures at just about 72 hours were negative.  The patient is aware that they still will culture through the lab for another 3-4 days; they could change.      Wound is healing fine.  We are just going to discontinue the antibiotics and watch.  Discharged home on the same medications, aspirin for DVT prophylaxis.  They have [facility] coming out to assist with wound care management and his nursing care as well as physical therapy.  Have them take  the staples out in his home in probably 2.5 to 3 weeks postop.  We will see him back in the office in about 6 weeks postop.  He knows the absolute importance of being careful; we went over those multiple times in the hospital.  He stated understanding.  I went over those with his wife over the phone as well.  With the restrictions in visitors we had to do everything we could  over the phone.         SAVANAH SPRAGUE MD             D: 2020   T: 2020   MT: ELIS      Name:     FRANKIE THOMPSON   MRN:      [id number]        Account:        [id number]   :      1933           Admit Date:     2020                                  Discharge Date: 2020      Document: [id number]       cc: Rebecca Noyola MD

## 2021-09-02 ENCOUNTER — HOSPITAL ENCOUNTER (EMERGENCY)
Facility: CLINIC | Age: 86
Discharge: HOME | End: 2021-09-02
Attending: EMERGENCY MEDICINE | Admitting: EMERGENCY MEDICINE
Payer: MEDICARE

## 2021-09-02 ENCOUNTER — APPOINTMENT (OUTPATIENT)
Dept: CT IMAGING | Facility: CLINIC | Age: 86
End: 2021-09-02
Attending: EMERGENCY MEDICINE
Payer: MEDICARE

## 2021-09-02 VITALS
BODY MASS INDEX: 30.92 KG/M2 | WEIGHT: 216 LBS | DIASTOLIC BLOOD PRESSURE: 95 MMHG | SYSTOLIC BLOOD PRESSURE: 211 MMHG | HEART RATE: 65 BPM | TEMPERATURE: 97.6 F | RESPIRATION RATE: 18 BRPM | HEIGHT: 70 IN | OXYGEN SATURATION: 96 %

## 2021-09-02 DIAGNOSIS — S22.41XA CLOSED FRACTURE OF MULTIPLE RIBS OF RIGHT SIDE, INITIAL ENCOUNTER: ICD-10-CM

## 2021-09-02 DIAGNOSIS — S09.90XA CLOSED HEAD INJURY, INITIAL ENCOUNTER: ICD-10-CM

## 2021-09-02 DIAGNOSIS — S01.01XA LACERATION OF SCALP, INITIAL ENCOUNTER: ICD-10-CM

## 2021-09-02 LAB
ANION GAP SERPL CALCULATED.3IONS-SCNC: 4 MMOL/L (ref 3–14)
BUN SERPL-MCNC: 20 MG/DL (ref 7–30)
CALCIUM SERPL-MCNC: 9.1 MG/DL (ref 8.5–10.1)
CHLORIDE BLD-SCNC: 100 MMOL/L (ref 94–109)
CO2 SERPL-SCNC: 30 MMOL/L (ref 20–32)
CREAT SERPL-MCNC: 1.08 MG/DL (ref 0.66–1.25)
ERYTHROCYTE [DISTWIDTH] IN BLOOD BY AUTOMATED COUNT: NORMAL %
GFR SERPL CREATININE-BSD FRML MDRD: 61 ML/MIN/1.73M2
GLUCOSE BLD-MCNC: 157 MG/DL (ref 70–99)
HCT VFR BLD AUTO: NORMAL %
HGB BLD-MCNC: NORMAL G/DL
MCH RBC QN AUTO: NORMAL PG
MCHC RBC AUTO-ENTMCNC: NORMAL G/DL
MCV RBC AUTO: NORMAL FL
PLATELET # BLD AUTO: NORMAL 10*3/UL
POTASSIUM BLD-SCNC: 4.3 MMOL/L (ref 3.4–5.3)
RBC # BLD AUTO: NORMAL 10*6/UL
SODIUM SERPL-SCNC: 134 MMOL/L (ref 133–144)
WBC # BLD AUTO: NORMAL 10*3/UL

## 2021-09-02 PROCEDURE — 99285 EMERGENCY DEPT VISIT HI MDM: CPT | Mod: 25

## 2021-09-02 PROCEDURE — 70450 CT HEAD/BRAIN W/O DYE: CPT | Mod: MG

## 2021-09-02 PROCEDURE — 71250 CT THORAX DX C-: CPT | Mod: MG

## 2021-09-02 PROCEDURE — 12013 RPR F/E/E/N/L/M 2.6-5.0 CM: CPT

## 2021-09-02 PROCEDURE — 36415 COLL VENOUS BLD VENIPUNCTURE: CPT | Performed by: EMERGENCY MEDICINE

## 2021-09-02 PROCEDURE — 72125 CT NECK SPINE W/O DYE: CPT | Mod: MG

## 2021-09-02 PROCEDURE — 250N000013 HC RX MED GY IP 250 OP 250 PS 637: Performed by: EMERGENCY MEDICINE

## 2021-09-02 PROCEDURE — 80048 BASIC METABOLIC PNL TOTAL CA: CPT | Performed by: EMERGENCY MEDICINE

## 2021-09-02 RX ORDER — HYDROCODONE BITARTRATE AND ACETAMINOPHEN 5; 325 MG/1; MG/1
1 TABLET ORAL ONCE
Status: COMPLETED | OUTPATIENT
Start: 2021-09-02 | End: 2021-09-02

## 2021-09-02 RX ORDER — HYDROCODONE BITARTRATE AND ACETAMINOPHEN 5; 325 MG/1; MG/1
1 TABLET ORAL EVERY 6 HOURS PRN
Qty: 15 TABLET | Refills: 0 | Status: SHIPPED | OUTPATIENT
Start: 2021-09-02 | End: 2021-09-17

## 2021-09-02 RX ORDER — ASPIRIN 81 MG
100 TABLET, DELAYED RELEASE (ENTERIC COATED) ORAL 2 TIMES DAILY
Qty: 20 TABLET | Refills: 0 | Status: SHIPPED | OUTPATIENT
Start: 2021-09-02 | End: 2021-09-12

## 2021-09-02 RX ADMIN — HYDROCODONE BITARTRATE AND ACETAMINOPHEN 1 TABLET: 5; 325 TABLET ORAL at 14:22

## 2021-09-02 ASSESSMENT — ENCOUNTER SYMPTOMS
LIGHT-HEADEDNESS: 0
WOUND: 1

## 2021-09-02 ASSESSMENT — MIFFLIN-ST. JEOR: SCORE: 1661.02

## 2021-09-02 NOTE — ED NOTES
Bed: ED27  Expected date:   Expected time:   Means of arrival:   Comments:  517-87M FALL HEAD INJURY

## 2021-09-02 NOTE — ED NOTES
Pt ambulated to evaluate gait, was able to stand by self and ambulate with the assistance of a walker. MD notified

## 2021-09-02 NOTE — ED NOTES
Emergency Department Technician Wound Irrigation Note:    9/2/2021    12:56 PM      Wound location:  Right eyebrow     Irrigation Fluid: Normal Saline    Estimated Irrigation Volume (60 mL fluid per cm): 180 mL    Sneha Malcolm

## 2021-09-02 NOTE — ED PROVIDER NOTES
History   Chief Complaint:  Facial Laceration     HPI   Mark Hernández is a 87 year old male with history of complete AV block s/p Pacemaker implant, hyperlipidemia, hypertension, prostate cancer, and type II diabetes  who presents with a facial laceration after a fall. The patient reports that earlier this morning he slipped and fell while attempting to turn on his shower. The patient reports that he might have leaned too far back causing his fall. He now has bruising and a laceration to the area above his right eye. The patient also mentions having some right lateral rib pain that is new since his fall. He denies any lightheadedness or loss of consciousness. The patient last had his tetanus vaccination updated in 2020.     Review of Systems   Cardiovascular: Positive for chest pain (right rib pain).   Skin: Positive for wound (Right face).   Neurological: Negative for syncope and light-headedness.   All other systems reviewed and are negative.      Allergies:  The patient has no known allergies.     Medications:  Aspirin 325 mg   Coreg  Cymbalta  Lasix  Ferosul  Neurontin  Metformin  Lisinopril  Senokot  Zocor  Flomax  Ultram     Past Medical History:    Acquired hydrocele  Benign prostatic hyperplasia  CKD  Complete AV block  Congestive heart failure  Diverticulosis  Hemorrhoids  Hyperlipidemia  Hypertension  Kidney cancer  Malignant neoplasm of anterior wall of urinary bladder  Peripheral neuropathy  Prostate cancer  Sensorineural hearing loss bilaterally  Thrombocytopenia  Type II diabetes   Vitamin B12 deficiency      Past Surgical History:    Hip arthroplasty revision right x5  Back surgery  Tonsillectomy  Nephrectomy  Irrigation and debridement  Knee surgery  Carpal tunnel release  Pacemaker implant   Rotator cuff repair     Family History:    Father: myocardial infarction   Sister: rheumatoid arthritis, myocardial infarction     Social History:  The patient presents alone.     Physical Exam  "    Patient Vitals for the past 24 hrs:   BP Temp Temp src Pulse Resp SpO2 Height Weight   09/02/21 1138 (!) 211/95 97.6  F (36.4  C) Oral 65 18 96 % 1.778 m (5' 10\") 98 kg (216 lb)       Physical Exam  General: Resting comfortably on the gurney.  The patient is awake, alert, and appropriate.  He is very talkative.  He seems to have a good memory.  Head:    Large scalp contusion/hematoma involving the right lateral superior eyebrow. There is bruising superior and inferior to the laceration which is obliquely orientated across the lateral right eyebrow. It is somewhat stellate in appearance. Bleeding is minor at this time. The zygoma on the right is not tender. The nasal bone is normal. The lateral orbital bone is nontender. His eyes look normal. Significant bruising to the right superior eyelid.   Eyes:  The pupils are equal, round, and reactive to light    There is no nystagmus    Extraocular muscles are intact    Conjunctivae and sclerae are normal    The upper eyelid has bruising/mild hematoma.  ENT:    The nose is normal    Pinnae are normal    The oropharynx is normal    Uvula is in the midline  Neck:  Normal range of motion    There is no rigidity noted    There is no midline cervical spine pain/tenderness    Trachea is in the midline    No mass is detected  CV:  Regular rate and underlying rhythm     Normal S1/S2, no S3/S4    No pathological murmur detected    Pacemaker to left upper chest.   Resp:  Lungs are clear    There is no tachypnea    Non-labored    No rales    No wheezing   GI:  Abdomen is soft, there is no rigidity    No distension    No tympani    No rebound tenderness     Non-surgical without peritoneal features  MS:  Normal muscular tone    Symmetric motor strength    No major joint effusions    No asymmetric leg swelling, no calf tenderness    Well healed surgical scar to right hip.    Right shoulder has a well healed surgical scar.    Tenderness involving the anterolateral right ribs, no " bruising noted. No tenderness over   the liver.   Skin:  No rash or acute skin lesions noted  Neuro: Speech is normal and fluent  Psych:  Awake. Alert.      Normal affect.  Appropriate interactions.  Lymph: No anterior cervical lymphadenopathy noted      Emergency Department Course     Imaging:  CT WO Contrast  1. No evidence of acute intracranial hemorrhage, mass, or herniation.  2. Diffuse parenchymal volume loss and white matter changes likely due  to chronic microvascular ischemic change.   3. Large right frontal scalp soft tissue injury and hematoma. No  underlying calvarial fracture.  4. Age-indeterminate but probably chronic fracture through the medial  left orbital wall/lamina papyracea.  Reading per radiology.    CT Cervical Spine WO Contrast  1. No evidence of acute fracture or subluxation in the cervical spine.  2. Marked multilevel degenerative changes throughout the cervical  spine as detailed above.   Reading per radiology.    CT Chest WO Contrast  1. No acute intrathoracic process demonstrated.  2. Subacute fractures of the right third and ninth ribs.  Reading per radiology.    Laboratory:  CBC: WBC 5.1, HGB 12.3 (L), PLT Pending   BMP: Glucose 157 (H) o/w WNL (Creatinine 1.08)     Procedures    Laceration Repair        LACERATION:  A stellate clean 3 cm laceration.      LOCATION:  Lateral right eyebrow      ANESTHESIA:  Local using 0.5% bupivacaine with epinephrine total of 7 mLs      PREPARATION:  Irrigation and Scrubbing with Normal Saline and Shur Clens      DEBRIDEMENT: no debridement      CLOSURE:  Wound was closed with One Layer.  Skin closed with 6 x 5.0 Ethylon using interrupted sutures.        Emergency Department Course:    Reviewed:  I reviewed nursing notes, vitals, past medical history and care everywhere    Assessments:  1145 I obtained history and examined the patient as noted above.   1251 I rechecked the patient and explained findings. I repaired the patient's laceration as  documented above.   1356 I rechecked and updated the patient.      Interventions:  1422 Norco 1 tablet Oral     Disposition:  The patient was discharged to home.       Impression & Plan     Medical Decision Making:  This patient presents after a fall as noted above.  He does normally use a walker.  He fell on the bathroom.  He denies syncope.  He struck the right eyebrow region and suffered a 3 cm stellate laceration and hematoma to the area.  There is no intracranial fracture or bleed noted.  He did not have significant neck pain but given the mechanism the patient underwent CT scan of the cervical spine which is within normal limits in terms of fracture.  There is DJD.  The patient also had right anterior inferior rib pain so underwent CT scan without contrast which does show 9th rib fracture, age indeterminant.  He is unaware of prior rib fractures so this is likely acute.  A third rib fracture is noted, again also subacute, the patient does not have pain in this location but it may represent a second acute fracture.  The patient underwent screening laboratories which indicate normal serum electrolytes.  His BUN is 20 and creatinine is 1.08 with mild chronic renal insufficiency.  Glucose is slightly elevated at 157.  White count is 5.1 and hemoglobin is 12.3.  The patient was advised that he might have 2 rib fractures.  He obviously had a closed head injury.  He is able to ambulate with a walker.  His low rib on the right does cause pain with ambulation.  He like to try to go home so I will prescribe him pain medication and stool softeners to his pharmacy.  His wife does drive and will pick him up.  Patient was advised that if his symptoms significantly worsen he certainly may return to the emergency department for consideration of admission and coordination of care and services.    Diagnosis:    ICD-10-CM    1. Laceration of scalp, initial encounter  S01.01XA    2. Closed fracture of multiple ribs of right side,  initial encounter  S22.41XA    3. Closed head injury, initial encounter  S09.90XA        Discharge Medications:  New Prescriptions    DOCUSATE SODIUM (COLACE) 100 MG TABLET    Take 1 tablet (100 mg) by mouth 2 times daily for 10 days    HYDROCODONE-ACETAMINOPHEN (NORCO) 5-325 MG TABLET    Take 1 tablet by mouth every 6 hours as needed for moderate to severe pain       Scribe Disclosure:  I, Rohith Lagos, am serving as a scribe at 11:36 AM on 9/2/2021 to document services personally performed by Morales Bello MD based on my observations and the provider's statements to me.        Morales Bello MD  09/02/21 [id number]

## 2021-09-02 NOTE — DISCHARGE INSTRUCTIONS
Discharge Instructions  Head Injury    You have been seen today for a head injury. Your evaluation included a history and physical examination. You may have had a CT (CAT) scan performed, though most head injuries do not require a scan. Based on this evaluation, your provider today does not feel that your head injury is serious.    Generally, every Emergency Department visit should have a follow-up clinic visit with either a primary or a specialty clinic/provider. Please follow-up as instructed by your emergency provider today.  Return to the Emergency Department if:  You are confused or you are not acting right.  Your headache gets worse or you start to have a really bad headache even with your recommended treatment plan.  You vomit (throw up) more than once.  You have a seizure.  You have trouble walking.  You have weakness or paralysis (cannot move) in an arm or a leg.  You have blood or fluid coming from your ears or nose.  You have new symptoms or anything that worries you.    Sleeping:  It is okay for you to sleep, but someone should wake you up if instructed by your provider, and someone should check on you at your usual time to wake up.     Activity:  Do not drive for at least 24 hours.  Do not drive if you have dizzy spells or trouble concentrating, or remembering things.  Do not return to any contact sports until cleared by your regular provider.     MORE INFORMATION:    Concussion:  A concussion is a minor head injury that may cause temporary problems with the way the brain works. Although concussions are important, they are generally not an emergency or a reason that a person needs to be hospitalized. Some concussion symptoms include confusion, amnesia (forgetful), nausea (sick to your stomach) and vomiting (throwing up), dizziness, fatigue, memory or concentration problems, irritability and sleep problems. For most people, concussions are mild and temporary but some will have more severe and persistent  symptoms that require on-going care and treatment.  CT Scans: Your evaluation today may have included a CT scan (CAT scan) to look for things like bleeding or a skull fracture (broken bone).  CT scans involve radiation and too many CT scans can cause serious health problems like cancer, especially in children.  Because of this, your provider may not have ordered a CT scan today if they think you are at low risk for a serious or life threatening problem.    If you were given a prescription for medicine here today, be sure to read all of the information (including the package insert) that comes with your prescription.  This will include important information about the medicine, its side effects, and any warnings that you need to know about.  The pharmacist who fills the prescription can provide more information and answer questions you may have about the medicine.  If you have questions or concerns that the pharmacist cannot address, please call or return to the Emergency Department.     Remember that you can always come back to the Emergency Department if you are not able to see your regular provider in the amount of time listed above, if you get any new symptoms, or if there is anything that worries you.      Your sutures should be removed in 10 days.  Use the incentive spirometer several times per hour to keep your lungs nicely inflated

## 2022-07-10 ENCOUNTER — APPOINTMENT (OUTPATIENT)
Dept: GENERAL RADIOLOGY | Facility: CLINIC | Age: 87
DRG: 480 | End: 2022-07-10
Attending: EMERGENCY MEDICINE
Payer: MEDICARE

## 2022-07-10 ENCOUNTER — HOSPITAL ENCOUNTER (INPATIENT)
Facility: CLINIC | Age: 87
LOS: 4 days | Discharge: NURSING, REHAB OR LONG-TERM CARE | DRG: 480 | End: 2022-07-14
Attending: EMERGENCY MEDICINE | Admitting: INTERNAL MEDICINE
Payer: MEDICARE

## 2022-07-10 DIAGNOSIS — S72.142A CLOSED INTERTROCHANTERIC FRACTURE OF HIP, LEFT, INITIAL ENCOUNTER (H): ICD-10-CM

## 2022-07-10 DIAGNOSIS — G63 POLYNEUROPATHY ASSOCIATED WITH UNDERLYING DISEASE (H): ICD-10-CM

## 2022-07-10 LAB
ALBUMIN SERPL-MCNC: 3.1 G/DL (ref 3.4–5)
ALP SERPL-CCNC: 125 U/L (ref 40–150)
ALT SERPL W P-5'-P-CCNC: 25 U/L (ref 0–70)
ANION GAP SERPL CALCULATED.3IONS-SCNC: 8 MMOL/L (ref 3–14)
AST SERPL W P-5'-P-CCNC: 9 U/L (ref 0–45)
BASOPHILS # BLD AUTO: 0 10E3/UL (ref 0–0.2)
BASOPHILS NFR BLD AUTO: 0 %
BILIRUB SERPL-MCNC: 0.3 MG/DL (ref 0.2–1.3)
BUN SERPL-MCNC: 40 MG/DL (ref 7–30)
CALCIUM SERPL-MCNC: 8.3 MG/DL (ref 8.5–10.1)
CHLORIDE BLD-SCNC: 106 MMOL/L (ref 94–109)
CO2 SERPL-SCNC: 26 MMOL/L (ref 20–32)
CREAT SERPL-MCNC: 1.24 MG/DL (ref 0.66–1.25)
EOSINOPHIL # BLD AUTO: 0.2 10E3/UL (ref 0–0.7)
EOSINOPHIL NFR BLD AUTO: 4 %
ERYTHROCYTE [DISTWIDTH] IN BLOOD BY AUTOMATED COUNT: 12.8 % (ref 10–15)
GFR SERPL CREATININE-BSD FRML MDRD: 56 ML/MIN/1.73M2
GLUCOSE BLD-MCNC: 185 MG/DL (ref 70–99)
HCT VFR BLD AUTO: 30.3 % (ref 40–53)
HGB BLD-MCNC: 10.4 G/DL (ref 13.3–17.7)
IMM GRANULOCYTES # BLD: 0 10E3/UL
IMM GRANULOCYTES NFR BLD: 1 %
LYMPHOCYTES # BLD AUTO: 1.2 10E3/UL (ref 0.8–5.3)
LYMPHOCYTES NFR BLD AUTO: 22 %
MCH RBC QN AUTO: 33.2 PG (ref 26.5–33)
MCHC RBC AUTO-ENTMCNC: 34.3 G/DL (ref 31.5–36.5)
MCV RBC AUTO: 97 FL (ref 78–100)
MONOCYTES # BLD AUTO: 0.6 10E3/UL (ref 0–1.3)
MONOCYTES NFR BLD AUTO: 11 %
NEUTROPHILS # BLD AUTO: 3.4 10E3/UL (ref 1.6–8.3)
NEUTROPHILS NFR BLD AUTO: 62 %
NRBC # BLD AUTO: 0 10E3/UL
NRBC BLD AUTO-RTO: 0 /100
PLATELET # BLD AUTO: 155 10E3/UL (ref 150–450)
POTASSIUM BLD-SCNC: 3.9 MMOL/L (ref 3.4–5.3)
PROT SERPL-MCNC: 5.8 G/DL (ref 6.8–8.8)
RBC # BLD AUTO: 3.13 10E6/UL (ref 4.4–5.9)
SARS-COV-2 RNA RESP QL NAA+PROBE: NEGATIVE
SODIUM SERPL-SCNC: 140 MMOL/L (ref 133–144)
WBC # BLD AUTO: 5.6 10E3/UL (ref 4–11)

## 2022-07-10 PROCEDURE — U0005 INFEC AGEN DETEC AMPLI PROBE: HCPCS | Performed by: EMERGENCY MEDICINE

## 2022-07-10 PROCEDURE — 73502 X-RAY EXAM HIP UNI 2-3 VIEWS: CPT

## 2022-07-10 PROCEDURE — 99285 EMERGENCY DEPT VISIT HI MDM: CPT | Mod: 25

## 2022-07-10 PROCEDURE — 99223 1ST HOSP IP/OBS HIGH 75: CPT | Mod: AI | Performed by: INTERNAL MEDICINE

## 2022-07-10 PROCEDURE — C9803 HOPD COVID-19 SPEC COLLECT: HCPCS

## 2022-07-10 PROCEDURE — 250N000011 HC RX IP 250 OP 636: Performed by: EMERGENCY MEDICINE

## 2022-07-10 PROCEDURE — 96374 THER/PROPH/DIAG INJ IV PUSH: CPT

## 2022-07-10 PROCEDURE — 120N000001 HC R&B MED SURG/OB

## 2022-07-10 PROCEDURE — 250N000013 HC RX MED GY IP 250 OP 250 PS 637: Performed by: INTERNAL MEDICINE

## 2022-07-10 PROCEDURE — 80053 COMPREHEN METABOLIC PANEL: CPT | Performed by: EMERGENCY MEDICINE

## 2022-07-10 PROCEDURE — 85025 COMPLETE CBC W/AUTO DIFF WBC: CPT | Performed by: EMERGENCY MEDICINE

## 2022-07-10 PROCEDURE — 36415 COLL VENOUS BLD VENIPUNCTURE: CPT | Performed by: EMERGENCY MEDICINE

## 2022-07-10 RX ORDER — SULFAMETHOXAZOLE AND TRIMETHOPRIM 400; 80 MG/1; MG/1
1 TABLET ORAL DAILY
COMMUNITY

## 2022-07-10 RX ORDER — MORPHINE SULFATE 2 MG/ML
2 INJECTION, SOLUTION INTRAMUSCULAR; INTRAVENOUS ONCE
Status: COMPLETED | OUTPATIENT
Start: 2022-07-10 | End: 2022-07-10

## 2022-07-10 RX ORDER — ASPIRIN 81 MG/1
81 TABLET ORAL DAILY
Status: ON HOLD | COMMUNITY
End: 2022-07-12

## 2022-07-10 RX ORDER — HYDROMORPHONE HYDROCHLORIDE 1 MG/ML
0.5 INJECTION, SOLUTION INTRAMUSCULAR; INTRAVENOUS; SUBCUTANEOUS ONCE
Status: DISCONTINUED | OUTPATIENT
Start: 2022-07-10 | End: 2022-07-10

## 2022-07-10 RX ORDER — SENNOSIDES 8.6 MG
650 CAPSULE ORAL 2 TIMES DAILY PRN
Status: ON HOLD | COMMUNITY
End: 2022-07-12

## 2022-07-10 RX ORDER — CARVEDILOL 12.5 MG/1
12.5 TABLET ORAL ONCE
Status: COMPLETED | OUTPATIENT
Start: 2022-07-10 | End: 2022-07-10

## 2022-07-10 RX ADMIN — CARVEDILOL 12.5 MG: 12.5 TABLET, FILM COATED ORAL at 23:47

## 2022-07-10 RX ADMIN — MORPHINE SULFATE 2 MG: 2 INJECTION, SOLUTION INTRAMUSCULAR; INTRAVENOUS at 23:02

## 2022-07-10 ASSESSMENT — ENCOUNTER SYMPTOMS: ARTHRALGIAS: 1

## 2022-07-10 ASSESSMENT — ACTIVITIES OF DAILY LIVING (ADL): ADLS_ACUITY_SCORE: 35

## 2022-07-11 ENCOUNTER — ANESTHESIA EVENT (OUTPATIENT)
Dept: SURGERY | Facility: CLINIC | Age: 87
DRG: 480 | End: 2022-07-11
Payer: MEDICARE

## 2022-07-11 ENCOUNTER — ANESTHESIA (OUTPATIENT)
Dept: SURGERY | Facility: CLINIC | Age: 87
DRG: 480 | End: 2022-07-11
Payer: MEDICARE

## 2022-07-11 ENCOUNTER — APPOINTMENT (OUTPATIENT)
Dept: GENERAL RADIOLOGY | Facility: CLINIC | Age: 87
DRG: 480 | End: 2022-07-11
Attending: INTERNAL MEDICINE
Payer: MEDICARE

## 2022-07-11 ENCOUNTER — APPOINTMENT (OUTPATIENT)
Dept: GENERAL RADIOLOGY | Facility: CLINIC | Age: 87
DRG: 480 | End: 2022-07-11
Attending: HOSPITALIST
Payer: MEDICARE

## 2022-07-11 LAB
ABO/RH(D): NORMAL
ANION GAP SERPL CALCULATED.3IONS-SCNC: 6 MMOL/L (ref 3–14)
ANTIBODY SCREEN: NEGATIVE
BUN SERPL-MCNC: 37 MG/DL (ref 7–30)
CALCIUM SERPL-MCNC: 8.6 MG/DL (ref 8.5–10.1)
CHLORIDE BLD-SCNC: 106 MMOL/L (ref 94–109)
CO2 SERPL-SCNC: 27 MMOL/L (ref 20–32)
CREAT SERPL-MCNC: 1.09 MG/DL (ref 0.66–1.25)
ERYTHROCYTE [DISTWIDTH] IN BLOOD BY AUTOMATED COUNT: 13 % (ref 10–15)
GFR SERPL CREATININE-BSD FRML MDRD: 65 ML/MIN/1.73M2
GLUCOSE BLD-MCNC: 150 MG/DL (ref 70–99)
GLUCOSE BLDC GLUCOMTR-MCNC: 108 MG/DL (ref 70–99)
GLUCOSE BLDC GLUCOMTR-MCNC: 132 MG/DL (ref 70–99)
GLUCOSE BLDC GLUCOMTR-MCNC: 159 MG/DL (ref 70–99)
GLUCOSE BLDC GLUCOMTR-MCNC: 160 MG/DL (ref 70–99)
GLUCOSE BLDC GLUCOMTR-MCNC: 169 MG/DL (ref 70–99)
HCT VFR BLD AUTO: 29.8 % (ref 40–53)
HGB BLD-MCNC: 10 G/DL (ref 13.3–17.7)
MCH RBC QN AUTO: 31.4 PG (ref 26.5–33)
MCHC RBC AUTO-ENTMCNC: 33.6 G/DL (ref 31.5–36.5)
MCV RBC AUTO: 94 FL (ref 78–100)
PLATELET # BLD AUTO: 139 10E3/UL (ref 150–450)
POTASSIUM BLD-SCNC: 4 MMOL/L (ref 3.4–5.3)
RBC # BLD AUTO: 3.18 10E6/UL (ref 4.4–5.9)
SODIUM SERPL-SCNC: 139 MMOL/L (ref 133–144)
SPECIMEN EXPIRATION DATE: NORMAL
WBC # BLD AUTO: 5.8 10E3/UL (ref 4–11)

## 2022-07-11 PROCEDURE — C1713 ANCHOR/SCREW BN/BN,TIS/BN: HCPCS | Performed by: ORTHOPAEDIC SURGERY

## 2022-07-11 PROCEDURE — 710N000009 HC RECOVERY PHASE 1, LEVEL 1, PER MIN: Performed by: ORTHOPAEDIC SURGERY

## 2022-07-11 PROCEDURE — 36415 COLL VENOUS BLD VENIPUNCTURE: CPT | Performed by: PHYSICIAN ASSISTANT

## 2022-07-11 PROCEDURE — 99232 SBSQ HOSP IP/OBS MODERATE 35: CPT | Performed by: HOSPITALIST

## 2022-07-11 PROCEDURE — 120N000001 HC R&B MED SURG/OB

## 2022-07-11 PROCEDURE — 250N000009 HC RX 250: Performed by: ANESTHESIOLOGY

## 2022-07-11 PROCEDURE — 250N000013 HC RX MED GY IP 250 OP 250 PS 637: Performed by: INTERNAL MEDICINE

## 2022-07-11 PROCEDURE — 258N000003 HC RX IP 258 OP 636: Performed by: PHYSICIAN ASSISTANT

## 2022-07-11 PROCEDURE — 272N000001 HC OR GENERAL SUPPLY STERILE: Performed by: ORTHOPAEDIC SURGERY

## 2022-07-11 PROCEDURE — 86850 RBC ANTIBODY SCREEN: CPT | Performed by: PHYSICIAN ASSISTANT

## 2022-07-11 PROCEDURE — 85018 HEMOGLOBIN: CPT | Performed by: INTERNAL MEDICINE

## 2022-07-11 PROCEDURE — 370N000017 HC ANESTHESIA TECHNICAL FEE, PER MIN: Performed by: ORTHOPAEDIC SURGERY

## 2022-07-11 PROCEDURE — 250N000009 HC RX 250: Performed by: ORTHOPAEDIC SURGERY

## 2022-07-11 PROCEDURE — 250N000009 HC RX 250: Performed by: NURSE ANESTHETIST, CERTIFIED REGISTERED

## 2022-07-11 PROCEDURE — 360N000084 HC SURGERY LEVEL 4 W/ FLUORO, PER MIN: Performed by: ORTHOPAEDIC SURGERY

## 2022-07-11 PROCEDURE — 258N000003 HC RX IP 258 OP 636: Performed by: NURSE ANESTHETIST, CERTIFIED REGISTERED

## 2022-07-11 PROCEDURE — 82310 ASSAY OF CALCIUM: CPT | Performed by: INTERNAL MEDICINE

## 2022-07-11 PROCEDURE — 0QH736Z INSERTION OF INTRAMEDULLARY INTERNAL FIXATION DEVICE INTO LEFT UPPER FEMUR, PERCUTANEOUS APPROACH: ICD-10-PCS | Performed by: ORTHOPAEDIC SURGERY

## 2022-07-11 PROCEDURE — 250N000025 HC SEVOFLURANE, PER MIN: Performed by: ORTHOPAEDIC SURGERY

## 2022-07-11 PROCEDURE — 258N000003 HC RX IP 258 OP 636: Performed by: ANESTHESIOLOGY

## 2022-07-11 PROCEDURE — C1769 GUIDE WIRE: HCPCS | Performed by: ORTHOPAEDIC SURGERY

## 2022-07-11 PROCEDURE — 250N000011 HC RX IP 250 OP 636: Performed by: INTERNAL MEDICINE

## 2022-07-11 PROCEDURE — 36415 COLL VENOUS BLD VENIPUNCTURE: CPT | Performed by: INTERNAL MEDICINE

## 2022-07-11 PROCEDURE — 86901 BLOOD TYPING SEROLOGIC RH(D): CPT | Performed by: PHYSICIAN ASSISTANT

## 2022-07-11 PROCEDURE — 250N000011 HC RX IP 250 OP 636: Performed by: NURSE ANESTHETIST, CERTIFIED REGISTERED

## 2022-07-11 PROCEDURE — 999N000141 HC STATISTIC PRE-PROCEDURE NURSING ASSESSMENT: Performed by: ORTHOPAEDIC SURGERY

## 2022-07-11 PROCEDURE — 250N000011 HC RX IP 250 OP 636: Performed by: ANESTHESIOLOGY

## 2022-07-11 PROCEDURE — 999N000179 XR SURGERY CARM FLUORO LESS THAN 5 MIN W STILLS

## 2022-07-11 PROCEDURE — 71045 X-RAY EXAM CHEST 1 VIEW: CPT

## 2022-07-11 DEVICE — IMP SCR SYN 5.0 TI LOCK T25 STARDRIVE 36MM 04.005.526S: Type: IMPLANTABLE DEVICE | Site: HIP | Status: FUNCTIONAL

## 2022-07-11 DEVICE — IMP SCR SYN TFNA FENESTRATED LAG 100MM 04.038.200S: Type: IMPLANTABLE DEVICE | Site: HIP | Status: FUNCTIONAL

## 2022-07-11 DEVICE — IMP NAIL FEMORAL SYN TFN 10X170MM 125D 04.037.012S: Type: IMPLANTABLE DEVICE | Site: HIP | Status: FUNCTIONAL

## 2022-07-11 RX ORDER — LIDOCAINE 40 MG/G
CREAM TOPICAL
Status: DISCONTINUED | OUTPATIENT
Start: 2022-07-11 | End: 2022-07-11

## 2022-07-11 RX ORDER — AMOXICILLIN 250 MG
1 CAPSULE ORAL 2 TIMES DAILY PRN
Status: DISCONTINUED | OUTPATIENT
Start: 2022-07-11 | End: 2022-07-14 | Stop reason: HOSPADM

## 2022-07-11 RX ORDER — SODIUM CHLORIDE, SODIUM LACTATE, POTASSIUM CHLORIDE, CALCIUM CHLORIDE 600; 310; 30; 20 MG/100ML; MG/100ML; MG/100ML; MG/100ML
INJECTION, SOLUTION INTRAVENOUS CONTINUOUS
Status: DISCONTINUED | OUTPATIENT
Start: 2022-07-11 | End: 2022-07-11 | Stop reason: HOSPADM

## 2022-07-11 RX ORDER — AMOXICILLIN 250 MG
2 CAPSULE ORAL 2 TIMES DAILY PRN
Status: DISCONTINUED | OUTPATIENT
Start: 2022-07-11 | End: 2022-07-14 | Stop reason: HOSPADM

## 2022-07-11 RX ORDER — MAGNESIUM HYDROXIDE 1200 MG/15ML
LIQUID ORAL PRN
Status: DISCONTINUED | OUTPATIENT
Start: 2022-07-11 | End: 2022-07-11 | Stop reason: HOSPADM

## 2022-07-11 RX ORDER — CEFAZOLIN SODIUM 2 G/100ML
2 INJECTION, SOLUTION INTRAVENOUS SEE ADMIN INSTRUCTIONS
Status: CANCELLED | OUTPATIENT
Start: 2022-07-11

## 2022-07-11 RX ORDER — DEXAMETHASONE SODIUM PHOSPHATE 4 MG/ML
INJECTION, SOLUTION INTRA-ARTICULAR; INTRALESIONAL; INTRAMUSCULAR; INTRAVENOUS; SOFT TISSUE PRN
Status: DISCONTINUED | OUTPATIENT
Start: 2022-07-11 | End: 2022-07-11

## 2022-07-11 RX ORDER — HYDROMORPHONE HCL IN WATER/PF 6 MG/30 ML
0.2 PATIENT CONTROLLED ANALGESIA SYRINGE INTRAVENOUS EVERY 5 MIN PRN
Status: DISCONTINUED | OUTPATIENT
Start: 2022-07-11 | End: 2022-07-11 | Stop reason: HOSPADM

## 2022-07-11 RX ORDER — DULOXETIN HYDROCHLORIDE 30 MG/1
30 CAPSULE, DELAYED RELEASE ORAL DAILY
Status: DISCONTINUED | OUTPATIENT
Start: 2022-07-11 | End: 2022-07-14 | Stop reason: HOSPADM

## 2022-07-11 RX ORDER — NALOXONE HYDROCHLORIDE 0.4 MG/ML
0.2 INJECTION, SOLUTION INTRAMUSCULAR; INTRAVENOUS; SUBCUTANEOUS
Status: DISCONTINUED | OUTPATIENT
Start: 2022-07-11 | End: 2022-07-14 | Stop reason: HOSPADM

## 2022-07-11 RX ORDER — ONDANSETRON 2 MG/ML
4 INJECTION INTRAMUSCULAR; INTRAVENOUS EVERY 30 MIN PRN
Status: DISCONTINUED | OUTPATIENT
Start: 2022-07-11 | End: 2022-07-11 | Stop reason: HOSPADM

## 2022-07-11 RX ORDER — NALOXONE HYDROCHLORIDE 0.4 MG/ML
0.4 INJECTION, SOLUTION INTRAMUSCULAR; INTRAVENOUS; SUBCUTANEOUS
Status: DISCONTINUED | OUTPATIENT
Start: 2022-07-11 | End: 2022-07-14 | Stop reason: HOSPADM

## 2022-07-11 RX ORDER — KETAMINE HYDROCHLORIDE 10 MG/ML
INJECTION INTRAMUSCULAR; INTRAVENOUS PRN
Status: DISCONTINUED | OUTPATIENT
Start: 2022-07-11 | End: 2022-07-11

## 2022-07-11 RX ORDER — TAMSULOSIN HYDROCHLORIDE 0.4 MG/1
0.8 CAPSULE ORAL AT BEDTIME
Status: DISCONTINUED | OUTPATIENT
Start: 2022-07-11 | End: 2022-07-14 | Stop reason: HOSPADM

## 2022-07-11 RX ORDER — PROCHLORPERAZINE MALEATE 5 MG
5 TABLET ORAL EVERY 6 HOURS PRN
Status: DISCONTINUED | OUTPATIENT
Start: 2022-07-11 | End: 2022-07-14 | Stop reason: HOSPADM

## 2022-07-11 RX ORDER — ONDANSETRON 2 MG/ML
INJECTION INTRAMUSCULAR; INTRAVENOUS PRN
Status: DISCONTINUED | OUTPATIENT
Start: 2022-07-11 | End: 2022-07-11

## 2022-07-11 RX ORDER — FENTANYL CITRATE 0.05 MG/ML
25 INJECTION, SOLUTION INTRAMUSCULAR; INTRAVENOUS EVERY 5 MIN PRN
Status: DISCONTINUED | OUTPATIENT
Start: 2022-07-11 | End: 2022-07-11 | Stop reason: HOSPADM

## 2022-07-11 RX ORDER — GABAPENTIN 800 MG/1
800 TABLET ORAL 2 TIMES DAILY
Status: DISCONTINUED | OUTPATIENT
Start: 2022-07-11 | End: 2022-07-14 | Stop reason: HOSPADM

## 2022-07-11 RX ORDER — BISACODYL 10 MG
10 SUPPOSITORY, RECTAL RECTAL DAILY PRN
Status: DISCONTINUED | OUTPATIENT
Start: 2022-07-11 | End: 2022-07-14

## 2022-07-11 RX ORDER — CEFAZOLIN SODIUM 2 G/100ML
2 INJECTION, SOLUTION INTRAVENOUS
Status: CANCELLED | OUTPATIENT
Start: 2022-07-11

## 2022-07-11 RX ORDER — CEFAZOLIN SODIUM 2 G/100ML
2 INJECTION, SOLUTION INTRAVENOUS EVERY 8 HOURS
Status: COMPLETED | OUTPATIENT
Start: 2022-07-12 | End: 2022-07-12

## 2022-07-11 RX ORDER — LABETALOL HYDROCHLORIDE 5 MG/ML
10 INJECTION, SOLUTION INTRAVENOUS
Status: DISCONTINUED | OUTPATIENT
Start: 2022-07-11 | End: 2022-07-11 | Stop reason: HOSPADM

## 2022-07-11 RX ORDER — HYDRALAZINE HYDROCHLORIDE 20 MG/ML
2.5-5 INJECTION INTRAMUSCULAR; INTRAVENOUS EVERY 10 MIN PRN
Status: DISCONTINUED | OUTPATIENT
Start: 2022-07-11 | End: 2022-07-11 | Stop reason: HOSPADM

## 2022-07-11 RX ORDER — PROCHLORPERAZINE 25 MG
12.5 SUPPOSITORY, RECTAL RECTAL EVERY 12 HOURS PRN
Status: DISCONTINUED | OUTPATIENT
Start: 2022-07-11 | End: 2022-07-14 | Stop reason: HOSPADM

## 2022-07-11 RX ORDER — ONDANSETRON 2 MG/ML
4 INJECTION INTRAMUSCULAR; INTRAVENOUS EVERY 6 HOURS PRN
Status: DISCONTINUED | OUTPATIENT
Start: 2022-07-11 | End: 2022-07-14 | Stop reason: HOSPADM

## 2022-07-11 RX ORDER — ACETAMINOPHEN 650 MG/1
650 SUPPOSITORY RECTAL EVERY 6 HOURS PRN
Status: DISCONTINUED | OUTPATIENT
Start: 2022-07-11 | End: 2022-07-14 | Stop reason: HOSPADM

## 2022-07-11 RX ORDER — CEFAZOLIN SODIUM 1 G/3ML
INJECTION, POWDER, FOR SOLUTION INTRAMUSCULAR; INTRAVENOUS PRN
Status: DISCONTINUED | OUTPATIENT
Start: 2022-07-11 | End: 2022-07-11

## 2022-07-11 RX ORDER — LIDOCAINE 40 MG/G
CREAM TOPICAL
Status: DISCONTINUED | OUTPATIENT
Start: 2022-07-11 | End: 2022-07-14 | Stop reason: HOSPADM

## 2022-07-11 RX ORDER — OXYCODONE HYDROCHLORIDE 5 MG/1
5 TABLET ORAL EVERY 4 HOURS PRN
Status: DISCONTINUED | OUTPATIENT
Start: 2022-07-11 | End: 2022-07-11 | Stop reason: HOSPADM

## 2022-07-11 RX ORDER — DEXTROSE MONOHYDRATE 25 G/50ML
25-50 INJECTION, SOLUTION INTRAVENOUS
Status: DISCONTINUED | OUTPATIENT
Start: 2022-07-11 | End: 2022-07-14 | Stop reason: HOSPADM

## 2022-07-11 RX ORDER — ONDANSETRON 4 MG/1
4 TABLET, ORALLY DISINTEGRATING ORAL EVERY 6 HOURS PRN
Status: DISCONTINUED | OUTPATIENT
Start: 2022-07-11 | End: 2022-07-14 | Stop reason: HOSPADM

## 2022-07-11 RX ORDER — HYDROMORPHONE HCL IN WATER/PF 6 MG/30 ML
0.2 PATIENT CONTROLLED ANALGESIA SYRINGE INTRAVENOUS
Status: DISCONTINUED | OUTPATIENT
Start: 2022-07-11 | End: 2022-07-13

## 2022-07-11 RX ORDER — PROPOFOL 10 MG/ML
INJECTION, EMULSION INTRAVENOUS PRN
Status: DISCONTINUED | OUTPATIENT
Start: 2022-07-11 | End: 2022-07-11

## 2022-07-11 RX ORDER — ACETAMINOPHEN 325 MG/1
650 TABLET ORAL EVERY 6 HOURS PRN
Status: DISCONTINUED | OUTPATIENT
Start: 2022-07-11 | End: 2022-07-14 | Stop reason: HOSPADM

## 2022-07-11 RX ORDER — TRANEXAMIC ACID 650 MG/1
1950 TABLET ORAL ONCE
Status: CANCELLED | OUTPATIENT
Start: 2022-07-11 | End: 2022-07-11

## 2022-07-11 RX ORDER — BUPIVACAINE HYDROCHLORIDE AND EPINEPHRINE 5; 5 MG/ML; UG/ML
INJECTION, SOLUTION PERINEURAL PRN
Status: DISCONTINUED | OUTPATIENT
Start: 2022-07-11 | End: 2022-07-11 | Stop reason: HOSPADM

## 2022-07-11 RX ORDER — ONDANSETRON 4 MG/1
4 TABLET, ORALLY DISINTEGRATING ORAL EVERY 30 MIN PRN
Status: DISCONTINUED | OUTPATIENT
Start: 2022-07-11 | End: 2022-07-11 | Stop reason: HOSPADM

## 2022-07-11 RX ORDER — CARVEDILOL 12.5 MG/1
12.5 TABLET ORAL 2 TIMES DAILY WITH MEALS
Status: DISCONTINUED | OUTPATIENT
Start: 2022-07-11 | End: 2022-07-14 | Stop reason: HOSPADM

## 2022-07-11 RX ORDER — LIDOCAINE 40 MG/G
CREAM TOPICAL
Status: DISCONTINUED | OUTPATIENT
Start: 2022-07-11 | End: 2022-07-11 | Stop reason: HOSPADM

## 2022-07-11 RX ORDER — SODIUM CHLORIDE 9 MG/ML
INJECTION, SOLUTION INTRAVENOUS CONTINUOUS
Status: DISCONTINUED | OUTPATIENT
Start: 2022-07-11 | End: 2022-07-12

## 2022-07-11 RX ORDER — NICOTINE POLACRILEX 4 MG
15-30 LOZENGE BUCCAL
Status: DISCONTINUED | OUTPATIENT
Start: 2022-07-11 | End: 2022-07-14 | Stop reason: HOSPADM

## 2022-07-11 RX ADMIN — PHENYLEPHRINE HYDROCHLORIDE 100 MCG: 10 INJECTION INTRAVENOUS at 18:10

## 2022-07-11 RX ADMIN — HYDROMORPHONE HYDROCHLORIDE 0.2 MG: 0.2 INJECTION, SOLUTION INTRAMUSCULAR; INTRAVENOUS; SUBCUTANEOUS at 03:01

## 2022-07-11 RX ADMIN — HYDROMORPHONE HYDROCHLORIDE 0.2 MG: 0.2 INJECTION, SOLUTION INTRAMUSCULAR; INTRAVENOUS; SUBCUTANEOUS at 15:49

## 2022-07-11 RX ADMIN — SODIUM CHLORIDE: 9 INJECTION, SOLUTION INTRAVENOUS at 21:59

## 2022-07-11 RX ADMIN — HYDROMORPHONE HYDROCHLORIDE 0.2 MG: 0.2 INJECTION, SOLUTION INTRAMUSCULAR; INTRAVENOUS; SUBCUTANEOUS at 13:31

## 2022-07-11 RX ADMIN — GABAPENTIN 800 MG: 800 TABLET, FILM COATED ORAL at 09:02

## 2022-07-11 RX ADMIN — Medication 25 MG: at 18:02

## 2022-07-11 RX ADMIN — PHENYLEPHRINE HYDROCHLORIDE 150 MCG: 10 INJECTION INTRAVENOUS at 18:19

## 2022-07-11 RX ADMIN — SODIUM CHLORIDE: 9 INJECTION, SOLUTION INTRAVENOUS at 11:13

## 2022-07-11 RX ADMIN — PHENYLEPHRINE HYDROCHLORIDE 50 MCG: 10 INJECTION INTRAVENOUS at 19:04

## 2022-07-11 RX ADMIN — PHENYLEPHRINE HYDROCHLORIDE 200 MCG: 10 INJECTION INTRAVENOUS at 19:19

## 2022-07-11 RX ADMIN — FENTANYL CITRATE 25 MCG: 50 INJECTION, SOLUTION INTRAMUSCULAR; INTRAVENOUS at 20:41

## 2022-07-11 RX ADMIN — HYDROMORPHONE HYDROCHLORIDE 0.2 MG: 0.2 INJECTION, SOLUTION INTRAMUSCULAR; INTRAVENOUS; SUBCUTANEOUS at 09:02

## 2022-07-11 RX ADMIN — Medication 25 ML: at 16:50

## 2022-07-11 RX ADMIN — PROPOFOL 180 MG: 10 INJECTION, EMULSION INTRAVENOUS at 18:08

## 2022-07-11 RX ADMIN — ONDANSETRON 4 MG: 2 INJECTION INTRAMUSCULAR; INTRAVENOUS at 18:33

## 2022-07-11 RX ADMIN — MIDAZOLAM 1 MG: 1 INJECTION INTRAMUSCULAR; INTRAVENOUS at 18:02

## 2022-07-11 RX ADMIN — FENTANYL CITRATE 25 MCG: 50 INJECTION, SOLUTION INTRAMUSCULAR; INTRAVENOUS at 20:36

## 2022-07-11 RX ADMIN — TAMSULOSIN HYDROCHLORIDE 0.8 MG: 0.4 CAPSULE ORAL at 01:46

## 2022-07-11 RX ADMIN — SODIUM CHLORIDE, POTASSIUM CHLORIDE, SODIUM LACTATE AND CALCIUM CHLORIDE: 600; 310; 30; 20 INJECTION, SOLUTION INTRAVENOUS at 17:58

## 2022-07-11 RX ADMIN — SODIUM CHLORIDE, POTASSIUM CHLORIDE, SODIUM LACTATE AND CALCIUM CHLORIDE: 600; 310; 30; 20 INJECTION, SOLUTION INTRAVENOUS at 20:05

## 2022-07-11 RX ADMIN — CARVEDILOL 12.5 MG: 12.5 TABLET, FILM COATED ORAL at 16:01

## 2022-07-11 RX ADMIN — PHENYLEPHRINE HYDROCHLORIDE 100 MCG: 10 INJECTION INTRAVENOUS at 19:10

## 2022-07-11 RX ADMIN — CEFAZOLIN 2 G: 1 INJECTION, POWDER, FOR SOLUTION INTRAMUSCULAR; INTRAVENOUS at 18:11

## 2022-07-11 RX ADMIN — FENTANYL CITRATE 25 MCG: 50 INJECTION, SOLUTION INTRAMUSCULAR; INTRAVENOUS at 20:52

## 2022-07-11 RX ADMIN — PHENYLEPHRINE HYDROCHLORIDE 0.5 MCG/KG/MIN: 10 INJECTION INTRAVENOUS at 18:22

## 2022-07-11 RX ADMIN — PROPOFOL 20 MG: 10 INJECTION, EMULSION INTRAVENOUS at 18:04

## 2022-07-11 RX ADMIN — PHENYLEPHRINE HYDROCHLORIDE 50 MCG: 10 INJECTION INTRAVENOUS at 18:44

## 2022-07-11 RX ADMIN — GABAPENTIN 800 MG: 800 TABLET, FILM COATED ORAL at 01:46

## 2022-07-11 RX ADMIN — PHENYLEPHRINE HYDROCHLORIDE 100 MCG: 10 INJECTION INTRAVENOUS at 19:01

## 2022-07-11 RX ADMIN — DEXAMETHASONE SODIUM PHOSPHATE 4 MG: 4 INJECTION, SOLUTION INTRA-ARTICULAR; INTRALESIONAL; INTRAMUSCULAR; INTRAVENOUS; SOFT TISSUE at 18:23

## 2022-07-11 RX ADMIN — FENTANYL CITRATE 25 MCG: 50 INJECTION, SOLUTION INTRAMUSCULAR; INTRAVENOUS at 20:57

## 2022-07-11 RX ADMIN — PHENYLEPHRINE HYDROCHLORIDE 100 MCG: 10 INJECTION INTRAVENOUS at 18:11

## 2022-07-11 ASSESSMENT — ACTIVITIES OF DAILY LIVING (ADL)
ADLS_ACUITY_SCORE: 37
ADLS_ACUITY_SCORE: 35
ADLS_ACUITY_SCORE: 37

## 2022-07-11 ASSESSMENT — ENCOUNTER SYMPTOMS: DYSRHYTHMIAS: 1

## 2022-07-11 ASSESSMENT — LIFESTYLE VARIABLES: TOBACCO_USE: 1

## 2022-07-11 NOTE — ED NOTES
Bed: ED11  Expected date:   Expected time:   Means of arrival:   Comments:  Tome[place] 88M hip pain after fall, possibly broken, 50mcg of fentanyl

## 2022-07-11 NOTE — PROGRESS NOTES
[facility]  Hospitalist Progress Note   07/11/2022          Assessment and Plan:       Mark Hernández is a 88 year old male with prostate cancer, complete heart block, CKD, diabetes admitted on 7/10/2022 with a left hip fracture after a mechanical fall.     Acute mildly displaced left intertrochanteric hip fracture:    This is after a mechanical fall at home without any syncope.  N.p.o. for surgery today.  Orthopedic team following.  Defer postoperative care to orthopedic team.  Recommend early ambulation.  Minimize narcotic use as able to.  At high risk for delirium.  Monitor mental status closely.    Diabetes:   PTA metformin.  Insulin sliding scale.     Hypertension, congestive heart failure:  Continue pta Coreg, lisinopril, Zocor and furosemide postprocedure.  Telemetry monitoring..     4.  Chronic kidney disease stage III, appears to be roughly at his baseline of 1.1-1.2.  Monitor renal function closely.     5.  Complete heart block, status post pacer:  He sounds to be in normal sinus rhythm.     6.  Iron deficiency anemia:  Can resume iron once taking p.o. daily.     7.  Benign prostatic hypertrophy:  Continue with Flomax.     8.  Vitamin B12 deficiency:  Resume B12 supplementation when taking p.o. daily.    Orders Placed This Encounter      NPO per Anesthesia Guidelines for Procedure/Surgery Except for: Meds      DVT Prophylaxis: SCD  Code Status: Full Code  Disposition: Expected discharge > 2 days.    Discussed with patient, floor RN  More than 50% of time spent in direct patient care, care coordination, patient counseling, and formalizing plan of care.    Rebecca Valdez MD        Interval History:      Patient lying in bed.    N.p.o. from midnight, plan for surgery today.  Continues to complain of pain in his left hip.  Receiving IV Dilaudid for pain.  Denies any chest pain or palpitations.  Denies any headache or dizziness.  No nausea or vomiting.  Anxious for procedure.            Physical Exam:        Physical Exam   Temp:  [97.2  F (36.2  C)-98  F (36.7  C)] 97.2  F (36.2  C)  Pulse:  [60-79] 65  Resp:  [14-18] 16  BP: (112-147)/(57-75) 118/57  SpO2:  [95 %-100 %] 96 %    Intake/Output Summary (Last 24 hours) at 7/11/2022 1623  Last data filed at 7/11/2022 1148  Gross per 24 hour   Intake --   Output 800 ml   Net -800 ml       Admission Weight: 94.3 kg (208 lb)  Current Weight: 94.3 kg (208 lb)    PHYSICAL EXAM  GENERAL: Patient is in no distress. Alert and oriented.  HEART: Regular rate and rhythm. S1S2.   LUNGS: Clear to auscultation bilaterally. No expiratory wheeze.  Respirations unlabored  NEURO: Moving all extremities  EXTREMITIES: No pedal edema.  Left hip externally rotated  SKIN: Warm, dry. No rash  PSYCHIATRY Cooperative       Medications:          [Auto Hold] carvedilol  12.5 mg Oral BID w/meals     [Auto Hold] DULoxetine  30 mg Oral Daily     [Auto Hold] gabapentin  800 mg Oral BID     [Auto Hold] insulin aspart  1-6 Units Subcutaneous Q4H     [Auto Hold] sodium chloride (PF)  3 mL Intracatheter Q8H     [Auto Hold] tamsulosin  0.8 mg Oral At Bedtime     [Auto Hold] acetaminophen **OR** [Auto Hold] acetaminophen, [Auto Hold] bisacodyl, [Auto Hold] glucose **OR** [Auto Hold] dextrose **OR** [Auto Hold] glucagon, [Auto Hold] HYDROmorphone, [Auto Hold] lidocaine 4%, [Auto Hold] lidocaine (buffered or not buffered), [Auto Hold] melatonin, [Auto Hold] naloxone **OR** [Auto Hold] naloxone **OR** [Auto Hold] naloxone **OR** [Auto Hold] naloxone, [Auto Hold] ondansetron **OR** [Auto Hold] ondansetron, [Auto Hold] oxyCODONE, [Auto Hold] prochlorperazine **OR** [Auto Hold] prochlorperazine **OR** [Auto Hold] prochlorperazine, [Auto Hold] senna-docusate **OR** [Auto Hold] senna-docusate, [Auto Hold] sodium chloride (PF)         Data:      All new lab and imaging data was reviewed.

## 2022-07-11 NOTE — ED TRIAGE NOTES
Pt from home where he lives independently with his wife, they went to dinner tonight and pt had 1 glass of wine, upon returning home pt had a mechanical fall where he tripped and fell, pt reports left hip and groin pain, received 50 mcg fent from EMS prior to arrival and now rate pain 0/10 at rest but has pain with exam     Triage Assessment     Row Name 07/10/22 2054       Triage Assessment (Adult)    Airway WDL WDL       Respiratory WDL    Respiratory WDL WDL       Cardiac WDL    Cardiac WDL WDL       Cognitive/Neuro/Behavioral WDL    Cognitive/Neuro/Behavioral WDL WDL

## 2022-07-11 NOTE — H&P
Admitted: 07/10/2022    PRIMARY CARE PHYSICIAN:  Dr. at [facility].    CODE STATUS:  Full code.  Discussed with the patient.    CHIEF COMPLAINT:  Left hip pain after a fall.    HISTORY OF PRESENT ILLNESS:  Mr. Edgar monae is an 88-year-old male with a past medical history significant for complete heart block, status pacer, prostate and bladder cancer, diabetes, CHF, who presents to the Emergency Department with the above concerns.  History is obtained through discussion with the ED physician, as well as the patient.      The patient notes that he had gone over with his wife to [employer] this evening where he had dinner with one glass of wine.  He got back home and was feeling fine, but unfortunately caught his toe on the floor and fell to the ground, landing on his left hip.  There was no preceding dizziness or lightheadedness and there was no loss of consciousness.  He was brought to the Emergency Department where plain film showed a mildly displaced left intertrochanteric hip fracture.      His pain is fairly well controlled as long as he is at rest.  He denies any chest pain, shortness of breath, abdominal pain, nausea, vomiting, diaphoresis, fevers or chills.  No sick contacts.  The patient is able to get around well.  At baseline, he does not have any chest pain or shortness of breath with ambulation or with exertion.  He does carry a history of CHF, though I note that echocardiogram back in 2020 showed a preserved ejection fraction of 60%.  He does have some lower extremity edema for which she is on Lasix.  Volume status has been stable recently.    PAST MEDICAL HISTORY:    1.  Prostate cancer with associated bladder cancer and right ureter cancer, status post nephrectomy.  2.  Complete heart block, status post pacer in 2016.  3.  Congestive heart failure:  Echocardiogram in 2020 showed an EF of 60%.  4.  Diabetes type 2.  5.  Chronic kidney disease stage III with a baseline creatinine of roughly  1.1.  6.  BPH.  7.  Diabetes.  8.  Hypertension.  9.  Dyslipidemia.  10.  Peripheral neuropathy.  11.  Vitamin B12 deficiency.  12.  Hemorrhoids.  13.  Diverticulosis.  14.  Chronic anemia, baseline of 11-12.    MEDICATIONS:    Prior to Admission medications    Medication Sig Last Dose Taking? Auth Provider Long Term End Date   acetaminophen (ACETAMINOPHEN 8 HOUR) 650 MG CR tablet Take 650 mg by mouth 2 times daily as needed for mild pain or fever  at prn Yes Unknown, Entered By History       aspirin 81 MG EC tablet Take 81 mg by mouth daily 7/10/2022 at Unknown time Yes Unknown, Entered By History       carvedilol (COREG) 12.5 MG tablet Take 12.5 mg by mouth 2 times daily (with meals) 7/10/2022 at am Yes Reported, Patient Yes     Coenzyme Q10 (COQ-10 PO) Take 1 tablet by mouth daily 7/9/2022 at pm Yes Reported, Patient       CYANOCOBALAMIN PO Take 1,000 mcg by mouth every other day  Unknown at Unknown time Yes Unknown, Entered By History       DULoxetine (CYMBALTA) 30 MG capsule Take 30 mg by mouth daily Takes for neuropathy 7/9/2022 at pm Yes Reported, Patient Yes     ferrous sulfate (FEROSUL) 325 (65 Fe) MG tablet Take 325 mg by mouth daily (with breakfast) 7/10/2022 at am Yes Reported, Patient       furosemide (LASIX) 20 MG tablet Take 20 mg by mouth daily 7/10/2022 at am Yes Reported, Patient       gabapentin (NEURONTIN) 800 MG tablet Take 1 tablet (800 mg) by mouth 2 times daily 7/10/2022 at am Yes Sho Hines PA-C Yes     lisinopril (ZESTRIL) 2.5 MG tablet Take 2.5 mg by mouth daily  7/10/2022 at am Yes Reported, Patient Yes     metFORMIN (GLUCOPHAGE) 500 MG tablet Take 250 mg by mouth 2 times daily (with meals) (takes 0.5 x 500mg) 7/10/2022 at am Yes Reported, Patient No     Omega-3 Fatty Acids (OMEGA 3 PO) Take 1 teaspoonful by mouth daily 7/9/2022 at Unknown time Yes Reported, Patient       PREBIOTIC PRODUCT PO Take 1 Scoop by mouth daily   Yes Unknown, Entered By History       Probiotic  Product (PROBIOTIC PO) Take 1 capsule by mouth daily 7/9/2022 at Unknown time Yes Reported, Patient       simvastatin (ZOCOR) 40 MG tablet Take 20 mg by mouth At Bedtime (Takes 1/2 of a 40mg tablet) 7/9/2022 at pm Yes Reported, Patient Yes     sulfamethoxazole-trimethoprim (BACTRIM) 400-80 MG tablet Take 1 tablet by mouth daily 7/10/2022 at am Yes Unknown, Entered By History No     tamsulosin (FLOMAX) 0.4 MG 24 hr capsule Take 0.8 mg by mouth At Bedtime (takes 2 x 0.4mg) 7/9/2022 at pm Yes Reported, Patient       VITAMIN D PO Take 1 tablet by mouth daily     Reported, Patient           SOCIAL HISTORY:  The patient denies any use of tobacco, including a long time ago, but has one drink most days of the week.    FAMILY HISTORY:  Father had an MI.  Mother had schizophrenia and a CVA.    ALLERGIES:  NO KNOWN DRUG ALLERGIES.    REVIEW OF SYSTEMS:  A complete review of systems reviewed and negative, save for the pertinent positives recorded in HPI.    PHYSICAL EXAMINATION:    VITAL SIGNS:  Show blood pressure of 112/60, heart rate 73, respirations 18, satting 99% on room air, temperature 98.0 degrees Fahrenheit.  GENERAL:  The patient is lying in bed and appears comfortable at rest.  HEENT:  Pupils equal, round, reactive to light, extraocular muscle function intact.  No scleral icterus.  Oropharynx is clear.  NECK:  No lymphadenopathy or thyromegaly.  CARDIOVASCULAR:  Heart is regular rate and rhythm without any murmur, rub or gallop.  PULMONARY:  Lungs are clear to auscultation bilaterally without wheeze or rhonchi.  GASTROINTESTINAL:  Positive bowel sounds, soft, nontender and nondistended.  SKIN:  No new rashes or lesions.  LYMPHATICS:  He has trace to 1+ pitting edema of the bilateral lower extremities.  PSYCHIATRIC:  Alert and oriented x 3, normal affect.  NEUROLOGIC:  Cranial nerves II over grossly intact.  No focal deficits.    LABORATORY DATA:  WBC count 5.6, hemoglobin 10.4, platelets 155.  Sodium 140, potassium  3.9, chloride 106, CO2 of 26, BUN 40, creatinine 1.24.  Unremarkable LFTs.      Plain film of the hip shows a mildly displaced left intertrochanteric hip fracture.    IMPRESSION AND PLAN:  Mr. Hernández is an 88-year-old male with a past medical history significant for prostate cancer, complete heart block, CKD, diabetes, who presents to the Emergency Department with a left hip fracture after a mechanical fall.    1.  Acute mildly displaced left intertrochanteric hip fracture:  This is after a mechanical fall at home without any syncope.  He is being n.p.o. with pain control, antiemetics as needed.  Orthopedic surgery consultation has been ordered for likely surgery tomorrow.  He will be on bed rest, pending surgery.  Therapies after surgery.    2.  Diabetes:  Blood sugar is mildly elevated.  We will await medication rec, but it appears he is on metformin.  We will have on sliding scale.  Can resume oral anti-diabetics when he is eating and taking p.o. daily.    3.  Hypertension, congestive heart failure:  The CHF seems to be primarily diastolic type.  Based upon echocardiogram about a year and a half ago.  Volume status appears to be roughly at baseline.  We will continue with PTA regimen once confirmed, but appears to include Coreg, lisinopril, Zocor and furosemide.    4.  Chronic kidney disease stage III, appears to be roughly at his baseline of 1.1-1.2.    5.  Complete heart block, status post pacer:  He sounds to be in normal sinus rhythm.    6.  Iron deficiency anemia:  Can resume iron once taking p.o. daily.    7.  Benign prostatic hypertrophy:  Continue with Flomax.    8.  Vitamin B12 deficiency:  Resume B12 supplementation when taking p.o. daily.    9.  Perioperative evaluation:  Patient does not have any exertional shortness of breath or chest pain symptoms at baseline and is at his typical exertional capacity baseline.  He is at slightly increased risk due to his age, history of diabetes and CHF.  We will  obtain a chest x-ray preoperatively.    10.  Deep venous thrombosis prophylaxis:  SCDs.    DISPOSITION:  The patient was brought under inpatient status.  Anticipate three nights in the hospital for post-fracture cares and possible TCU.    Shantanu Benavidez DO        D: 07/10/2022   T: 2022   MT: [email or web address]    Name:     FRANKIE THOMPSON  MRN:      [id number]        Account:     [id number]   :      1933           Admitted:    07/10/2022       Document: [id number]

## 2022-07-11 NOTE — CONSULTS
[facility]    Orthopedic Consultation    Mark Hernández MRN# [id number]   Age: 88 year old YOB: 1933     Date of Admission: 7/10/2022    Reason for consult: Left hip IT fracture       Requesting provider: Shantanu Benavidez       Level of consult: Consult, follow and place orders           Assessment and Plan:   Assessment:   Left hip intertrochanteric fracture       Plan:   Patient scheduled for a Left hip IM Nail later today pending patient is optimized/cleared for surgery per hospitalist.    Surgeon: Dr Pacheco  NPO Status effective midnight   NWB/Bedrest until postop   Hold anticoagulants if taken: ASA 81mg   Pain medication as needed, minimize narcotics as able           Chief Complaint:   Left hip pain          History of Present Illness:   Mr. Hernández injury is an 88-year-old male presented to ED with a fall.   Patient was at home last evening and was feeling fine, but unfortunately caught his toe on the floor and fell to the ground, landing on his left hip. He was brought to the Emergency Department where plain film showed a mildly displaced left intertrochanteric hip fracture.             Past Medical History:     Past Medical History:   Diagnosis Date     Acquired hydrocele      BPH (benign prostatic hyperplasia)      CKD (chronic kidney disease) stage 3, GFR 30-59 ml/min (H)      Complete AV block (H) 09/23/2016    Implantation of a dual-chamber pacemaker     Congestive heart failure (H)      Diverticulosis      Hemorrhoids      Hyperlipidemia      Hypertension      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)      Pacemaker      Peripheral neuropathy      Prostate cancer (H)      Renal disease      Sensorineural hearing loss of both ears      Thrombocytopenia (H)      Thrombocytopenia (H)      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)      Vitamin B12 deficiency              Past Surgical History:     Past  Surgical History:   Procedure Laterality Date     ARTHROPLASTY REVISION HIP Right 2017    Procedure: ARTHROPLASTY REVISION HIP;;  Surgeon: Kirk Summers MD;  Location:  OR     ARTHROPLASTY REVISION HIP Right 2020    Procedure: REVISE RIGHT TOTAL HIP;  Surgeon: Kirk Summers MD;  Location:  OR     ARTHROPLASTY REVISION HIP Right 2020    Procedure: IRRIGATION AND DEBRIDEMENT RIGHT HIP WOUND WITH CLOSURE;  Surgeon: Kirk Summers MD;  Location:  OR     BACK SURGERY       ENT SURGERY      tonsillectomy     GENITOURINARY SURGERY      S/P NEPHRECTOMY     IRRIGATION AND DEBRIDEMENT HIP, PLACE ANTIBIOTIC CEMENT BEADS / SPACER Right 2017    Procedure: COMBINED IRRIGATION AND DEBRIDEMENT HIP, PLACE ANTIBIOTIC CEMENT BEADS / SPACER;  IRRIGATION AND DEBRIDEMENT RIGHT HIP WITH PLACEMENT ANTIBIOTIC BEADS, POLY EXCHANGE, AND PARTIAL HIP REVISION. ;  Surgeon: Kirk Summers MD;  Location:  OR     KNEE SURGERY       ORTHOPEDIC SURGERY       RELEASE CARPAL TUNNEL Left 2019    Procedure: LEFT CARPAL TUNNEL RELEASE;  Surgeon: Mynor López MD;  Location:  OR     revision total hip arthroplasty       rivision total hip arthroplasty       rotator cuff syndrome               Social History:     Social History     Tobacco Use     Smoking status: Former Smoker     Packs/day: 1.00     Years: 24.00     Pack years: 24.00     Types: Cigarettes     Start date:      Quit date:      Years since quittin.5     Smokeless tobacco: Never Used   Substance Use Topics     Alcohol use: Yes     Comment: wine daily: 2              Family History:     Family History   Problem Relation Age of Onset     Family History Negative Mother      Myocardial Infarction Father 80     Unknown/Adopted Maternal Grandmother      Unknown/Adopted Maternal Grandfather      Unknown/Adopted Paternal Grandmother      Unknown/Adopted Paternal Grandfather      Rheumatoid Arthritis Sister      Myocardial Infarction  Sister      Family History Negative Son      Family History Negative Daughter              Immunizations:     VACCINE/DOSE   Diptheria   DPT   DTAP   HBIG   Hepatitis A   Hepatitis B   HIB   Influenza   Measles   Meningococcal   MMR   Mumps   Pneumococcal   Polio   Rubella   Small Pox   TDAP   Varicella   Zoster             Allergies:   No Known Allergies          Medications:     Current Facility-Administered Medications   Medication     [Auto Hold] acetaminophen (TYLENOL) tablet 650 mg    Or     [Auto Hold] acetaminophen (TYLENOL) Suppository 650 mg     [Auto Hold] bisacodyl (DULCOLAX) suppository 10 mg     [Auto Hold] carvedilol (COREG) tablet 12.5 mg     [Auto Hold] glucose gel 15-30 g    Or     [Auto Hold] dextrose 50 % injection 25-50 mL    Or     [Auto Hold] glucagon injection 1 mg     [Auto Hold] DULoxetine (CYMBALTA) DR capsule 30 mg     [Auto Hold] gabapentin (NEURONTIN) tablet 800 mg     [Auto Hold] HYDROmorphone (DILAUDID) injection 0.2 mg     [Auto Hold] insulin aspart (NovoLOG) injection (RAPID ACTING)     [Auto Hold] lidocaine (LMX4) cream     [Auto Hold] lidocaine 1 % 0.1-1 mL     [Auto Hold] melatonin tablet 1 mg     [Auto Hold] naloxone (NARCAN) injection 0.2 mg    Or     [Auto Hold] naloxone (NARCAN) injection 0.4 mg    Or     [Auto Hold] naloxone (NARCAN) injection 0.2 mg    Or     [Auto Hold] naloxone (NARCAN) injection 0.4 mg     [Auto Hold] ondansetron (ZOFRAN ODT) ODT tab 4 mg    Or     [Auto Hold] ondansetron (ZOFRAN) injection 4 mg     [Auto Hold] oxyCODONE IR (ROXICODONE) half-tab 2.5 mg     [Auto Hold] prochlorperazine (COMPAZINE) injection 5 mg    Or     [Auto Hold] prochlorperazine (COMPAZINE) tablet 5 mg    Or     [Auto Hold] prochlorperazine (COMPAZINE) suppository 12.5 mg     [Auto Hold] senna-docusate (SENOKOT-S/PERICOLACE) 8.6-50 MG per tablet 1 tablet    Or     [Auto Hold] senna-docusate (SENOKOT-S/PERICOLACE) 8.6-50 MG per tablet 2 tablet     [Auto Hold] sodium chloride (PF) 0.9%  PF flush 3 mL     [Auto Hold] sodium chloride (PF) 0.9% PF flush 3 mL     sodium chloride 0.9% infusion     [Auto Hold] tamsulosin (FLOMAX) capsule 0.8 mg             Review of Systems:   ROS:  10 point ROS neg other than the symptoms noted above in the HPI.            Physical Exam:   All vitals have been reviewed  Patient Vitals for the past 24 hrs:   BP Temp Temp src Pulse Resp SpO2 Weight   07/11/22 1605 -- -- -- -- 16 -- --   07/11/22 1600 118/57 97.2  F (36.2  C) Oral 65 16 96 % --   07/11/22 1445 -- -- -- -- 16 -- --   07/11/22 1331 -- -- -- -- 16 -- --   07/11/22 0917 -- -- -- -- 16 -- --   07/11/22 0902 -- -- -- -- 16 -- --   07/11/22 0755 121/60 97.6  F (36.4  C) Oral 60 16 100 % --   07/11/22 0101 (!) 143/75 98  F (36.7  C) Oral 74 14 96 % --   07/10/22 2347 126/67 -- -- 75 -- -- --   07/10/22 2300 (!) 147/65 -- -- 79 -- 96 % --   07/10/22 2230 -- -- -- -- -- 95 % --   07/10/22 2155 -- -- -- -- -- 99 % --   07/10/22 2100 -- -- -- -- -- 97 % --   07/10/22 2055 -- -- -- -- -- 97 % --   07/10/22 2053 112/60 98  F (36.7  C) Temporal 73 18 98 % 94.3 kg (208 lb)       Intake/Output Summary (Last 24 hours) at 7/11/2022 1624  Last data filed at 7/11/2022 1148  Gross per 24 hour   Intake --   Output 800 ml   Net -800 ml         Physical Exam   Temp: 97.2  F (36.2  C) Temp src: Oral BP: 118/57 Pulse: 65   Resp: 16 SpO2: 96 % O2 Device: None (Room air)    Vital Signs with Ranges  Temp:  [97.2  F (36.2  C)-98  F (36.7  C)] 97.2  F (36.2  C)  Pulse:  [60-79] 65  Resp:  [14-18] 16  BP: (112-147)/(57-75) 118/57  SpO2:  [95 %-100 %] 96 %  208 lbs 0 oz    Constitutional: Pleasant, alert, appropriate, following commands.  HEENT: Head atraumatic normocephalic. Pupils equal round and reactive to light.  Respiratory: Unlabored breathing no audible wheeze  Cardiovascular: Regular rate and rhythm per pulses  GI: Abdomen non-distended.  Lymph/Hematologic: No lymphadenopathy in areas examined  Genitourinary:  No méndez  Skin: No  rashes, no cyanosis, no edema.  Musculoskeletal: On physical exam of the left lower extremity, patient's leg is resting in a fairly neutral position.  No skin abrasions seen, mild ecchymosis.   Patient is able to dorsi and plantarflex both ankles with equal resistance.  Full sensation to light touch on the left versus right lower extremities.  Distal pulses are intact and equal bilaterally.      Neurologic: normal without focal findings, mental status, speech normal, alert and oriented x iii  Neuropsychiatric: stable             Data:   All laboratory data reviewed  Results for orders placed or performed during the hospital encounter of 07/10/22   XR Pelvis w Hip Left 1 View     Status: None    Narrative    EXAM: XR PELVIS AND HIP LEFT 1 VIEW  LOCATION: [facility]  DATE/TIME: 7/10/2022 9:20 PM    INDICATION: Pain after trauma  COMPARISON: 5/27/2020 radiographs.      Impression    IMPRESSION:   1. Mildly displaced intertrochanteric left hip fracture. There is less than 1 cm of displacement. No angulation.  2. Postoperative changes and heterotopic ossification about the right hip, unchanged.  3. Postoperative and degenerative changes in the visualized spine again noted.   XR Chest Port 1 View     Status: None    Narrative    CHEST ONE VIEW July 11, 2022 10:05 AM     HISTORY: Preoperative.    COMPARISON: December 30, 2017.      Impression    IMPRESSION: No acute disease.    HODA INGRAM MD         SYSTEM ID:  [id number]   Comprehensive metabolic panel     Status: Abnormal   Result Value Ref Range    Sodium 140 133 - 144 mmol/L    Potassium 3.9 3.4 - 5.3 mmol/L    Chloride 106 94 - 109 mmol/L    Carbon Dioxide (CO2) 26 20 - 32 mmol/L    Anion Gap 8 3 - 14 mmol/L    Urea Nitrogen 40 (H) 7 - 30 mg/dL    Creatinine 1.24 0.66 - 1.25 mg/dL    Calcium 8.3 (L) 8.5 - 10.1 mg/dL    Glucose 185 (H) 70 - 99 mg/dL    Alkaline Phosphatase 125 40 - 150 U/L    AST 9 0 - 45 U/L    ALT 25 0 - 70 U/L    Protein  Total 5.8 (L) 6.8 - 8.8 g/dL    Albumin 3.1 (L) 3.4 - 5.0 g/dL    Bilirubin Total 0.3 0.2 - 1.3 mg/dL    GFR Estimate 56 (L) >60 mL/min/1.73m2   CBC with platelets and differential     Status: Abnormal   Result Value Ref Range    WBC Count 5.6 4.0 - 11.0 10e3/uL    RBC Count 3.13 (L) 4.40 - 5.90 10e6/uL    Hemoglobin 10.4 (L) 13.3 - 17.7 g/dL    Hematocrit 30.3 (L) 40.0 - 53.0 %    MCV 97 78 - 100 fL    MCH 33.2 (H) 26.5 - 33.0 pg    MCHC 34.3 31.5 - 36.5 g/dL    RDW 12.8 10.0 - 15.0 %    Platelet Count 155 150 - 450 10e3/uL    % Neutrophils 62 %    % Lymphocytes 22 %    % Monocytes 11 %    % Eosinophils 4 %    % Basophils 0 %    % Immature Granulocytes 1 %    NRBCs per 100 WBC 0 <1 /100    Absolute Neutrophils 3.4 1.6 - 8.3 10e3/uL    Absolute Lymphocytes 1.2 0.8 - 5.3 10e3/uL    Absolute Monocytes 0.6 0.0 - 1.3 10e3/uL    Absolute Eosinophils 0.2 0.0 - 0.7 10e3/uL    Absolute Basophils 0.0 0.0 - 0.2 10e3/uL    Absolute Immature Granulocytes 0.0 <=0.4 10e3/uL    Absolute NRBCs 0.0 10e3/uL   Asymptomatic COVID-19 Virus (Coronavirus) by PCR Nasopharyngeal     Status: Normal    Specimen: Nasopharyngeal; Swab   Result Value Ref Range    SARS CoV2 PCR Negative Negative    Narrative    Testing was performed using the Xpert Xpress SARS-CoV-2 Assay on the   Cepheid Gene-Xpert Instrument Systems. Additional information about   this Emergency Use Authorization (EUA) assay can be found via the Lab   Guide. This test should be ordered for the detection of SARS-CoV-2 in   individuals who meet SARS-CoV-2 clinical and/or epidemiological   criteria. Test performance is unknown in asymptomatic patients. This   test is for in vitro diagnostic use under the FDA EUA for   laboratories certified under CLIA to perform high complexity testing.   This test has not been FDA cleared or approved. A negative result   does not rule out the presence of PCR inhibitors in the specimen or   target RNA in concentration below the limit of detection  for the   assay. The possibility of a false negative should be considered if   the patient's recent exposure or clinical presentation suggests   COVID-19. This test was validated by the [facility]. This laboratory is certified under the Clinical Laboratory Improvement Amendments of 1988 (CLIA-88) as qualified to perform high complexity laboratory testing.     Basic metabolic panel     Status: Abnormal   Result Value Ref Range    Sodium 139 133 - 144 mmol/L    Potassium 4.0 3.4 - 5.3 mmol/L    Chloride 106 94 - 109 mmol/L    Carbon Dioxide (CO2) 27 20 - 32 mmol/L    Anion Gap 6 3 - 14 mmol/L    Urea Nitrogen 37 (H) 7 - 30 mg/dL    Creatinine 1.09 0.66 - 1.25 mg/dL    Calcium 8.6 8.5 - 10.1 mg/dL    Glucose 150 (H) 70 - 99 mg/dL    GFR Estimate 65 >60 mL/min/1.73m2   CBC with platelets     Status: Abnormal   Result Value Ref Range    WBC Count 5.8 4.0 - 11.0 10e3/uL    RBC Count 3.18 (L) 4.40 - 5.90 10e6/uL    Hemoglobin 10.0 (L) 13.3 - 17.7 g/dL    Hematocrit 29.8 (L) 40.0 - 53.0 %    MCV 94 78 - 100 fL    MCH 31.4 26.5 - 33.0 pg    MCHC 33.6 31.5 - 36.5 g/dL    RDW 13.0 10.0 - 15.0 %    Platelet Count 139 (L) 150 - 450 10e3/uL   Glucose by meter     Status: Abnormal   Result Value Ref Range    GLUCOSE BY METER POCT 160 (H) 70 - 99 mg/dL   Glucose by meter     Status: Abnormal   Result Value Ref Range    GLUCOSE BY METER POCT 159 (H) 70 - 99 mg/dL   Glucose by meter     Status: Abnormal   Result Value Ref Range    GLUCOSE BY METER POCT 132 (H) 70 - 99 mg/dL   Glucose by meter     Status: Abnormal   Result Value Ref Range    GLUCOSE BY METER POCT 108 (H) 70 - 99 mg/dL   CBC with platelets differential     Status: Abnormal    Narrative    The following orders were created for panel order CBC with platelets differential.  Procedure                               Abnormality         Status                     ---------                               -----------         ------                      CBC with platelets and d...[[id number]]  Abnormal            Final result                 Please view results for these tests on the individual orders.          Attestation:  I have reviewed today's vital signs, notes, medications, labs and imaging with Dr. Pacheco.  Amount of time performed on this consult: 40 minutes.    Sabrina Mace PA-C

## 2022-07-11 NOTE — ANESTHESIA PROCEDURE NOTES
"Fascia iliaca Procedure Note    Pre-Procedure   Staff -        Anesthesiologist:  Geraldine Grajeda MD       Performed By: anesthesiologist       Location: pre-op       Pre-Anesthestic Checklist: patient identified, IV checked, site marked, risks and benefits discussed, informed consent, monitors and equipment checked, pre-op evaluation, at physician/surgeon's request and post-op pain management  Timeout:       Correct Patient: Yes        Correct Procedure: Yes        Correct Site: Yes        Correct Position: Yes        Correct Laterality: Yes        Site Marked: Yes  Procedure Documentation  Procedure: Fascia iliaca       Laterality: left       Patient Position: supine       Patient Prep/Sterile Barriers: sterile gloves, mask, \"no-touch\" technique        Skin prep: Chloraprep       Local skin infiltrated with 3 mL of 1% lidocaine.        Needle Type: insulated and short bevel (Pajunk)       Needle Gauge: 21.        Needle Length (millimeters): 100        Ultrasound guided       1. Ultrasound was used to identify targeted nerve, plexus, vascular marker, or fascial plane and place a needle adjacent to it in real-time.       2. Ultrasound was used to visualize the spread of anesthetic in close proximity to the above referenced structure.       3. A permanent image is entered into the patient's record.       4. The visualized anatomic structures appeared normal.       5. There were no apparent abnormal pathologic findings.    Assessment/Narrative         The placement was negative for: blood aspirated, painful injection and site bleeding       Paresthesias: No.       Test dose of mL at.         Test dose negative, 3 minutes after injection, for signs of intravascular, subdural, or intrathecal injection.       Bolus given via needle..        Secured via.        Insertion/Infusion Method: Single Shot       Complications: none       Injection made incrementally with aspirations every 5 mL.    Medication(s) Administered "   Bupivacaine 0.5% w/ 1:400K Epi (Injection) - Injection   25 mL - 7/11/2022 4:50:00 PM   Comments:  0.5% Bupivacaine with 1:400,000 epinephrine injected.  Patient tolerated the procedure well.    The surgeon has given a verbal order transferring care of this patient to me for the performance of a regional analgesia block for post-op pain control. It is requested of me because I am uniquely trained and qualified to perform this block and the surgeon is neither trained nor qualified to perform this procedure.

## 2022-07-11 NOTE — ANESTHESIA PROCEDURE NOTES
Airway       Patient location during procedure: OR  Staff -        Anesthesiologist:  Geraldine Grajeda MD       CRNA: Regina Beltran APRN CRNA       Performed By: CRNA  Consent for Airway        Urgency: elective  Indications and Patient Condition       Indications for airway management: mary jo-procedural       Induction type:intravenous       Mask difficulty assessment: 1 - vent by mask    Final Airway Details       Final airway type: supraglottic airway    Supraglottic Airway Details        Type: LMA       Brand: I-Gel       LMA size: 5    Post intubation assessment        Placement verified by: capnometry, equal breath sounds and chest rise        Number of attempts at approach: 1       Secured with: pink tape       Ease of procedure: easy       Dentition: Unchanged

## 2022-07-11 NOTE — INTERVAL H&P NOTE
I have reviewed the surgical (or preoperative) H&P that is linked to this encounter, and examined the patient. There are no significant changes    Clinical Conditions Present on Arrival:  Clinically Significant Risk Factors Present on Admission               # Hypoalbuminemia: Albumin = 3.1 g/dL (Ref range: 3.4 - 5.0 g/dL) on admission, will monitor as appropriate

## 2022-07-11 NOTE — PROGRESS NOTES
Ortho Brief    87 yo M with left hip IT fx    Plan for surgery today if clear hip nailing  NPO    Renato Pacheco MD

## 2022-07-11 NOTE — PHARMACY-ADMISSION MEDICATION HISTORY
Pharmacy Medication History  Admission medication history interview status for the 7/10/2022  admission is complete. See EPIC admission navigator for prior to admission medications     Location of Interview: Patient room  Medication history sources: Patient/Patient's daughter Geovanni    Significant changes made to the medication list:  Added Bactrim and Prebiotic powder.    In the past week, patient estimated taking medication this percent of the time: 50-90% due to other    Additional medication history information:   None    Medication reconciliation completed by provider prior to medication history? No    Time spent in this activity: 15 minutes    Prior to Admission medications    Medication Sig Last Dose Taking? Auth Provider Long Term End Date   acetaminophen (ACETAMINOPHEN 8 HOUR) 650 MG CR tablet Take 650 mg by mouth 2 times daily as needed for mild pain or fever  at prn Yes Unknown, Entered By History     aspirin 81 MG EC tablet Take 81 mg by mouth daily 7/10/2022 at Unknown time Yes Unknown, Entered By History     carvedilol (COREG) 12.5 MG tablet Take 12.5 mg by mouth 2 times daily (with meals) 7/10/2022 at am Yes Reported, Patient Yes    Coenzyme Q10 (COQ-10 PO) Take 1 tablet by mouth daily 7/9/2022 at pm Yes Reported, Patient     CYANOCOBALAMIN PO Take 1,000 mcg by mouth every other day  Unknown at Unknown time Yes Unknown, Entered By History     DULoxetine (CYMBALTA) 30 MG capsule Take 30 mg by mouth daily Takes for neuropathy 7/9/2022 at pm Yes Reported, Patient Yes    ferrous sulfate (FEROSUL) 325 (65 Fe) MG tablet Take 325 mg by mouth daily (with breakfast) 7/10/2022 at am Yes Reported, Patient     furosemide (LASIX) 20 MG tablet Take 20 mg by mouth daily 7/10/2022 at am Yes Reported, Patient     gabapentin (NEURONTIN) 800 MG tablet Take 1 tablet (800 mg) by mouth 2 times daily 7/10/2022 at am Yes Sho Hines PA-C Yes    lisinopril (ZESTRIL) 2.5 MG tablet Take 2.5 mg by mouth daily   7/10/2022 at am Yes Reported, Patient Yes    metFORMIN (GLUCOPHAGE) 500 MG tablet Take 250 mg by mouth 2 times daily (with meals) (takes 0.5 x 500mg) 7/10/2022 at am Yes Reported, Patient No    Omega-3 Fatty Acids (OMEGA 3 PO) Take 1 teaspoonful by mouth daily 7/9/2022 at Unknown time Yes Reported, Patient     PREBIOTIC PRODUCT PO Take 1 Scoop by mouth daily  Yes Unknown, Entered By History     Probiotic Product (PROBIOTIC PO) Take 1 capsule by mouth daily 7/9/2022 at Unknown time Yes Reported, Patient     simvastatin (ZOCOR) 40 MG tablet Take 20 mg by mouth At Bedtime (Takes 1/2 of a 40mg tablet) 7/9/2022 at pm Yes Reported, Patient Yes    sulfamethoxazole-trimethoprim (BACTRIM) 400-80 MG tablet Take 1 tablet by mouth daily 7/10/2022 at am Yes Unknown, Entered By History No    tamsulosin (FLOMAX) 0.4 MG 24 hr capsule Take 0.8 mg by mouth At Bedtime (takes 2 x 0.4mg) 7/9/2022 at pm Yes Reported, Patient     VITAMIN D PO Take 1 tablet by mouth daily   Reported, Patient         The information provided in this note is only as accurate as the sources available at the time of update(s)

## 2022-07-11 NOTE — PLAN OF CARE
Goal Outcome Evaluation:    Pt. A&O x4, pleasant. VSS on RA. Strict bedrest. IV dilaudid for pain. Baseline neuropathy stays unchanged per pt.'s report. BG checks, but refused insulin. NPO, surgery today. Will cont' to monitor.

## 2022-07-11 NOTE — PROGRESS NOTES
A&OX4, sleeping throughout the day.  Taking IV dilaudid for pain.  Micro turns and repositioning.  External male catheter with adequate output.  IV fluids infusing.  NPO since midnight.  Blood sugar checks every 4 hours.  Plan for surgery today at 1700.

## 2022-07-11 NOTE — H&P
[facility]    History and Physical - Hospitalist Service       Date of Admission:  7/10/2022  Dictation #: [id number]  Brief Summary (see dictation for more details): 88M hx CHF (EF 60%), DM2, CKD, Htn presents with L hip fx after mechanical fall.  NPO, pain control, ortho consult.      Clinically Significant Risk Factors Present on Admission             # Hypoalbuminemia: Albumin = 3.1 g/dL (Ref range: 3.4 - 5.0 g/dL) on admission, will monitor as appropriate     # Hypertension: home medication list includes antihypertensive(s)            Shantanu Benavidez, DO  Hospitalist Service  [facility]  Securely message with the Vocera Web Console (learn more here)  Text page via [employer] Paging/Directory

## 2022-07-11 NOTE — ED NOTES
[facility]  ED Nurse Handoff Report    ED Chief complaint: Fall      ED Diagnosis:   Final diagnoses:   Closed intertrochanteric fracture of hip, left, initial encounter (H)       Code Status: To be addressed by admitting provider.  Allergies: No Known Allergies    Patient Story: Pt presents to ED via EMS for evaluation of left hip and groin pain. Pt had a mechanical fall at [facility]. Pt received 50mcg fentanyl from EMS pre hospital.   Focused Assessment:    Respiratory: Depth and rate regular, O2 sat 99% in ED  Cardiac: Radial pulse regular  Neuro: Alert and Oriented x4  Musculoskeletal: L hip pain    Treatments and/or interventions provided: Vitals, imaging, labs.  Patient's response to treatments and/or interventions: Pt tolerated well.    To be done/followed up on inpatient unit: Ortho consult.     Does this patient have any cognitive concerns?: None    Activity level - Baseline/Home:  Independent  Activity Level - Current:   Total Care    Patient's Preferred language: English   Needed?: No    Isolation: None  Infection: Not Applicable  Patient tested for COVID 19 prior to admission: YES  Bariatric?: No    Vital Signs:   Vitals:    07/10/22 2053 07/10/22 2055 07/10/22 2100 07/10/22 2155   BP: 112/60      Pulse: 73      Resp: 18      Temp: 98  F (36.7  C)      TempSrc: Temporal      SpO2: 98% 97% 97% 99%   Weight: 94.3 kg (208 lb)          Cardiac Rhythm:     Was the PSS-3 completed:   Yes  What interventions are required if any?               Family Comments: Wife at bedside, extremely pleasant to work with.   OBS brochure/video discussed/provided to patient/family: N/A              Name of person given brochure if not patient: N/A              Relationship to patient: N/A    For the majority of the shift this patient's behavior was Green.   Behavioral interventions performed were frequent rounding, call light within reach.    ED NURSE PHONE NUMBER: [phone]

## 2022-07-11 NOTE — ED PROVIDER NOTES
History   Chief Complaint:  Fall       The history is provided by the patient.      Mark Hernández is a 88 year old male with history of prostate cancer, acute kidney failure, S/P nephrectomy, hypertension, hyperlipidemia, CHF, and complete heart block, S/P biventricular pacemaker, who presents with left hip pain after fall. Earlier tonight, the patient went to dinner with his wife and endorses having one glass of wine. When he came home, he accidentally tripped over his feet and landed on his left hip. He did not hit his head and did not lose consciousness. He was given 50 mcg of fentanyl by EMS on his way to the ED.       Review of Systems   Musculoskeletal: Positive for arthralgias.   Neurological: Negative for syncope.   All other systems reviewed and are negative.      Allergies:  The patient has no known allergies.     Medications:  Aspirin 325 mg  Coreg  Cymbalta  Ferosul  Lasix  Gabapentin   Lisinopril  Metformin  Senna-docusate  Flomax  Tramadol    Past Medical History:     Acute kidney failure  Stage III CKD    Complete heart block  Prostate cancer  Malignant neoplasm of lateral wall of urinary bladder  Malignant neoplasm or right ureter   Hypertension  Hyperlipidemia  BPH  Vitamin B12 deficiency   CHF  Hydrocele in adult   Thrombocytopenia  Dehydration   Diverticulosis  Hemorrhoids     Past Surgical History:    Right hip arthroplasty   Right hip revision  Right hip I&D  Back surgery   Tonsillectomy  Nephrectomy  Left carpal tunnel release  Rotator cuff syndrome    Biventricular pacemaker placement  Bladder tumor excision  Bilateral hydrocele excision     Family History:    Father: MI  Sister: rheumatoid arthritis, MI  Mother: schizophrenia, stroke    Social History:  The patient presents to the ED alone  Living Situation: Lives with wife      Physical Exam     Patient Vitals for the past 24 hrs:   BP Temp Temp src Pulse Resp SpO2 Weight   07/10/22 2155 -- -- -- -- -- 99 % --   07/10/22 2100 --  -- -- -- -- 97 % --   07/10/22 2055 -- -- -- -- -- 97 % --   07/10/22 2053 112/60 98  F (36.7  C) Temporal 73 18 98 % 94.3 kg (208 lb)       Physical Exam    Eye:  Pupils are equal, round, and reactive.  Extraocular movements intact.    ENT:  No rhinorrhea.  Moist mucus membranes.  Normal tongue and tonsil.    Cardiac:  Regular rate and rhythm.  No murmurs, gallops, or rubs.    Pulmonary:  Clear to auscultation bilaterally.  No wheezes, rales, or rhonchi.    Abdomen:  Positive bowel sounds.  Abdomen is soft and non-distended, without focal tenderness.    Musculoskeletal:  Normal movement of all extremities, save for the left hip, without evidence for deficit. There is diffuse left hip tenderness throughout without deformity.     Skin:  Warm and dry without rashes.    Neurologic:  Non-focal exam without asymmetric weakness or numbness.     Psychiatric:  Normal affect with appropriate interaction with examiner.    Emergency Department Course     Imaging:  XR Pelvis w Hip Left 1 View   Final Result   IMPRESSION:    1. Mildly displaced intertrochanteric left hip fracture. There is less than 1 cm of displacement. No angulation.   2. Postoperative changes and heterotopic ossification about the right hip, unchanged.   3. Postoperative and degenerative changes in the visualized spine again noted.        Report per radiology    Laboratory:  Labs Ordered and Resulted from Time of ED Arrival to Time of ED Departure   COMPREHENSIVE METABOLIC PANEL - Abnormal       Result Value    Sodium 140      Potassium 3.9      Chloride 106      Carbon Dioxide (CO2) 26      Anion Gap 8      Urea Nitrogen 40 (*)     Creatinine 1.24      Calcium 8.3 (*)     Glucose 185 (*)     Alkaline Phosphatase 125      AST 9      ALT 25      Protein Total 5.8 (*)     Albumin 3.1 (*)     Bilirubin Total 0.3      GFR Estimate 56 (*)    CBC WITH PLATELETS AND DIFFERENTIAL - Abnormal    WBC Count 5.6      RBC Count 3.13 (*)     Hemoglobin 10.4 (*)      Hematocrit 30.3 (*)     MCV 97      MCH 33.2 (*)     MCHC 34.3      RDW 12.8      Platelet Count 155      % Neutrophils 62      % Lymphocytes 22      % Monocytes 11      % Eosinophils 4      % Basophils 0      % Immature Granulocytes 1      NRBCs per 100 WBC 0      Absolute Neutrophils 3.4      Absolute Lymphocytes 1.2      Absolute Monocytes 0.6      Absolute Eosinophils 0.2      Absolute Basophils 0.0      Absolute Immature Granulocytes 0.0      Absolute NRBCs 0.0     COVID-19 VIRUS (CORONAVIRUS) BY PCR          Emergency Department Course:             Reviewed:  I reviewed nursing notes, vitals and past medical history    Assessments:  2101 I obtained history and examined the patient as noted above.   2140 I rechecked the patient and explained findings.     Consults:  2145 I spoke with Dr. Benavidez, hospitalist, who agreed to accept the patient.     Interventions:  Medications   sodium chloride (PF) 0.9% PF flush 3 mL (has no administration in time range)   sodium chloride (PF) 0.9% PF flush 3 mL (has no administration in time range)       Disposition:  The patient was admitted to the hospital under the care of Dr. Benavidez.     Impression & Plan     CMS Diagnoses: None      Medical Decision Making:  This unfortunate elderly gentleman presents after suffering a simple mechanical fall, landing on his left side and fracturing his left hip.  There is no other sign of significant trauma.  His vital signs are otherwise reassuring and his abdomen is soft and benign.  X-ray confirms this fracture.  Plan will be for admission to the hospital service with consultation with orthopedics in the morning for probable surgical fixation.  The patient's questions were answered and he is comfortable with the plan for admission.    Diagnosis:    ICD-10-CM    1. Closed intertrochanteric fracture of hip, left, initial encounter (H)  S72.142A          Scribe Disclosure:  Sabrina JOHNSON, am serving as a scribe at 8:54 PM on 7/10/2022 to  document services personally performed by Trierweiler, Chad A, MD based on my observations and the provider's statements to me.              Trierweiler, Chad A, MD  07/11/22 2282

## 2022-07-12 ENCOUNTER — APPOINTMENT (OUTPATIENT)
Dept: PHYSICAL THERAPY | Facility: CLINIC | Age: 87
DRG: 480 | End: 2022-07-12
Attending: PHYSICIAN ASSISTANT
Payer: MEDICARE

## 2022-07-12 LAB
GLUCOSE BLDC GLUCOMTR-MCNC: 145 MG/DL (ref 70–99)
GLUCOSE BLDC GLUCOMTR-MCNC: 173 MG/DL (ref 70–99)
GLUCOSE BLDC GLUCOMTR-MCNC: 186 MG/DL (ref 70–99)
GLUCOSE BLDC GLUCOMTR-MCNC: 196 MG/DL (ref 70–99)
GLUCOSE BLDC GLUCOMTR-MCNC: 210 MG/DL (ref 70–99)
GLUCOSE BLDC GLUCOMTR-MCNC: 225 MG/DL (ref 70–99)
HGB BLD-MCNC: 8.7 G/DL (ref 13.3–17.7)

## 2022-07-12 PROCEDURE — 250N000013 HC RX MED GY IP 250 OP 250 PS 637: Performed by: PHYSICIAN ASSISTANT

## 2022-07-12 PROCEDURE — 97161 PT EVAL LOW COMPLEX 20 MIN: CPT | Mod: GP | Performed by: PHYSICAL THERAPIST

## 2022-07-12 PROCEDURE — 85018 HEMOGLOBIN: CPT | Performed by: PHYSICIAN ASSISTANT

## 2022-07-12 PROCEDURE — 120N000001 HC R&B MED SURG/OB

## 2022-07-12 PROCEDURE — 97530 THERAPEUTIC ACTIVITIES: CPT | Mod: GP | Performed by: PHYSICAL THERAPIST

## 2022-07-12 PROCEDURE — 250N000012 HC RX MED GY IP 250 OP 636 PS 637: Performed by: PHYSICIAN ASSISTANT

## 2022-07-12 PROCEDURE — 250N000013 HC RX MED GY IP 250 OP 250 PS 637: Performed by: HOSPITALIST

## 2022-07-12 PROCEDURE — 258N000003 HC RX IP 258 OP 636: Performed by: PHYSICIAN ASSISTANT

## 2022-07-12 PROCEDURE — 99232 SBSQ HOSP IP/OBS MODERATE 35: CPT | Performed by: HOSPITALIST

## 2022-07-12 PROCEDURE — 36415 COLL VENOUS BLD VENIPUNCTURE: CPT | Performed by: PHYSICIAN ASSISTANT

## 2022-07-12 PROCEDURE — 250N000011 HC RX IP 250 OP 636: Performed by: PHYSICIAN ASSISTANT

## 2022-07-12 RX ORDER — AMOXICILLIN 250 MG
1-2 CAPSULE ORAL 2 TIMES DAILY PRN
DISCHARGE
Start: 2022-07-12

## 2022-07-12 RX ORDER — LANOLIN ALCOHOL/MO/W.PET/CERES
3 CREAM (GRAM) TOPICAL AT BEDTIME
Status: DISCONTINUED | OUTPATIENT
Start: 2022-07-12 | End: 2022-07-14 | Stop reason: HOSPADM

## 2022-07-12 RX ORDER — BISACODYL 10 MG
10 SUPPOSITORY, RECTAL RECTAL DAILY PRN
DISCHARGE
Start: 2022-07-12

## 2022-07-12 RX ORDER — ASPIRIN 325 MG
325 TABLET, DELAYED RELEASE (ENTERIC COATED) ORAL DAILY
Qty: 42 TABLET | Refills: 0 | DISCHARGE
Start: 2022-07-13 | End: 2022-07-14

## 2022-07-12 RX ORDER — OXYCODONE HYDROCHLORIDE 5 MG/1
2.5 TABLET ORAL EVERY 6 HOURS PRN
Qty: 10 TABLET | Refills: 0 | Status: ON HOLD | OUTPATIENT
Start: 2022-07-12 | End: 2022-08-27

## 2022-07-12 RX ORDER — ACETAMINOPHEN 325 MG/1
650 TABLET ORAL EVERY 4 HOURS PRN
Qty: 100 TABLET | DISCHARGE
Start: 2022-07-12

## 2022-07-12 RX ORDER — AMOXICILLIN 250 MG
1 CAPSULE ORAL 2 TIMES DAILY
Status: DISCONTINUED | OUTPATIENT
Start: 2022-07-12 | End: 2022-07-14 | Stop reason: HOSPADM

## 2022-07-12 RX ORDER — BISACODYL 10 MG
10 SUPPOSITORY, RECTAL RECTAL DAILY PRN
Status: DISCONTINUED | OUTPATIENT
Start: 2022-07-12 | End: 2022-07-14 | Stop reason: HOSPADM

## 2022-07-12 RX ORDER — FUROSEMIDE 20 MG
20 TABLET ORAL DAILY
Status: DISCONTINUED | OUTPATIENT
Start: 2022-07-13 | End: 2022-07-13

## 2022-07-12 RX ORDER — SULFAMETHOXAZOLE AND TRIMETHOPRIM 400; 80 MG/1; MG/1
1 TABLET ORAL DAILY
Status: DISCONTINUED | OUTPATIENT
Start: 2022-07-12 | End: 2022-07-14 | Stop reason: HOSPADM

## 2022-07-12 RX ORDER — POLYETHYLENE GLYCOL 3350 17 G/17G
17 POWDER, FOR SOLUTION ORAL 2 TIMES DAILY
Status: DISCONTINUED | OUTPATIENT
Start: 2022-07-12 | End: 2022-07-14 | Stop reason: HOSPADM

## 2022-07-12 RX ORDER — SIMVASTATIN 20 MG
20 TABLET ORAL AT BEDTIME
Status: DISCONTINUED | OUTPATIENT
Start: 2022-07-12 | End: 2022-07-14 | Stop reason: HOSPADM

## 2022-07-12 RX ADMIN — OXYCODONE HYDROCHLORIDE 2.5 MG: 5 TABLET ORAL at 06:14

## 2022-07-12 RX ADMIN — DULOXETINE HYDROCHLORIDE 30 MG: 30 CAPSULE, DELAYED RELEASE ORAL at 08:44

## 2022-07-12 RX ADMIN — INSULIN ASPART 1 UNITS: 100 INJECTION, SOLUTION INTRAVENOUS; SUBCUTANEOUS at 10:09

## 2022-07-12 RX ADMIN — INSULIN ASPART 1 UNITS: 100 INJECTION, SOLUTION INTRAVENOUS; SUBCUTANEOUS at 14:27

## 2022-07-12 RX ADMIN — TAMSULOSIN HYDROCHLORIDE 0.8 MG: 0.4 CAPSULE ORAL at 00:06

## 2022-07-12 RX ADMIN — OXYCODONE HYDROCHLORIDE 2.5 MG: 5 TABLET ORAL at 21:12

## 2022-07-12 RX ADMIN — INSULIN ASPART 1 UNITS: 100 INJECTION, SOLUTION INTRAVENOUS; SUBCUTANEOUS at 17:48

## 2022-07-12 RX ADMIN — SENNOSIDES AND DOCUSATE SODIUM 1 TABLET: 50; 8.6 TABLET ORAL at 21:12

## 2022-07-12 RX ADMIN — ACETAMINOPHEN 650 MG: 325 TABLET ORAL at 10:16

## 2022-07-12 RX ADMIN — MELATONIN TAB 3 MG 3 MG: 3 TAB at 21:12

## 2022-07-12 RX ADMIN — CARVEDILOL 12.5 MG: 12.5 TABLET, FILM COATED ORAL at 08:44

## 2022-07-12 RX ADMIN — SIMVASTATIN 20 MG: 20 TABLET, FILM COATED ORAL at 21:12

## 2022-07-12 RX ADMIN — ASPIRIN 325 MG: 325 TABLET, COATED ORAL at 08:44

## 2022-07-12 RX ADMIN — INSULIN ASPART 2 UNITS: 100 INJECTION, SOLUTION INTRAVENOUS; SUBCUTANEOUS at 02:35

## 2022-07-12 RX ADMIN — CARVEDILOL 12.5 MG: 12.5 TABLET, FILM COATED ORAL at 17:47

## 2022-07-12 RX ADMIN — CEFAZOLIN 2 G: 10 INJECTION, POWDER, FOR SOLUTION INTRAVENOUS at 02:38

## 2022-07-12 RX ADMIN — OXYCODONE HYDROCHLORIDE 2.5 MG: 5 TABLET ORAL at 10:16

## 2022-07-12 RX ADMIN — TAMSULOSIN HYDROCHLORIDE 0.8 MG: 0.4 CAPSULE ORAL at 21:12

## 2022-07-12 RX ADMIN — POLYETHYLENE GLYCOL 3350 17 G: 17 POWDER, FOR SOLUTION ORAL at 21:12

## 2022-07-12 RX ADMIN — CEFAZOLIN 2 G: 10 INJECTION, POWDER, FOR SOLUTION INTRAVENOUS at 10:09

## 2022-07-12 RX ADMIN — GABAPENTIN 800 MG: 800 TABLET, FILM COATED ORAL at 21:12

## 2022-07-12 RX ADMIN — INSULIN ASPART 2 UNITS: 100 INJECTION, SOLUTION INTRAVENOUS; SUBCUTANEOUS at 06:15

## 2022-07-12 RX ADMIN — ACETAMINOPHEN 650 MG: 325 TABLET ORAL at 00:06

## 2022-07-12 RX ADMIN — GABAPENTIN 800 MG: 800 TABLET, FILM COATED ORAL at 08:44

## 2022-07-12 ASSESSMENT — ACTIVITIES OF DAILY LIVING (ADL)
ADLS_ACUITY_SCORE: 37

## 2022-07-12 NOTE — PROVIDER NOTIFICATION
"Dr. Courtney perez paged \" Can you please order PT for this patient? TY\"    Update: \" I am sorry; I have noticed that he has PT but is not being seen, today. TY\"  "

## 2022-07-12 NOTE — PROGRESS NOTES
A&O X4; VSS on RA; Up with PT w/ A2 FWW; external catheter w/ adequate output; L hip dressing CDI; pain managed w/ PRN Oxycodone and Tylenol; baseline neuropathy in all extremities; BG elevated w/ corrective insulin; tolerating PO; IVF discontinued.

## 2022-07-12 NOTE — PROGRESS NOTES
On-call physician page out to notify regarding saturation of L hip dressing. Awaiting call back.    Orders received and implemented to change dressing with new ABD pad and medipore tape.

## 2022-07-12 NOTE — PLAN OF CARE
Goal Outcome Evaluation:        Pt. Alert & oriented x4; [place].  On 1LNC.  Due to ambulate post-op.  External cath in place with sufficient output--see I &O.  L hip dressing saturated at 2230, order received and implemented to redress.  Dressing currently CDI.  LLE CMS intact ex. Cool feet & baseline neuropathy. Pt. Denies neuropathy sx at this time.  Tolerating PO intake.  Oxycodone given x1 this shift for pain--see MAR. Glucose checks completed Q4; requiring correction--see MAR.

## 2022-07-12 NOTE — ANESTHESIA POSTPROCEDURE EVALUATION
Patient: Mark Hernández    Procedure: Procedure(s):  INTERNAL FIXATION, FRACTURE, TROCHANTERIC, HIP, USING DOM       Anesthesia Type:  General    Note:  Disposition: Inpatient   Postop Pain Control: Uneventful            Sign Out: Well controlled pain   PONV: No   Neuro/Psych: Uneventful            Sign Out: Acceptable/Baseline neuro status   Airway/Respiratory: Uneventful            Sign Out: Acceptable/Baseline resp. status   CV/Hemodynamics: Uneventful            Sign Out: Acceptable CV status; No obvious hypovolemia; No obvious fluid overload   Other NRE: NONE   DID A NON-ROUTINE EVENT OCCUR? No           Last vitals:  Vitals Value Taken Time   /61 07/11/22 2040   Temp 36.1  C (96.9  F) 07/11/22 2030   Pulse 60 07/11/22 2042   Resp 27 07/11/22 2042   SpO2 96 % 07/11/22 2042   Vitals shown include unvalidated device data.    Electronically Signed By: Geraldine Grajeda MD  July 11, 2022  8:43 PM

## 2022-07-12 NOTE — PLAN OF CARE
Occupational Therapy: Orders received. Chart reviewed and discussed with care team.? Occupational Therapy not indicated due to pt. Will require TCU stay and will initiate OT eval at next level of care..? Defer discharge recommendations to Physical Therapy.? Will complete orders.

## 2022-07-12 NOTE — DISCHARGE INSTRUCTIONS
Please call Dr Pacheco's patient coordinator, Sabrina, to schedule your 2-week post-op and 6 week post-op appointments.  She is also the  for any questions or concerns and can be reached during normal business hours (M-F, 8am-5pm) at [phone].  If you have any questions or concerns after hours, please contact the on call surgeon at [phone].      Alternatively, if you have concerns regarding your incision, dressing, or any urgent issues that require more immediate attention or an in-person visit, please go to the nearest [facility] location (there are many located throughout the [facility]).  [facility] is open 7 days a week from 8 AM until 8 PM.  No appointment is needed.  Patients are seen on a walk-in basis.     [place] location is across from [facility].  [facility] [address] [facility]  [phone]  [place] location is nearby [facility].  Address is [address]  [phone]   [place] location is at [facility].  Address is [address], [place].   [phone]

## 2022-07-12 NOTE — PROGRESS NOTES
Orthopedic Surgery  Mark Hernández  DATE: 07/12/2022    Admit Date:  7/10/2022    A/P:  Mark Hernández is a 88 year old with prostate cancer, CKD, CHB s/p PPM, DM2, and cog impairment admitted on 7/10/2022 after a fall with resulting femur fracture.    Left hip ITFFx s/p IM nailing, #1 Day Post-Op  No periop complications noted.  Progressing as expected given age, severe hearing loss, and baseline cog impairment.  --  WBAT LLE with gait aid  --  Tegaderm/gauze  --  ASA for DVT PPx  --  Anticipate will need TCU    #Acute blood loss anemia  -- Hgb 10.4 >> 8.7  -- Monitor    #Disposition  --  Await therapy recommendations; anticipate will need TCU   --  Follow-up with Ortho in 2 weeks.  If at TCU, an defer 2 week post-op visit and follow-up in clinic for 6 week visit.       Sarah Li [facility]  [phone]  Text Page (7AM - 5PM)    _________________________________________________________    INTERVAL HISTORY:  Resting comfortably in bed.  Confused it seems and also hard of hearing.  Daughter and wife at bedside.  Daughter has many questions and voices that she was not contacted by MD after surgery with an update and would like information about what exactly transpired during surgery, post-op expectations, and recovery period.       PHYSICAL EXAM:  Vital Signs: Temp: 97.9  F (36.6  C) Temp src: Oral BP: 110/51 Pulse: 72   Resp: 16 SpO2: 97 % O2 Device: None (Room air) Oxygen Delivery: 1 LPM  I/O last 3 completed shifts:  In: 1050 [P.O.:50; I.V.:1000]  Out: 1250 [Urine:900; Blood:350]  Vitals:    07/10/22 2053   Weight: 94.3 kg (208 lb)       [place], confused but cooperative.  Nontoxic.    NCAT.  EOM grossly intact bilaterally. MMM  Respirations unlabored  Large ABD pad over gauze.  C/D/I.     Minimal erythema of the surrounding skin but 1-2+ induration extending dependently down leg.    Strength intact, symmetric in distal BLE  Bilateral calves supple  Digits  [facility]      LABORATORY DATA:  I reviewed all medications, new labs and imaging results over the last 24 hours.  Recent Labs   Lab Test 07/11/22  0814 07/10/22  2121 09/02/21  1301    140 134   POTASSIUM 4.0 3.9 4.3   BUN 37* 40* 20   CR 1.09 1.24 1.08     Recent Labs   Lab Test 07/12/22  1106 07/11/22  0814 07/10/22  2121   WBC  --  5.8 5.6   HGB 8.7* 10.0* 10.4*   PLT  --  139* 155     Recent Labs   Lab Test 12/27/17 2020   INR 1.11       Results for orders placed or performed during the hospital encounter of 07/10/22   XR Pelvis w Hip Left 1 View    Narrative    EXAM: XR PELVIS AND HIP LEFT 1 VIEW  LOCATION: [facility]  DATE/TIME: 7/10/2022 9:20 PM    INDICATION: Pain after trauma  COMPARISON: 5/27/2020 radiographs.      Impression    IMPRESSION:   1. Mildly displaced intertrochanteric left hip fracture. There is less than 1 cm of displacement. No angulation.  2. Postoperative changes and heterotopic ossification about the right hip, unchanged.  3. Postoperative and degenerative changes in the visualized spine again noted.   XR Chest Port 1 View    Narrative    CHEST ONE VIEW July 11, 2022 10:05 AM     HISTORY: Preoperative.    COMPARISON: December 30, 2017.      Impression    IMPRESSION: No acute disease.    HODA INGRAM MD         SYSTEM ID:  [id number]   XR Surgery RIRI L/T 5 Min Fluoro w Stills    Narrative    EXAM: XR SURGERY RIRI FLUORO LESS THAN 5 MIN W STILLS  LOCATION: [facility]  DATE/TIME: 7/11/2022 6:20 PM    INDICATION: Internal Fixation, Fracture, Trochanteric, Hip, Using Christopher Left  COMPARISON: Radiograph 07/10/2022  TECHNIQUE: Exam performed by surgeon.    FINDINGS:    Intramedullary christopher and screw fixation of intertrochanteric left femur fracture. No evidence of immediate hardware complication or failure.    FLUOROSCOPIC TIME: 1.3  NUMBER OF IMAGES: 5      Impression    IMPRESSION:  1.  Repair of left femur fracture without evidence of immediate  hardware complication or failure.

## 2022-07-12 NOTE — OP NOTE
[facility]  Operative Note  Cephalomedullary Nailing      Mark Hernández MRN# [id number]   YOB: 1933  Procedure Date:  7/11/2022  Age: 88 year old     PREOPERATIVE DIAGNOSIS:     1.  Displaced left intertrochanteric femur fracture.        POSTOPERATIVE DIAGNOSES:     1.  Displaced left intertrochanteric femur fracture.         PROCEDURE PERFORMED:  Left hip cephalomedullary nailing, femur fracture.      SURGEON:  Renato Pacheco MD     ASSISTANT: ANGIE Whyte who was critical for positioning patient, helping obtain reduction, retraction during exposure and implant placement, as well as closure.     ANESTHESIA:  General.      ESTIMATED BLOOD LOSS:  100 mL      IMPLANTS USED:  Synthes TFN advanced nail 10 mm x 170 mm x 125 degrees, cephalomedullary screw with distal interlock.      FINDINGS:  Mark Hernández is a 88 year old-year-old male who did do some ambulation prior to this with above-named injury.  A long discussion had regarding the nature of hip fractures, risks related to that and surgery discussed, included but not limited to bleeding, infection, damage to surrounding neurovascular structures, malunion, delayed union, nonunion, need for additional surgeries, blood clots that go to the heart, lung or brain, anesthetic complications or even death.  Discussed hip fracture mortality rates.  The patient and the family wished to proceed and consent signed.      DESCRIPTION OF PROCEDURE:  The patient identified in the preoperative holding area per hospital policy and the correct op site marked, to the OR table, Ancef given.  General anesthesia induced, placed onto the [place] fracture table legs positioned in a scissor position.  All bony prominences well padded.  Slight traction and internal rotation reduced the fracture.  Chlorhexidine pre-scrub, ChloraPrep, standard drape.  A timeout was performed.  All in the room agreed.         A small incision was made just  proximal to greater trochanter after infiltrating 1% lidocaine with epinephrine and dissected down the greater trochanter, placed a guide pin confirming position AP and lateral views.  Opening reamer.  Placed the preoperatively templated cephalomedullary nail.  Placed the guide wire into the center-center position in the femoral head and reamed, measured and placed the screw.  Checked AP, lateral and 30-degree arcs in between to ensure safe position, as well as appropriate reduction.  Compressed.  Locked the interference screw proximally.  Outrigger removed.  Distal interlock placed.  Final x-rays showed appropriate reduction, safe position of the implants. Thoroughly irrigated wounds, closed deep layers, 0 Vicryl, 2-0 Vicryl in subcutaneous, staples and sterile dressings applied.      POSTOPERATIVE PLAN:   1.  Weightbearing as tolerated with a walker x 6 weeks.   2.  Deep venous thrombosis prophylaxis with aspirin enteric-coated 325 mg daily x6 weeks.   3.  Ancef x 24 hours  4.  Osteoporosis workup including calcium, vitamin D supplementation and followup with primary care doctor for additional treatment.   5.  Physical therapy.   6.  Pain control.  Minimize narcotics.      POSTOPERATIVE PLAN:  At two weeks, the dressing could be removed.  There are no sutures to be removed.  If all is going well, first followup could be 6 weeks with Dr. Lopez, [facility], with 2 views of the right femur, sooner if there is any difficulty.      [facility]  [facility]    Monday 1-5 PM  and Thursday 7:30-12:00 AM  [address] [place] [address]  [phone]    [phone]    Or    Tuesday 7:30-5 PM  [address]   [address] [place] [address]     JASSI LOPEZ MD

## 2022-07-12 NOTE — PROGRESS NOTES
[facility]  Hospitalist Progress Note   07/12/2022          Assessment and Plan:       Mark Hernández is a 88 year old male with prostate cancer, complete heart block, CKD, diabetes admitted on 7/10/2022 with a left hip fracture after a mechanical fall.    S/p Left hip IM Nail on 7/11/2022.  Acute traumatic displaced left intertrochanteric hip fracture:    S/P Left hip IM Nail on 7/11/2022 by orthopedic surgery.  Documented  mL.  Defer postoperative care to orthopedic team.  Recommend early ambulation.  Per Ortho weightbearing as tolerated with walker for 6 weeks.  DVT prophylaxis with aspirin 325 mg oral daily for 6 weeks.  Minimize narcotic use as able to.  At high risk for delirium.  Monitor mental status closely.  Rehabilitation per orthopedic surgery.  Osteoporosis workup including calcium, vitamin D supplementation and followup with primary care doctor for additional treatment.   Follow up At two weeks, the dressing could be removed.  There are no sutures to be removed.  If all is g oing well, first followup could be 6 weeks with Dr. Pacheco, [facility], with 2 views of the right femur, sooner if there is any difficulty    Status post mechanical fall at home.  Per report patient had gone over to [place] with his wife on the evening of admission.  Had dinner with 1 glass of wine, after going home unfortunately caught his toe on the floor and fell to the ground landing on his left hip no chest pain or syncope..   Telemetry monitoring-sinus rhythm.  Physical therapy evaluation    History of congestive heart failure.  Complete heart block status post pacemaker 2016.  Hypertension.  Hyperlipidemia.  History of chronic lower extremity edema.  Denies any chest pain or palpitations.  Appears to be in sinus rhythm.    Echocardiogram 2020 showed preserved EF of 60%.    Continue PTA Coreg.   Continue to hold PTA lisinopril today.  Restart PTA Lasix tomorrow.  Restart  PTA simvastatin  Hold off restarting PTA lisinopril, Lasix given systolic blood pressures in 100s, 110s.  Telemetry monitoring.    History of peripheral neuropathy.  Continue PTA gabapentin 800 mg twice daily.  Continue PTA Cymbalta    Chronic kidney disease stage III  Appears to be roughly at his baseline of 1.1-1.2.  Monitor renal function in AM.  Avoid nephrotoxic drugs.     Acute on chronic anemia likely dilutional, surgical blood loss.  Chronic Iron deficiency anemia: History of vitamin B12 deficiency   On chart review baseline hemoglobin around 11.    Hemoglobin around 10 > 8.7  Follow iron studies.  Can resume iron, vitamin B12 supplements on discharge once taking p.o. daily.    Diabetes mellitus with a hemoglobin A1c of 6.4.  PTA metformin on hold  Insulin sliding scale.   Monitor blood sugars, optimize regimen.  Carb controlled diet.    Benign prostatic hypertrophy:    Continue with Flomax.    History of bladder and prostate cancer.  Status post multiple TURPs, right ureterectomy and nephrectomy  No acute issues.    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  PTA on antibiotic chronic suppression.  Restart PTA Bactrim    Obesity with a BMI of 29.84.  Consider lifestyle modification with diet and exercise as able to.  Consider sleep studies as outpatient    Orders Placed This Encounter      Advance Diet as Tolerated: Regular Diet Adult      DVT Prophylaxis: SCD  Code Status: Full Code  Disposition: Expected discharge 1- 2 days.    Discussed with patient, his daughter by the bedside, floor RN  Total time greater than 25 minutes.  More than 50% of time spent in direct patient care, care coordination, patient counseling, and formalizing plan of care.    Rebecca Valdez MD        Interval History:      Patient lying in bed.    Underwent surgery yesterday.  Documented  mL.  On 1 L nasal oxygen.  Receiving oxycodone for pain.  Denies any chest pain or  palpitations.  Denies any headache or dizziness.  No nausea or vomiting.         Physical Exam:        Physical Exam   Temp:  [96.9  F (36.1  C)-98.3  F (36.8  C)] 97.9  F (36.6  C)  Pulse:  [60-77] 72  Resp:  [9-28] 16  BP: (102-158)/(51-73) 110/51  SpO2:  [95 %-100 %] 97 %    Intake/Output Summary (Last 24 hours) at 7/11/2022 1623  Last data filed at 7/11/2022 1148  Gross per 24 hour   Intake --   Output 800 ml   Net -800 ml       Admission Weight: 94.3 kg (208 lb)  Current Weight: 94.3 kg (208 lb)    PHYSICAL EXAM  GENERAL: Patient is in no distress. Alert and oriented.  Hard of hearing.  HEART: Regular rate and rhythm. S1S2.   LUNGS: Bilateral slightly decreased breath sounds.  No wheezing or crackles.  Respirations unlabored  NEURO: Moving all extremities  EXTREMITIES: No pedal edema.  Left hip dressing intact.  SKIN: Warm, dry. No rash  PSYCHIATRY Cooperative       Medications:          aspirin  325 mg Oral Daily     carvedilol  12.5 mg Oral BID w/meals     DULoxetine  30 mg Oral Daily     gabapentin  800 mg Oral BID     insulin aspart  1-6 Units Subcutaneous Q4H     sodium chloride (PF)  3 mL Intracatheter Q8H     sodium chloride (PF)  3 mL Intracatheter Q8H     tamsulosin  0.8 mg Oral At Bedtime     acetaminophen **OR** acetaminophen, bisacodyl, glucose **OR** dextrose **OR** glucagon, HYDROmorphone, lidocaine 4%, lidocaine (buffered or not buffered), melatonin, naloxone **OR** naloxone **OR** naloxone **OR** naloxone, ondansetron **OR** ondansetron, oxyCODONE, prochlorperazine **OR** prochlorperazine **OR** prochlorperazine, senna-docusate **OR** senna-docusate, sodium chloride (PF), sodium chloride (PF)         Data:      All new lab and imaging data was reviewed.

## 2022-07-12 NOTE — PROGRESS NOTES
"   07/12/22 1100   Quick Adds   Type of Visit Initial PT Evaluation       Present no   Living Environment   People in Home spouse   Current Living Arrangements other (see comments)  (Townhome)   Home Accessibility stairs to enter home   Number of Stairs, Main Entrance 3   Stair Railings, Main Entrance railings on both sides of stairs   Transportation Anticipated family or friend will provide   [place] Pt reports needing to negotiate stairs to enter the home but has a stair lift inside the home. Pt reports his daughter will pick him up upon discharge.   Self-Care   Usual Activity Tolerance moderate   Current Activity Tolerance fair   Regular Exercise No   Equipment Currently Used at Home grab bar, toilet;grab bar, tub/shower   Fall history within last six months yes   Number of times patient has fallen within last six months 3   Activity/Exercise/Self-Care Comment Pt reports being able to bathe without assist but otherwise requires assist with all other ADLs from spouse. Pt reports ambulating at baseline a FWW. Pt reports stairs are not difficult for pt. Pt reports being able to ambulate ~ 100' w/ FWW before needing a rest break. Pt does not drive.   General Information   Onset of Illness/Injury or Date of Surgery 07/12/22   Referring Physician Demar An PA-C   Patient/Family Therapy Goals Statement (PT) \"To get better\"   Pertinent History of Current Problem (include personal factors and/or comorbidities that impact the POC) Per Chart: s/p L hip ORIF POD #1   Existing Precautions/Restrictions fall   Weight-Bearing Status - LLE weight-bearing as tolerated   Weight-Bearing Status - RLE full weight-bearing   Cognition   Orientation Status (Cognition) oriented x 3   Pain Assessment   Patient Currently in Pain Yes, see Vital Sign flowsheet  (7/10 L Hip)   Integumentary/Edema   Integumentary/Edema Comments Incision on L hip with " dressing intact   Posture    Posture Forward head position;Protracted shoulders   Range of Motion (ROM)   Range of Motion ROM is WFL;ROM deficits secondary to surgical procedure;ROM deficits secondary to pain;ROM deficits secondary to swelling;ROM deficits secondary to weakness   ROM Comment R LE ROM WFL   Strength (Manual Muscle Testing)   Strength (Manual Muscle Testing) Deficits observed during functional mobility   Strength Comments Pt unable to volitionally move LLE against gravity at this time.   Bed Mobility   Comment, (Bed Mobility) Supine>sit w/ mod A x 2   Transfers   Comment, (Transfers) Sit>stand w/ FWW and mod A x 2   Gait/Stairs (Locomotion)   Comment, (Gait/Stairs) Unable to initiate   Sensory Examination   Sensory Perception patient reports no sensory changes   Sensory Perception Comments Pt reports having neuropathy in his hands and feet at baseline.   Clinical Impression   Criteria for Skilled Therapeutic Intervention Yes, treatment indicated   PT Diagnosis (PT) Impaired gait   Influenced by the following impairments Decreased activity tolerance; decreased balance; decreased strength   Functional limitations due to impairments Impaired functional mobility   Clinical Presentation (PT Evaluation Complexity) Stable/Uncomplicated   Clinical Presentation Rationale Clinical Judgement   Clinical Decision Making (Complexity) low complexity   Planned Therapy Interventions (PT) balance training;bed mobility training;gait training;patient/family education;stair training;strengthening;transfer training   Risk & Benefits of therapy have been explained evaluation/treatment results reviewed;care plan/treatment goals reviewed;risks/benefits reviewed;current/potential barriers reviewed;participants voiced agreement with care plan;participants included;patient   PT Discharge Planning   PT Discharge Recommendation (DC Rec) Transitional Care Facility   PT Rationale for DC Rec Pt is below baseline. Pt requiring heavy A  x 2 with all functional mobility. Pt unable to intiate ambulation at this time due to deficits in activity tolerance, balance, and strength. Due to these deficits, pt is a high falls risk and unsafe to return home at this time. Pt would benefit from continued skilled PT services via TCU to address deficits and improve IND with safety and functional mobility. Pt in agreement for TCU and prefers [facility] if possible.   PT Brief overview of current status Supine>sit w/ mod A x 2; sit>stand w/ FWW and mod A x2   Plan of Care Review   Plan of Care Reviewed With patient   Total Evaluation Time   Total Evaluation Time (Minutes) 10   Physical Therapy Goals   PT Frequency 5x/week   PT Predicted Duration/Target Date for Goal Attainment 07/17/22   PT Goals Bed Mobility;Gait;Transfers;Stairs   PT: Bed Mobility Supervision/stand-by assist;Supine to/from sit   PT: Transfers Supervision/stand-by assist;Sit to/from stand;Assistive device   PT: Gait Supervision/stand-by assist;Assistive device;100 feet   PT: Stairs Minimal assist;3 stairs

## 2022-07-12 NOTE — ANESTHESIA CARE TRANSFER NOTE
Patient: Mark Hernández    Procedure: Procedure(s):  INTERNAL FIXATION, FRACTURE, TROCHANTERIC, HIP, USING DOM       Diagnosis: Fracture, femur neck (H) [S72.009A]  Diagnosis Additional Information: No value filed.    Anesthesia Type:   Spinal     Note:    Oropharynx: oropharynx clear of all foreign objects and spontaneously breathing  Level of Consciousness: awake  Oxygen Supplementation: face mask  Level of Supplemental Oxygen (L/min / FiO2): 6  Independent Airway: airway patency satisfactory and stable  Dentition: dentition unchanged  Vital Signs Stable: post-procedure vital signs reviewed and stable  Report to RN Given: handoff report given  Patient transferred to: PACU  Comments: At end of procedure, spontaneous respirations, adequate tidal volumes, followed commands to voice, LMA removed atraumatically, oropharynx suctioned, airway patent after LMA removal. Oxygen via facemask at 6 liters per minute to PACU. Oxygen tubing connected to wall O2 in PACU, SpO2, NiBP, and EKG monitors and alarms on and functioning, Desirae Hugger warmer connected to patient gown, report on patient's clinical status given to PACU RN, RN questions answered.  Handoff Report: Identifed the Patient, Identified the Reponsible Provider, Reviewed the pertinent medical history, Discussed the surgical course, Reviewed Intra-OP anesthesia mangement and issues during anesthesia, Set expectations for post-procedure period and Allowed opportunity for questions and acknowledgement of understanding      Vitals:  Vitals Value Taken Time   /73 07/11/22 2010   Temp     Pulse 60 07/11/22 2013   Resp 10 07/11/22 2013   SpO2 100 % 07/11/22 2013   Vitals shown include unvalidated device data.    Electronically Signed By: LUCY Jamison CRNA  July 11, 2022  8:14 PM

## 2022-07-13 ENCOUNTER — APPOINTMENT (OUTPATIENT)
Dept: PHYSICAL THERAPY | Facility: CLINIC | Age: 87
DRG: 480 | End: 2022-07-13
Payer: MEDICARE

## 2022-07-13 LAB
ALBUMIN SERPL-MCNC: 2.7 G/DL (ref 3.4–5)
ALP SERPL-CCNC: 58 U/L (ref 40–150)
ALT SERPL W P-5'-P-CCNC: 10 U/L (ref 0–70)
ANION GAP SERPL CALCULATED.3IONS-SCNC: 2 MMOL/L (ref 3–14)
AST SERPL W P-5'-P-CCNC: 16 U/L (ref 0–45)
BILIRUB SERPL-MCNC: 0.9 MG/DL (ref 0.2–1.3)
BUN SERPL-MCNC: 30 MG/DL (ref 7–30)
CALCIUM SERPL-MCNC: 8.1 MG/DL (ref 8.5–10.1)
CHLORIDE BLD-SCNC: 104 MMOL/L (ref 94–109)
CO2 SERPL-SCNC: 28 MMOL/L (ref 20–32)
CREAT SERPL-MCNC: 1.13 MG/DL (ref 0.66–1.25)
ERYTHROCYTE [DISTWIDTH] IN BLOOD BY AUTOMATED COUNT: 13.3 % (ref 10–15)
GFR SERPL CREATININE-BSD FRML MDRD: 63 ML/MIN/1.73M2
GLUCOSE BLD-MCNC: 200 MG/DL (ref 70–99)
GLUCOSE BLDC GLUCOMTR-MCNC: 139 MG/DL (ref 70–99)
GLUCOSE BLDC GLUCOMTR-MCNC: 142 MG/DL (ref 70–99)
GLUCOSE BLDC GLUCOMTR-MCNC: 159 MG/DL (ref 70–99)
GLUCOSE BLDC GLUCOMTR-MCNC: 178 MG/DL (ref 70–99)
HCT VFR BLD AUTO: 27 % (ref 40–53)
HGB BLD-MCNC: 8.8 G/DL (ref 13.3–17.7)
IRON SATN MFR SERPL: 10 % (ref 15–46)
IRON SERPL-MCNC: 15 UG/DL (ref 35–180)
MCH RBC QN AUTO: 32 PG (ref 26.5–33)
MCHC RBC AUTO-ENTMCNC: 32.6 G/DL (ref 31.5–36.5)
MCV RBC AUTO: 98 FL (ref 78–100)
PLATELET # BLD AUTO: 139 10E3/UL (ref 150–450)
POTASSIUM BLD-SCNC: 4.2 MMOL/L (ref 3.4–5.3)
PROT SERPL-MCNC: 5.3 G/DL (ref 6.8–8.8)
RBC # BLD AUTO: 2.75 10E6/UL (ref 4.4–5.9)
SODIUM SERPL-SCNC: 134 MMOL/L (ref 133–144)
TIBC SERPL-MCNC: 149 UG/DL (ref 240–430)
WBC # BLD AUTO: 9.2 10E3/UL (ref 4–11)

## 2022-07-13 PROCEDURE — 80053 COMPREHEN METABOLIC PANEL: CPT | Performed by: HOSPITALIST

## 2022-07-13 PROCEDURE — 83550 IRON BINDING TEST: CPT | Performed by: HOSPITALIST

## 2022-07-13 PROCEDURE — 250N000013 HC RX MED GY IP 250 OP 250 PS 637: Performed by: PHYSICIAN ASSISTANT

## 2022-07-13 PROCEDURE — 120N000001 HC R&B MED SURG/OB

## 2022-07-13 PROCEDURE — 85018 HEMOGLOBIN: CPT | Performed by: HOSPITALIST

## 2022-07-13 PROCEDURE — 97530 THERAPEUTIC ACTIVITIES: CPT | Mod: GP

## 2022-07-13 PROCEDURE — 250N000013 HC RX MED GY IP 250 OP 250 PS 637: Performed by: HOSPITALIST

## 2022-07-13 PROCEDURE — 93010 ELECTROCARDIOGRAM REPORT: CPT | Performed by: INTERNAL MEDICINE

## 2022-07-13 PROCEDURE — 93005 ELECTROCARDIOGRAM TRACING: CPT

## 2022-07-13 PROCEDURE — 36415 COLL VENOUS BLD VENIPUNCTURE: CPT | Performed by: HOSPITALIST

## 2022-07-13 PROCEDURE — 97110 THERAPEUTIC EXERCISES: CPT | Mod: GP

## 2022-07-13 PROCEDURE — 99232 SBSQ HOSP IP/OBS MODERATE 35: CPT | Performed by: HOSPITALIST

## 2022-07-13 RX ORDER — FUROSEMIDE 20 MG
20 TABLET ORAL DAILY
Status: DISCONTINUED | OUTPATIENT
Start: 2022-07-14 | End: 2022-07-14 | Stop reason: HOSPADM

## 2022-07-13 RX ADMIN — ACETAMINOPHEN 650 MG: 325 TABLET ORAL at 22:12

## 2022-07-13 RX ADMIN — POLYETHYLENE GLYCOL 3350 17 G: 17 POWDER, FOR SOLUTION ORAL at 22:12

## 2022-07-13 RX ADMIN — DULOXETINE HYDROCHLORIDE 30 MG: 30 CAPSULE, DELAYED RELEASE ORAL at 09:13

## 2022-07-13 RX ADMIN — SENNOSIDES AND DOCUSATE SODIUM 1 TABLET: 50; 8.6 TABLET ORAL at 09:14

## 2022-07-13 RX ADMIN — ACETAMINOPHEN 650 MG: 325 TABLET ORAL at 16:18

## 2022-07-13 RX ADMIN — POLYETHYLENE GLYCOL 3350 17 G: 17 POWDER, FOR SOLUTION ORAL at 09:15

## 2022-07-13 RX ADMIN — GABAPENTIN 800 MG: 800 TABLET, FILM COATED ORAL at 09:13

## 2022-07-13 RX ADMIN — ACETAMINOPHEN 650 MG: 325 TABLET ORAL at 09:27

## 2022-07-13 RX ADMIN — MELATONIN TAB 3 MG 3 MG: 3 TAB at 22:12

## 2022-07-13 RX ADMIN — SIMVASTATIN 20 MG: 20 TABLET, FILM COATED ORAL at 22:12

## 2022-07-13 RX ADMIN — TAMSULOSIN HYDROCHLORIDE 0.8 MG: 0.4 CAPSULE ORAL at 22:12

## 2022-07-13 RX ADMIN — GABAPENTIN 800 MG: 800 TABLET, FILM COATED ORAL at 22:12

## 2022-07-13 RX ADMIN — ASPIRIN 325 MG: 325 TABLET, COATED ORAL at 09:14

## 2022-07-13 RX ADMIN — SENNOSIDES AND DOCUSATE SODIUM 1 TABLET: 50; 8.6 TABLET ORAL at 22:12

## 2022-07-13 RX ADMIN — FUROSEMIDE 20 MG: 20 TABLET ORAL at 09:13

## 2022-07-13 RX ADMIN — CARVEDILOL 12.5 MG: 12.5 TABLET, FILM COATED ORAL at 09:14

## 2022-07-13 RX ADMIN — CARVEDILOL 12.5 MG: 12.5 TABLET, FILM COATED ORAL at 18:17

## 2022-07-13 RX ADMIN — SULFAMETHOXAZOLE AND TRIMETHOPRIM 1 TABLET: 400; 80 TABLET ORAL at 09:13

## 2022-07-13 ASSESSMENT — ACTIVITIES OF DAILY LIVING (ADL)
ADLS_ACUITY_SCORE: 37

## 2022-07-13 NOTE — PROGRESS NOTES
Date/Time:07/13 2300-0730    Trauma/Ortho/Medical (Choose one) :Trauma    Diagnosis:Left femur fx  POD#:2  Mental Status:A&O x3  Activity/dangle:A-2 walker GB  Diet:Mod carb  Pain:Managed with PRN oxycodone and Tylenol  Mock/Voiding:External catheter with adequate amount  Tele/Restraints/Iso:Tele,A-fib  02/LDA:Supplemental O2 2 lpm PRN  D/C Date:Pending TCU placement ??  Other Info:VSS,IV SL BLE numbness baseline per pt  Pt uses hearing aids bilaterally.

## 2022-07-13 NOTE — PROGRESS NOTES
Orthopedic Surgery  Mark Hernández  07/13/2022     Admit Date:  7/10/2022  POD: 2 Days Post-Op   Procedure(s):  INTERNAL FIXATION, FRACTURE, TROCHANTERIC, HIP, USING DOM    Alert and oriented.     Patient resting comfortably in bed.    Pain controlled.  Tolerating oral intake.    Denies nausea or vomiting  Denies chest pain or shortness of breath    Temp:  [98  F (36.7  C)-99.3  F (37.4  C)] 98.1  F (36.7  C)  Pulse:  [66-85] 66  Resp:  [16] 16  BP: ()/(44-55) 116/49  SpO2:  [88 %-98 %] 98 %    Dressing saturated and peeling off, changed to aquacel at bedside.  No active drainage from incision.    Minimal erythema of the surrounding skin.   Bilateral calves are soft, non-tender.  Left lower extremity is NVI.  Sensation intact bilateral lower extremities  Patient able to resist dorsi and plantar flexion bilaterally  +Dp pulse    Labs:  Recent Labs   Lab Test 07/13/22  1006 07/12/22  1106 07/11/22  0814 07/10/22  2121   WBC 9.2  --  5.8 5.6   HGB 8.8* 8.7* 10.0* 10.4*   *  --  139* 155     Recent Labs   Lab Test 12/27/17 2020   INR 1.11     No lab results found.      1. PLAN:   Continue ASA for DVT prophylaxis.     Mobilize with PT/OT    WBAT Left LE with walker.     Continue current pain regiment.   Dressings: Keep intact.  Change if >60% saturated or peeling off.    Follow-up: At two weeks, the dressing could be removed.  There are no sutures to be removed.  If all is going well, first followup could be 6 weeks with Dr. Pacheco, [facility], with 2 views of the right femur, sooner if there is any difficulty.     2. Disposition   Anticipate d/c to TCU when medically cleared and progressing in PT.    Sabrina Mace PA-C     27-Mar-2019

## 2022-07-13 NOTE — PLAN OF CARE
Patient is A&O x4. VSS on RA. Wear hearing aids. Up with assist of 2 with GW. Dressing on hip CDI. IV-SL. Voiding per external catheter with adequate output. Pain managed with prn oxycodone. BG corrected with insulin.

## 2022-07-13 NOTE — PROGRESS NOTES
[facility]  Hospitalist Progress Note   07/13/2022          Assessment and Plan:       Mark Hernández is a 88 year old male with prostate cancer, complete heart block, CKD, diabetes admitted on 7/10/2022 with a left hip fracture after a mechanical fall.    S/p Left hip IM Nail on 7/11/2022.  Acute traumatic displaced left intertrochanteric hip fracture    S/P Left hip IM Nail on 7/11/2022 by orthopedic surgery.    Documented  mL.  Defer postoperative care to orthopedic team.  Recommend early ambulation.  Per Ortho weightbearing as tolerated with walker for 6 weeks.  DVT prophylaxis with aspirin 325 mg oral daily for 6 weeks.  Tylenol as needed for mild-moderate pain.  Reserve oxycodone for moderate to severe pain.  Minimize narcotic use as able to.  Rehabilitation per orthopedic surgery.  Osteoporosis workup including calcium, vitamin D supplementation and followup with primary care doctor for additional treatment.   Follow up At two weeks, the dressing could be removed.  There are no sutures to be removed.  If all is going well, first followup could be 6 weeks with Dr. Pacheco, [facility], with 2 views of the right femur, sooner if there is any difficulty    Status post mechanical fall at home.  Per report patient had gone over to [place] with his wife on the evening of admission.  Had dinner with 1 glass of wine, after going home unfortunately caught his toe on the floor and fell to the ground landing on his left hip no chest pain or syncope..  Rehab team following, plan for transition to TCU.    Fall precautions.      Acute encephalopathy likely delirium from hospitalization, narcotic use, possible cognitive impairment at baseline.  Patient drowsy this morning.  Per daughter report was confused earlier in the morning.  Minimize narcotic use as able to.  Decrease oxycodone to 2.5 mg  Hard of hearing, use pocket talker if able to.  Delirium  precautions.  Consider cognitive assessment at     History of congestive heart failure.  Complete heart block status post pacemaker 2016.  Hypertension.  Hyperlipidemia.  History of chronic lower extremity edema.  Denies any chest pain or palpitations.  Echo 2020 showed preserved EF of 60%.    EKG ordered -telemetry in and out of A. fib with rate controlled.  On full-strength aspirin as above  Continue PTA Coreg.   Continue to hold PTA lisinopril today soft pressures.  Restart PTA Lasix with hold parameters.  Restart PTA simvastatin  Telemetry monitoring.    Acute on chronic anemia likely dilutional, surgical blood loss.  Chronic Iron deficiency anemia: History of vitamin B12 deficiency   On chart review baseline hemoglobin around 11.    Hemoglobin around 10 > 8.8.  Iron saturation index 10.  Will start on IV Venofer 1 dose.  Can resume iron, vitamin B12 supplements on discharge.    History of peripheral neuropathy.  Continue PTA gabapentin 800 mg twice daily.  Continue PTA Cymbalta    Chronic kidney disease stage III  Appears to be roughly at his baseline of 1.1-1.2.  Monitor renal function in AM.  Avoid nephrotoxic drugs.     Diabetes mellitus with a hemoglobin A1c of 6.4.  PTA metformin on hold  Insulin sliding scale.   Monitor blood sugars, optimize regimen.  Carb controlled diet.    Benign prostatic hypertrophy:    Continue with Flomax.    History of bladder and prostate cancer.  Status post multiple TURPs, right ureterectomy and nephrectomy  No acute issues.    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  PTA on antibiotic chronic suppression.  Restart PTA Bactrim    Hypoalbuminemia likely from poor oral intake, dilutional.  Serum albumin 2.7.  Lasix restarted.  Dietary supplements with Ensure.    Obesity with a BMI of 29.84.  Consider lifestyle modification with diet and exercise as able to.  Consider sleep studies as outpatient    Orders Placed This Encounter       Consistent Carbohydrate Diet Moderate Consistent Carb (60 g CHO per Meal) Diet      DVT Prophylaxis: SCD  Code Status: Full Code  Disposition: Expected discharge 1- 2 days.    Discussed with patient, his daughter by the bedside, bedside RN, .  Total time greater than 35 minutes.  More than 50% of time spent in direct patient care, care coordination, patient counseling, and formalizing plan of care.    Rebecca Valdez MD        Interval History:        Patient lying in bed.  More drowsy today when compared to baseline.  Receiving Tylenol, oxycodone.  Per daughter report by the bedside-earlier this morning was confused.  Next  Denies any chest pain or palpitations.  Denies any headache or dizziness.  No nausea or vomiting.  Telemetry with atrial fibrillation.         Physical Exam:        Physical Exam   Temp:  [98  F (36.7  C)-99.3  F (37.4  C)] 98.7  F (37.1  C)  Pulse:  [68-85] 80  Resp:  [16] 16  BP: ()/(44-55) 117/55  SpO2:  [88 %-97 %] 97 %    Intake/Output Summary (Last 24 hours) at 7/11/2022 1623  Last data filed at 7/11/2022 1148  Gross per 24 hour   Intake --   Output 800 ml   Net -800 ml       Admission Weight: 94.3 kg (208 lb)  Current Weight: 94.3 kg (208 lb)    PHYSICAL EXAM  GENERAL: Patient is in no distress.  Drowsy but arousable hard of hearing.  HEART: Regular rate and rhythm. S1S2.   LUNGS: Bilateral slightly decreased breath sounds.  No wheezing or crackles.  Respirations unlabored  NEURO: Moving all extremities  EXTREMITIES: No pedal edema.  Left hip dressing intact.  SKIN: Warm, dry. No rash  PSYCHIATRY Cooperative       Medications:          aspirin  325 mg Oral Daily     carvedilol  12.5 mg Oral BID w/meals     DULoxetine  30 mg Oral Daily     furosemide  20 mg Oral Daily     gabapentin  800 mg Oral BID     insulin aspart  1-7 Units Subcutaneous TID AC     insulin aspart  1-5 Units Subcutaneous At Bedtime     melatonin  3 mg Oral At Bedtime     polyethylene glycol  17 g  Oral BID     senna-docusate  1 tablet Oral BID     simvastatin  20 mg Oral At Bedtime     sodium chloride (PF)  3 mL Intracatheter Q8H     sodium chloride (PF)  3 mL Intracatheter Q8H     sulfamethoxazole-trimethoprim  1 tablet Oral Daily     tamsulosin  0.8 mg Oral At Bedtime     acetaminophen **OR** acetaminophen, bisacodyl, bisacodyl, glucose **OR** dextrose **OR** glucagon, HYDROmorphone, lidocaine 4%, lidocaine (buffered or not buffered), magnesium hydroxide, naloxone **OR** naloxone **OR** naloxone **OR** naloxone, ondansetron **OR** ondansetron, oxyCODONE, prochlorperazine **OR** prochlorperazine **OR** prochlorperazine, senna-docusate **OR** senna-docusate, sodium chloride (PF), sodium chloride (PF)         Data:      All new lab and imaging data was reviewed.

## 2022-07-13 NOTE — PLAN OF CARE
A&OX4, [place], bilateral hearing aids present.  Up with strong assist of 2 and walker, taking a few steps from bed to chair.  Tolerating regular diet.  Voiding adequately via external catheter.  Taking prn tylenol for pain.  Discharge pending TCU.  Patient refused blood glucose check before dinner this evening.

## 2022-07-13 NOTE — CONSULTS
Care Management Initial Consult    General Information  Assessment completed with: Patient, Children, Stefan  Type of CM/SW Visit: Offer D/C Planning    Primary Care Provider verified and updated as needed: No   Readmission within the last 30 days: no previous admission in last 30 days      Reason for Consult: discharge planning  Advance Care Planning: Advance Care Planning Reviewed: no concerns identified        Communication Assessment  Patient's communication style: spoken language (English or Bilingual)      Cognitive  Cognitive/Neuro/Behavioral: WDL  Level of Consciousness: alert  Arousal Level: opens eyes spontaneously  Orientation: oriented x 4  Mood/Behavior: cooperative, calm  Best Language: 0 - No aphasia  Speech: logical, clear    Living Environment:   People in home: spouse     Current living Arrangements: [facility]  Able to return to prior arrangements: no    Family/Social Support:  Care provided by:  self  Provides care for: no one  Marital Status:   Wife, Children          Description of Support System: Involved, Supportive    Support Assessment: Adequate social supports, Adequate family and caregiver support     Current Resources:   Patient receiving home care services:       Community Resources:    Equipment currently used at home: grab bar, tub/shower  Supplies currently used at home:      Employment/Financial:  Employment Status: retired        Financial Concerns:        Lifestyle & Psychosocial Needs:  Social Determinants of Health     Tobacco Use: Medium Risk     Smoking Tobacco Use: Former Smoker     Smokeless Tobacco Use: Never Used   Alcohol Use: Not on file   Financial Resource Strain: Not on file   Food Insecurity: Not on file   Transportation Needs: Not on file   Physical Activity: Not on file   Stress: Not on file   Social Connections: Not on file   Intimate Partner Violence: Not on file   Depression: Not on file   Housing Stability: Not on file     Functional  Status:  Prior to admission patient needed assistance: n/a     Mental Health Status:  Chemical Dependency Status  Values/Beliefs:  Spiritual, Cultural Beliefs, [employer] Practices, Values that affect care:               Additional Information:  Consult for discharge planning. Patient in agreement for TCU at discharge and would like referrals sent to [facility].  GLORIA sent TCU referral to [facility]. TCU accepted patient and has room available today. SW discussed private room costs of $36 a day (waiver for the first two weeks). Writer discussed transportation options and possible out of pocket costs of transport with patient. Patient would like w/c transportation scheduled at discharge.     Patient has been vaccinated for COVID and has had the booster. Writer encouraged patient and/or family to reach out to facility directly for most up to date visitor guidelines.     GLORIA paged hospitalist to see if patient is medically ready for discharge.     Addendum:  Patient is not medically ready to discharge. [facility] will hold TCU bed for tomorrow 7/14    AMANDA Estrada, LGSW

## 2022-07-14 VITALS
SYSTOLIC BLOOD PRESSURE: 125 MMHG | HEART RATE: 62 BPM | BODY MASS INDEX: 29.84 KG/M2 | RESPIRATION RATE: 18 BRPM | WEIGHT: 208 LBS | OXYGEN SATURATION: 94 % | TEMPERATURE: 97.9 F | DIASTOLIC BLOOD PRESSURE: 48 MMHG

## 2022-07-14 LAB
ANION GAP SERPL CALCULATED.3IONS-SCNC: 2 MMOL/L (ref 3–14)
BUN SERPL-MCNC: 30 MG/DL (ref 7–30)
CALCIUM SERPL-MCNC: 8.5 MG/DL (ref 8.5–10.1)
CHLORIDE BLD-SCNC: 104 MMOL/L (ref 94–109)
CO2 SERPL-SCNC: 29 MMOL/L (ref 20–32)
CREAT SERPL-MCNC: 1.12 MG/DL (ref 0.66–1.25)
GFR SERPL CREATININE-BSD FRML MDRD: 63 ML/MIN/1.73M2
GLUCOSE BLD-MCNC: 176 MG/DL (ref 70–99)
GLUCOSE BLDC GLUCOMTR-MCNC: 129 MG/DL (ref 70–99)
GLUCOSE BLDC GLUCOMTR-MCNC: 142 MG/DL (ref 70–99)
GLUCOSE BLDC GLUCOMTR-MCNC: 168 MG/DL (ref 70–99)
GLUCOSE BLDC GLUCOMTR-MCNC: 169 MG/DL (ref 70–99)
HGB BLD-MCNC: 8.2 G/DL (ref 13.3–17.7)
POTASSIUM BLD-SCNC: 4.4 MMOL/L (ref 3.4–5.3)
SODIUM SERPL-SCNC: 135 MMOL/L (ref 133–144)

## 2022-07-14 PROCEDURE — 80048 BASIC METABOLIC PNL TOTAL CA: CPT | Performed by: HOSPITALIST

## 2022-07-14 PROCEDURE — 250N000013 HC RX MED GY IP 250 OP 250 PS 637: Performed by: PHYSICIAN ASSISTANT

## 2022-07-14 PROCEDURE — 99239 HOSP IP/OBS DSCHRG MGMT >30: CPT | Performed by: HOSPITALIST

## 2022-07-14 PROCEDURE — 36415 COLL VENOUS BLD VENIPUNCTURE: CPT | Performed by: HOSPITALIST

## 2022-07-14 PROCEDURE — 85018 HEMOGLOBIN: CPT | Performed by: HOSPITALIST

## 2022-07-14 PROCEDURE — 250N000013 HC RX MED GY IP 250 OP 250 PS 637: Performed by: HOSPITALIST

## 2022-07-14 RX ORDER — GABAPENTIN 600 MG/1
600 TABLET ORAL 2 TIMES DAILY
Refills: 0 | DISCHARGE
Start: 2022-07-14 | End: 2022-08-26

## 2022-07-14 RX ORDER — ASPIRIN 81 MG/1
81 TABLET ORAL 2 TIMES DAILY
Status: DISCONTINUED | OUTPATIENT
Start: 2022-07-15 | End: 2022-07-14 | Stop reason: HOSPADM

## 2022-07-14 RX ADMIN — GABAPENTIN 800 MG: 800 TABLET, FILM COATED ORAL at 08:06

## 2022-07-14 RX ADMIN — DULOXETINE HYDROCHLORIDE 30 MG: 30 CAPSULE, DELAYED RELEASE ORAL at 08:06

## 2022-07-14 RX ADMIN — POLYETHYLENE GLYCOL 3350 17 G: 17 POWDER, FOR SOLUTION ORAL at 08:07

## 2022-07-14 RX ADMIN — FUROSEMIDE 20 MG: 20 TABLET ORAL at 08:06

## 2022-07-14 RX ADMIN — ACETAMINOPHEN 650 MG: 325 TABLET ORAL at 08:06

## 2022-07-14 RX ADMIN — CARVEDILOL 12.5 MG: 12.5 TABLET, FILM COATED ORAL at 17:00

## 2022-07-14 RX ADMIN — CARVEDILOL 12.5 MG: 12.5 TABLET, FILM COATED ORAL at 08:07

## 2022-07-14 RX ADMIN — SULFAMETHOXAZOLE AND TRIMETHOPRIM 1 TABLET: 400; 80 TABLET ORAL at 08:07

## 2022-07-14 RX ADMIN — SENNOSIDES AND DOCUSATE SODIUM 2 TABLET: 50; 8.6 TABLET ORAL at 08:11

## 2022-07-14 RX ADMIN — ASPIRIN 325 MG: 325 TABLET, COATED ORAL at 08:06

## 2022-07-14 RX ADMIN — ACETAMINOPHEN 650 MG: 325 TABLET ORAL at 17:00

## 2022-07-14 ASSESSMENT — ACTIVITIES OF DAILY LIVING (ADL)
ADLS_ACUITY_SCORE: 37
ADLS_ACUITY_SCORE: 37
ADLS_ACUITY_SCORE: 39
ADLS_ACUITY_SCORE: 37
ADLS_ACUITY_SCORE: 24
ADLS_ACUITY_SCORE: 37
ADLS_ACUITY_SCORE: 39

## 2022-07-14 NOTE — PROGRESS NOTES
Date/Time:07/14,2838-4618    Trauma/Ortho/Medical (Choose one) :Trauma    Diagnosis:Left femur fx  POD#:3  Mental Status:A&O x4  Activity/dangle:A-2 walker GB  Diet:Mod carb  Pain:Managed with PRN oxycodone and Tylenol  Mock/Voiding:External catheter with adequate amount  Tele/Restraints/Iso:Tele,A-fib  02/LDA:RA  D/C Date:Pending TCU placement ??  Other Info:VSS,IV SL BLE numbness baseline per pt.

## 2022-07-14 NOTE — DISCHARGE SUMMARY
Discharge Summary  Hospitalist    Date of Admission:  7/10/2022  Date of Discharge:  7/14/2022  Discharging Provider: Rebecca Valdez MD    Primary Care Physician   [facility]  Primary Care Provider Phone Number: [phone]  Primary Care Provider Fax Number: [phone]    PRINCIPAL DIAGNOSIS  S/p Left hip IM Nail on 7/11/2022.  Acute traumatic displaced left intertrochanteric hip fracture .  Status post mechanical fall at home.  Acute mild encephalopathy- multifactorial likely delirium from hospitalization, narcotic use, possible cognitive impairment at baseline -Improved.  Acute on chronic anemia likely dilutional, surgical blood loss.  Hypoalbuminemia likely from poor oral intake, dilutional.  Obesity with a BMI of 29.84.    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    Hospital Course   Mark Hernández is a 88 year old male with prostate cancer, complete heart block, CKD, diabetes admitted on 7/10/2022 with a left hip fracture after a mechanical fall.     S/p Left hip IM Nail on 7/11/2022.  Acute traumatic displaced left intertrochanteric hip fracture .  S/P Left hip IM Nail on 7/11/2022 by orthopedic surgery.     Documented  mL.  Followed by orthopedic team postoperatively.  Recommend early ambulation.  Per Ortho weightbearing as tolerated with walker for 6 weeks.  Was on aspirin 325 mg daily for DVT prophylaxis, noted drop in hemoglobin.  On day of discharge orthopedic team recommending aspirin 81 mg twice daily.  Tylenol as needed for mild-moderate pain.    Reserve oxycodone 2.5 mg for severe pain. Minimize narcotic use as able to.  Rehabilitation per orthopedic surgery.  Osteoporosis workup including calcium, vitamin D supplementation and followup with primary care doctor for additional treatment.   Follow up At two weeks, the dressing could be removed.  There are no sutures to be removed.  If all is going well, first followup could be 6 weeks with Dr. Pacheco, [facility], with 2 views of the right femur, sooner if there is any difficulty     Status post mechanical fall at home.  Per report patient had gone over to [place] with his wife on the evening of admission.  Had dinner with 1 glass of wine, after going home unfortunately caught his toe on the floor and fell to the ground landing on his left hip no chest pain or syncope..  Rehab team followed, plan for transition to TCU.    Fall precautions.       Acute encephalopathy likely delirium from hospitalization, narcotic use, possible cognitive impairment at baseline -Improved.  On 7/13 postoperatively patient drowsy, per report confusion.  Decreased oxycodone.  Back to baseline mental status.    Hard of hearing, use pocket talker if able to.  Delirium precautions.  Consider cognitive assessment at TCU    Acute on chronic anemia likely dilutional, surgical blood loss.  Chronic Iron deficiency anemia: History of vitamin B12 deficiency   On chart review baseline hemoglobin around 11.    Hemoglobin around 10 > 8.8 >8.2.  Iron saturation index 10.  Oral iron supplements on discharge.  Was on aspirin 325 mg daily for DVT prophylaxis, noted drop  in hemoglobin.  On day of discharge orthopedic team recommending aspirin 81 mg twice daily.  Monitor hemoglobin level in 48 to 72 hours or earlier if symptomatic.    History of congestive heart failure.  Complete heart block status post pacemaker 2016.  Hypertension.  Hyperlipidemia.  History of chronic lower extremity edema.  Denies any chest pain or palpitations.  Echo 2020 showed preserved EF of 60%.    Telemetry ? A. fib, EKG showed paced rhythm.  On aspirin as above.  Continue PTA Coreg.   Continue PTA lisinopril, Lasix.  Continue PTA statin.       History of peripheral neuropathy.  Continue PTA gabapentin, decrease dose to 600 mg twice daily.   Continue PTA Cymbalta     Chronic kidney disease stage III  Appears to be roughly at his baseline of 1.1-1.2.  Monitor renal function in 1 week.  Avoid nephrotoxic drugs.      Diabetes mellitus with a hemoglobin A1c of 6.4.  PTA metformin on discharge   Monitor blood sugars, optimize regimen. Carb controlled diet.  Age-appropriate health maintenance on PCP visit.     Benign prostatic hypertrophy    Continue with Flomax.     History of bladder and prostate cancer.  Status post multiple TURPs, right ureterectomy and nephrectomy  No acute issues.     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  PTA on antibiotic chronic suppression. Continue PTA Bactrim     Hypoalbuminemia likely from poor oral intake, dilutional.  Serum albumin 2.7.  Lasix started.  Dietary supplements with Ensure.     Obesity with a BMI of 29.84.  Consider lifestyle modification with diet and exercise as able to.  Consider sleep studies as outpatient.    Rebecca Valdez MD.    Pending Results   Unresulted Labs Ordered in the Past 30 Days of this Admission     No orders found from 6/10/2022 to 7/11/2022.             Physical Exam   Vitals:    07/10/22 2053   Weight: 94.3 kg (208 lb)     Vital Signs with Ranges  Temp:  [97.4  F (36.3  C)-98.7  F (37.1  C)] 98.7  F  "(37.1  C)  Pulse:  [63-72] 63  Resp:  [16] 16  BP: ()/(45-58) 130/58  SpO2:  [90 %-95 %] 94 %  I/O last 3 completed shifts:  In: -   Out: 1600 [Urine:1600]  PHYSICAL EXAM  GENERAL: Patient is in no distress.  Awake, can answer all questions.  HEART: Regular rate and rhythm. S1S2.   LUNGS: Bilateral slightly decreased breath sounds.  No wheezing or crackles.  Respirations unlabored  NEURO: Moving all extremities  EXTREMITIES: No pedal edema.  Left hip dressing intact.  SKIN: Warm, dry. No rash  PSYCHIATRY Cooperative  )Consultations This Hospital Stay   ORTHOPEDIC SURGERY IP CONSULT  PHYSICAL THERAPY ADULT IP CONSULT  OCCUPATIONAL THERAPY ADULT IP CONSULT  CARE MANAGEMENT / SOCIAL WORK IP CONSULT  PHYSICAL THERAPY ADULT IP CONSULT  OCCUPATIONAL THERAPY ADULT IP CONSULT    Time Spent on this Encounter   Rebecca JOHNSON MD, personally saw the patient today and spent greater than 30 minutes discharging this patient. Discussed with patient, patient's daughter, bedside RN.    Discharge Disposition   Discharged to short-term care facility  Condition at discharge: Good    Discharge Orders      When to call - Contact Surgeon Team    You may experience symptoms that require follow-up before your scheduled appointment. Refer to the \"Stoplight Tool\" for instructions on when to contact your Surgeon Team if you are concerned about pain control, blood clots, constipation, or if you are unable to urinate.     Discharge Instruction - Breathing exercises    Perform breathing exercises using your Incentive Spirometer 10 times per hour while awake for 2 weeks.     Symptoms - Constipation management    Constipation (hard, dry bowel movements) is not uncommon after an orthopedic injury due to the combination of being less active and opioid pain medication.  You can do the following to help reduce constipation:  ~  FLUIDS:  Drink clear liquids (water or Gatorade), or juice (apple/prune).  ~  DIET:  Eat a fiber rich diet.    ~  " ACTIVITY:  Get up and move around several times a day.  Increase your activity as you are able.  MEDICATIONS:  Reduce the risk of constipation by starting medications before you are constipated.  You can take Miralax (1 capful mixed in at least 8 oz fluids, 1-2 times per day) and/or a laxative (Senokot-S, 1-2 tablets taken 1-2 times a day).  Do not take bowel meds if you have loose stools.  If you already have constipation and these medications are not working, you try over-the-counter milk of magnesia, Dulcolax suppositories, or magnesium citrate (use as per package instructions).  Call your primary care provider if it has been greater than 3 days since your last bowel movement.     Medication instructions -  Anticoagulation - aspirin    Take Aspirin 325 daily as prescribed for 6 weeks after surgery.  This is given to help minimize your risk of a post-op blood clot.  After 4 weeks, you can either return to your normal aspirin dose or, if you are not normally on aspirin, stop taking it altogether.     Activity - Exercises to prevent blood clots    Unless otherwise directed by your Surgeon team, perform the following exercises at least three times per day for the first four weeks after surgery to prevent blood clots in your legs: 1) Point and flex your feet (Ankle Pumps), 2) Move your ankle around in big circles, 3) Wiggle your toes, 4) Walk, even for short distances, several times a day, will help decrease the risk of blood clots.     Comfort and Pain Management - Pain after Surgery    Pain after surgery is normal and expected.  You will have some amount of pain for several weeks after surgery.  Your pain will improve with time.  There are several things you can do to help reduce your pain including: rest, ice, elevation, and using pain medications as needed. Contact your Surgeon Team if you have pain that persists or worsens after surgery despite rest, ice, elevation, and taking your medication(s) as prescribed.  "Contact your Surgeon Team if you have new numbness, tingling, or weakness in your operative extremity.     Comfort and Pain Management - Swelling after Surgery    Swelling and/or bruising of the affected extremity is common and may persist for weeks-to-months. You may notice swelling gets worse the more you are using the injured leg or are sitting with your foot resting normally on the floor.  In addition to frequent icing and elevation, you can try compression stockings or bandages (worn under your knee immobilizer if you instructed to wear one).  Make sure to remove any compressive stocking/bandage at least twice per day to assess the underlying skin.  The wraps/stockings may cause some slight indents at the seams but should NOT cause numbness, purple skin, cool extremities, or skin breakdown.  If this is happening, they are too tight and are cutting off your circulation and you should remove them immediately.   Contact your Orthopedic Team if you notice new swelling NOT associated with an increase in your activity level, or if you have any swelling that is also red, hot, or painful.     Comfort and Pain Management - LOWER Extremity Elevation    Swelling is expected for several weeks-to-months after an injury or surgery. This type of swelling is usually associated with gravity and activity, and can be improved with elevation.  The best way to decrease the swelling is to elevate your leg with your \"toes above your nose\".  It may be easiest to achieve this position by laying down and placing several pillows lengthwise under your calf and heel. When elevating your leg keep your knee completely straight.  Elevate your leg as often as possible especially for the first 2-3 weeks after injury or surgery.     Comfort and Pain Management - Cold therapy    Ice can be used to control swelling and discomfort after injury or surgery  Place a thin towel over your affected joint and lay an ice pack or bag or frozen veggies " overtop.  Leave the ice pack in place for 20-30 minutes, then remove and leave off for 20 minutes. Repeat this 20 minutes on - 20 minutes off routine as often as tolerated.  Monitor skin for any signs of frostbite (skin that stays white and numb even after the ice has been removed for 20 minutes, etc).     Medication Instructions - Acetaminophen (TYLENOL) Instructions    If tolerated, please use acetaminophen (TYLENOL) for pain relief.  This can be used in addition to an NSAID and an opiate/narcotic medication.  Acetaminophen is most effective for managing pain symptoms when taken on a schedule  (every four, six, or eight hours).  For people without liver disease, max acetaminophen dose is 4000 mg in 24 hours from all medications.  Do NOT exceed this daily dose.  Most patients use acetaminophen for pain control for the first four weeks after injury.  You can wean from this medication as your pain decreases.  If able to use NSAIDs, you may take acetaminophen with ibuprofen to maximize pain relief from non-narcotic sources.  You can either alternate the medications (for instance, take acetaminophen, then 3-4 hours later take ibuprofen, then 3-4 hours later take acetaminophen, etc)  OR you can take them together at the same time 3-4 times per day (take acetaminophen WITH ibuprofen all at once every 6-8 hours.)     Medication Instructions - Opioid Instructions (0.5 - 1 tablet Q 4-6 hours, MAX 4 tablets)    You are discharging with an opioid medication (oxycodone). This medication should only be taken for severe, breakthrough pain that is not controlled with acetaminophen (TYLENOL).  If you rate your pain less than 6, you do not need this medication.  Pain rating 0-6:  You do not need a narcotic/opiate medication.  Use acetaminophen, elevation, ice, and rest for pain relief.   Pain rating 7-10:  Take 1/2 tablet (2.5 mg oxycodone) every 4-6 hours as needed    Do not exceed 4 tablets per day.  Opiates / narcotics will  cause constipation and sleepiness.  No driving if taking this medication.  Use sparingly and with caution - narcotics are known to be habit-forming / addictive.  If you find you are having a hard time weaning off this medication or are concerned that you have developed a dependence, it is very important that you talk to your primary care provider to discuss strategies and create a plan. DO NOT drink alcohol if taking narcotics (such as oxycodone) or other sedating medications.     Medication Instructions - Opioids - Tapering Instructions    In the first three days following surgery, your symptoms may warrant use of the narcotic pain medication every four to six hours as prescribed. This is normal. As your pain symptoms improve, focus your efforts on decreasing (tapering) use of narcotic medications. The most successful tapering strategy is to first, decrease the number of tablets you take every 4-6 hours to the minimum prescribed. Then, increase the amount of time between doses.  For example:  First, taper to   or 1 tablet every 4-6 hours.  Then, taper to   or 1 tablet every 6-8 hours.  Then, taper to   or 1 tablet every 8-10 hours.  Then, taper to   or 1 tablet every 10-12 hours.  Then, taper to   or 1 tablet at bedtime.  The bedtime dose can help with comfort during sleep and is typically the last dose to be discontinued after surgery.     Return to Driving    Return to driving - Driving is NOT permitted until directed by your provider. Under no circumstance are you permitted to drive while using narcotic pain medications.     Weight bearing as tolerated    Weight bearing as tolerated on your operative extremity.     Dressing / Wound Care - Wound    You have a clean dressing on your surgical wound.  Please leave dressing intact until you are seen for your 2 week post-op appointment.  You may shower with the dressing on, just make sure the shower head doesn't hit the dressing directly.  OK for soapy water to run  over the dressing.  Pat dry after shower.  Reinforce rolling or peeling edges with tape as needed.  If you are discharging to a care facility and are unable to make the 2 week post-op appointment, you can have your care team assist with managing your dressing.  On post-op day #14, your care team can remove the surgical dressing and also take out any staples or sutures.  Afterward, you only need to cover your incision if there is bleeding from the staple sites or if you have clothing rubbing against the incision.  DO NOT apply any lotions, ointments, or creams to the incision.  Contact your Surgical Team if you have increased redness, warmth around the surgical wound, and/or drainage from the surgical wound.     Dressing / Wound Care - NO Tub Bathing    Tub bathing, swimming, or any other activities that will cause your incision to be submerged in water should be avoided until allowed by your Surgeon.     Follow Up Care    Follow-up:  At two weeks, the dressing could be removed.  There are no sutures to be removed.  If all is going well, first followup could be 6 weeks with Dr. Pacheco, [facility], with 2 views of the right femur, sooner if there is any difficulty.     .  Please call Dr Pacheco's patient coordinator, Sabrina, to schedule this appointment and also with any questions or concerns.  She can be reached during normal business hours (M-F, 8am-5pm) at [phone].  If you have any questions or concerns after hours, please contact the on call surgeon at [phone].                  If you are at a care facility and are unable to make the 2 week post-op appointment, it is OK for your care team to assist with managing your post-op dressing and staple removal (see wound instructions below).  However, you MUST still contact Sabrina, Dr Pacheco's patient coordinator, to schedule a 6 week post-op clinic appointment to see Dr Pacheco for a wound check and x-rays.     Reason for your  hospital stay    You were admitted to the hospital with fall, left hip fracture, underwent surgery. Post operatively concern for blood loss anemia, delirium from hospitalization.     Follow-up and recommended labs and tests     Osteoporosis workup including calcium, vitamin D supplementation and followup with primary care doctor for additional treatment.   Consider sleep studies as outpatient.  Age-appropriate health maintenance on PCP visit     Discharge Instructions    Fall precautions.  Delirium precautions  Minimize narcotic use as able to.  Avoid nephrotoxic drugs.    Consider lifestyle modification with diet and exercise as able to.     Monitor and record    Monitor blood sugars once a day, review on provider visit and optimize therapy.     General info for SNF    Length of Stay Estimate: Short Term Care: Estimated # of Days <30  Condition at Discharge: Stable  Level of care:skilled   Rehabilitation Potential: Good  Admission H&P remains valid and up-to-date: Yes  Recent Chemotherapy: N/A  Use Nursing Home Standing Orders: Yes     Mantoux instructions    Give two-step Mantoux (PPD) Per Facility Policy Yes     Follow Up and recommended labs and tests    Follow up with intermediate physician.  The following labs/tests are recommended: Monitor hemoglobin level in 48 hours.    Follow CBC, CMP again in 1 week.  Consider cognitive assessment at TCU.     Physical Therapy Adult Consult    Evaluate and treat as clinically indicated.    Reason:s/p fall,   Physical deconditioning     Occupational Therapy Adult Consult    Evaluate and treat as clinically indicated.    Reason:   Physical deconditioning     Fall precautions     Crutches DME    DME Documentation: Describe the reason for need to support medical necessity: Impaired gait status post hip surgery. I, the undersigned, certify that the above prescribed supplies are medically necessary for this patient and is both reasonable and necessary in reference to accepted  standards of medical practice in the treatment of this patient's condition and is not prescribed as a convenience.     Cane DME    DME Documentation: Describe the reason for need to support medical necessity: Impaired gait status post hip surgery. I, the undersigned, certify that the above prescribed supplies are medically necessary for this patient and is both reasonable and necessary in reference to accepted standards of medical practice in the treatment of this patient's condition and is not prescribed as a convenience.     Walker DME    : DME Documentation: Describe the reason for need to support medical necessity: Impaired gait status post hip surgery. I, the undersigned, certify that the above prescribed supplies are medically necessary for this patient and is both reasonable and necessary in reference to accepted standards of medical practice in the treatment of this patient's condition and is not prescribed as a convenience.     Diet    Follow this diet upon discharge: Orders Placed This Encounter      Consistent Carbohydrate Diet Moderate Consistent Carb (60 g CHO per Meal) Diet       Discharge Medications   Current Discharge Medication List      START taking these medications    Details   acetaminophen (TYLENOL) 325 MG tablet Take 2 tablets (650 mg) by mouth every 4 hours as needed for mild pain  Qty: 100 tablet    Associated Diagnoses: Closed intertrochanteric fracture of hip, left, initial encounter (H)      bisacodyl (DULCOLAX) 10 MG suppository Place 1 suppository (10 mg) rectally daily as needed for constipation    Associated Diagnoses: Closed intertrochanteric fracture of hip, left, initial encounter (H)      oxyCODONE (ROXICODONE) 5 MG tablet Take 0.5 tablets (2.5 mg) by mouth every 6 hours as needed for moderate to severe pain (Pain 7-10)  Qty: 10 tablet, Refills: 0    Associated Diagnoses: Closed intertrochanteric fracture of hip, left, initial encounter (H)      senna-docusate  (SENOKOT-S/PERICOLACE) 8.6-50 MG tablet Take 1-2 tablets by mouth 2 times daily as needed for constipation While on narcotics. Hold for loose stools.    Associated Diagnoses: Closed intertrochanteric fracture of hip, left, initial encounter (H)         CONTINUE these medications which have CHANGED    Details   aspirin (ASA) 81 MG EC tablet Take 1 tablet (81 mg) by mouth 2 times daily  Qty: 30 tablet, Refills: 0    Associated Diagnoses: Closed intertrochanteric fracture of hip, left, initial encounter (H)      gabapentin (NEURONTIN) 600 MG tablet Take 1 tablet (600 mg) by mouth 2 times daily  Refills: 0    Associated Diagnoses: Polyneuropathy associated with underlying disease (H)         CONTINUE these medications which have NOT CHANGED    Details   carvedilol (COREG) 12.5 MG tablet Take 12.5 mg by mouth 2 times daily (with meals)      Coenzyme Q10 (COQ-10 PO) Take 1 tablet by mouth daily      CYANOCOBALAMIN PO Take 1,000 mcg by mouth every other day       DULoxetine (CYMBALTA) 30 MG capsule Take 30 mg by mouth daily Takes for neuropathy      ferrous sulfate (FEROSUL) 325 (65 Fe) MG tablet Take 325 mg by mouth daily (with breakfast)      furosemide (LASIX) 20 MG tablet Take 20 mg by mouth daily      lisinopril (ZESTRIL) 2.5 MG tablet Take 2.5 mg by mouth daily       metFORMIN (GLUCOPHAGE) 500 MG tablet Take 250 mg by mouth 2 times daily (with meals) (takes 0.5 x 500mg)      Omega-3 Fatty Acids (OMEGA 3 PO) Take 1 teaspoonful by mouth daily      PREBIOTIC PRODUCT PO Take 1 Scoop by mouth daily      Probiotic Product (PROBIOTIC PO) Take 1 capsule by mouth daily      simvastatin (ZOCOR) 40 MG tablet Take 20 mg by mouth At Bedtime (Takes 1/2 of a 40mg tablet)      sulfamethoxazole-trimethoprim (BACTRIM) 400-80 MG tablet Take 1 tablet by mouth daily      tamsulosin (FLOMAX) 0.4 MG 24 hr capsule Take 0.8 mg by mouth At Bedtime (takes 2 x 0.4mg)      VITAMIN D PO Take 1 tablet by mouth daily         STOP taking these  medications       acetaminophen (ACETAMINOPHEN 8 HOUR) 650 MG CR tablet Comments:   Reason for Stopping:             Allergies   No Known Allergies    DATA  Most Recent 3 CBC's:Recent Labs   Lab Test 07/14/22  0825 07/13/22  1006 07/12/22  1106 07/11/22  0814 07/10/22  2121   WBC  --  9.2  --  5.8 5.6   HGB 8.2* 8.8* 8.7* 10.0* 10.4*   MCV  --  98  --  94 97   PLT  --  139*  --  139* 155      Most Recent 3 BMP's:  Recent Labs   Lab Test 07/14/22  1117 07/14/22  0825 07/14/22  0717 07/13/22  1205 07/13/22  0938 07/11/22  1137 07/11/22  0814   NA  --  135  --   --  134  --  139   POTASSIUM  --  4.4  --   --  4.2  --  4.0   CHLORIDE  --  104  --   --  104  --  106   CO2  --  29  --   --  28  --  27   BUN  --  30  --   --  30  --  37*   CR  --  1.12  --   --  1.13  --  1.09   ANIONGAP  --  2*  --   --  2*  --  6   ESAU  --  8.5  --   --  8.1*  --  8.6   * 176* 129*   < > 200*   < > 150*    < > = values in this interval not displayed.     Most Recent 2 LFT's:  Recent Labs   Lab Test 07/13/22  0938 07/10/22  2121   AST 16 9   ALT 10 25   ALKPHOS 58 125   BILITOTAL 0.9 0.3     Most Recent TSH, T4 and A1c Labs:  Recent Labs   Lab Test 05/13/20  1102   A1C 6.5*     Results for orders placed or performed during the hospital encounter of 07/10/22   XR Pelvis w Hip Left 1 View    Narrative    EXAM: XR PELVIS AND HIP LEFT 1 VIEW  LOCATION: [facility]  DATE/TIME: 7/10/2022 9:20 PM    INDICATION: Pain after trauma  COMPARISON: 5/27/2020 radiographs.      Impression    IMPRESSION:   1. Mildly displaced intertrochanteric left hip fracture. There is less than 1 cm of displacement. No angulation.  2. Postoperative changes and heterotopic ossification about the right hip, unchanged.  3. Postoperative and degenerative changes in the visualized spine again noted.   XR Chest Port 1 View    Narrative    CHEST ONE VIEW July 11, 2022 10:05 AM     HISTORY: Preoperative.    COMPARISON: December 30, 2017.       Impression    IMPRESSION: No acute disease.    HODA INGRAM MD         SYSTEM ID:  [id number]   XR Surgery RIRI L/T 5 Min Fluoro w Stills    Narrative    EXAM: XR SURGERY RIRI FLUORO LESS THAN 5 MIN W STILLS  LOCATION: [facility]  DATE/TIME: 7/11/2022 6:20 PM    INDICATION: Internal Fixation, Fracture, Trochanteric, Hip, Using Christopher Left  COMPARISON: Radiograph 07/10/2022  TECHNIQUE: Exam performed by surgeon.    FINDINGS:    Intramedullary christopher and screw fixation of intertrochanteric left femur fracture. No evidence of immediate hardware complication or failure.    FLUOROSCOPIC TIME: 1.3  NUMBER OF IMAGES: 5      Impression    IMPRESSION:  1.  Repair of left femur fracture without evidence of immediate hardware complication or failure.

## 2022-07-14 NOTE — PROGRESS NOTES
Orthopedic Surgery  Mark Hernández  07/14/2022     Admit Date:  7/10/2022  POD: 3 Days Post-Op   Procedure(s):  INTERNAL FIXATION, FRACTURE, TROCHANTERIC, HIP, USING DOM    Alert and oriented.     Patient resting comfortably in chair.    Pain controlled.  Tolerating oral intake.    Denies nausea or vomiting  Denies chest pain or shortness of breath    Temp:  [97.4  F (36.3  C)-98.7  F (37.1  C)] 98.7  F (37.1  C)  Pulse:  [63-72] 63  Resp:  [16] 16  BP: ()/(45-58) 130/58  SpO2:  [90 %-98 %] 94 %    Dressing is clean/dry and intact.    Pitting edema left LE.   Minimal erythema of the surrounding skin.   Bilateral calves are soft, non-tender.  Left lower extremity is NVI.  Sensation intact bilateral lower extremities  Patient able to resist dorsi and plantar flexion bilaterally  +Dp pulse    Labs:  Recent Labs   Lab Test 07/14/22  0825 07/13/22  1006 07/12/22  1106 07/11/22  0814 07/10/22  2121   WBC  --  9.2  --  5.8 5.6   HGB 8.2* 8.8* 8.7* 10.0* 10.4*   PLT  --  139*  --  139* 155     Recent Labs   Lab Test 12/27/17 2020   INR 1.11       1. Plan:   ASA decreased to 81mg bid for DVT prophylaxis.     Mobilize with PT/OT    WBAT Left LE with walker.     Continue current pain regiment.   Dressings: Keep intact.  Change if >60% saturated or peeling off.    Follow-up: At two weeks, the dressing could be removed.  There are no sutures to be removed.  If all is going well, first followup could be 6 weeks with Dr. Pacheco, [facility], with 2 views of the right femur, sooner if there is any difficulty.     2. Disposition   Anticipate d/c to TCU when medically cleared and progressing in PT.    Sabrina Mace PA-C

## 2022-07-14 NOTE — PLAN OF CARE
Goal Outcome Evaluation:  A&Ox4. VSS on RA. Voiding with external cath. Hard Assist of 2 GB/W. Tele: NSR. Denies pain but request to keep up with the PRN tylenol. Dressings to hip CDI.

## 2022-07-14 NOTE — PLAN OF CARE
Mental Status:A&O x4  Activity/dangle:A2 walker GB  Diet:Mod carb  Pain: PRN Tylenol  Mock/Voiding:External catheter with adequate amount  Tele/Restraints/Iso: 100% V-paced, (Smart Disclosure down)  02/LDA:RAJANI  D/C Date: TCU this pm

## 2022-07-15 LAB
ATRIAL RATE - MUSE: 66 BPM
DIASTOLIC BLOOD PRESSURE - MUSE: NORMAL MMHG
INTERPRETATION ECG - MUSE: NORMAL
P AXIS - MUSE: 68 DEGREES
PR INTERVAL - MUSE: 118 MS
QRS DURATION - MUSE: 152 MS
QT - MUSE: 430 MS
QTC - MUSE: 450 MS
R AXIS - MUSE: -60 DEGREES
SYSTOLIC BLOOD PRESSURE - MUSE: NORMAL MMHG
T AXIS - MUSE: 132 DEGREES
VENTRICULAR RATE- MUSE: 66 BPM

## 2022-07-15 NOTE — PROGRESS NOTES
Patient transferred per streatcher and 2 attendents to TCU. All belongings sent with- glasses and hearing aid. No clothes here at hospital. Packet sent with patient.

## 2022-07-15 NOTE — PLAN OF CARE
Physical Therapy Discharge Summary    Reason for therapy discharge:    Discharged to transitional care facility.    Progress towards therapy goal(s). See goals on Care Plan in [facility] electronic health record for goal details.  Goals not met.  Barriers to achieving goals:   discharge from facility.    Therapy recommendation(s):    Continued therapy is recommended.  Rationale/Recommendations:  To progress independence and safety with functional mobility.

## 2022-07-19 ENCOUNTER — LAB REQUISITION (OUTPATIENT)
Dept: LAB | Facility: CLINIC | Age: 87
End: 2022-07-19
Payer: MEDICARE

## 2022-07-19 DIAGNOSIS — D64.9 ANEMIA, UNSPECIFIED: ICD-10-CM

## 2022-07-19 DIAGNOSIS — N18.9 CHRONIC KIDNEY DISEASE, UNSPECIFIED: ICD-10-CM

## 2022-07-20 LAB
ANION GAP SERPL CALCULATED.3IONS-SCNC: 13 MMOL/L (ref 7–15)
BUN SERPL-MCNC: 31.3 MG/DL (ref 8–23)
CALCIUM SERPL-MCNC: 9.1 MG/DL (ref 8.8–10.2)
CHLORIDE SERPL-SCNC: 100 MMOL/L (ref 98–107)
CREAT SERPL-MCNC: 1.25 MG/DL (ref 0.67–1.17)
DEPRECATED HCO3 PLAS-SCNC: 25 MMOL/L (ref 22–29)
ERYTHROCYTE [DISTWIDTH] IN BLOOD BY AUTOMATED COUNT: 13.2 % (ref 10–15)
GFR SERPL CREATININE-BSD FRML MDRD: 55 ML/MIN/1.73M2
GLUCOSE SERPL-MCNC: 120 MG/DL (ref 70–99)
HCT VFR BLD AUTO: 27.8 % (ref 40–53)
HGB BLD-MCNC: 8.8 G/DL (ref 13.3–17.7)
MCH RBC QN AUTO: 31.1 PG (ref 26.5–33)
MCHC RBC AUTO-ENTMCNC: 31.7 G/DL (ref 31.5–36.5)
MCV RBC AUTO: 98 FL (ref 78–100)
PLATELET # BLD AUTO: 282 10E3/UL (ref 150–450)
POTASSIUM SERPL-SCNC: 4.3 MMOL/L (ref 3.4–5.3)
RBC # BLD AUTO: 2.83 10E6/UL (ref 4.4–5.9)
SODIUM SERPL-SCNC: 138 MMOL/L (ref 136–145)
WBC # BLD AUTO: 7 10E3/UL (ref 4–11)

## 2022-07-20 PROCEDURE — P9604 ONE-WAY ALLOW PRORATED TRIP: HCPCS | Mod: ORL | Performed by: NURSE PRACTITIONER

## 2022-07-20 PROCEDURE — 80048 BASIC METABOLIC PNL TOTAL CA: CPT | Mod: ORL | Performed by: NURSE PRACTITIONER

## 2022-07-20 PROCEDURE — 85027 COMPLETE CBC AUTOMATED: CPT | Mod: ORL | Performed by: NURSE PRACTITIONER

## 2022-07-20 PROCEDURE — 36415 COLL VENOUS BLD VENIPUNCTURE: CPT | Mod: ORL | Performed by: NURSE PRACTITIONER

## 2022-08-26 ENCOUNTER — APPOINTMENT (OUTPATIENT)
Dept: GENERAL RADIOLOGY | Facility: CLINIC | Age: 87
End: 2022-08-26
Attending: EMERGENCY MEDICINE
Payer: MEDICARE

## 2022-08-26 ENCOUNTER — APPOINTMENT (OUTPATIENT)
Dept: CT IMAGING | Facility: CLINIC | Age: 87
End: 2022-08-26
Attending: EMERGENCY MEDICINE
Payer: MEDICARE

## 2022-08-26 ENCOUNTER — HOSPITAL ENCOUNTER (OUTPATIENT)
Facility: CLINIC | Age: 87
Setting detail: OBSERVATION
Discharge: NURSING, REHAB OR LONG-TERM CARE | End: 2022-08-30
Attending: EMERGENCY MEDICINE | Admitting: HOSPITALIST
Payer: MEDICARE

## 2022-08-26 DIAGNOSIS — N39.0 URINARY TRACT INFECTION WITHOUT HEMATURIA, SITE UNSPECIFIED: Primary | ICD-10-CM

## 2022-08-26 DIAGNOSIS — S22.39XA CLOSED FRACTURE OF ONE RIB, UNSPECIFIED LATERALITY, INITIAL ENCOUNTER: ICD-10-CM

## 2022-08-26 DIAGNOSIS — S20.229A CONTUSION OF BACK, UNSPECIFIED LATERALITY, INITIAL ENCOUNTER: ICD-10-CM

## 2022-08-26 DIAGNOSIS — S09.90XA CLOSED HEAD INJURY, INITIAL ENCOUNTER: ICD-10-CM

## 2022-08-26 LAB
ANION GAP SERPL CALCULATED.3IONS-SCNC: 6 MMOL/L (ref 3–14)
BUN SERPL-MCNC: 48 MG/DL (ref 7–30)
CALCIUM SERPL-MCNC: 8.4 MG/DL (ref 8.5–10.1)
CHLORIDE BLD-SCNC: 103 MMOL/L (ref 94–109)
CO2 SERPL-SCNC: 27 MMOL/L (ref 20–32)
CREAT SERPL-MCNC: 1.54 MG/DL (ref 0.66–1.25)
ERYTHROCYTE [DISTWIDTH] IN BLOOD BY AUTOMATED COUNT: 14.3 % (ref 10–15)
GFR SERPL CREATININE-BSD FRML MDRD: 43 ML/MIN/1.73M2
GLUCOSE BLD-MCNC: 118 MG/DL (ref 70–99)
HBA1C MFR BLD: 6.1 % (ref 0–5.6)
HCT VFR BLD AUTO: 28.5 % (ref 40–53)
HGB BLD-MCNC: 8.9 G/DL (ref 13.3–17.7)
MAGNESIUM SERPL-MCNC: 2.4 MG/DL (ref 1.6–2.3)
MCH RBC QN AUTO: 30.4 PG (ref 26.5–33)
MCHC RBC AUTO-ENTMCNC: 31.2 G/DL (ref 31.5–36.5)
MCV RBC AUTO: 97 FL (ref 78–100)
PHOSPHATE SERPL-MCNC: 3.9 MG/DL (ref 2.5–4.5)
PLATELET # BLD AUTO: 205 10E3/UL (ref 150–450)
POTASSIUM BLD-SCNC: 4.5 MMOL/L (ref 3.4–5.3)
RBC # BLD AUTO: 2.93 10E6/UL (ref 4.4–5.9)
SARS-COV-2 RNA RESP QL NAA+PROBE: NEGATIVE
SODIUM SERPL-SCNC: 136 MMOL/L (ref 133–144)
WBC # BLD AUTO: 6.3 10E3/UL (ref 4–11)

## 2022-08-26 PROCEDURE — 83036 HEMOGLOBIN GLYCOSYLATED A1C: CPT | Performed by: HOSPITALIST

## 2022-08-26 PROCEDURE — 70450 CT HEAD/BRAIN W/O DYE: CPT | Mod: MA

## 2022-08-26 PROCEDURE — 99220 PR INITIAL OBSERVATION CARE,LEVEL III: CPT | Performed by: HOSPITALIST

## 2022-08-26 PROCEDURE — 72125 CT NECK SPINE W/O DYE: CPT | Mod: MA

## 2022-08-26 PROCEDURE — 250N000013 HC RX MED GY IP 250 OP 250 PS 637: Performed by: EMERGENCY MEDICINE

## 2022-08-26 PROCEDURE — G0378 HOSPITAL OBSERVATION PER HR: HCPCS

## 2022-08-26 PROCEDURE — 99285 EMERGENCY DEPT VISIT HI MDM: CPT | Mod: 25

## 2022-08-26 PROCEDURE — 72131 CT LUMBAR SPINE W/O DYE: CPT | Mod: MA

## 2022-08-26 PROCEDURE — 96374 THER/PROPH/DIAG INJ IV PUSH: CPT

## 2022-08-26 PROCEDURE — U0005 INFEC AGEN DETEC AMPLI PROBE: HCPCS | Performed by: EMERGENCY MEDICINE

## 2022-08-26 PROCEDURE — 250N000011 HC RX IP 250 OP 636: Performed by: EMERGENCY MEDICINE

## 2022-08-26 PROCEDURE — 85027 COMPLETE CBC AUTOMATED: CPT | Performed by: HOSPITALIST

## 2022-08-26 PROCEDURE — 83735 ASSAY OF MAGNESIUM: CPT | Performed by: HOSPITALIST

## 2022-08-26 PROCEDURE — 84100 ASSAY OF PHOSPHORUS: CPT | Performed by: HOSPITALIST

## 2022-08-26 PROCEDURE — 73060 X-RAY EXAM OF HUMERUS: CPT | Mod: LT

## 2022-08-26 PROCEDURE — 82310 ASSAY OF CALCIUM: CPT | Performed by: HOSPITALIST

## 2022-08-26 PROCEDURE — 71250 CT THORAX DX C-: CPT | Mod: MA

## 2022-08-26 PROCEDURE — 36415 COLL VENOUS BLD VENIPUNCTURE: CPT | Performed by: HOSPITALIST

## 2022-08-26 PROCEDURE — 73030 X-RAY EXAM OF SHOULDER: CPT | Mod: LT

## 2022-08-26 PROCEDURE — C9803 HOPD COVID-19 SPEC COLLECT: HCPCS

## 2022-08-26 PROCEDURE — 250N000013 HC RX MED GY IP 250 OP 250 PS 637: Performed by: HOSPITALIST

## 2022-08-26 RX ORDER — NALOXONE HYDROCHLORIDE 0.4 MG/ML
0.2 INJECTION, SOLUTION INTRAMUSCULAR; INTRAVENOUS; SUBCUTANEOUS
Status: DISCONTINUED | OUTPATIENT
Start: 2022-08-26 | End: 2022-08-30 | Stop reason: HOSPADM

## 2022-08-26 RX ORDER — DULOXETIN HYDROCHLORIDE 30 MG/1
30 CAPSULE, DELAYED RELEASE ORAL AT BEDTIME
Status: DISCONTINUED | OUTPATIENT
Start: 2022-08-26 | End: 2022-08-30 | Stop reason: HOSPADM

## 2022-08-26 RX ORDER — TAMSULOSIN HYDROCHLORIDE 0.4 MG/1
0.8 CAPSULE ORAL AT BEDTIME
Status: DISCONTINUED | OUTPATIENT
Start: 2022-08-26 | End: 2022-08-30 | Stop reason: HOSPADM

## 2022-08-26 RX ORDER — POLYETHYLENE GLYCOL 3350 17 G/17G
17 POWDER, FOR SOLUTION ORAL DAILY PRN
Status: DISCONTINUED | OUTPATIENT
Start: 2022-08-26 | End: 2022-08-30 | Stop reason: HOSPADM

## 2022-08-26 RX ORDER — ASPIRIN 81 MG/1
81 TABLET ORAL DAILY
Status: DISCONTINUED | OUTPATIENT
Start: 2022-08-27 | End: 2022-08-30 | Stop reason: HOSPADM

## 2022-08-26 RX ORDER — SULFAMETHOXAZOLE AND TRIMETHOPRIM 400; 80 MG/1; MG/1
1 TABLET ORAL EVERY EVENING
Status: DISCONTINUED | OUTPATIENT
Start: 2022-08-26 | End: 2022-08-30 | Stop reason: HOSPADM

## 2022-08-26 RX ORDER — NALOXONE HYDROCHLORIDE 0.4 MG/ML
0.4 INJECTION, SOLUTION INTRAMUSCULAR; INTRAVENOUS; SUBCUTANEOUS
Status: DISCONTINUED | OUTPATIENT
Start: 2022-08-26 | End: 2022-08-30 | Stop reason: HOSPADM

## 2022-08-26 RX ORDER — NICOTINE POLACRILEX 4 MG
15-30 LOZENGE BUCCAL
Status: DISCONTINUED | OUTPATIENT
Start: 2022-08-26 | End: 2022-08-30 | Stop reason: HOSPADM

## 2022-08-26 RX ORDER — FUROSEMIDE 20 MG
20 TABLET ORAL DAILY
Status: DISCONTINUED | OUTPATIENT
Start: 2022-08-27 | End: 2022-08-30 | Stop reason: HOSPADM

## 2022-08-26 RX ORDER — ACETAMINOPHEN 500 MG
1000 TABLET ORAL ONCE
Status: COMPLETED | OUTPATIENT
Start: 2022-08-26 | End: 2022-08-26

## 2022-08-26 RX ORDER — SIMVASTATIN 20 MG
20 TABLET ORAL AT BEDTIME
Status: DISCONTINUED | OUTPATIENT
Start: 2022-08-26 | End: 2022-08-30 | Stop reason: HOSPADM

## 2022-08-26 RX ORDER — LIDOCAINE 4 G/G
1 PATCH TOPICAL EVERY 24 HOURS
Status: DISCONTINUED | OUTPATIENT
Start: 2022-08-26 | End: 2022-08-30 | Stop reason: HOSPADM

## 2022-08-26 RX ORDER — PROCHLORPERAZINE MALEATE 5 MG
5 TABLET ORAL EVERY 6 HOURS PRN
Status: DISCONTINUED | OUTPATIENT
Start: 2022-08-26 | End: 2022-08-30 | Stop reason: HOSPADM

## 2022-08-26 RX ORDER — CARVEDILOL 12.5 MG/1
12.5 TABLET ORAL 2 TIMES DAILY WITH MEALS
Status: DISCONTINUED | OUTPATIENT
Start: 2022-08-27 | End: 2022-08-30 | Stop reason: HOSPADM

## 2022-08-26 RX ORDER — AMOXICILLIN 250 MG
1 CAPSULE ORAL 2 TIMES DAILY PRN
Status: DISCONTINUED | OUTPATIENT
Start: 2022-08-26 | End: 2022-08-30 | Stop reason: HOSPADM

## 2022-08-26 RX ORDER — GABAPENTIN 600 MG/1
600 TABLET ORAL 2 TIMES DAILY
Status: DISCONTINUED | OUTPATIENT
Start: 2022-08-26 | End: 2022-08-29

## 2022-08-26 RX ORDER — PROCHLORPERAZINE 25 MG
12.5 SUPPOSITORY, RECTAL RECTAL EVERY 12 HOURS PRN
Status: DISCONTINUED | OUTPATIENT
Start: 2022-08-26 | End: 2022-08-30 | Stop reason: HOSPADM

## 2022-08-26 RX ORDER — GABAPENTIN 800 MG/1
800 TABLET ORAL 2 TIMES DAILY
COMMUNITY

## 2022-08-26 RX ORDER — HYDROMORPHONE HYDROCHLORIDE 1 MG/ML
0.5 INJECTION, SOLUTION INTRAMUSCULAR; INTRAVENOUS; SUBCUTANEOUS
Status: DISCONTINUED | OUTPATIENT
Start: 2022-08-26 | End: 2022-08-26

## 2022-08-26 RX ORDER — AMOXICILLIN 250 MG
2 CAPSULE ORAL 2 TIMES DAILY PRN
Status: DISCONTINUED | OUTPATIENT
Start: 2022-08-26 | End: 2022-08-30 | Stop reason: HOSPADM

## 2022-08-26 RX ORDER — LISINOPRIL 2.5 MG/1
2.5 TABLET ORAL DAILY
Status: DISCONTINUED | OUTPATIENT
Start: 2022-08-27 | End: 2022-08-30 | Stop reason: HOSPADM

## 2022-08-26 RX ORDER — HYDROMORPHONE HCL IN WATER/PF 6 MG/30 ML
0.2 PATIENT CONTROLLED ANALGESIA SYRINGE INTRAVENOUS EVERY 4 HOURS PRN
Status: DISCONTINUED | OUTPATIENT
Start: 2022-08-26 | End: 2022-08-30 | Stop reason: HOSPADM

## 2022-08-26 RX ORDER — DEXTROSE MONOHYDRATE 25 G/50ML
25-50 INJECTION, SOLUTION INTRAVENOUS
Status: DISCONTINUED | OUTPATIENT
Start: 2022-08-26 | End: 2022-08-30 | Stop reason: HOSPADM

## 2022-08-26 RX ORDER — CYCLOBENZAPRINE HCL 5 MG
5 TABLET ORAL EVERY 8 HOURS PRN
Status: DISCONTINUED | OUTPATIENT
Start: 2022-08-26 | End: 2022-08-30 | Stop reason: HOSPADM

## 2022-08-26 RX ORDER — ONDANSETRON 4 MG/1
4 TABLET, ORALLY DISINTEGRATING ORAL EVERY 6 HOURS PRN
Status: DISCONTINUED | OUTPATIENT
Start: 2022-08-26 | End: 2022-08-30 | Stop reason: HOSPADM

## 2022-08-26 RX ORDER — FERROUS SULFATE 325(65) MG
325 TABLET ORAL
Status: DISCONTINUED | OUTPATIENT
Start: 2022-08-27 | End: 2022-08-30 | Stop reason: HOSPADM

## 2022-08-26 RX ORDER — ONDANSETRON 2 MG/ML
4 INJECTION INTRAMUSCULAR; INTRAVENOUS EVERY 6 HOURS PRN
Status: DISCONTINUED | OUTPATIENT
Start: 2022-08-26 | End: 2022-08-30 | Stop reason: HOSPADM

## 2022-08-26 RX ADMIN — HYDROMORPHONE HYDROCHLORIDE 0.5 MG: 1 INJECTION, SOLUTION INTRAMUSCULAR; INTRAVENOUS; SUBCUTANEOUS at 20:01

## 2022-08-26 RX ADMIN — SULFAMETHOXAZOLE AND TRIMETHOPRIM 1 TABLET: 400; 80 TABLET ORAL at 23:55

## 2022-08-26 RX ADMIN — ACETAMINOPHEN 1000 MG: 500 TABLET, FILM COATED ORAL at 19:16

## 2022-08-26 ASSESSMENT — ACTIVITIES OF DAILY LIVING (ADL)
ADLS_ACUITY_SCORE: 35

## 2022-08-26 ASSESSMENT — ENCOUNTER SYMPTOMS
ABDOMINAL PAIN: 0
ARTHRALGIAS: 1
FLANK PAIN: 0
BACK PAIN: 1
WOUND: 1
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
## 2022-08-27 ENCOUNTER — APPOINTMENT (OUTPATIENT)
Dept: PHYSICAL THERAPY | Facility: CLINIC | Age: 87
End: 2022-08-27
Attending: HOSPITALIST
Payer: MEDICARE

## 2022-08-27 LAB
ALBUMIN UR-MCNC: 10 MG/DL
ANION GAP SERPL CALCULATED.3IONS-SCNC: 6 MMOL/L (ref 3–14)
APPEARANCE UR: ABNORMAL
BACTERIA #/AREA URNS HPF: ABNORMAL /HPF
BILIRUB UR QL STRIP: NEGATIVE
BUN SERPL-MCNC: 45 MG/DL (ref 7–30)
CALCIUM SERPL-MCNC: 8.8 MG/DL (ref 8.5–10.1)
CHLORIDE BLD-SCNC: 104 MMOL/L (ref 94–109)
CO2 SERPL-SCNC: 26 MMOL/L (ref 20–32)
COLOR UR AUTO: ABNORMAL
CREAT SERPL-MCNC: 1.43 MG/DL (ref 0.66–1.25)
CREAT UR-MCNC: 38 MG/DL
ERYTHROCYTE [DISTWIDTH] IN BLOOD BY AUTOMATED COUNT: 14.4 % (ref 10–15)
FRACT EXCRET NA UR+SERPL-RTO: 1.1 %
GFR SERPL CREATININE-BSD FRML MDRD: 47 ML/MIN/1.73M2
GLUCOSE BLD-MCNC: 130 MG/DL (ref 70–99)
GLUCOSE BLDC GLUCOMTR-MCNC: 134 MG/DL (ref 70–99)
GLUCOSE BLDC GLUCOMTR-MCNC: 135 MG/DL (ref 70–99)
GLUCOSE BLDC GLUCOMTR-MCNC: 180 MG/DL (ref 70–99)
GLUCOSE BLDC GLUCOMTR-MCNC: 225 MG/DL (ref 70–99)
GLUCOSE BLDC GLUCOMTR-MCNC: 232 MG/DL (ref 70–99)
GLUCOSE UR STRIP-MCNC: NEGATIVE MG/DL
HCT VFR BLD AUTO: 28 % (ref 40–53)
HGB BLD-MCNC: 9.1 G/DL (ref 13.3–17.7)
HGB UR QL STRIP: ABNORMAL
KETONES UR STRIP-MCNC: NEGATIVE MG/DL
LEUKOCYTE ESTERASE UR QL STRIP: ABNORMAL
MCH RBC QN AUTO: 30.6 PG (ref 26.5–33)
MCHC RBC AUTO-ENTMCNC: 32.5 G/DL (ref 31.5–36.5)
MCV RBC AUTO: 94 FL (ref 78–100)
MUCOUS THREADS #/AREA URNS LPF: PRESENT /LPF
NITRATE UR QL: POSITIVE
PH UR STRIP: 5.5 [PH] (ref 5–7)
PLATELET # BLD AUTO: 193 10E3/UL (ref 150–450)
POTASSIUM BLD-SCNC: 3.9 MMOL/L (ref 3.4–5.3)
RBC # BLD AUTO: 2.97 10E6/UL (ref 4.4–5.9)
RBC URINE: 5 /HPF
SODIUM SERPL-SCNC: 136 MMOL/L (ref 133–144)
SODIUM UR-SCNC: 40 MMOL/L
SP GR UR STRIP: 1.01 (ref 1–1.03)
UROBILINOGEN UR STRIP-MCNC: NORMAL MG/DL
WBC # BLD AUTO: 5 10E3/UL (ref 4–11)
WBC URINE: >182 /HPF

## 2022-08-27 PROCEDURE — 97162 PT EVAL MOD COMPLEX 30 MIN: CPT | Mod: GP

## 2022-08-27 PROCEDURE — 84300 ASSAY OF URINE SODIUM: CPT | Performed by: HOSPITALIST

## 2022-08-27 PROCEDURE — 96376 TX/PRO/DX INJ SAME DRUG ADON: CPT

## 2022-08-27 PROCEDURE — 85014 HEMATOCRIT: CPT | Performed by: HOSPITALIST

## 2022-08-27 PROCEDURE — 87086 URINE CULTURE/COLONY COUNT: CPT | Performed by: HOSPITALIST

## 2022-08-27 PROCEDURE — 250N000012 HC RX MED GY IP 250 OP 636 PS 637: Performed by: HOSPITALIST

## 2022-08-27 PROCEDURE — 250N000011 HC RX IP 250 OP 636: Performed by: HOSPITALIST

## 2022-08-27 PROCEDURE — 999N000157 HC STATISTIC RCP TIME EA 10 MIN

## 2022-08-27 PROCEDURE — 96372 THER/PROPH/DIAG INJ SC/IM: CPT | Performed by: HOSPITALIST

## 2022-08-27 PROCEDURE — G0378 HOSPITAL OBSERVATION PER HR: HCPCS

## 2022-08-27 PROCEDURE — 94150 VITAL CAPACITY TEST: CPT

## 2022-08-27 PROCEDURE — 97530 THERAPEUTIC ACTIVITIES: CPT | Mod: GP

## 2022-08-27 PROCEDURE — 36415 COLL VENOUS BLD VENIPUNCTURE: CPT | Performed by: HOSPITALIST

## 2022-08-27 PROCEDURE — 250N000013 HC RX MED GY IP 250 OP 250 PS 637: Performed by: HOSPITALIST

## 2022-08-27 PROCEDURE — 81001 URINALYSIS AUTO W/SCOPE: CPT | Performed by: HOSPITALIST

## 2022-08-27 PROCEDURE — 99225 PR SUBSEQUENT OBSERVATION CARE,LEVEL II: CPT | Performed by: HOSPITALIST

## 2022-08-27 PROCEDURE — 82962 GLUCOSE BLOOD TEST: CPT

## 2022-08-27 PROCEDURE — 80048 BASIC METABOLIC PNL TOTAL CA: CPT | Performed by: HOSPITALIST

## 2022-08-27 RX ORDER — OXYCODONE HYDROCHLORIDE 5 MG/1
2.5 TABLET ORAL EVERY 4 HOURS PRN
Qty: 10 TABLET | Refills: 0 | Status: SHIPPED | OUTPATIENT
Start: 2022-08-27

## 2022-08-27 RX ADMIN — INSULIN ASPART 1 UNITS: 100 INJECTION, SOLUTION INTRAVENOUS; SUBCUTANEOUS at 17:56

## 2022-08-27 RX ADMIN — DULOXETINE HYDROCHLORIDE 30 MG: 30 CAPSULE, DELAYED RELEASE ORAL at 00:43

## 2022-08-27 RX ADMIN — DULOXETINE HYDROCHLORIDE 30 MG: 30 CAPSULE, DELAYED RELEASE ORAL at 22:10

## 2022-08-27 RX ADMIN — TAMSULOSIN HYDROCHLORIDE 0.8 MG: 0.4 CAPSULE ORAL at 22:11

## 2022-08-27 RX ADMIN — SIMVASTATIN 20 MG: 20 TABLET, FILM COATED ORAL at 00:16

## 2022-08-27 RX ADMIN — TAMSULOSIN HYDROCHLORIDE 0.8 MG: 0.4 CAPSULE ORAL at 00:27

## 2022-08-27 RX ADMIN — OXYCODONE HYDROCHLORIDE 2.5 MG: 5 TABLET ORAL at 13:00

## 2022-08-27 RX ADMIN — CARVEDILOL 12.5 MG: 12.5 TABLET, FILM COATED ORAL at 17:37

## 2022-08-27 RX ADMIN — LIDOCAINE 1 PATCH: 560 PATCH PERCUTANEOUS; TOPICAL; TRANSDERMAL at 22:11

## 2022-08-27 RX ADMIN — HYDROMORPHONE HYDROCHLORIDE 0.2 MG: 0.2 INJECTION, SOLUTION INTRAMUSCULAR; INTRAVENOUS; SUBCUTANEOUS at 01:54

## 2022-08-27 RX ADMIN — FERROUS SULFATE TAB 325 MG (65 MG ELEMENTAL FE) 325 MG: 325 (65 FE) TAB at 09:04

## 2022-08-27 RX ADMIN — CARVEDILOL 12.5 MG: 12.5 TABLET, FILM COATED ORAL at 09:04

## 2022-08-27 RX ADMIN — OXYCODONE HYDROCHLORIDE 2.5 MG: 5 TABLET ORAL at 17:43

## 2022-08-27 RX ADMIN — GABAPENTIN 600 MG: 600 TABLET, FILM COATED ORAL at 20:46

## 2022-08-27 RX ADMIN — SIMVASTATIN 20 MG: 20 TABLET, FILM COATED ORAL at 22:10

## 2022-08-27 RX ADMIN — ASPIRIN 81 MG: 81 TABLET, COATED ORAL at 09:04

## 2022-08-27 RX ADMIN — SULFAMETHOXAZOLE AND TRIMETHOPRIM 1 TABLET: 400; 80 TABLET ORAL at 20:46

## 2022-08-27 RX ADMIN — GABAPENTIN 600 MG: 600 TABLET, FILM COATED ORAL at 09:04

## 2022-08-27 RX ADMIN — INSULIN ASPART 2 UNITS: 100 INJECTION, SOLUTION INTRAVENOUS; SUBCUTANEOUS at 14:25

## 2022-08-27 RX ADMIN — LIDOCAINE 1 PATCH: 560 PATCH PERCUTANEOUS; TOPICAL; TRANSDERMAL at 00:44

## 2022-08-27 RX ADMIN — CYCLOBENZAPRINE HYDROCHLORIDE 5 MG: 5 TABLET, FILM COATED ORAL at 13:01

## 2022-08-27 RX ADMIN — GABAPENTIN 600 MG: 600 TABLET, FILM COATED ORAL at 00:00

## 2022-08-27 RX ADMIN — OXYCODONE HYDROCHLORIDE 2.5 MG: 5 TABLET ORAL at 06:04

## 2022-08-27 ASSESSMENT — ACTIVITIES OF DAILY LIVING (ADL)
ADLS_ACUITY_SCORE: 38
ADLS_ACUITY_SCORE: 35
ADLS_ACUITY_SCORE: 38
ADLS_ACUITY_SCORE: 35
ADLS_ACUITY_SCORE: 35
ADLS_ACUITY_SCORE: 38
ADLS_ACUITY_SCORE: 38
ADLS_ACUITY_SCORE: 35
ADLS_ACUITY_SCORE: 35
ADLS_ACUITY_SCORE: 38

## 2022-08-27 NOTE — PLAN OF CARE
Goal Outcome Evaluation:    Pt is A&Ox4,  VSS on room air, pain managed with PRN Oxycodone and PRN Dilaudid. Uses urinal and is incontinent.  SARAH CHAMPAGNE.
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
## 2022-08-27 NOTE — PROGRESS NOTES
RT bedside to initiate NIF/VC with patient. Patient putting forth good effort achieving -35NIF and 2500mlVC. Patient on RA sitting in a chair. RT will continue to follow.    Barrera Manriquez, RT

## 2022-08-27 NOTE — PHARMACY-ADMISSION MEDICATION HISTORY
Pharmacy Medication History  Admission medication history interview status for the 8/26/2022  admission is complete. See EPIC admission navigator for prior to admission medications     Location of Interview: Patient room  Medication history sources: Patient, Surescripts and Care Everywhere    Significant changes made to the medication list:  1. Changed gabapentin from 600 mg to 800 mg. Per chart review, this was discussed with patient's PCP.     Additional medication history information:   1. Patient unsure of dose of lisinopril. TCU discharge said 2.5 mg daily and this was what was mentioned in the 8/22 telephone communication.   2. Patient does not recall using Nystatin powder, so this was not added to the list despite being on TCU list. Similarly, diclofenac gel was not added.     Medication reconciliation completed by provider prior to medication history? No    Time spent in this activity: 20 min    Prior to Admission medications    Medication Sig Last Dose Taking? Auth Provider Long Term End Date   aspirin (ASA) 81 MG EC tablet Take 1 tablet (81 mg) by mouth 2 times daily 8/26/2022 at AM Yes Sabrina Mace, KATHLEEN     carvedilol (COREG) 12.5 MG tablet Take 12.5 mg by mouth 2 times daily (with meals) 8/26/2022 at AM Yes Reported, Patient Yes    Coenzyme Q10 (COQ-10 PO) Take 1 tablet by mouth daily 8/26/2022 at AM Yes Reported, Patient     DULoxetine (CYMBALTA) 30 MG capsule Take 30 mg by mouth At Bedtime Takes for neuropathy 8/25/2022 at HS Yes Reported, Patient Yes    ferrous sulfate (FEROSUL) 325 (65 Fe) MG tablet Take 325 mg by mouth daily (with breakfast) 8/26/2022 at AM Yes Reported, Patient     furosemide (LASIX) 20 MG tablet Take 20 mg by mouth daily 8/26/2022 at AM Yes Reported, Patient     gabapentin (NEURONTIN) 800 MG tablet Take 800 mg by mouth 2 times daily 8/26/2022 at AM Yes Unknown, Entered By History No    lisinopril (ZESTRIL) 2.5 MG tablet Take 2.5 mg by mouth daily  8/26/2022 at AM Yes  Reported, Patient Yes    metFORMIN (GLUCOPHAGE) 500 MG tablet Take 250 mg by mouth 2 times daily (with meals) (takes 0.5 x 500mg) 8/26/2022 at AM Yes Reported, Patient No    Omega-3 Fatty Acids (OMEGA 3 PO) Take 1 teaspoonful by mouth daily 8/26/2022 at AM Yes Reported, Patient     PREBIOTIC PRODUCT PO Take 1 Scoop by mouth daily 8/26/2022 at AM Yes Unknown, Entered By History     Probiotic Product (PROBIOTIC PO) Take 1 capsule by mouth daily 8/26/2022 at AM Yes Reported, Patient     simvastatin (ZOCOR) 40 MG tablet Take 20 mg by mouth At Bedtime (Takes 1/2 of a 40mg tablet) 8/25/2022 at HS Yes Reported, Patient Yes    sulfamethoxazole-trimethoprim (BACTRIM) 400-80 MG tablet Take 1 tablet by mouth daily 8/25/2022 at HS Yes Unknown, Entered By History No    tamsulosin (FLOMAX) 0.4 MG 24 hr capsule Take 0.8 mg by mouth At Bedtime (takes 2 x 0.4mg) 8/25/2022 at HS Yes Reported, Patient     vitamin B-12 (CYANOCOBALAMIN) 1000 MCG tablet Take 1,000 mcg by mouth every other day  8/26/2022 at AM Yes Unknown, Entered By History     VITAMIN D PO Take 1 tablet by mouth daily 8/26/2022 at AM Yes Reported, Patient     acetaminophen (TYLENOL) 325 MG tablet Take 2 tablets (650 mg) by mouth every 4 hours as needed for mild pain PRN at PRN  Sarah Li PA     bisacodyl (DULCOLAX) 10 MG suppository Place 1 suppository (10 mg) rectally daily as needed for constipation  Patient not taking: Reported on 8/26/2022 Not Taking at Unknown time  Sarah Li PA     oxyCODONE (ROXICODONE) 5 MG tablet Take 0.5 tablets (2.5 mg) by mouth every 6 hours as needed for moderate to severe pain (Pain 7-10)   Sarah Li PA     senna-docusate (SENOKOT-S/PERICOLACE) 8.6-50 MG tablet Take 1-2 tablets by mouth 2 times daily as needed for constipation While on narcotics. Hold for loose stools. PRN at PRN  Sarah Lie, PA         The information provided in this note is only as accurate as the sources available at the time  of update(s)

## 2022-08-27 NOTE — PROGRESS NOTES
Patient vital signs are at baseline: Yes  Patient able to ambulate as they were prior to admission or with assist devices provided by therapies during their stay:  No,  Reason:  Needing assist of 1-2.  Patient MUST void prior to discharge:  Yes  Patient able to tolerate oral intake:  Yes  Pain has adequate pain control using Oral analgesics:  No,  Reason:  Partially Met. C/O pain on mid back.  Does patient have an identified :  Yes  Has goal D/C date and time been discussed with patient:  No,  Reason:  Discharge date/disposition unsure.

## 2022-08-27 NOTE — PROGRESS NOTES
Pt arrived at 2250. A&O x4. Rates pain at 5, received dilaudid in ED. Very pleasant. PIV SL. Cardiac diet. Able to use urinal.

## 2022-08-27 NOTE — H&P
[facility]    History and Physical  Hospitalist       Date of Admission:  8/26/2022    Assessment & Plan   Mark Hernández is a 88 year old male who presents with fall while riding on the seat of a walker with his wife pushing.    Fall  Just discharged from TCU on 8/25, out with his wife shopping on 8/26. He was moving too slow, so she urged him to sit in the seat of the walker and she would push him. They got to a hill and he fell off. All imaging (See below) without acute fracture other than right 11th rib which could be healing vs acute.  - encourage IS  - telemetry  - PT/SW inderjital, he had been discharged from TCU with home cares  - pain control with tylenol, oxycodone, flexeril PRN  - lidocaine patches  - mild rib fracture pathway ordered    CURTIS on Chronic kidney disease stage III  Baseline 1.1-1.2. Cr on admit is 1.54  - hold PTA lasix. Lisinopril for now  - check UA, FENA  - BMP in AM    S/p Left hip IM Nail on 7/11/2022.  Acute traumatic displaced left intertrochanteric hip fracture .  Had follow up on 8/26 AM, patient reports everything looked good  - continue ASA 81mg daily (completed 6 weeks of BID dosing)     Delirium and question of mild cognitive impairment during July 2022 admit  - Monitor, provide frequent reorientation     Acute on chronic anemia with blood loss, improving  Chronic Iron deficiency anemia: History of vitamin B12 deficiency   Baseline hgb 11. Hgb down to 8.2 post op. Up to 8.9 on 8/26  - resume iron supplement on discharge  - ASA 81mg daily going forward    History of congestive heart failure.  Complete heart block status post pacemaker 2016.  Hypertension.  Hyperlipidemia.  History of chronic lower extremity edema.  Echo 2020 showed preserved EF of 60%.    - continue ASA, coreg, statin  - hold lisinopril and lasix given CURTIS     History of peripheral neuropathy.  Continue PTA gabapentin BID, reduce to 600mg BID  Continue PTA Cymbalta     Diabetes  "mellitus   - Medium resistance sliding scale insulin  - ACHS BG checks     Benign prostatic hypertrophy    History of bladder and prostate cancer.  Status post multiple TURPs, right ureterectomy and nephrectomy  -Continue with Flomax.     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  - Continue PTA Bactrim for chronic suppression     Obesity with a BMI of 31.78  Consider lifestyle modification with diet and exercise as able to.  Consider sleep studies as outpatient.    Clinically Significant Risk Factors Present on Admission              # Acute Kidney Injury, unspecified: based on a >150% or 0.3 mg/dL increase in creatinine on admission compared to past 90 day average, will monitor renal function    # Hypertension: home medication list includes antihypertensive(s)    # Obesity: Estimated body mass index is 31.78 kg/m  as calculated from the following:    Height as of this encounter: 1.727 m (5' 8\").    Weight as of this encounter: 94.8 kg (209 lb).          DVT Prophylaxis: Pneumatic Compression Devices  Code Status: Full Code     Expected Discharge Date: 08/27/2022               Evelyn Pulido DO    Primary Care Physician   [facility]    Chief Complaint   fall    History is obtained from the patient, prior record review    History of Present Illness   Mark Hernández is a 88 year old male who presents with fall.Just discharged from TCU on 8/25, out with his wife shopping on 8/26. He was moving too slow, so she urged him to sit in the seat of the walker and she would push him. They got to a hill and he fell off as she tried to push him up. He fell back onto back and head, scrape noted on elbow. Complains of general rib pain and aches. He denies abdominal or flank pain. Just feels like sharp stabbing pains. No fevers, chills, nausea, vomiting, diarrhea. He felt he had been doing well up until this point.    Past Medical History    I have " reviewed this patient's medical history and updated it with pertinent information if needed.   Past Medical History:   Diagnosis Date     Acquired hydrocele      BPH (benign prostatic hyperplasia)      CKD (chronic kidney disease) stage 3, GFR 30-59 ml/min (H)      Complete AV block (H) 09/23/2016    Implantation of a dual-chamber pacemaker     Congestive heart failure (H)      Diverticulosis      Hemorrhoids      Hyperlipidemia      Hypertension      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)      Pacemaker      Peripheral neuropathy      Prostate cancer (H)      Renal disease      Sensorineural hearing loss of both ears      Thrombocytopenia (H)      Thrombocytopenia (H)      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  Past Surgical History:   Procedure Laterality Date     ARTHROPLASTY REVISION HIP Right 12/28/2017    Procedure: ARTHROPLASTY REVISION HIP;;  Surgeon: Kirk Summers MD;  Location:  OR     ARTHROPLASTY REVISION HIP Right 5/13/2020    Procedure: REVISE RIGHT TOTAL HIP;  Surgeon: Kirk Summers MD;  Location:  OR     ARTHROPLASTY REVISION HIP Right 5/28/2020    Procedure: IRRIGATION AND DEBRIDEMENT RIGHT HIP WOUND WITH CLOSURE;  Surgeon: Kirk Summers MD;  Location:  OR     BACK SURGERY       ENT SURGERY      tonsillectomy     GENITOURINARY SURGERY      S/P NEPHRECTOMY     IRRIGATION AND DEBRIDEMENT HIP, PLACE ANTIBIOTIC CEMENT BEADS / SPACER Right 12/28/2017    Procedure: COMBINED IRRIGATION AND DEBRIDEMENT HIP, PLACE ANTIBIOTIC CEMENT BEADS / SPACER;  IRRIGATION AND DEBRIDEMENT RIGHT HIP WITH PLACEMENT ANTIBIOTIC BEADS, POLY EXCHANGE, AND PARTIAL HIP REVISION. ;  Surgeon: Kirk Summers MD;  Location:  OR     KNEE SURGERY       OPEN REDUCTION INTERNAL FIXATION HIP NAILING Left  7/11/2022    Procedure: INTERNAL FIXATION, FRACTURE, TROCHANTERIC, HIP, USING DOM;  Surgeon: Renato Pacheco MD;  Location:  OR     ORTHOPEDIC SURGERY       RELEASE CARPAL TUNNEL Left 4/25/2019    Procedure: LEFT CARPAL TUNNEL RELEASE;  Surgeon: Mynor López MD;  Location:  OR     revision total hip arthroplasty       rivision total hip arthroplasty       rotator cuff syndrome         Prior to Admission Medications   Prior to Admission Medications   Prescriptions Last Dose Informant Patient Reported? Taking?   Coenzyme Q10 (COQ-10 PO) 8/26/2022 at AM Self Yes Yes   Sig: Take 1 tablet by mouth daily   DULoxetine (CYMBALTA) 30 MG capsule 8/25/2022 at HS Self Yes Yes   Sig: Take 30 mg by mouth At Bedtime Takes for neuropathy   Omega-3 Fatty Acids (OMEGA 3 PO) 8/26/2022 at AM Self Yes Yes   Sig: Take 1 teaspoonful by mouth daily   PREBIOTIC PRODUCT PO 8/26/2022 at AM  Yes Yes   Sig: Take 1 Scoop by mouth daily   Probiotic Product (PROBIOTIC PO) 8/26/2022 at AM Self Yes Yes   Sig: Take 1 capsule by mouth daily   VITAMIN D PO 8/26/2022 at AM Self Yes Yes   Sig: Take 1 tablet by mouth daily   acetaminophen (TYLENOL) 325 MG tablet PRN at PRN  No No   Sig: Take 2 tablets (650 mg) by mouth every 4 hours as needed for mild pain   aspirin (ASA) 81 MG EC tablet 8/26/2022 at AM  No Yes   Sig: Take 1 tablet (81 mg) by mouth 2 times daily   bisacodyl (DULCOLAX) 10 MG suppository Not Taking at Unknown time  No No   Sig: Place 1 suppository (10 mg) rectally daily as needed for constipation   Patient not taking: Reported on 8/26/2022   carvedilol (COREG) 12.5 MG tablet 8/26/2022 at AM Self Yes Yes   Sig: Take 12.5 mg by mouth 2 times daily (with meals)   ferrous sulfate (FEROSUL) 325 (65 Fe) MG tablet 8/26/2022 at AM Self Yes Yes   Sig: Take 325 mg by mouth daily (with breakfast)   furosemide (LASIX) 20 MG tablet 8/26/2022 at AM Self Yes Yes   Sig: Take 20 mg by mouth daily   gabapentin (NEURONTIN) 800 MG tablet 8/26/2022  at AM  Yes Yes   Sig: Take 800 mg by mouth 2 times daily   lisinopril (ZESTRIL) 2.5 MG tablet 8/26/2022 at AM Self Yes Yes   Sig: Take 2.5 mg by mouth daily    metFORMIN (GLUCOPHAGE) 500 MG tablet 8/26/2022 at AM Self Yes Yes   Sig: Take 250 mg by mouth 2 times daily (with meals) (takes 0.5 x 500mg)   oxyCODONE (ROXICODONE) 5 MG tablet   No No   Sig: Take 0.5 tablets (2.5 mg) by mouth every 6 hours as needed for moderate to severe pain (Pain 7-10)   senna-docusate (SENOKOT-S/PERICOLACE) 8.6-50 MG tablet PRN at PRN  No No   Sig: Take 1-2 tablets by mouth 2 times daily as needed for constipation While on narcotics. Hold for loose stools.   simvastatin (ZOCOR) 40 MG tablet 8/25/2022 at HS Self Yes Yes   Sig: Take 20 mg by mouth At Bedtime (Takes 1/2 of a 40mg tablet)   sulfamethoxazole-trimethoprim (BACTRIM) 400-80 MG tablet 8/25/2022 at HS  Yes Yes   Sig: Take 1 tablet by mouth daily   tamsulosin (FLOMAX) 0.4 MG 24 hr capsule 8/25/2022 at HS Self Yes Yes   Sig: Take 0.8 mg by mouth At Bedtime (takes 2 x 0.4mg)   vitamin B-12 (CYANOCOBALAMIN) 1000 MCG tablet 8/26/2022 at AM Self Yes Yes   Sig: Take 1,000 mcg by mouth every other day       Facility-Administered Medications: None     Allergies   No Known Allergies    Social History   I have reviewed this patient's social history and updated it with pertinent information if needed. Mark Funez Edgar  reports that he quit smoking about 49 years ago. His smoking use included cigarettes. He started smoking about 73 years ago. He has a 24.00 pack-year smoking history. He has never used smokeless tobacco. He reports current alcohol use. He reports that he does not use drugs.    Family History   I have reviewed this patient's family history and updated it with pertinent information if needed.   Family History   Problem Relation Age of Onset     Family History Negative Mother      Myocardial Infarction Father 80     Unknown/Adopted Maternal Grandmother      Unknown/Adopted  Maternal Grandfather      Unknown/Adopted Paternal Grandmother      Unknown/Adopted Paternal Grandfather      Rheumatoid Arthritis Sister      Myocardial Infarction Sister      Family History Negative Son      Family History Negative Daughter        Review of Systems   The 10 point Review of Systems is negative other than noted in the HPI    Physical Exam   Temp: 98.6  F (37  C) Temp src: Temporal BP: 139/56 Pulse: 66   Resp: 18 SpO2: 99 % O2 Device: None (Room air)    Vital Signs with Ranges  Temp:  [98.6  F (37  C)] 98.6  F (37  C)  Pulse:  [66] 66  Resp:  [18] 18  BP: (139)/(56) 139/56  SpO2:  [99 %] 99 %  209 lbs 0 oz    Constitutional: Awake, alert, cooperative, no apparent distress, slow speech (post dilaudid)  Eyes: Conjunctiva and pupils examined and normal.  HEENT: Moist mucous membranes, normal dentition.  Respiratory: Clear to auscultation bilaterally, no crackles or wheezing.  Cardiovascular: Regular rate and rhythm, normal S1 and S2, and no murmur noted.  GI: Soft, non-distended, non-tender, normal bowel sounds, obese  Lymph/Hematologic: No anterior cervical or supraclavicular adenopathy.  Skin: No rashes, no cyanosis, no edema. Abrasion to right elbow with bandage overlying  Musculoskeletal: No joint swelling, erythema or tenderness.  Neurologic: Cranial nerves 2-12 intact, normal strength and sensation.  Psychiatric: Alert, oriented to person, place and time, no obvious anxiety or depression.    Data   Data reviewed today:  I personally reviewed CT head: brain atrophy and presumed chronic microvascular ischemic changes. CT cervical spine: no acute fracture, mild rotation of C1-C2 nonspecific. Multilevel spondylolisthesis, multilevel advanced degen changes cervical spine. CT Chest: healing posterior right 11th rib fracture, an acute fracture is possible. Healed or healing right posterior 3rd rib fracture. degen changes in spine. CT lumbar: no acute displaced fracture, no traumatic subluxation.  Posterior fusion L4-5 without acute hardware complication. Defect in left sided bone graft at L5-S1. Multilevel degen change, including moderate L3-4 spinal canal stenosis. Xray Humerus/left shoulder: degen change at left glenohumeral joint, effacement of subacromial space worrisome for high grade rotator cuff tearing, mild diastasis of AC joint. No acute fracture. Calcific tendinitis near greater tuberosity.  Recent Labs   Lab 08/26/22  2156   WBC 6.3   HGB 8.9*   MCV 97         POTASSIUM 4.5   CHLORIDE 103   CO2 27   BUN 48*   CR 1.54*   ANIONGAP 6   ESAU 8.4*   *       Recent Results (from the past 24 hour(s))   Humerus XR,  G/E 2 views, left    Narrative    EXAM: XR HUMERUS LEFT G/E 2 VIEWS, XR SHOULDER LEFT G/E 3 VIEWS  LOCATION: [facility]  DATE/TIME: 8/26/2022 4:43 PM    INDICATION: Fall, pain.  COMPARISON: None.      Impression    IMPRESSION: Degenerative change at the left glenohumeral joint. Complete effacement of the subacromial space worrisome for high-grade rotator cuff tearing. Mild diastasis of the AC joint. No evidence for acute fracture. There are calcifications adjacent   to the greater tuberosity which could represent calcific tendinitis. The humerus is otherwise negative. Olecranon spurring at the elbow joint.   XR Shoulder Left G/E 3 Views    Narrative    EXAM: XR HUMERUS LEFT G/E 2 VIEWS, XR SHOULDER LEFT G/E 3 VIEWS  LOCATION: [facility]  DATE/TIME: 8/26/2022 4:43 PM    INDICATION: Fall, pain.  COMPARISON: None.      Impression    IMPRESSION: Degenerative change at the left glenohumeral joint. Complete effacement of the subacromial space worrisome for high-grade rotator cuff tearing. Mild diastasis of the AC joint. No evidence for acute fracture. There are calcifications adjacent   to the greater tuberosity which could represent calcific tendinitis. The humerus is otherwise negative. Olecranon spurring at the elbow joint.    CT Head w/o Contrast    Narrative    EXAM: CT HEAD W/O CONTRAST  LOCATION: [facility]  DATE/TIME: 8/26/2022 5:12 PM    INDICATION: fall, head injury  COMPARISON: CT head 09/02/2021.  TECHNIQUE: Routine CT Head without IV contrast. Multiplanar reformats. Dose reduction techniques were used.    FINDINGS:  INTRACRANIAL CONTENTS: No intracranial hemorrhage, extraaxial collection, or mass effect.  No CT evidence of acute infarct. Mild to moderate presumed chronic small vessel ischemic changes. Mild to moderate generalized volume loss. No hydrocephalus.     VISUALIZED ORBITS/SINUSES/MASTOIDS: Prior right cataract surgery. Old fracture medial wall left orbit. Visualized portions of the orbits are otherwise unremarkable. No paranasal sinus mucosal disease. No middle ear or mastoid effusion.    BONES/SOFT TISSUES: No acute abnormality.      Impression    IMPRESSION:  1.  No CT evidence for acute intracranial process.  2.  Brain atrophy and presumed chronic microvascular ischemic changes as above.   Cervical spine CT w/o contrast    Narrative    EXAM: CT CERVICAL SPINE W/O CONTRAST  LOCATION: [facility]  DATE/TIME: 8/26/2022 5:13 PM    INDICATION: Fall, pain.  COMPARISON: None.  TECHNIQUE: Routine CT Cervical Spine without IV contrast. Multiplanar reformats. Dose reduction techniques were used.    FINDINGS:  VERTEBRA: No acute fracture identified. Small chronic-appearing Schmorl's nodes in the superior and inferior endplates of C6. Otherwise, normal vertebral body heights. Anterolisthesis of C2 on C3 measuring approximately 2 mm. Retrolisthesis of C4 on C5   measuring approximately 1-2 mm. Retrolisthesis of C5 on C6 measuring 1-2 mm. Anterolisthesis of C6 on C7 measuring 2-3 mm. Anterolisthesis of C7 on T1 measuring approximately 2-3 mm. Mild broad levoconvex curvature of the cervical spine. There is   rotation of C1 on C2 with relative posterior subluxation of the right  C1 lateral mass with respect to the right C2 lateral mass and minimal anterior subluxation of the left C1 lateral mass with respect to the left C2 lateral mass. This could be   positional.    CANAL/FORAMINA: Multilevel degenerative disc disease, including severe disc height loss at C4-C5, C5-C6 and C6-C7 and moderate to severe disc height loss at C3-C4 and C7-T1 and moderate disc height loss at C2-C3. Marginal multilevel endplate osteophytes   and uncovertebral osteophytes. Moderate to severe multilevel degenerative facet disease present. Degenerative arthropathy at the median atlantoaxial joint. Small foci of calcification/ossification inferior to the anterior arch of C1 in the predental   space, potentially degenerative in nature. Subcortical cysts in the inferior aspect of the odontoid process, nonspecific, but potentially degenerative in nature. Multilevel disc osteophyte complexes contributing to mild/moderate multilevel spinal canal   stenosis. No definite high-grade spinal canal narrowing identified. Moderate to severe right C4-C5 neural foraminal stenosis. Moderate to severe left C5-C6 neural foraminal stenosis. There is at least moderate right C3-C4 neural foraminal stenosis.   Relatively lesser degrees of neural foraminal narrowing seen elsewhere.    PARASPINAL: Partially visualized cardiac pacing wires. Right greater than left carotid bifurcation atherosclerotic calcifications. The visualized paraspinous soft tissues otherwise appear grossly unremarkable.      Impression    IMPRESSION:  1.  No acute fracture.  2.  Mild rotation of C1 on C2, which is nonspecific, but could be positional.  3.  Multilevel spondylolisthesis, as described. This is nonspecific, but may be degenerative in nature.  4.  Multilevel advanced degenerative changes of the cervical spine, as described.   Lumbar spine CT w/o contrast    Narrative    EXAM: CT LUMBAR SPINE W/O CONTRAST  LOCATION: [facility]  DATE/TIME: 8/26/2022 5:43 PM    INDICATION: Fall, trauma, pain.  COMPARISON: 08/18/2017.  TECHNIQUE: Routine CT Lumbar Spine without IV contrast. Multiplanar reformats. Dose reduction techniques were used.     FINDINGS:  NOMENCLATURE: Five lumbar-type vertebral bodies.    POSTOPERATIVE CHANGE: Posterior instrumented fusion at L4-S1 without evidence of hardware-related complication. Solid right-sided posterolateral fusion. Persistent lucency through the left L5 pars, though slightly decreased in conspicuity. Laminectomies   at L4-L5 and L5-S1.    ALIGNMENT: Grade 1 anterolisthesis at L4-L5 and L5-S1. Mild retrolisthesis at each level between T12 and L4. Levoconvex curvature of approximately 20 degrees with apex at L2-L3.    BONES: Vertebral body heights are unremarkable. There is no evidence of acute displaced fractures. Defect in the right ilium is chronic and postoperative. The visualized portions of the sacrum and aidan appear otherwise intact. No destructive bony lesions   are apparent. Moderate multilevel facet arthropathy.    DISCS: Moderate to advanced multilevel spondylosis disc height loss and multilevel disc-osteophyte complex.    SPINAL CANAL: Moderate L3-L4 spinal canal stenosis.    NEURAL FORAMINA: Moderate bilateral L5-S1 and right L2-L3 neural foraminal stenosis. Severe left L3-L4 and right L1-L2 neural foraminal stenosis.    PARASPINAL SOFT TISSUES: Fatty atrophy of the paraspinal musculature.    ABDOMINAL CAVITY: No evidence of an acute abnormality within technique limitations.      Impression    IMPRESSION:  1.  No evidence of acute displaced fracture.  2.  No evidence of traumatic subluxation.  3.  Posterior instrumented fusion at L4-L5 without evidence of acute hardware-related complication. Redemonstrated defect in the left-sided bone graft at L5-S1.  4.  Multilevel degenerative change, including moderate L3-L4 spinal canal stenosis.   CT Chest w/o Contrast    Narrative    EXAM: CT CHEST  W/O CONTRAST  LOCATION: [facility]  DATE/TIME: 8/26/2022 5:43 PM    INDICATION: fall, pain  COMPARISON: None.  TECHNIQUE: CT chest without IV contrast. Multiplanar reformats were obtained. Dose reduction techniques were used.  CONTRAST: None.    FINDINGS:   LUNGS AND PLEURA: No pneumothorax.  Emphysema is present.    MEDIASTINUM/AXILLAE: Left chest wall biventricular pacemaker.     CORONARY ARTERY CALCIFICATION: Severe.    UPPER ABDOMEN: Cholelithiasis.  The right kidney is likely surgically absent. There is colonic diverticulosis in the field-of-view.    MUSCULOSKELETAL:  Likely healing posterior right 11th rib fracture. An acute fracture is possible, however. Healed or healing right posterior third rib fracture.  There are degenerative changes in the spine. Degenerative change in the left shoulder with   surrounding fluid.        Impression    IMPRESSION:   1.  Likely healing posterior right 11th rib fracture. An acute fracture is possible, however.   2.  Healed or healing right posterior third rib fracture.   3.  Degenerative changes in the spine.

## 2022-08-27 NOTE — PROGRESS NOTES
RECEIVING UNIT ED HANDOFF REVIEW    ED Nurse Handoff Report was reviewed by: Lori Steel RN on August 26, 2022 at 10:36 PM

## 2022-08-27 NOTE — PROVIDER NOTIFICATION
Hospitalist notified about pt's UA result and if pt needs to be on tele. Per MD, awaiting urine culture result and no need for tele.

## 2022-08-28 LAB
ANION GAP SERPL CALCULATED.3IONS-SCNC: 6 MMOL/L (ref 3–14)
BUN SERPL-MCNC: 35 MG/DL (ref 7–30)
CALCIUM SERPL-MCNC: 8.8 MG/DL (ref 8.5–10.1)
CHLORIDE BLD-SCNC: 106 MMOL/L (ref 94–109)
CO2 SERPL-SCNC: 27 MMOL/L (ref 20–32)
CREAT SERPL-MCNC: 1.21 MG/DL (ref 0.66–1.25)
GFR SERPL CREATININE-BSD FRML MDRD: 58 ML/MIN/1.73M2
GLUCOSE BLD-MCNC: 165 MG/DL (ref 70–99)
GLUCOSE BLDC GLUCOMTR-MCNC: 112 MG/DL (ref 70–99)
GLUCOSE BLDC GLUCOMTR-MCNC: 122 MG/DL (ref 70–99)
GLUCOSE BLDC GLUCOMTR-MCNC: 140 MG/DL (ref 70–99)
GLUCOSE BLDC GLUCOMTR-MCNC: 147 MG/DL (ref 70–99)
GLUCOSE BLDC GLUCOMTR-MCNC: 170 MG/DL (ref 70–99)
POTASSIUM BLD-SCNC: 4.2 MMOL/L (ref 3.4–5.3)
SODIUM SERPL-SCNC: 139 MMOL/L (ref 133–144)

## 2022-08-28 PROCEDURE — 36415 COLL VENOUS BLD VENIPUNCTURE: CPT | Performed by: HOSPITALIST

## 2022-08-28 PROCEDURE — G0378 HOSPITAL OBSERVATION PER HR: HCPCS

## 2022-08-28 PROCEDURE — 99225 PR SUBSEQUENT OBSERVATION CARE,LEVEL II: CPT | Performed by: HOSPITALIST

## 2022-08-28 PROCEDURE — 999N000157 HC STATISTIC RCP TIME EA 10 MIN

## 2022-08-28 PROCEDURE — 250N000013 HC RX MED GY IP 250 OP 250 PS 637: Performed by: HOSPITALIST

## 2022-08-28 PROCEDURE — 96372 THER/PROPH/DIAG INJ SC/IM: CPT

## 2022-08-28 PROCEDURE — 80048 BASIC METABOLIC PNL TOTAL CA: CPT | Performed by: HOSPITALIST

## 2022-08-28 PROCEDURE — 82962 GLUCOSE BLOOD TEST: CPT

## 2022-08-28 RX ADMIN — SULFAMETHOXAZOLE AND TRIMETHOPRIM 1 TABLET: 400; 80 TABLET ORAL at 20:26

## 2022-08-28 RX ADMIN — DULOXETINE HYDROCHLORIDE 30 MG: 30 CAPSULE, DELAYED RELEASE ORAL at 22:20

## 2022-08-28 RX ADMIN — CYCLOBENZAPRINE HYDROCHLORIDE 5 MG: 5 TABLET, FILM COATED ORAL at 17:35

## 2022-08-28 RX ADMIN — INSULIN ASPART 1 UNITS: 100 INJECTION, SOLUTION INTRAVENOUS; SUBCUTANEOUS at 08:41

## 2022-08-28 RX ADMIN — OXYCODONE HYDROCHLORIDE 2.5 MG: 5 TABLET ORAL at 13:18

## 2022-08-28 RX ADMIN — CARVEDILOL 12.5 MG: 12.5 TABLET, FILM COATED ORAL at 08:25

## 2022-08-28 RX ADMIN — CARVEDILOL 12.5 MG: 12.5 TABLET, FILM COATED ORAL at 17:35

## 2022-08-28 RX ADMIN — SIMVASTATIN 20 MG: 20 TABLET, FILM COATED ORAL at 22:20

## 2022-08-28 RX ADMIN — FERROUS SULFATE TAB 325 MG (65 MG ELEMENTAL FE) 325 MG: 325 (65 FE) TAB at 08:25

## 2022-08-28 RX ADMIN — INSULIN ASPART 1 UNITS: 100 INJECTION, SOLUTION INTRAVENOUS; SUBCUTANEOUS at 13:16

## 2022-08-28 RX ADMIN — ASPIRIN 81 MG: 81 TABLET, COATED ORAL at 08:25

## 2022-08-28 RX ADMIN — TAMSULOSIN HYDROCHLORIDE 0.8 MG: 0.4 CAPSULE ORAL at 22:20

## 2022-08-28 RX ADMIN — OXYCODONE HYDROCHLORIDE 2.5 MG: 5 TABLET ORAL at 20:34

## 2022-08-28 RX ADMIN — GABAPENTIN 600 MG: 600 TABLET, FILM COATED ORAL at 20:26

## 2022-08-28 RX ADMIN — LIDOCAINE 1 PATCH: 560 PATCH PERCUTANEOUS; TOPICAL; TRANSDERMAL at 22:20

## 2022-08-28 RX ADMIN — GABAPENTIN 600 MG: 600 TABLET, FILM COATED ORAL at 08:25

## 2022-08-28 RX ADMIN — SENNOSIDES AND DOCUSATE SODIUM 1 TABLET: 50; 8.6 TABLET ORAL at 22:35

## 2022-08-28 ASSESSMENT — ACTIVITIES OF DAILY LIVING (ADL)
ADLS_ACUITY_SCORE: 39
ADLS_ACUITY_SCORE: 47
ADLS_ACUITY_SCORE: 39
ADLS_ACUITY_SCORE: 47
ADLS_ACUITY_SCORE: 39

## 2022-08-28 NOTE — PROGRESS NOTES
[facility]    Hospitalist Progress Note    Date of Admission:  8/26/2022    Assessment & Plan     Mark Hernández is a 88 year old male who presents with fall while riding on the seat of a walker with his wife pushing.     Fall  Fracture of right 11th rib, acute versus subacute  Fracture of right posterior third rib, healing versus healed  Just discharged from TCU on 8/25, out with his wife shopping on 8/26. He was moving too slow, so she urged him to sit in the seat of the walker and she would push him. They got to a hill and he fell off. All imaging (See below) without acute fracture other than right 11th rib which could be healing vs acute.  - encourage IS  - PT/SW sharon, he had been discharged from TCU with home cares.  Currently recommendation is again discharge to TCU  - pain control with tylenol, oxycodone, flexeril PRN  - lidocaine patches     CURTIS on Chronic kidney disease stage III  ?  Possible UTI  Baseline 1.1-1.2. Cr on admit is 1.54  - hold PTA lasix. Lisinopril for now.  Creatinine improved to 1.2  -Monitor BMP  -UA appears infected.  Await urine culture as he does not have any other symptoms of UTI.  He is also on chronic Bactrim.     S/p Left hip IM Nail on 7/11/2022.  Acute traumatic displaced left intertrochanteric hip fracture .  Had follow up on 8/26 AM, patient reports everything looked good  - continue ASA 81mg daily (completed 6 weeks of BID dosing)     Delirium and question of mild cognitive impairment during July 2022 admit  - Monitor, provide frequent reorientation     Acute on chronic anemia with blood loss, improving  Chronic Iron deficiency anemia: History of vitamin B12 deficiency   Baseline hgb 11. Hgb down to 8.2 post op. Up to 8.9 on 8/26  - resume iron supplement on discharge  - ASA 81mg daily going forward    History of congestive heart failure.  Complete heart block status post pacemaker 2016.  Hypertension.  Hyperlipidemia.  History of chronic  "lower extremity edema.  Echo 2020 showed preserved EF of 60%.    - continue ASA, coreg, statin  - hold lisinopril and lasix given CURTIS     History of peripheral neuropathy.  Continue PTA gabapentin BID, reduce to 600mg BID  Continue PTA Cymbalta     Diabetes mellitus   - Medium resistance sliding scale insulin  - ACHS BG checks     Benign prostatic hypertrophy    History of bladder and prostate cancer.  Status post multiple TURPs, right ureterectomy and nephrectomy  -Continue with Flomax.     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  - Continue PTA Bactrim for chronic suppression     Obesity with a BMI of 31.78  Consider lifestyle modification with diet and exercise as able to.  Consider sleep studies as outpatient.         Clinically Significant Risk Factors Present on Admission                 # Hypertension: home medication list includes antihypertensive(s)    # Obesity: Estimated body mass index is 32.05 kg/m  as calculated from the following:    Height as of this encounter: 1.727 m (5' 8\").    Weight as of this encounter: 95.6 kg (210 lb 12.2 oz).      DVT Prophylaxis: Pneumatic Compression Devices  Code Status: Full Code    Disposition: Await therapy reassessment today.  Patient prefers to go home but per RN, he is still assist of 2 for mobility.  Only has wife at home.    Marlena Moore MD  Text Page (7am - 6pm, M-F)  [facility]  Securely message with the Vocera Web Console (learn more here)  Text page via [employer] Paging/Directory      Interval History     Stable overnight.  Still has pain in his back but is okay at rest.  Worsens with movement.  Awaiting therapy reassessment to be.  Prefers to go home but states that he will go to TCU if absolutely necessary.    -Data reviewed today: I reviewed all new labs and imaging results over the last 24 hours. I personally reviewed CT scan with result as noted above    Physical Exam   Temp: 98.7  F " (37.1  C) Temp src: Oral BP: 129/61 Pulse: 63   Resp: 18 SpO2: 96 % O2 Device: None (Room air)    Vitals:    08/26/22 1604 08/27/22 0613   Weight: 94.8 kg (209 lb) 95.6 kg (210 lb 12.2 oz)     Vital Signs with Ranges  Temp:  [98  F (36.7  C)-98.7  F (37.1  C)] 98.7  F (37.1  C)  Pulse:  [61-69] 63  Resp:  [16-18] 18  BP: (121-133)/(56-61) 129/61  SpO2:  [94 %-96 %] 96 %  I/O last 3 completed shifts:  In: -   Out: 1950 [Urine:1950]    Constitutional: Awake, alert, cooperative, no apparent distress, elderly male appears stated age  Respiratory: Clear to auscultation bilaterally, no crackles or wheezing.  Cardiovascular: Regular rate and rhythm, normal S1 and S2,  GI: Soft, non-distended, non-tender, normal bowel sounds, obese  Skin: No rashes, no cyanosis, no edema. Abrasion to right elbow with bandage overlying  Neurologic:  No facial asymmetry, moving all extremities, engages in simple conversation  Psychiatric: Alert, oriented to person, place and time, no obvious anxiety or depression.       Medications       aspirin  81 mg Oral Daily     carvedilol  12.5 mg Oral BID w/meals     DULoxetine  30 mg Oral At Bedtime     ferrous sulfate  325 mg Oral Daily with breakfast     [Held by provider] furosemide  20 mg Oral Daily     gabapentin  600 mg Oral BID     insulin aspart  1-7 Units Subcutaneous TID AC     insulin aspart  1-5 Units Subcutaneous At Bedtime     lidocaine  1 patch Transdermal Q24H     lidocaine   Transdermal Q8H [facility]     [Held by provider] lisinopril  2.5 mg Oral Daily     simvastatin  20 mg Oral At Bedtime     sulfamethoxazole-trimethoprim  1 tablet Oral QPM     tamsulosin  0.8 mg Oral At Bedtime       Data   Recent Labs   Lab 08/28/22  1206 08/28/22  0956 08/28/22  0840 08/27/22  1222 08/27/22  0701 08/27/22  0052 08/26/22  2156   WBC  --   --   --   --  5.0  --  6.3   HGB  --   --   --   --  9.1*  --  8.9*   MCV  --   --   --   --  94  --  97   PLT  --   --   --   --  193  --  205   NA  --  139  --   --   136  --  136   POTASSIUM  --  4.2  --   --  3.9  --  4.5   CHLORIDE  --  106  --   --  104  --  103   CO2  --  27  --   --  26  --  27   BUN  --  35*  --   --  45*  --  48*   CR  --  1.21  --   --  1.43*  --  1.54*   ANIONGAP  --  6  --   --  6  --  6   ESAU  --  8.8  --   --  8.8  --  8.4*   * 165* 147*   < > 130*   < > 118*    < > = values in this interval not displayed.       Imaging  No results found for this or any previous visit (from the past 24 hour(s)).

## 2022-08-28 NOTE — UTILIZATION REVIEW
Concurrent stay review; Secondary Review Determination     [facility]          Under the authority of the Utilization Management Committee, the utilization review process indicated a secondary review on the above patient.  The review outcome is based on review of the medical records, discussions with staff, and applying clinical experience noted on the date of the review.          (x) Observation Status Appropriate - Concurrent stay review    RATIONALE FOR DETERMINATION   88 year old male who presents with fall while riding on the seat of a walker with his wife pushing. Just discharged from TCU on 8/25, out with his wife shopping on 8/26. He was moving too slow, so she urged him to sit in the seat of the walker and she would push him. They got to a hill and he fell off. All imaging (See below) without acute fracture other than right 11th rib which could be healing vs acute.  Clear indication for prolonged hospital care other than declining physical therapy evaluation and possible placement into a significant or long-term care.  The severity of illness, intensity of service provided, expected LOS and risk for adverse outcome make the care appropriate for observation.      This document was produced using voice recognition software       The information on this document is developed by the utilization review team in order for the business office to ensure compliance.  This only denotes the appropriateness of proper admission status and does not reflect the quality of care rendered.         The definitions of Inpatient Status and Observation Status used in making the determination above are those provided in the CMS Coverage Manual, Chapter 1 and Chapter 6, section 70.4.      Sincerely,     SYLVIA WHITESIDE MD    System Medical Director  Utilization Management  [facility].

## 2022-08-28 NOTE — PROGRESS NOTES
Observation goals  PRIOR TO DISCHARGE        Comments: -diagnostic tests and consults completed and resulted: Goal Met.  -vital signs normal or at patient baseline: Goal Met.  -adequate pain control on oral analgesics: Partially Met. Denies pain when not moving, otherwise c/o pain on back which is managed with prn oxycodone.   -returns to baseline functional status: Goal not met. Needing assist of 2 to stand up and moves fairly slow.   -safe disposition plan has been identified: Goal not met. Awaiting TCU placement.  Nurse to notify provider when observation goals have been met and patient is ready for discharge.

## 2022-08-28 NOTE — PROGRESS NOTES
.Patient vital signs are at baseline: Yes  Patient able to ambulate as they were prior to admission or with assist devices provided by therapies during their stay:  No,  Reason:  Need PT   Patient MUST void prior to discharge:  Yes  Patient able to tolerate oral intake:  Yes  Pain has adequate pain control using Oral analgesics:  Yes  Does patient have an identified :  Yes  Has goal D/C date and time been discussed with patient:  No,  Reason: pending     Patient A&Ox4, VSS on RA. Voiding on urinal. IvSL. T&R . Lidocaine patch mid back Encouraged IS.  Pending Urin culture. Will continue monitoring.

## 2022-08-28 NOTE — PROGRESS NOTES
.Patient vital signs are at baseline: Yes  Patient able to ambulate as they were prior to admission or with assist devices provided by therapies during their stay:  No,  Reason:  Need PT   Patient MUST void prior to discharge:  Yes  Patient able to tolerate oral intake:  Yes  Pain has adequate pain control using Oral analgesics:  Yes  Does patient have an identified :  Yes  Has goal D/C date and time been discussed with patient:  No,  Reason: pending

## 2022-08-29 ENCOUNTER — APPOINTMENT (OUTPATIENT)
Dept: PHYSICAL THERAPY | Facility: CLINIC | Age: 87
End: 2022-08-29
Payer: MEDICARE

## 2022-08-29 LAB
BACTERIA UR CULT: ABNORMAL
GLUCOSE BLDC GLUCOMTR-MCNC: 137 MG/DL (ref 70–99)
GLUCOSE BLDC GLUCOMTR-MCNC: 137 MG/DL (ref 70–99)
GLUCOSE BLDC GLUCOMTR-MCNC: 140 MG/DL (ref 70–99)
GLUCOSE BLDC GLUCOMTR-MCNC: 152 MG/DL (ref 70–99)
GLUCOSE BLDC GLUCOMTR-MCNC: 172 MG/DL (ref 70–99)

## 2022-08-29 PROCEDURE — G0378 HOSPITAL OBSERVATION PER HR: HCPCS

## 2022-08-29 PROCEDURE — 94150 VITAL CAPACITY TEST: CPT

## 2022-08-29 PROCEDURE — 82962 GLUCOSE BLOOD TEST: CPT

## 2022-08-29 PROCEDURE — 250N000013 HC RX MED GY IP 250 OP 250 PS 637: Performed by: HOSPITALIST

## 2022-08-29 PROCEDURE — 99225 PR SUBSEQUENT OBSERVATION CARE,LEVEL II: CPT | Performed by: HOSPITALIST

## 2022-08-29 PROCEDURE — 96372 THER/PROPH/DIAG INJ SC/IM: CPT

## 2022-08-29 PROCEDURE — 97530 THERAPEUTIC ACTIVITIES: CPT | Mod: GP,CQ | Performed by: PHYSICAL THERAPY ASSISTANT

## 2022-08-29 RX ORDER — GABAPENTIN 800 MG/1
800 TABLET ORAL 2 TIMES DAILY
Status: DISCONTINUED | OUTPATIENT
Start: 2022-08-29 | End: 2022-08-30 | Stop reason: HOSPADM

## 2022-08-29 RX ORDER — ACETAMINOPHEN 325 MG/1
975 TABLET ORAL EVERY 8 HOURS
Status: DISCONTINUED | OUTPATIENT
Start: 2022-08-29 | End: 2022-08-30 | Stop reason: HOSPADM

## 2022-08-29 RX ORDER — CEFUROXIME AXETIL 500 MG/1
500 TABLET ORAL EVERY 12 HOURS SCHEDULED
Status: DISCONTINUED | OUTPATIENT
Start: 2022-08-29 | End: 2022-08-30 | Stop reason: HOSPADM

## 2022-08-29 RX ADMIN — ASPIRIN 81 MG: 81 TABLET, COATED ORAL at 08:35

## 2022-08-29 RX ADMIN — CARVEDILOL 12.5 MG: 12.5 TABLET, FILM COATED ORAL at 08:35

## 2022-08-29 RX ADMIN — CARVEDILOL 12.5 MG: 12.5 TABLET, FILM COATED ORAL at 17:38

## 2022-08-29 RX ADMIN — CEFUROXIME AXETIL 500 MG: 500 TABLET ORAL at 15:03

## 2022-08-29 RX ADMIN — INSULIN ASPART 1 UNITS: 100 INJECTION, SOLUTION INTRAVENOUS; SUBCUTANEOUS at 12:41

## 2022-08-29 RX ADMIN — SULFAMETHOXAZOLE AND TRIMETHOPRIM 1 TABLET: 400; 80 TABLET ORAL at 21:39

## 2022-08-29 RX ADMIN — FERROUS SULFATE TAB 325 MG (65 MG ELEMENTAL FE) 325 MG: 325 (65 FE) TAB at 08:35

## 2022-08-29 RX ADMIN — CYCLOBENZAPRINE HYDROCHLORIDE 5 MG: 5 TABLET, FILM COATED ORAL at 08:39

## 2022-08-29 RX ADMIN — ACETAMINOPHEN 975 MG: 325 TABLET ORAL at 15:03

## 2022-08-29 RX ADMIN — OXYCODONE HYDROCHLORIDE 2.5 MG: 5 TABLET ORAL at 08:39

## 2022-08-29 RX ADMIN — DULOXETINE HYDROCHLORIDE 30 MG: 30 CAPSULE, DELAYED RELEASE ORAL at 21:39

## 2022-08-29 RX ADMIN — GABAPENTIN 800 MG: 800 TABLET, FILM COATED ORAL at 21:39

## 2022-08-29 RX ADMIN — INSULIN ASPART 1 UNITS: 100 INJECTION, SOLUTION INTRAVENOUS; SUBCUTANEOUS at 16:56

## 2022-08-29 RX ADMIN — CEFUROXIME AXETIL 500 MG: 500 TABLET ORAL at 21:39

## 2022-08-29 RX ADMIN — GABAPENTIN 600 MG: 600 TABLET, FILM COATED ORAL at 08:35

## 2022-08-29 RX ADMIN — SIMVASTATIN 20 MG: 20 TABLET, FILM COATED ORAL at 21:38

## 2022-08-29 RX ADMIN — TAMSULOSIN HYDROCHLORIDE 0.8 MG: 0.4 CAPSULE ORAL at 21:38

## 2022-08-29 RX ADMIN — LIDOCAINE 1 PATCH: 560 PATCH PERCUTANEOUS; TOPICAL; TRANSDERMAL at 22:07

## 2022-08-29 RX ADMIN — ACETAMINOPHEN 975 MG: 325 TABLET ORAL at 21:39

## 2022-08-29 RX ADMIN — OXYCODONE HYDROCHLORIDE 2.5 MG: 5 TABLET ORAL at 12:41

## 2022-08-29 ASSESSMENT — ACTIVITIES OF DAILY LIVING (ADL)
ADLS_ACUITY_SCORE: 47
ADLS_ACUITY_SCORE: 47
ADLS_ACUITY_SCORE: 42
ADLS_ACUITY_SCORE: 47
ADLS_ACUITY_SCORE: 42
ADLS_ACUITY_SCORE: 47
ADLS_ACUITY_SCORE: 47

## 2022-08-29 NOTE — PROGRESS NOTES
Observation goals  PRIOR TO DISCHARGE        Comments: -diagnostic tests and consults completed and resulted: Goal Met.  -vital signs normal or at patient baseline: Goal Met.  -adequate pain control on oral analgesics: Partially Met. Denies pain when not moving, otherwise c/o pain on back managed with prn oxycodone.   -returns to baseline functional status: Goal not met. Needing assist of 2 to stand up and moves fairly slow.   -safe disposition plan has been identified: Goal not met. Awaiting TCU placement.  Nurse to notify provider when observation goals have been met and patient is ready for discharge.

## 2022-08-29 NOTE — PLAN OF CARE
Goal Outcome Evaluation:    Plan of Care Reviewed With: patient     Overall Patient Progress: improving       A&O, slight forgetful. VSS, RA. CMS intact. Voiding in urinal/incontinent at times. Up with heavy assist of 2 with [address]. Sat in chair for meals, BG check. Pain managed with oxy and flexeril. Discharge pending to TCU.

## 2022-08-29 NOTE — PROGRESS NOTES
[facility]    Hospitalist Progress Note    Date of Admission:  8/26/2022    Assessment & Plan     Mark Hernández is a 88 year old male who presents with fall while riding on the seat of a walker with his wife pushing.     Fall  Fracture of right 11th rib, acute versus subacute  Fracture of right posterior third rib, healing versus healed  Just discharged from TCU on 8/25, out with his wife shopping on 8/26. He was moving too slow, so she urged him to sit in the seat of the walker and she would push him. They got to a hill and he fell off. All imaging (See below) without acute fracture other than right 11th rib which could be healing vs acute.  - encourage IS  - PT/SW sharon, he had been discharged from TCU with home cares.  Currently recommendation is again discharge to TCU  - pain control with scheduled tylenol, as needed oxycodone, flexeril PRN  -Is also on scheduled Cymbalta and gabapentin  - lidocaine patches     CURTIS on Chronic kidney disease stage III, improving  E. coli UTI  Baseline 1.1-1.2. Cr on admit is 1.54  - hold PTA lasix. Lisinopril for now.  Creatinine improved to 1.2  -Monitor BMP  -UA appears infected with greater than 182 WBCs, positive nitrites.  Urine culture growing E. coli that is resistant to the chronic Bactrim patient is on.  We will treat with 3-day course of cefuroxime.  Not really symptomatic from UTI but given the hip hardware, high risk for infectious complications, have opted to treat.     S/p Left hip IM Nail on 7/11/2022.  Acute traumatic displaced left intertrochanteric hip fracture .  Had follow up on 8/26 AM, patient reports everything looked good  - continue ASA 81mg daily (completed 6 weeks of BID dosing)     Delirium and question of mild cognitive impairment during July 2022 admit  - Monitor, provide frequent reorientation     Acute on chronic anemia with blood loss, improving  Chronic Iron deficiency anemia: History of vitamin B12 deficiency  "  Baseline hgb 11. Hgb down to 8.2 post op. Up to 8.9 on 8/26  - resume iron supplement on discharge  - ASA 81mg daily going forward    History of congestive heart failure.  Complete heart block status post pacemaker 2016.  Hypertension.  Hyperlipidemia.  History of chronic lower extremity edema.  Echo 2020 showed preserved EF of 60%.    - continue ASA, coreg, statin  - hold lisinopril and lasix given CURTIS     History of peripheral neuropathy.  Continue PTA gabapentin BID, increase back to PTA dose of 800 mg twice daily with improved renal function  Continue PTA Cymbalta     Diabetes mellitus   - Medium resistance sliding scale insulin  - ACHS BG checks     Benign prostatic hypertrophy    History of bladder and prostate cancer.  Status post multiple TURPs, right ureterectomy and nephrectomy  -Continue with Flomax.     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  - Continue PTA Bactrim for chronic suppression     Obesity with a BMI of 31.78  Consider lifestyle modification with diet and exercise as able to.  Consider sleep studies as outpatient.         Clinically Significant Risk Factors Present on Admission                 # Hypertension: home medication list includes antihypertensive(s)    # Obesity: Estimated body mass index is 33.32 kg/m  as calculated from the following:    Height as of this encounter: 1.727 m (5' 8\").    Weight as of this encounter: 99.4 kg (219 lb 2.2 oz).      DVT Prophylaxis: Pneumatic Compression Devices  Code Status: Full Code    Disposition: Await therapy reassessment.  Patient prefers to go home but per RN, he is still assist of 2 for mobility.  Only has wife at home.    Marlena Moore MD  Text Page (7am - 6pm, M-F)  [facility]  Securely message with the Vocera Web Console (learn more here)  Text page via [employer] Paging/Directory      Interval History     Stable overnight.  Still has pain in his back but is okay at rest.  " Worsens with movement.  He was quite sleepy this morning.  Fell asleep during conversation.      -Data reviewed today: I reviewed all new labs and imaging results over the last 24 hours. I personally reviewed CT scan with result as noted above    Physical Exam   Temp: 97.5  F (36.4  C) Temp src: Oral BP: 124/56 Pulse: 65   Resp: 16 SpO2: 93 % O2 Device: None (Room air)    Vitals:    08/26/22 1604 08/27/22 0613 08/29/22 0211   Weight: 94.8 kg (209 lb) 95.6 kg (210 lb 12.2 oz) 99.4 kg (219 lb 2.2 oz)     Vital Signs with Ranges  Temp:  [97.5  F (36.4  C)-99.6  F (37.6  C)] 97.5  F (36.4  C)  Pulse:  [61-67] 65  Resp:  [12-16] 16  BP: (118-131)/(55-61) 124/56  SpO2:  [93 %-97 %] 93 %  I/O last 3 completed shifts:  In: 480 [P.O.:480]  Out: 2200 [Urine:2200]    Constitutional:  Sleepy but easily arousable, no apparent distress, elderly male appears stated age  Respiratory: Clear to auscultation bilaterally, no crackles or wheezing.  Cardiovascular: Regular rate and rhythm, normal S1 and S2,  GI: Soft, non-distended, non-tender, normal bowel sounds, obese  Skin: No rashes, no cyanosis, no edema. Abrasion to right elbow with bandage overlying  Neurologic:  No facial asymmetry, moving all extremities, engages in simple conversation  Psychiatric:  Sleepy but arousable, oriented to person, place and time, no obvious anxiety or depression.       Medications       aspirin  81 mg Oral Daily     carvedilol  12.5 mg Oral BID w/meals     DULoxetine  30 mg Oral At Bedtime     ferrous sulfate  325 mg Oral Daily with breakfast     [Held by provider] furosemide  20 mg Oral Daily     gabapentin  600 mg Oral BID     insulin aspart  1-7 Units Subcutaneous TID AC     insulin aspart  1-5 Units Subcutaneous At Bedtime     lidocaine  1 patch Transdermal Q24H     lidocaine   Transdermal Q8H [facility]     [Held by provider] lisinopril  2.5 mg Oral Daily     simvastatin  20 mg Oral At Bedtime     sulfamethoxazole-trimethoprim  1 tablet Oral QPM      tamsulosin  0.8 mg Oral At Bedtime       Data   Recent Labs   Lab 08/29/22  1123 08/29/22  0624 08/29/22  0213 08/28/22  1206 08/28/22  0956 08/27/22  1222 08/27/22  0701 08/27/22  0052 08/26/22  2156   WBC  --   --   --   --   --   --  5.0  --  6.3   HGB  --   --   --   --   --   --  9.1*  --  8.9*   MCV  --   --   --   --   --   --  94  --  97   PLT  --   --   --   --   --   --  193  --  205   NA  --   --   --   --  139  --  136  --  136   POTASSIUM  --   --   --   --  4.2  --  3.9  --  4.5   CHLORIDE  --   --   --   --  106  --  104  --  103   CO2  --   --   --   --  27  --  26  --  27   BUN  --   --   --   --  35*  --  45*  --  48*   CR  --   --   --   --  1.21  --  1.43*  --  1.54*   ANIONGAP  --   --   --   --  6  --  6  --  6   ESAU  --   --   --   --  8.8  --  8.8  --  8.4*   * 137* 137*   < > 165*   < > 130*   < > 118*    < > = values in this interval not displayed.       Imaging  No results found for this or any previous visit (from the past 24 hour(s)).

## 2022-08-29 NOTE — PROGRESS NOTES
Care Management Follow Up    Length of Stay (days): 0    Expected Discharge Date: 08/30/2022     Concerns to be Addressed:       Patient plan of care discussed at interdisciplinary rounds: Yes    Anticipated Discharge Disposition:       Anticipated Discharge Services: None  Anticipated Discharge DME: None    Patient/family educated on Medicare website which has current facility and service quality ratings:    Education Provided on the Discharge Plan:    Patient/Family in Agreement with the Plan: yes    Referrals Placed by CM/SW:    Private pay costs discussed: Not applicable    Additional Information:  Patient accepted to [facility]. Writer spoke to patient who asked that writer meet with wife when she arrives.     Writer spoke to patient and wife at bedside regarding discharge. Wife is in agreement for [facility]. Writer confirmed that there is a $36/day private room fee that is waived for 14 days. Patient has used 20 Medicare days and wife is aware. Wife also confirmed a ride is needed for transport. Call to [facility] and ride set up for tomorrow at 1030 M [facility] wheelchair. Patient, wife and [facility] updated.      JUSTINO Harris

## 2022-08-30 VITALS
WEIGHT: 211.86 LBS | RESPIRATION RATE: 16 BRPM | HEART RATE: 64 BPM | BODY MASS INDEX: 32.11 KG/M2 | SYSTOLIC BLOOD PRESSURE: 131 MMHG | OXYGEN SATURATION: 94 % | DIASTOLIC BLOOD PRESSURE: 57 MMHG | TEMPERATURE: 97.5 F | HEIGHT: 68 IN

## 2022-08-30 LAB
ANION GAP SERPL CALCULATED.3IONS-SCNC: 4 MMOL/L (ref 3–14)
BUN SERPL-MCNC: 27 MG/DL (ref 7–30)
CALCIUM SERPL-MCNC: 8.8 MG/DL (ref 8.5–10.1)
CHLORIDE BLD-SCNC: 101 MMOL/L (ref 94–109)
CO2 SERPL-SCNC: 28 MMOL/L (ref 20–32)
CREAT SERPL-MCNC: 1.21 MG/DL (ref 0.66–1.25)
GFR SERPL CREATININE-BSD FRML MDRD: 58 ML/MIN/1.73M2
GLUCOSE BLD-MCNC: 174 MG/DL (ref 70–99)
GLUCOSE BLDC GLUCOMTR-MCNC: 147 MG/DL (ref 70–99)
GLUCOSE BLDC GLUCOMTR-MCNC: 156 MG/DL (ref 70–99)
POTASSIUM BLD-SCNC: 4.3 MMOL/L (ref 3.4–5.3)
SODIUM SERPL-SCNC: 133 MMOL/L (ref 133–144)

## 2022-08-30 PROCEDURE — 82962 GLUCOSE BLOOD TEST: CPT

## 2022-08-30 PROCEDURE — 250N000013 HC RX MED GY IP 250 OP 250 PS 637: Performed by: HOSPITALIST

## 2022-08-30 PROCEDURE — 80048 BASIC METABOLIC PNL TOTAL CA: CPT | Performed by: HOSPITALIST

## 2022-08-30 PROCEDURE — 36415 COLL VENOUS BLD VENIPUNCTURE: CPT | Performed by: HOSPITALIST

## 2022-08-30 PROCEDURE — 99217 PR OBSERVATION CARE DISCHARGE: CPT | Performed by: HOSPITALIST

## 2022-08-30 PROCEDURE — 96372 THER/PROPH/DIAG INJ SC/IM: CPT

## 2022-08-30 PROCEDURE — G0378 HOSPITAL OBSERVATION PER HR: HCPCS

## 2022-08-30 RX ORDER — LIDOCAINE 4 G/G
PATCH TOPICAL
Qty: 20 PATCH | Refills: 0 | DISCHARGE
Start: 2022-08-30

## 2022-08-30 RX ORDER — CYCLOBENZAPRINE HCL 5 MG
5 TABLET ORAL 2 TIMES DAILY PRN
DISCHARGE
Start: 2022-08-30 | End: 2022-09-06

## 2022-08-30 RX ORDER — CEFUROXIME AXETIL 500 MG/1
500 TABLET ORAL EVERY 12 HOURS
Refills: 0 | DISCHARGE
Start: 2022-08-30 | End: 2022-09-01

## 2022-08-30 RX ORDER — POLYETHYLENE GLYCOL 3350 17 G/17G
17 POWDER, FOR SOLUTION ORAL DAILY PRN
DISCHARGE
Start: 2022-08-30

## 2022-08-30 RX ADMIN — GABAPENTIN 800 MG: 800 TABLET, FILM COATED ORAL at 09:13

## 2022-08-30 RX ADMIN — INSULIN ASPART 1 UNITS: 100 INJECTION, SOLUTION INTRAVENOUS; SUBCUTANEOUS at 09:12

## 2022-08-30 RX ADMIN — CEFUROXIME AXETIL 500 MG: 500 TABLET ORAL at 09:13

## 2022-08-30 RX ADMIN — CARVEDILOL 12.5 MG: 12.5 TABLET, FILM COATED ORAL at 09:13

## 2022-08-30 RX ADMIN — ASPIRIN 81 MG: 81 TABLET, COATED ORAL at 09:12

## 2022-08-30 RX ADMIN — ACETAMINOPHEN 975 MG: 325 TABLET ORAL at 05:42

## 2022-08-30 RX ADMIN — FERROUS SULFATE TAB 325 MG (65 MG ELEMENTAL FE) 325 MG: 325 (65 FE) TAB at 09:12

## 2022-08-30 ASSESSMENT — ACTIVITIES OF DAILY LIVING (ADL)
ADLS_ACUITY_SCORE: 42

## 2022-08-30 NOTE — PROGRESS NOTES
Care Management Discharge Note    Discharge Date: 08/30/2022     Discharge Disposition:  [facility] TCU  Discharge Services: None    Discharge DME: None    Discharge Transportation: agency    Private pay costs discussed: private room/amenity fees and transportation costs    PAS Confirmation Code:  [id number]  Patient/family educated on Medicare website which has current facility and service quality ratings:      Education Provided on the Discharge Plan:    Persons Notified of Discharge Plans: Hospitalist, Charge, Huc    Patient/Family in Agreement with the Plan: yes    Handoff Referral Completed: Yes    Additional Information:  Patient sent to discharge today 8/30 at 1030 to [facility] TCU via  w/c transportation.    D: Per DHS regulation, SW completed and submitted PAS-RR to [place] [employer] Direct Connect via the Senior [employer] Line.  PAS-RR confirmation # is : [id number]    I: SW spoke with pt and they are aware a PAS-RR has been submitted.  SW reviewed with pt that they may be contacted for a follow up appointment within 10 days of hospital discharge if their SNF stay is < 30 days.  Contact information for [employer] was also provided.    A: pt verbalized understanding.    P: Further questions may be directed to [employer] Line at #[phone], option #4 for PAS-RR staff.    Chante Shepherd, MSW, LGSW

## 2022-08-30 NOTE — DISCHARGE SUMMARY
Discharge Summary  Hospitalist    Date of Admission:  8/26/2022  Date of Discharge:  8/30/2022  Discharging Provider: Marlena Moore MD, MD  Date of Service (when I saw the patient): 08/30/22    Discharge Diagnoses   Fall  Fracture of right 11th rib, acute versus subacute  Fracture of right posterior third rib, healing versus healed    History of Present Illness   Please refer H & P for details.      Hospital Course   Mark Hernández is a 88 year old male who presents with fall while riding on the seat of a walker with his wife pushing.     Fall  Fracture of right 11th rib, acute versus subacute  Fracture of right posterior third rib, healing versus healed  Just discharged from TCU on 8/25, out with his wife shopping on 8/26. He was moving too slow, so she urged him to sit in the seat of the walker and she would push him. They got to a hill and he fell off. All imaging (See below) without acute fracture other than right 11th rib which could be healing vs acute.  - encourage IS  - PT/GLORIA herrera, he had been discharged from TCU with home cares.  Currently recommendation is again discharge to TCU  - pain control with scheduled tylenol, as needed oxycodone, flexeril PRN  -Is also on scheduled Cymbalta and gabapentin  - lidocaine patches  -Patient is discharged to TCU in stable condition on 8/30/2022     CURTIS on Chronic kidney disease stage III, improving  E. coli UTI  Baseline 1.1-1.2. Cr on admit is 1.54  - hold PTA lasix. Lisinopril for now.  Creatinine improved to 1.2  -Monitor BMP.  Lisinopril and Lasix resumed at discharge.  -UA appears infected with greater than 182 WBCs, positive nitrites.  Urine culture growing E. coli that is resistant to the chronic Bactrim patient is on.  We will treat with 3-day course of cefuroxime.  Not really symptomatic from UTI but given the hip hardware, high risk for infectious complications, have opted to treat.     S/p Left hip IM Nail on 7/11/2022.  Acute traumatic displaced  left intertrochanteric hip fracture .  Had follow up on 8/26 AM, patient reports everything looked good  - continue ASA 81mg daily (completed 6 weeks of BID dosing)     Delirium and question of mild cognitive impairment during July 2022 admit  - Monitor, provide frequent reorientation     Acute on chronic anemia with blood loss, improving  Chronic Iron deficiency anemia: History of vitamin B12 deficiency   Baseline hgb 11. Hgb down to 8.2 post op. Up to 8.9 on 8/26  - resume iron supplement on discharge  - ASA 81mg daily going forward    History of congestive heart failure.  Complete heart block status post pacemaker 2016.  Hypertension.  Hyperlipidemia.  History of chronic lower extremity edema.  Echo 2020 showed preserved EF of 60%.    - continue ASA, coreg, statin  - hold lisinopril and lasix given CURTIS, resumed at discharge     History of peripheral neuropathy.  Continue PTA gabapentin BID, increase back to PTA dose of 800 mg twice daily with improved renal function  Continue PTA Cymbalta     Diabetes mellitus   - Medium resistance sliding scale insulin  - ACHS BG checks     Benign prostatic hypertrophy    History of bladder and prostate cancer.  Status post multiple TURPs, right ureterectomy and nephrectomy  -Continue with Flomax.     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  - Continue PTA Bactrim for chronic suppression     Obesity with a BMI of 31.78  Consider lifestyle modification with diet and exercise as able to.  Consider sleep studies as outpatient.      Marlena Moore MD, MD      Pending Results   These results will be followed up by Hospitalist team.  Unresulted Labs Ordered in the Past 30 Days of this Admission     No orders found from 7/27/2022 to 8/27/2022.          Code Status   Full Code       Primary Care Physician   [facility]    Follow-ups Needed After Discharge   Follow-up Appointments     Follow Up and recommended labs and  tests      Follow up with Nursing home physician.  No follow up labs or test are   needed.             Physical Exam   Temp: 97.5  F (36.4  C) Temp src: Oral BP: 131/57 Pulse: 64   Resp: 16 SpO2: 94 % O2 Device: None (Room air)    Vitals:    08/27/22 0613 08/29/22 0211 08/30/22 0621   Weight: 95.6 kg (210 lb 12.2 oz) 99.4 kg (219 lb 2.2 oz) 96.1 kg (211 lb 13.8 oz)     Vital Signs with Ranges  Temp:  [97.5  F (36.4  C)-98.2  F (36.8  C)] 97.5  F (36.4  C)  Pulse:  [63-67] 64  Resp:  [16-18] 16  BP: (110-131)/(45-65) 131/57  SpO2:  [93 %-97 %] 94 %  I/O last 3 completed shifts:  In: 950 [P.O.:950]  Out: 1100 [Urine:1100]    Constitutional: Alert, cooperative, no apparent distress, has back pain with movement  Respiratory: Non labored breathing, clear to auscultation bilaterally, no crackles or wheezing  Cardiovascular: Regular rate and rhythm, no murmurs, no edema  GI: Normal bowel sounds, soft, non-distended, non-tender  Skin: No obvious rash  Neuro: Alert, engages in appropriate conversation, fluent speech, moving all extremities, no facial asymmetry  Psych: Calm and pleasant, no obvious anxiety/ depression      Discharge Disposition   Discharged to short-term care facility  Condition at discharge: Stable    Consultations This Hospital Stay   CARE MANAGEMENT / SOCIAL WORK IP CONSULT  PHYSICAL THERAPY ADULT IP CONSULT  PHYSICAL THERAPY ADULT IP CONSULT  OCCUPATIONAL THERAPY ADULT IP CONSULT    Time Spent on this Encounter   IMarlena MD, personally saw the patient today and spent greater than 30 minutes discharging this patient.    Discharge Orders      General info for SNF    Length of Stay Estimate: Short Term Care: Estimated # of Days <30  Condition at Discharge: Stable  Level of care:skilled   Rehabilitation Potential: Good  Admission H&P remains valid and up-to-date: Yes  Recent Chemotherapy: N/A  Use Nursing Home Standing Orders: Yes     Mantoux instructions    Give two-step Mantoux (PPD) Per Facility  Policy Yes     Follow Up and recommended labs and tests    Follow up with Nursing home physician.  No follow up labs or test are needed.     Reason for your hospital stay    Rib fracture, fall     Activity - Up with nursing assistance     Physical Therapy Adult Consult    Evaluate and treat as clinically indicated.    Reason:  weakness     Occupational Therapy Adult Consult    Evaluate and treat as clinically indicated.    Reason:  weakness     Fall precautions     Diet    Follow this diet upon discharge: Orders Placed This Encounter      Combination Diet Low Saturated Fat Na <2400mg Diet     Discharge Medications   Discharge Medication List as of 8/30/2022 10:01 AM      START taking these medications    Details   cefuroxime (CEFTIN) 500 MG tablet Take 1 tablet (500 mg) by mouth every 12 hours for 2 days, R-0, Transitional      cyclobenzaprine (FLEXERIL) 5 MG tablet Take 1 tablet (5 mg) by mouth 2 times daily as needed for muscle spasms, Transitional      Lidocaine (LIDOCARE) 4 % Patch To prevent lidocaine toxicity, patient should be patch free for 12 hrs daily.  Apply to areas of back/ chest wall that hurtDisp-20 patch, R-0Transitional      polyethylene glycol (MIRALAX) 17 g packet Take 17 g by mouth daily as needed for constipation, Transitional         CONTINUE these medications which have CHANGED    Details   aspirin (ASA) 81 MG EC tablet Take 1 tablet (81 mg) by mouth daily, Transitional      oxyCODONE (ROXICODONE) 5 MG tablet Take 0.5 tablets (2.5 mg) by mouth every 4 hours as needed for moderate to severe pain, Disp-10 tablet, R-0, Local Print         CONTINUE these medications which have NOT CHANGED    Details   carvedilol (COREG) 12.5 MG tablet Take 12.5 mg by mouth 2 times daily (with meals), Historical      Coenzyme Q10 (COQ-10 PO) Take 1 tablet by mouth daily, Historical      DULoxetine (CYMBALTA) 30 MG capsule Take 30 mg by mouth At Bedtime Takes for neuropathy, Historical      ferrous sulfate (FEROSUL)  325 (65 Fe) MG tablet Take 325 mg by mouth daily (with breakfast), Historical      furosemide (LASIX) 20 MG tablet Take 20 mg by mouth daily, Historical      gabapentin (NEURONTIN) 800 MG tablet Take 800 mg by mouth 2 times daily, Historical      lisinopril (ZESTRIL) 2.5 MG tablet Take 2.5 mg by mouth daily , Historical      metFORMIN (GLUCOPHAGE) 500 MG tablet Take 250 mg by mouth 2 times daily (with meals) (takes 0.5 x 500mg), Historical      Omega-3 Fatty Acids (OMEGA 3 PO) Take 1 teaspoonful by mouth daily, Historical      PREBIOTIC PRODUCT PO Take 1 Scoop by mouth daily, Historical      Probiotic Product (PROBIOTIC PO) Take 1 capsule by mouth daily, Historical      simvastatin (ZOCOR) 40 MG tablet Take 20 mg by mouth At Bedtime (Takes 1/2 of a 40mg tablet), Historical      sulfamethoxazole-trimethoprim (BACTRIM) 400-80 MG tablet Take 1 tablet by mouth daily, Historical      tamsulosin (FLOMAX) 0.4 MG 24 hr capsule Take 0.8 mg by mouth At Bedtime (takes 2 x 0.4mg), Historical      vitamin B-12 (CYANOCOBALAMIN) 1000 MCG tablet Take 1,000 mcg by mouth every other day , Historical      VITAMIN D PO Take 1 tablet by mouth daily, Historical      acetaminophen (TYLENOL) 325 MG tablet Take 2 tablets (650 mg) by mouth every 4 hours as needed for mild pain, Disp-100 tablet, Transitional      bisacodyl (DULCOLAX) 10 MG suppository Place 1 suppository (10 mg) rectally daily as needed for constipation, Transitional      senna-docusate (SENOKOT-S/PERICOLACE) 8.6-50 MG tablet Take 1-2 tablets by mouth 2 times daily as needed for constipation While on narcotics. Hold for loose stools., Transitional           Allergies   No Known Allergies  Data   Most Recent 3 CBC's:  Recent Labs   Lab Test 08/27/22  0701 08/26/22  2156 07/14/22  0825 07/13/22  1006   WBC 5.0 6.3  --  9.2   HGB 9.1* 8.9* 8.2* 8.8*   MCV 94 97  --  98    205  --  139*      Most Recent 3 BMP's:  Recent Labs   Lab Test 08/30/22  0938 08/30/22  0903  08/30/22  0201 08/28/22  1206 08/28/22  0956 08/27/22  1222 08/27/22  0701     --   --   --  139  --  136   POTASSIUM 4.3  --   --   --  4.2  --  3.9   CHLORIDE 101  --   --   --  106  --  104   CO2 28  --   --   --  27  --  26   BUN 27  --   --   --  35*  --  45*   CR 1.21  --   --   --  1.21  --  1.43*   ANIONGAP 4  --   --   --  6  --  6   ESAU 8.8  --   --   --  8.8  --  8.8   * 147* 156*   < > 165*   < > 130*    < > = values in this interval not displayed.     Most Recent 2 LFT's:  Recent Labs   Lab Test 07/13/22  0938 07/10/22  2121   AST 16 9   ALT 10 25   ALKPHOS 58 125   BILITOTAL 0.9 0.3     Most Recent INR's and Anticoagulation Dosing History:  Anticoagulation Dose History     Recent Dosing and Labs Latest Ref Rng & Units 12/27/2017    INR 0.86 - 1.14 1.11        Most Recent 3 Troponin's:No lab results found.  Most Recent Cholesterol Panel:No lab results found.  Most Recent 6 Bacteria Isolates From Any Culture (See EPIC Reports for Culture Details):  Recent Labs   Lab Test 05/28/20  1254 05/28/20  1250 05/27/20  1826 05/27/20  1821 05/27/20  1740 05/14/20  1042   CULT No anaerobes isolated  No growth No anaerobes isolated  No growth No growth No growth <10,000 colonies/mL  urogenital abundio  Susceptibility testing not routinely done   No growth     Most Recent TSH, T4 and A1c Labs:  Recent Labs   Lab Test 08/26/22  2156   A1C 6.1*       Results for orders placed or performed during the hospital encounter of 08/26/22   CT Head w/o Contrast    Narrative    EXAM: CT HEAD W/O CONTRAST  LOCATION: [facility]  DATE/TIME: 8/26/2022 5:12 PM    INDICATION: fall, head injury  COMPARISON: CT head 09/02/2021.  TECHNIQUE: Routine CT Head without IV contrast. Multiplanar reformats. Dose reduction techniques were used.    FINDINGS:  INTRACRANIAL CONTENTS: No intracranial hemorrhage, extraaxial collection, or mass effect.  No CT evidence of acute infarct. Mild to moderate presumed  chronic small vessel ischemic changes. Mild to moderate generalized volume loss. No hydrocephalus.     VISUALIZED ORBITS/SINUSES/MASTOIDS: Prior right cataract surgery. Old fracture medial wall left orbit. Visualized portions of the orbits are otherwise unremarkable. No paranasal sinus mucosal disease. No middle ear or mastoid effusion.    BONES/SOFT TISSUES: No acute abnormality.      Impression    IMPRESSION:  1.  No CT evidence for acute intracranial process.  2.  Brain atrophy and presumed chronic microvascular ischemic changes as above.   Cervical spine CT w/o contrast    Narrative    EXAM: CT CERVICAL SPINE W/O CONTRAST  LOCATION: [facility]  DATE/TIME: 8/26/2022 5:13 PM    INDICATION: Fall, pain.  COMPARISON: None.  TECHNIQUE: Routine CT Cervical Spine without IV contrast. Multiplanar reformats. Dose reduction techniques were used.    FINDINGS:  VERTEBRA: No acute fracture identified. Small chronic-appearing Schmorl's nodes in the superior and inferior endplates of C6. Otherwise, normal vertebral body heights. Anterolisthesis of C2 on C3 measuring approximately 2 mm. Retrolisthesis of C4 on C5   measuring approximately 1-2 mm. Retrolisthesis of C5 on C6 measuring 1-2 mm. Anterolisthesis of C6 on C7 measuring 2-3 mm. Anterolisthesis of C7 on T1 measuring approximately 2-3 mm. Mild broad levoconvex curvature of the cervical spine. There is   rotation of C1 on C2 with relative posterior subluxation of the right C1 lateral mass with respect to the right C2 lateral mass and minimal anterior subluxation of the left C1 lateral mass with respect to the left C2 lateral mass. This could be   positional.    CANAL/FORAMINA: Multilevel degenerative disc disease, including severe disc height loss at C4-C5, C5-C6 and C6-C7 and moderate to severe disc height loss at C3-C4 and C7-T1 and moderate disc height loss at C2-C3. Marginal multilevel endplate osteophytes   and uncovertebral osteophytes.  Moderate to severe multilevel degenerative facet disease present. Degenerative arthropathy at the median atlantoaxial joint. Small foci of calcification/ossification inferior to the anterior arch of C1 in the predental   space, potentially degenerative in nature. Subcortical cysts in the inferior aspect of the odontoid process, nonspecific, but potentially degenerative in nature. Multilevel disc osteophyte complexes contributing to mild/moderate multilevel spinal canal   stenosis. No definite high-grade spinal canal narrowing identified. Moderate to severe right C4-C5 neural foraminal stenosis. Moderate to severe left C5-C6 neural foraminal stenosis. There is at least moderate right C3-C4 neural foraminal stenosis.   Relatively lesser degrees of neural foraminal narrowing seen elsewhere.    PARASPINAL: Partially visualized cardiac pacing wires. Right greater than left carotid bifurcation atherosclerotic calcifications. The visualized paraspinous soft tissues otherwise appear grossly unremarkable.      Impression    IMPRESSION:  1.  No acute fracture.  2.  Mild rotation of C1 on C2, which is nonspecific, but could be positional.  3.  Multilevel spondylolisthesis, as described. This is nonspecific, but may be degenerative in nature.  4.  Multilevel advanced degenerative changes of the cervical spine, as described.   Lumbar spine CT w/o contrast    Narrative    EXAM: CT LUMBAR SPINE W/O CONTRAST  LOCATION: [facility]  DATE/TIME: 8/26/2022 5:43 PM    INDICATION: Fall, trauma, pain.  COMPARISON: 08/18/2017.  TECHNIQUE: Routine CT Lumbar Spine without IV contrast. Multiplanar reformats. Dose reduction techniques were used.     FINDINGS:  NOMENCLATURE: Five lumbar-type vertebral bodies.    POSTOPERATIVE CHANGE: Posterior instrumented fusion at L4-S1 without evidence of hardware-related complication. Solid right-sided posterolateral fusion. Persistent lucency through the left L5 pars, though  slightly decreased in conspicuity. Laminectomies   at L4-L5 and L5-S1.    ALIGNMENT: Grade 1 anterolisthesis at L4-L5 and L5-S1. Mild retrolisthesis at each level between T12 and L4. Levoconvex curvature of approximately 20 degrees with apex at L2-L3.    BONES: Vertebral body heights are unremarkable. There is no evidence of acute displaced fractures. Defect in the right ilium is chronic and postoperative. The visualized portions of the sacrum and aidan appear otherwise intact. No destructive bony lesions   are apparent. Moderate multilevel facet arthropathy.    DISCS: Moderate to advanced multilevel spondylosis disc height loss and multilevel disc-osteophyte complex.    SPINAL CANAL: Moderate L3-L4 spinal canal stenosis.    NEURAL FORAMINA: Moderate bilateral L5-S1 and right L2-L3 neural foraminal stenosis. Severe left L3-L4 and right L1-L2 neural foraminal stenosis.    PARASPINAL SOFT TISSUES: Fatty atrophy of the paraspinal musculature.    ABDOMINAL CAVITY: No evidence of an acute abnormality within technique limitations.      Impression    IMPRESSION:  1.  No evidence of acute displaced fracture.  2.  No evidence of traumatic subluxation.  3.  Posterior instrumented fusion at L4-L5 without evidence of acute hardware-related complication. Redemonstrated defect in the left-sided bone graft at L5-S1.  4.  Multilevel degenerative change, including moderate L3-L4 spinal canal stenosis.   XR Shoulder Left G/E 3 Views    Narrative    EXAM: XR HUMERUS LEFT G/E 2 VIEWS, XR SHOULDER LEFT G/E 3 VIEWS  LOCATION: [facility]  DATE/TIME: 8/26/2022 4:43 PM    INDICATION: Fall, pain.  COMPARISON: None.      Impression    IMPRESSION: Degenerative change at the left glenohumeral joint. Complete effacement of the subacromial space worrisome for high-grade rotator cuff tearing. Mild diastasis of the AC joint. No evidence for acute fracture. There are calcifications adjacent   to the greater tuberosity which  could represent calcific tendinitis. The humerus is otherwise negative. Olecranon spurring at the elbow joint.   Humerus XR,  G/E 2 views, left    Narrative    EXAM: XR HUMERUS LEFT G/E 2 VIEWS, XR SHOULDER LEFT G/E 3 VIEWS  LOCATION: [facility]  DATE/TIME: 8/26/2022 4:43 PM    INDICATION: Fall, pain.  COMPARISON: None.      Impression    IMPRESSION: Degenerative change at the left glenohumeral joint. Complete effacement of the subacromial space worrisome for high-grade rotator cuff tearing. Mild diastasis of the AC joint. No evidence for acute fracture. There are calcifications adjacent   to the greater tuberosity which could represent calcific tendinitis. The humerus is otherwise negative. Olecranon spurring at the elbow joint.   CT Chest w/o Contrast    Narrative    EXAM: CT CHEST W/O CONTRAST  LOCATION: [facility]  DATE/TIME: 8/26/2022 5:43 PM    INDICATION: fall, pain  COMPARISON: None.  TECHNIQUE: CT chest without IV contrast. Multiplanar reformats were obtained. Dose reduction techniques were used.  CONTRAST: None.    FINDINGS:   LUNGS AND PLEURA: No pneumothorax.  Emphysema is present.    MEDIASTINUM/AXILLAE: Left chest wall biventricular pacemaker.     CORONARY ARTERY CALCIFICATION: Severe.    UPPER ABDOMEN: Cholelithiasis.  The right kidney is likely surgically absent. There is colonic diverticulosis in the field-of-view.    MUSCULOSKELETAL:  Likely healing posterior right 11th rib fracture. An acute fracture is possible, however. Healed or healing right posterior third rib fracture.  There are degenerative changes in the spine. Degenerative change in the left shoulder with   surrounding fluid.        Impression    IMPRESSION:   1.  Likely healing posterior right 11th rib fracture. An acute fracture is possible, however.   2.  Healed or healing right posterior third rib fracture.   3.  Degenerative changes in the spine.

## 2022-08-30 NOTE — PLAN OF CARE
Observation goals  PRIOR TO DISCHARGE    Comments: -diagnostic tests and consults completed and resulted: Met  -vital signs normal or at patient baseline: Met  -adequate pain control on oral analgesics: Met  -returns to baseline functional status: Not met, Assist of 2 gb walker  -safe disposition plan has been identified: Met discharge to tcu today at 1030  Nurse to notify provider when observation goals have been met and patient is ready for discharge.

## 2022-08-30 NOTE — PLAN OF CARE
Goal Outcome Evaluation:    Plan of Care Reviewed With: patient     Overall Patient Progress: improving     A&O, forgetful at times. VSS, RA. CMS intact. Denies pain. Voiding in urinal. Will discharge to TCU at 10:30 with HE transport.

## 2022-08-30 NOTE — PLAN OF CARE
Patient up with assist of 2 and walker.  Pain managed with scheduled Tylenol and lidocaine patch to mid back.  VSS on RA.  A/Ox4; forgetful at times.  Prior L hip IM nailing open to air; CDI.  Baseline neuropathy in BLE, unchanged.  Voiding adequately.  Good PO intake.  Blood sugars 140 and 152.  Plan to discharge to TCU tomorrow morning.

## 2022-09-01 ENCOUNTER — LAB REQUISITION (OUTPATIENT)
Dept: LAB | Facility: CLINIC | Age: 87
End: 2022-09-01
Payer: MEDICARE

## 2022-09-01 DIAGNOSIS — D64.9 ANEMIA, UNSPECIFIED: ICD-10-CM

## 2022-09-06 LAB
ANION GAP SERPL CALCULATED.3IONS-SCNC: 11 MMOL/L (ref 7–15)
BUN SERPL-MCNC: 33 MG/DL (ref 8–23)
CALCIUM SERPL-MCNC: 9.4 MG/DL (ref 8.8–10.2)
CHLORIDE SERPL-SCNC: 98 MMOL/L (ref 98–107)
CREAT SERPL-MCNC: 1.33 MG/DL (ref 0.67–1.17)
DEPRECATED HCO3 PLAS-SCNC: 29 MMOL/L (ref 22–29)
GFR SERPL CREATININE-BSD FRML MDRD: 51 ML/MIN/1.73M2
GLUCOSE SERPL-MCNC: 148 MG/DL (ref 70–99)
HGB BLD-MCNC: 10.1 G/DL (ref 13.3–17.7)
POTASSIUM SERPL-SCNC: 4 MMOL/L (ref 3.4–5.3)
SODIUM SERPL-SCNC: 138 MMOL/L (ref 136–145)

## 2022-09-06 PROCEDURE — P9604 ONE-WAY ALLOW PRORATED TRIP: HCPCS | Performed by: INTERNAL MEDICINE

## 2022-09-06 PROCEDURE — 36415 COLL VENOUS BLD VENIPUNCTURE: CPT | Performed by: INTERNAL MEDICINE

## 2022-09-06 PROCEDURE — 85018 HEMOGLOBIN: CPT | Performed by: INTERNAL MEDICINE

## 2022-09-06 PROCEDURE — 82310 ASSAY OF CALCIUM: CPT | Performed by: INTERNAL MEDICINE

## 2022-12-26 ENCOUNTER — LAB REQUISITION (OUTPATIENT)
Dept: LAB | Facility: CLINIC | Age: 87
End: 2022-12-26
Payer: MEDICARE

## 2022-12-26 DIAGNOSIS — N18.9 CHRONIC KIDNEY DISEASE, UNSPECIFIED: ICD-10-CM

## 2022-12-26 DIAGNOSIS — D64.9 ANEMIA, UNSPECIFIED: ICD-10-CM

## 2022-12-26 DIAGNOSIS — R97.0 ELEVATED CARCINOEMBRYONIC ANTIGEN (CEA): ICD-10-CM

## 2022-12-27 LAB
ANION GAP SERPL CALCULATED.3IONS-SCNC: 13 MMOL/L (ref 7–15)
BASOPHILS # BLD AUTO: 0 10E3/UL (ref 0–0.2)
BASOPHILS NFR BLD AUTO: 1 %
BUN SERPL-MCNC: 18.1 MG/DL (ref 8–23)
CALCIUM SERPL-MCNC: 9.2 MG/DL (ref 8.8–10.2)
CHLORIDE SERPL-SCNC: 98 MMOL/L (ref 98–107)
CREAT SERPL-MCNC: 1.05 MG/DL (ref 0.67–1.17)
DEPRECATED HCO3 PLAS-SCNC: 26 MMOL/L (ref 22–29)
EOSINOPHIL # BLD AUTO: 0.3 10E3/UL (ref 0–0.7)
EOSINOPHIL NFR BLD AUTO: 5 %
ERYTHROCYTE [DISTWIDTH] IN BLOOD BY AUTOMATED COUNT: 15 % (ref 10–15)
GFR SERPL CREATININE-BSD FRML MDRD: 68 ML/MIN/1.73M2
GLUCOSE SERPL-MCNC: 151 MG/DL (ref 70–99)
HBA1C MFR BLD: 7 %
HCT VFR BLD AUTO: 38.4 % (ref 40–53)
HGB BLD-MCNC: 12.2 G/DL (ref 13.3–17.7)
IMM GRANULOCYTES # BLD: 0 10E3/UL
IMM GRANULOCYTES NFR BLD: 1 %
LYMPHOCYTES # BLD AUTO: 1.2 10E3/UL (ref 0.8–5.3)
LYMPHOCYTES NFR BLD AUTO: 20 %
MCH RBC QN AUTO: 29.4 PG (ref 26.5–33)
MCHC RBC AUTO-ENTMCNC: 31.8 G/DL (ref 31.5–36.5)
MCV RBC AUTO: 93 FL (ref 78–100)
MONOCYTES # BLD AUTO: 0.5 10E3/UL (ref 0–1.3)
MONOCYTES NFR BLD AUTO: 9 %
NEUTROPHILS # BLD AUTO: 3.8 10E3/UL (ref 1.6–8.3)
NEUTROPHILS NFR BLD AUTO: 64 %
NRBC # BLD AUTO: 0 10E3/UL
NRBC BLD AUTO-RTO: 0 /100
PLATELET # BLD AUTO: 246 10E3/UL (ref 150–450)
POTASSIUM SERPL-SCNC: 4 MMOL/L (ref 3.4–5.3)
RBC # BLD AUTO: 4.15 10E6/UL (ref 4.4–5.9)
SODIUM SERPL-SCNC: 137 MMOL/L (ref 136–145)
WBC # BLD AUTO: 5.9 10E3/UL (ref 4–11)

## 2022-12-27 PROCEDURE — 80048 BASIC METABOLIC PNL TOTAL CA: CPT | Mod: ORL | Performed by: NURSE PRACTITIONER

## 2022-12-27 PROCEDURE — P9604 ONE-WAY ALLOW PRORATED TRIP: HCPCS | Mod: ORL | Performed by: NURSE PRACTITIONER

## 2022-12-27 PROCEDURE — 36415 COLL VENOUS BLD VENIPUNCTURE: CPT | Mod: ORL | Performed by: NURSE PRACTITIONER

## 2022-12-27 PROCEDURE — 85025 COMPLETE CBC W/AUTO DIFF WBC: CPT | Mod: ORL | Performed by: NURSE PRACTITIONER

## 2022-12-27 PROCEDURE — 83036 HEMOGLOBIN GLYCOSYLATED A1C: CPT | Mod: ORL | Performed by: NURSE PRACTITIONER

## 2023-02-12 ENCOUNTER — APPOINTMENT (OUTPATIENT)
Dept: CT IMAGING | Facility: CLINIC | Age: 88
End: 2023-02-12
Attending: EMERGENCY MEDICINE
Payer: MEDICARE

## 2023-02-12 ENCOUNTER — HOSPITAL ENCOUNTER (EMERGENCY)
Facility: CLINIC | Age: 88
Discharge: HOME | End: 2023-02-12
Attending: EMERGENCY MEDICINE | Admitting: EMERGENCY MEDICINE
Payer: MEDICARE

## 2023-02-12 VITALS
TEMPERATURE: 97.5 F | OXYGEN SATURATION: 97 % | HEART RATE: 63 BPM | RESPIRATION RATE: 18 BRPM | BODY MASS INDEX: 31.78 KG/M2 | WEIGHT: 209 LBS | SYSTOLIC BLOOD PRESSURE: 168 MMHG | DIASTOLIC BLOOD PRESSURE: 86 MMHG

## 2023-02-12 DIAGNOSIS — S09.90XA CLOSED HEAD INJURY, INITIAL ENCOUNTER: ICD-10-CM

## 2023-02-12 DIAGNOSIS — S00.01XA ABRASION OF SCALP, INITIAL ENCOUNTER: ICD-10-CM

## 2023-02-12 PROCEDURE — G1010 CDSM STANSON: HCPCS

## 2023-02-12 PROCEDURE — 99284 EMERGENCY DEPT VISIT MOD MDM: CPT | Mod: 25

## 2023-02-12 ASSESSMENT — ENCOUNTER SYMPTOMS
ABDOMINAL PAIN: 0
NECK PAIN: 0
BACK PAIN: 0
MYALGIAS: 0
HEADACHES: 0

## 2023-02-12 ASSESSMENT — ACTIVITIES OF DAILY LIVING (ADL): ADLS_ACUITY_SCORE: 35

## 2023-02-12 NOTE — ED TRIAGE NOTES
Patient slipped on the ice, fell and hit the back of his head. Patient does not have any complaints of pain at this time, did not lose consciousness, and is not on blood thinners.      Triage Assessment     Row Name 02/12/23 1150       Triage Assessment (Adult)    Airway WDL WDL       Respiratory WDL    Respiratory WDL WDL       Skin Circulation/Temperature WDL    Skin Circulation/Temperature WDL WDL       Cardiac WDL    Cardiac WDL WDL       Peripheral/Neurovascular WDL    Peripheral Neurovascular WDL WDL       Cognitive/Neuro/Behavioral WDL    Cognitive/Neuro/Behavioral WDL WDL

## 2023-02-12 NOTE — ED PROVIDER NOTES
History     Chief Complaint:  Fall       HPI   Mark Hernández is a 89 year old male with a history of type II diabetes mellitus, hypertension, hyperlipidemia, and CHF who presents after a fall. The patient reports falling around 1100 at [employer]. He is not sure how he fell, but his wife states that he was zipping up his jacket, and likely lost balance because he was not holding onto his walker. She did not see him fall, but when she turned around he was awake on the ground. He hit the back of his head on the floor, and there is an abrasion to the back of his head. He was able to walk using his walker after the fall. No headache, neck pain, chest pain, abdominal pain, or new back pain. He has had falls in the past. No anticoagulation.    Independent Historian:   Wife, who reports that she heard him fall, and then saw him awake on the ground. They live at [facility] and can request a nurse to check on him periodically throughout the week.       ROS:  Review of Systems   Constitutional:        Mechanical fall   Cardiovascular: Negative for chest pain.   Gastrointestinal: Negative for abdominal pain.   Musculoskeletal: Negative for back pain, myalgias and neck pain.   Neurological: Negative for syncope and headaches.   All other systems reviewed and are negative.      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  Prostate cancer  Peripheral neuropathy  Acquired hydrocele  Type II diabetes mellitus   Thrombocytopenia  Dupuytren's contracture of left hand  Closed intertrochanteric fracture of left hip  Infection of right prosthetic hip  joint    Past Surgical History:    Left knee surgery  Tonsillectomy  Right rotator cuff repair  Bladder tumor excision x3  Pacemaker placement  Right hip arthroplasty with two revisions  Nephrectomy   Right hip I&D  Left hip ORIF  Carpal tunnel release  Hydrocele excision  Left ear Mohs surgery     Family History:   Father- MI  Mother- schizophrenia, stroke  Sister- MI, rheumatoid arthritis    Social History:  The patient presents via private vehicle  Wife at bedside  Lives at [facility]    Physical Exam     Patient Vitals for the past 24 hrs:   BP Temp Temp src Pulse Resp SpO2 Weight   02/12/23 1220 (!) 148/70 97.5  F (36.4  C) -- 62 18 97 % --   02/12/23 1152 (!) 141/68 97.5  F (36.4  C) Temporal 69 16 98 % 94.8 kg (209 lb)        Physical Exam  VS: Reviewed per above  HENT: Mucous membranes moist, no nuchal rigidity. Superior occipital scalp abrasion.  EYES: sclera anicteric  CV: Rate as noted, regular rhythm.   RESP: Effort normal. Breath sounds are normal bilaterally.  NEURO: GCS 15, cranial nerves II through XII are intact, 5 out of 5 strength in all 4 extremities, sensation is intact light touch in all 4 extremities  MSK: No deformity of the extremities.  No midline C/T/L-spine tenderness.  No pain with passive range of motion of the joints of all 4 extremities.  No chest wall tenderness.  SKIN: Warm and dry      Emergency Department Course     Imaging:  Cervical spine CT w/o contrast   Final Result   IMPRESSION:   HEAD CT:   1.  No intracranial hemorrhage, mass lesions, hydrocephalus or CT evidence for an acute infarct.   2.  Mild diffuse cerebral parenchymal volume loss. Presumed chronic hypertensive/microvascular ischemic white matter changes.      CERVICAL SPINE CT:   1.  No acute/subacute CT evidence for acute fracture or post traumatic subluxation.   2.  Chronic posterior healing right second rib fracture.       Head CT w/o contrast   Final Result   IMPRESSION:   HEAD CT:   1.  No intracranial  hemorrhage, mass lesions, hydrocephalus or CT evidence for an acute infarct.   2.  Mild diffuse cerebral parenchymal volume loss. Presumed chronic hypertensive/microvascular ischemic white matter changes.      CERVICAL SPINE CT:   1.  No acute/subacute CT evidence for acute fracture or post traumatic subluxation.   2.  Chronic posterior healing right second rib fracture.          Report per radiology    Emergency Department Course & Assessments:      Assessments:  1206 I obtained history and examined the patient as noted above.   1325 I rechecked the patient. Imaging results explained and discharge discussed.     Disposition:  The patient was discharged to home.     Impression & Plan      Medical Decision Making:  Patient presents to the ER for evaluation of injury sustained after mechanical fall earlier today.  Vital signs reassuring.  He does have abrasion to the occipital scalp with no other apparent injuries on thorough exam.  CT imaging of the head and neck is unremarkable.  Patient is neurologically intact.  I believe he is safe for discharge at this juncture.  Return precautions discussed prior to discharge.    Diagnosis:    ICD-10-CM    1. Closed head injury, initial encounter  S09.90XA       2. Abrasion of scalp, initial encounter  S00.01XA            Discharge Medications:  New Prescriptions    No medications on file      Scribe Disclosure:  I, Nichole Huff, am serving as a scribe at 12:06 PM on 2/12/2023 to document services personally performed by Sridhar Alonzo MD based on my observations and the provider's statements to me.      2/12/2023   Sridhar Alonzo MD Lindenbaum, Elan, MD  02/12/23 [id number]

## 2023-07-10 ENCOUNTER — LAB REQUISITION (OUTPATIENT)
Dept: LAB | Facility: CLINIC | Age: 88
End: 2023-07-10
Payer: MEDICARE

## 2023-07-10 DIAGNOSIS — D64.9 ANEMIA, UNSPECIFIED: ICD-10-CM

## 2023-07-10 DIAGNOSIS — E11.00 TYPE 2 DIABETES MELLITUS WITH HYPEROSMOLARITY WITHOUT NONKETOTIC HYPERGLYCEMIC-HYPEROSMOLAR COMA (NKHHC) (H): ICD-10-CM

## 2023-07-11 LAB
ANION GAP SERPL CALCULATED.3IONS-SCNC: 15 MMOL/L (ref 7–15)
BUN SERPL-MCNC: 20.4 MG/DL (ref 8–23)
CALCIUM SERPL-MCNC: 9.1 MG/DL (ref 8.8–10.2)
CHLORIDE SERPL-SCNC: 96 MMOL/L (ref 98–107)
CREAT SERPL-MCNC: 1.08 MG/DL (ref 0.67–1.17)
DEPRECATED HCO3 PLAS-SCNC: 26 MMOL/L (ref 22–29)
GFR SERPL CREATININE-BSD FRML MDRD: 66 ML/MIN/1.73M2
GLUCOSE SERPL-MCNC: 127 MG/DL (ref 70–99)
HBA1C MFR BLD: 6.7 %
HGB BLD-MCNC: 12.6 G/DL (ref 13.3–17.7)
POTASSIUM SERPL-SCNC: 4.2 MMOL/L (ref 3.4–5.3)
SODIUM SERPL-SCNC: 137 MMOL/L (ref 136–145)

## 2023-07-11 PROCEDURE — 83036 HEMOGLOBIN GLYCOSYLATED A1C: CPT | Mod: ORL | Performed by: NURSE PRACTITIONER

## 2023-07-11 PROCEDURE — 85018 HEMOGLOBIN: CPT | Mod: ORL | Performed by: NURSE PRACTITIONER

## 2023-07-11 PROCEDURE — P9604 ONE-WAY ALLOW PRORATED TRIP: HCPCS | Mod: ORL | Performed by: NURSE PRACTITIONER

## 2023-07-11 PROCEDURE — 36415 COLL VENOUS BLD VENIPUNCTURE: CPT | Mod: ORL | Performed by: NURSE PRACTITIONER

## 2023-07-11 PROCEDURE — 80048 BASIC METABOLIC PNL TOTAL CA: CPT | Mod: ORL | Performed by: NURSE PRACTITIONER

## 2023-11-12 ENCOUNTER — LAB REQUISITION (OUTPATIENT)
Dept: LAB | Facility: CLINIC | Age: 88
End: 2023-11-12
Payer: MEDICARE

## 2023-11-12 DIAGNOSIS — D64.9 ANEMIA, UNSPECIFIED: ICD-10-CM

## 2023-11-12 DIAGNOSIS — C61 MALIGNANT NEOPLASM OF PROSTATE (H): ICD-10-CM

## 2023-11-12 DIAGNOSIS — I50.22 CHRONIC SYSTOLIC (CONGESTIVE) HEART FAILURE (H): ICD-10-CM

## 2023-11-14 LAB
ANION GAP SERPL CALCULATED.3IONS-SCNC: 12 MMOL/L (ref 7–15)
BUN SERPL-MCNC: 21.9 MG/DL (ref 8–23)
CALCIUM SERPL-MCNC: 9.3 MG/DL (ref 8.2–9.6)
CHLORIDE SERPL-SCNC: 96 MMOL/L (ref 98–107)
CREAT SERPL-MCNC: 1.17 MG/DL (ref 0.67–1.17)
DEPRECATED HCO3 PLAS-SCNC: 27 MMOL/L (ref 22–29)
EGFRCR SERPLBLD CKD-EPI 2021: 59 ML/MIN/1.73M2
ERYTHROCYTE [DISTWIDTH] IN BLOOD BY AUTOMATED COUNT: 13.4 % (ref 10–15)
GLUCOSE SERPL-MCNC: 137 MG/DL (ref 70–99)
HCT VFR BLD AUTO: 37.3 % (ref 40–53)
HGB BLD-MCNC: 12.4 G/DL (ref 13.3–17.7)
MCH RBC QN AUTO: 31.6 PG (ref 26.5–33)
MCHC RBC AUTO-ENTMCNC: 33.2 G/DL (ref 31.5–36.5)
MCV RBC AUTO: 95 FL (ref 78–100)
PHOSPHATE SERPL-MCNC: 3.4 MG/DL (ref 2.5–4.5)
PLATELET # BLD AUTO: 188 10E3/UL (ref 150–450)
POTASSIUM SERPL-SCNC: 4.4 MMOL/L (ref 3.4–5.3)
PSA SERPL DL<=0.01 NG/ML-MCNC: 0.06 NG/ML
RBC # BLD AUTO: 3.93 10E6/UL (ref 4.4–5.9)
SODIUM SERPL-SCNC: 135 MMOL/L (ref 135–145)
WBC # BLD AUTO: 6 10E3/UL (ref 4–11)

## 2023-11-14 PROCEDURE — 84153 ASSAY OF PSA TOTAL: CPT | Mod: ORL | Performed by: NURSE PRACTITIONER

## 2023-11-14 PROCEDURE — P9604 ONE-WAY ALLOW PRORATED TRIP: HCPCS | Mod: ORL | Performed by: NURSE PRACTITIONER

## 2023-11-14 PROCEDURE — 80048 BASIC METABOLIC PNL TOTAL CA: CPT | Mod: ORL | Performed by: NURSE PRACTITIONER

## 2023-11-14 PROCEDURE — 36415 COLL VENOUS BLD VENIPUNCTURE: CPT | Mod: ORL | Performed by: NURSE PRACTITIONER

## 2023-11-14 PROCEDURE — 85027 COMPLETE CBC AUTOMATED: CPT | Mod: ORL | Performed by: NURSE PRACTITIONER

## 2023-11-14 PROCEDURE — 84100 ASSAY OF PHOSPHORUS: CPT | Mod: ORL | Performed by: NURSE PRACTITIONER

## 2024-01-29 ENCOUNTER — LAB REQUISITION (OUTPATIENT)
Dept: LAB | Facility: CLINIC | Age: 89
End: 2024-01-29
Payer: MEDICARE

## 2024-01-29 DIAGNOSIS — I47.20 VENTRICULAR TACHYCARDIA, UNSPECIFIED (H): ICD-10-CM

## 2024-01-30 LAB
ANION GAP SERPL CALCULATED.3IONS-SCNC: 10 MMOL/L (ref 7–15)
BUN SERPL-MCNC: 21.5 MG/DL (ref 8–23)
CALCIUM SERPL-MCNC: 9 MG/DL (ref 8.2–9.6)
CHLORIDE SERPL-SCNC: 95 MMOL/L (ref 98–107)
CREAT SERPL-MCNC: 1.2 MG/DL (ref 0.67–1.17)
DEPRECATED HCO3 PLAS-SCNC: 29 MMOL/L (ref 22–29)
EGFRCR SERPLBLD CKD-EPI 2021: 57 ML/MIN/1.73M2
GLUCOSE SERPL-MCNC: 119 MG/DL (ref 70–99)
MAGNESIUM SERPL-MCNC: 2.2 MG/DL (ref 1.7–2.3)
POTASSIUM SERPL-SCNC: 4.2 MMOL/L (ref 3.4–5.3)
SODIUM SERPL-SCNC: 134 MMOL/L (ref 135–145)
TSH SERPL DL<=0.005 MIU/L-ACNC: 2.84 UIU/ML (ref 0.3–4.2)

## 2024-01-30 PROCEDURE — P9604 ONE-WAY ALLOW PRORATED TRIP: HCPCS | Mod: ORL | Performed by: NURSE PRACTITIONER

## 2024-01-30 PROCEDURE — 36415 COLL VENOUS BLD VENIPUNCTURE: CPT | Mod: ORL | Performed by: NURSE PRACTITIONER

## 2024-01-30 PROCEDURE — 83735 ASSAY OF MAGNESIUM: CPT | Mod: ORL | Performed by: NURSE PRACTITIONER

## 2024-01-30 PROCEDURE — 84443 ASSAY THYROID STIM HORMONE: CPT | Mod: ORL | Performed by: NURSE PRACTITIONER

## 2024-01-30 PROCEDURE — 80048 BASIC METABOLIC PNL TOTAL CA: CPT | Mod: ORL | Performed by: NURSE PRACTITIONER

## 2024-03-12 ENCOUNTER — LAB REQUISITION (OUTPATIENT)
Dept: LAB | Facility: CLINIC | Age: 89
End: 2024-03-12
Payer: MEDICARE

## 2024-03-12 DIAGNOSIS — D64.9 ANEMIA, UNSPECIFIED: ICD-10-CM

## 2024-03-13 ENCOUNTER — LAB REQUISITION (OUTPATIENT)
Dept: LAB | Facility: CLINIC | Age: 89
End: 2024-03-13
Payer: MEDICARE

## 2024-03-13 DIAGNOSIS — L03.115 CELLULITIS OF RIGHT LOWER LIMB: ICD-10-CM

## 2024-03-13 LAB
ERYTHROCYTE [DISTWIDTH] IN BLOOD BY AUTOMATED COUNT: 13.4 % (ref 10–15)
HCT VFR BLD AUTO: 29.5 % (ref 40–53)
HGB BLD-MCNC: 9.7 G/DL (ref 13.3–17.7)
MCH RBC QN AUTO: 31.6 PG (ref 26.5–33)
MCHC RBC AUTO-ENTMCNC: 32.9 G/DL (ref 31.5–36.5)
MCV RBC AUTO: 96 FL (ref 78–100)
PLATELET # BLD AUTO: 379 10E3/UL (ref 150–450)
RBC # BLD AUTO: 3.07 10E6/UL (ref 4.4–5.9)
WBC # BLD AUTO: 6.7 10E3/UL (ref 4–11)

## 2024-03-13 PROCEDURE — P9604 ONE-WAY ALLOW PRORATED TRIP: HCPCS | Performed by: NURSE PRACTITIONER

## 2024-03-13 PROCEDURE — 85027 COMPLETE CBC AUTOMATED: CPT | Performed by: NURSE PRACTITIONER

## 2024-03-13 PROCEDURE — 36415 COLL VENOUS BLD VENIPUNCTURE: CPT | Performed by: NURSE PRACTITIONER

## 2024-03-14 LAB
ERYTHROCYTE [DISTWIDTH] IN BLOOD BY AUTOMATED COUNT: 13.4 % (ref 10–15)
HCT VFR BLD AUTO: 32.2 % (ref 40–53)
HGB BLD-MCNC: 10.4 G/DL (ref 13.3–17.7)
MCH RBC QN AUTO: 31.4 PG (ref 26.5–33)
MCHC RBC AUTO-ENTMCNC: 32.3 G/DL (ref 31.5–36.5)
MCV RBC AUTO: 97 FL (ref 78–100)
PLATELET # BLD AUTO: 366 10E3/UL (ref 150–450)
RBC # BLD AUTO: 3.31 10E6/UL (ref 4.4–5.9)
WBC # BLD AUTO: 7 10E3/UL (ref 4–11)

## 2024-03-14 PROCEDURE — 86140 C-REACTIVE PROTEIN: CPT | Performed by: NURSE PRACTITIONER

## 2024-03-14 PROCEDURE — P9604 ONE-WAY ALLOW PRORATED TRIP: HCPCS | Performed by: NURSE PRACTITIONER

## 2024-03-14 PROCEDURE — 82565 ASSAY OF CREATININE: CPT | Performed by: NURSE PRACTITIONER

## 2024-03-14 PROCEDURE — 36415 COLL VENOUS BLD VENIPUNCTURE: CPT | Performed by: NURSE PRACTITIONER

## 2024-03-14 PROCEDURE — 84460 ALANINE AMINO (ALT) (SGPT): CPT | Performed by: NURSE PRACTITIONER

## 2024-03-14 PROCEDURE — 85014 HEMATOCRIT: CPT | Performed by: NURSE PRACTITIONER

## 2024-03-15 ENCOUNTER — LAB REQUISITION (OUTPATIENT)
Dept: LAB | Facility: CLINIC | Age: 89
End: 2024-03-15
Payer: MEDICARE

## 2024-03-15 DIAGNOSIS — L03.115 CELLULITIS OF RIGHT LOWER LIMB: ICD-10-CM

## 2024-03-15 LAB
ALT SERPL W P-5'-P-CCNC: 19 U/L (ref 0–70)
CREAT SERPL-MCNC: 1.26 MG/DL (ref 0.67–1.17)
CRP SERPL-MCNC: 10.6 MG/L
EGFRCR SERPLBLD CKD-EPI 2021: 54 ML/MIN/1.73M2

## 2024-03-16 LAB
ALT SERPL W P-5'-P-CCNC: 9 U/L (ref 0–70)
BASOPHILS # BLD AUTO: 0.1 10E3/UL (ref 0–0.2)
BASOPHILS NFR BLD AUTO: 1 %
CREAT SERPL-MCNC: 1.22 MG/DL (ref 0.67–1.17)
CRP SERPL-MCNC: 12.3 MG/L
EGFRCR SERPLBLD CKD-EPI 2021: 56 ML/MIN/1.73M2
EOSINOPHIL # BLD AUTO: 0.1 10E3/UL (ref 0–0.7)
EOSINOPHIL NFR BLD AUTO: 1 %
ERYTHROCYTE [DISTWIDTH] IN BLOOD BY AUTOMATED COUNT: 13.4 % (ref 10–15)
HCT VFR BLD AUTO: 31.7 % (ref 40–53)
HGB BLD-MCNC: 10.6 G/DL (ref 13.3–17.7)
IMM GRANULOCYTES # BLD: 0.1 10E3/UL
IMM GRANULOCYTES NFR BLD: 2 %
LYMPHOCYTES # BLD AUTO: 1.3 10E3/UL (ref 0.8–5.3)
LYMPHOCYTES NFR BLD AUTO: 19 %
MCH RBC QN AUTO: 31.7 PG (ref 26.5–33)
MCHC RBC AUTO-ENTMCNC: 33.4 G/DL (ref 31.5–36.5)
MCV RBC AUTO: 95 FL (ref 78–100)
MONOCYTES # BLD AUTO: 0.8 10E3/UL (ref 0–1.3)
MONOCYTES NFR BLD AUTO: 12 %
NEUTROPHILS # BLD AUTO: 4.3 10E3/UL (ref 1.6–8.3)
NEUTROPHILS NFR BLD AUTO: 65 %
NRBC # BLD AUTO: 0 10E3/UL
NRBC BLD AUTO-RTO: 0 /100
PLATELET # BLD AUTO: 331 10E3/UL (ref 150–450)
RBC # BLD AUTO: 3.34 10E6/UL (ref 4.4–5.9)
WBC # BLD AUTO: 6.6 10E3/UL (ref 4–11)

## 2024-03-16 PROCEDURE — 82565 ASSAY OF CREATININE: CPT | Performed by: NURSE PRACTITIONER

## 2024-03-16 PROCEDURE — 85025 COMPLETE CBC W/AUTO DIFF WBC: CPT | Performed by: NURSE PRACTITIONER

## 2024-03-16 PROCEDURE — P9603 ONE-WAY ALLOW PRORATED MILES: HCPCS | Performed by: NURSE PRACTITIONER

## 2024-03-16 PROCEDURE — 86140 C-REACTIVE PROTEIN: CPT | Performed by: NURSE PRACTITIONER

## 2024-03-16 PROCEDURE — 84460 ALANINE AMINO (ALT) (SGPT): CPT | Performed by: NURSE PRACTITIONER

## 2024-03-16 PROCEDURE — 36415 COLL VENOUS BLD VENIPUNCTURE: CPT | Performed by: NURSE PRACTITIONER

## 2024-03-17 ENCOUNTER — LAB REQUISITION (OUTPATIENT)
Dept: LAB | Facility: CLINIC | Age: 89
End: 2024-03-17
Payer: MEDICARE

## 2024-03-17 DIAGNOSIS — L03.115 CELLULITIS OF RIGHT LOWER LIMB: ICD-10-CM

## 2024-03-18 LAB
ALT SERPL W P-5'-P-CCNC: 17 U/L (ref 0–70)
BASOPHILS # BLD AUTO: 0.1 10E3/UL (ref 0–0.2)
BASOPHILS NFR BLD AUTO: 1 %
CRP SERPL-MCNC: 13.4 MG/L
EOSINOPHIL # BLD AUTO: 0.3 10E3/UL (ref 0–0.7)
EOSINOPHIL NFR BLD AUTO: 4 %
ERYTHROCYTE [DISTWIDTH] IN BLOOD BY AUTOMATED COUNT: 13.7 % (ref 10–15)
HCT VFR BLD AUTO: 36.4 % (ref 40–53)
HGB BLD-MCNC: 11.7 G/DL (ref 13.3–17.7)
IMM GRANULOCYTES # BLD: 0.1 10E3/UL
IMM GRANULOCYTES NFR BLD: 1 %
LYMPHOCYTES # BLD AUTO: 1.2 10E3/UL (ref 0.8–5.3)
LYMPHOCYTES NFR BLD AUTO: 16 %
MCH RBC QN AUTO: 31 PG (ref 26.5–33)
MCHC RBC AUTO-ENTMCNC: 32.1 G/DL (ref 31.5–36.5)
MCV RBC AUTO: 96 FL (ref 78–100)
MONOCYTES # BLD AUTO: 0.8 10E3/UL (ref 0–1.3)
MONOCYTES NFR BLD AUTO: 10 %
NEUTROPHILS # BLD AUTO: 5.2 10E3/UL (ref 1.6–8.3)
NEUTROPHILS NFR BLD AUTO: 68 %
NRBC # BLD AUTO: 0 10E3/UL
NRBC BLD AUTO-RTO: 0 /100
PLATELET # BLD AUTO: 366 10E3/UL (ref 150–450)
RBC # BLD AUTO: 3.78 10E6/UL (ref 4.4–5.9)
WBC # BLD AUTO: 7.6 10E3/UL (ref 4–11)

## 2024-03-18 PROCEDURE — 85025 COMPLETE CBC W/AUTO DIFF WBC: CPT | Performed by: NURSE PRACTITIONER

## 2024-03-18 PROCEDURE — 36415 COLL VENOUS BLD VENIPUNCTURE: CPT | Performed by: NURSE PRACTITIONER

## 2024-03-18 PROCEDURE — P9604 ONE-WAY ALLOW PRORATED TRIP: HCPCS | Performed by: NURSE PRACTITIONER

## 2024-03-18 PROCEDURE — 84460 ALANINE AMINO (ALT) (SGPT): CPT | Performed by: NURSE PRACTITIONER

## 2024-03-18 PROCEDURE — 86140 C-REACTIVE PROTEIN: CPT | Performed by: NURSE PRACTITIONER

## 2024-03-18 PROCEDURE — 82565 ASSAY OF CREATININE: CPT | Performed by: NURSE PRACTITIONER

## 2024-03-19 LAB
CREAT SERPL-MCNC: 1.19 MG/DL (ref 0.67–1.17)
EGFRCR SERPLBLD CKD-EPI 2021: 58 ML/MIN/1.73M2

## 2024-03-20 ENCOUNTER — LAB REQUISITION (OUTPATIENT)
Dept: LAB | Facility: CLINIC | Age: 89
End: 2024-03-20
Payer: MEDICARE

## 2024-03-20 DIAGNOSIS — L03.115 CELLULITIS OF RIGHT LOWER LIMB: ICD-10-CM

## 2024-03-21 LAB
BASOPHILS # BLD AUTO: 0.1 10E3/UL (ref 0–0.2)
BASOPHILS NFR BLD AUTO: 1 %
EOSINOPHIL # BLD AUTO: 0.2 10E3/UL (ref 0–0.7)
EOSINOPHIL NFR BLD AUTO: 3 %
ERYTHROCYTE [DISTWIDTH] IN BLOOD BY AUTOMATED COUNT: 13.7 % (ref 10–15)
HCT VFR BLD AUTO: 32.1 % (ref 40–53)
HGB BLD-MCNC: 10.4 G/DL (ref 13.3–17.7)
IMM GRANULOCYTES # BLD: 0.1 10E3/UL
IMM GRANULOCYTES NFR BLD: 1 %
LYMPHOCYTES # BLD AUTO: 1.2 10E3/UL (ref 0.8–5.3)
LYMPHOCYTES NFR BLD AUTO: 18 %
MCH RBC QN AUTO: 30.8 PG (ref 26.5–33)
MCHC RBC AUTO-ENTMCNC: 32.4 G/DL (ref 31.5–36.5)
MCV RBC AUTO: 95 FL (ref 78–100)
MONOCYTES # BLD AUTO: 0.7 10E3/UL (ref 0–1.3)
MONOCYTES NFR BLD AUTO: 11 %
NEUTROPHILS # BLD AUTO: 4.3 10E3/UL (ref 1.6–8.3)
NEUTROPHILS NFR BLD AUTO: 66 %
NRBC # BLD AUTO: 0 10E3/UL
NRBC BLD AUTO-RTO: 0 /100
PLATELET # BLD AUTO: 272 10E3/UL (ref 150–450)
RBC # BLD AUTO: 3.38 10E6/UL (ref 4.4–5.9)
WBC # BLD AUTO: 6.5 10E3/UL (ref 4–11)

## 2024-03-21 PROCEDURE — 36415 COLL VENOUS BLD VENIPUNCTURE: CPT | Performed by: NURSE PRACTITIONER

## 2024-03-21 PROCEDURE — 84460 ALANINE AMINO (ALT) (SGPT): CPT | Performed by: NURSE PRACTITIONER

## 2024-03-21 PROCEDURE — 86140 C-REACTIVE PROTEIN: CPT | Performed by: NURSE PRACTITIONER

## 2024-03-21 PROCEDURE — 85004 AUTOMATED DIFF WBC COUNT: CPT | Performed by: NURSE PRACTITIONER

## 2024-03-21 PROCEDURE — P9604 ONE-WAY ALLOW PRORATED TRIP: HCPCS | Performed by: NURSE PRACTITIONER

## 2024-03-21 PROCEDURE — 82565 ASSAY OF CREATININE: CPT | Performed by: NURSE PRACTITIONER

## 2024-03-22 LAB
ALT SERPL W P-5'-P-CCNC: 16 U/L (ref 0–70)
CREAT SERPL-MCNC: 1.19 MG/DL (ref 0.67–1.17)
CRP SERPL-MCNC: 15.4 MG/L
EGFRCR SERPLBLD CKD-EPI 2021: 58 ML/MIN/1.73M2

## 2024-03-25 ENCOUNTER — LAB REQUISITION (OUTPATIENT)
Dept: LAB | Facility: CLINIC | Age: 89
End: 2024-03-25
Payer: MEDICARE

## 2024-03-25 DIAGNOSIS — L03.115 CELLULITIS OF RIGHT LOWER LIMB: ICD-10-CM

## 2024-03-26 LAB
BASOPHILS # BLD AUTO: 0 10E3/UL (ref 0–0.2)
BASOPHILS NFR BLD AUTO: 1 %
CRP SERPL-MCNC: 10.1 MG/L
EOSINOPHIL # BLD AUTO: 0.3 10E3/UL (ref 0–0.7)
EOSINOPHIL NFR BLD AUTO: 4 %
ERYTHROCYTE [DISTWIDTH] IN BLOOD BY AUTOMATED COUNT: 14 % (ref 10–15)
HCT VFR BLD AUTO: 33.2 % (ref 40–53)
HGB BLD-MCNC: 11.1 G/DL (ref 13.3–17.7)
IMM GRANULOCYTES # BLD: 0.1 10E3/UL
IMM GRANULOCYTES NFR BLD: 1 %
LYMPHOCYTES # BLD AUTO: 1.2 10E3/UL (ref 0.8–5.3)
LYMPHOCYTES NFR BLD AUTO: 21 %
MCH RBC QN AUTO: 32 PG (ref 26.5–33)
MCHC RBC AUTO-ENTMCNC: 33.4 G/DL (ref 31.5–36.5)
MCV RBC AUTO: 96 FL (ref 78–100)
MONOCYTES # BLD AUTO: 0.6 10E3/UL (ref 0–1.3)
MONOCYTES NFR BLD AUTO: 10 %
NEUTROPHILS # BLD AUTO: 3.8 10E3/UL (ref 1.6–8.3)
NEUTROPHILS NFR BLD AUTO: 63 %
NRBC # BLD AUTO: 0 10E3/UL
NRBC BLD AUTO-RTO: 0 /100
PLATELET # BLD AUTO: 202 10E3/UL (ref 150–450)
RBC # BLD AUTO: 3.47 10E6/UL (ref 4.4–5.9)
WBC # BLD AUTO: 6 10E3/UL (ref 4–11)

## 2024-03-26 PROCEDURE — 85004 AUTOMATED DIFF WBC COUNT: CPT | Performed by: NURSE PRACTITIONER

## 2024-03-26 PROCEDURE — P9604 ONE-WAY ALLOW PRORATED TRIP: HCPCS | Performed by: NURSE PRACTITIONER

## 2024-03-26 PROCEDURE — 86140 C-REACTIVE PROTEIN: CPT | Performed by: NURSE PRACTITIONER

## 2024-03-26 PROCEDURE — 36415 COLL VENOUS BLD VENIPUNCTURE: CPT | Performed by: NURSE PRACTITIONER

## 2024-03-27 ENCOUNTER — LAB REQUISITION (OUTPATIENT)
Dept: LAB | Facility: CLINIC | Age: 89
End: 2024-03-27
Payer: MEDICARE

## 2024-03-27 DIAGNOSIS — L03.115 CELLULITIS OF RIGHT LOWER LIMB: ICD-10-CM

## 2024-03-28 LAB
ALT SERPL W P-5'-P-CCNC: 13 U/L (ref 0–70)
BASOPHILS # BLD AUTO: 0 10E3/UL (ref 0–0.2)
BASOPHILS NFR BLD AUTO: 1 %
CREAT SERPL-MCNC: 1.28 MG/DL (ref 0.67–1.17)
CRP SERPL-MCNC: 6.83 MG/L
EGFRCR SERPLBLD CKD-EPI 2021: 53 ML/MIN/1.73M2
EOSINOPHIL # BLD AUTO: 0.2 10E3/UL (ref 0–0.7)
EOSINOPHIL NFR BLD AUTO: 4 %
ERYTHROCYTE [DISTWIDTH] IN BLOOD BY AUTOMATED COUNT: 14.1 % (ref 10–15)
HCT VFR BLD AUTO: 32.1 % (ref 40–53)
HGB BLD-MCNC: 10.5 G/DL (ref 13.3–17.7)
IMM GRANULOCYTES # BLD: 0.1 10E3/UL
IMM GRANULOCYTES NFR BLD: 1 %
LYMPHOCYTES # BLD AUTO: 1.1 10E3/UL (ref 0.8–5.3)
LYMPHOCYTES NFR BLD AUTO: 20 %
MCH RBC QN AUTO: 31.6 PG (ref 26.5–33)
MCHC RBC AUTO-ENTMCNC: 32.7 G/DL (ref 31.5–36.5)
MCV RBC AUTO: 97 FL (ref 78–100)
MONOCYTES # BLD AUTO: 0.7 10E3/UL (ref 0–1.3)
MONOCYTES NFR BLD AUTO: 13 %
NEUTROPHILS # BLD AUTO: 3.5 10E3/UL (ref 1.6–8.3)
NEUTROPHILS NFR BLD AUTO: 61 %
NRBC # BLD AUTO: 0 10E3/UL
NRBC BLD AUTO-RTO: 0 /100
PLATELET # BLD AUTO: 162 10E3/UL (ref 150–450)
RBC # BLD AUTO: 3.32 10E6/UL (ref 4.4–5.9)
WBC # BLD AUTO: 5.6 10E3/UL (ref 4–11)

## 2024-03-28 PROCEDURE — P9604 ONE-WAY ALLOW PRORATED TRIP: HCPCS | Performed by: NURSE PRACTITIONER

## 2024-03-28 PROCEDURE — 36415 COLL VENOUS BLD VENIPUNCTURE: CPT | Performed by: NURSE PRACTITIONER

## 2024-03-28 PROCEDURE — 86140 C-REACTIVE PROTEIN: CPT | Performed by: NURSE PRACTITIONER

## 2024-03-28 PROCEDURE — 85025 COMPLETE CBC W/AUTO DIFF WBC: CPT | Performed by: NURSE PRACTITIONER

## 2024-03-28 PROCEDURE — 82565 ASSAY OF CREATININE: CPT | Performed by: NURSE PRACTITIONER

## 2024-03-28 PROCEDURE — 84460 ALANINE AMINO (ALT) (SGPT): CPT | Performed by: NURSE PRACTITIONER

## 2024-04-03 ENCOUNTER — LAB REQUISITION (OUTPATIENT)
Dept: LAB | Facility: CLINIC | Age: 89
End: 2024-04-03
Payer: MEDICARE

## 2024-04-03 DIAGNOSIS — L03.115 CELLULITIS OF RIGHT LOWER LIMB: ICD-10-CM

## 2024-04-03 DIAGNOSIS — L03.90 CELLULITIS, UNSPECIFIED: ICD-10-CM

## 2024-04-04 LAB
ALT SERPL W P-5'-P-CCNC: 18 U/L (ref 0–70)
BASOPHILS # BLD AUTO: 0 10E3/UL (ref 0–0.2)
BASOPHILS NFR BLD AUTO: 1 %
CREAT SERPL-MCNC: 1.06 MG/DL (ref 0.67–1.17)
CRP SERPL-MCNC: 10.7 MG/L
EGFRCR SERPLBLD CKD-EPI 2021: 67 ML/MIN/1.73M2
EOSINOPHIL # BLD AUTO: 0.2 10E3/UL (ref 0–0.7)
EOSINOPHIL NFR BLD AUTO: 3 %
ERYTHROCYTE [DISTWIDTH] IN BLOOD BY AUTOMATED COUNT: 14.3 % (ref 10–15)
HCT VFR BLD AUTO: 34.9 % (ref 40–53)
HGB BLD-MCNC: 11.4 G/DL (ref 13.3–17.7)
IMM GRANULOCYTES # BLD: 0.1 10E3/UL
IMM GRANULOCYTES NFR BLD: 1 %
LYMPHOCYTES # BLD AUTO: 0.9 10E3/UL (ref 0.8–5.3)
LYMPHOCYTES NFR BLD AUTO: 11 %
MCH RBC QN AUTO: 31.1 PG (ref 26.5–33)
MCHC RBC AUTO-ENTMCNC: 32.7 G/DL (ref 31.5–36.5)
MCV RBC AUTO: 95 FL (ref 78–100)
MONOCYTES # BLD AUTO: 0.6 10E3/UL (ref 0–1.3)
MONOCYTES NFR BLD AUTO: 8 %
NEUTROPHILS # BLD AUTO: 5.9 10E3/UL (ref 1.6–8.3)
NEUTROPHILS NFR BLD AUTO: 76 %
NRBC # BLD AUTO: 0 10E3/UL
NRBC BLD AUTO-RTO: 0 /100
PLATELET # BLD AUTO: 186 10E3/UL (ref 150–450)
RBC # BLD AUTO: 3.67 10E6/UL (ref 4.4–5.9)
WBC # BLD AUTO: 7.6 10E3/UL (ref 4–11)

## 2024-04-04 PROCEDURE — 82565 ASSAY OF CREATININE: CPT | Performed by: NURSE PRACTITIONER

## 2024-04-04 PROCEDURE — 36415 COLL VENOUS BLD VENIPUNCTURE: CPT | Performed by: NURSE PRACTITIONER

## 2024-04-04 PROCEDURE — P9604 ONE-WAY ALLOW PRORATED TRIP: HCPCS | Performed by: NURSE PRACTITIONER

## 2024-04-04 PROCEDURE — 84460 ALANINE AMINO (ALT) (SGPT): CPT | Performed by: NURSE PRACTITIONER

## 2024-04-04 PROCEDURE — 85025 COMPLETE CBC W/AUTO DIFF WBC: CPT | Performed by: INTERNAL MEDICINE

## 2024-04-04 PROCEDURE — 86140 C-REACTIVE PROTEIN: CPT | Performed by: NURSE PRACTITIONER

## 2024-04-10 ENCOUNTER — LAB REQUISITION (OUTPATIENT)
Dept: LAB | Facility: CLINIC | Age: 89
End: 2024-04-10
Payer: MEDICARE

## 2024-04-10 DIAGNOSIS — L03.90 CELLULITIS, UNSPECIFIED: ICD-10-CM

## 2024-04-10 DIAGNOSIS — L03.115 CELLULITIS OF RIGHT LOWER LIMB: ICD-10-CM

## 2024-04-11 LAB
ALT SERPL W P-5'-P-CCNC: 15 U/L (ref 0–70)
BASOPHILS # BLD AUTO: 0 10E3/UL (ref 0–0.2)
BASOPHILS NFR BLD AUTO: 1 %
CREAT SERPL-MCNC: 1.15 MG/DL (ref 0.67–1.17)
CRP SERPL-MCNC: 7.32 MG/L
EGFRCR SERPLBLD CKD-EPI 2021: 60 ML/MIN/1.73M2
EOSINOPHIL # BLD AUTO: 0.2 10E3/UL (ref 0–0.7)
EOSINOPHIL NFR BLD AUTO: 3 %
ERYTHROCYTE [DISTWIDTH] IN BLOOD BY AUTOMATED COUNT: 14.1 % (ref 10–15)
HCT VFR BLD AUTO: 36.5 % (ref 40–53)
HGB BLD-MCNC: 12.1 G/DL (ref 13.3–17.7)
IMM GRANULOCYTES # BLD: 0.1 10E3/UL
IMM GRANULOCYTES NFR BLD: 1 %
LYMPHOCYTES # BLD AUTO: 1.1 10E3/UL (ref 0.8–5.3)
LYMPHOCYTES NFR BLD AUTO: 14 %
MCH RBC QN AUTO: 31.8 PG (ref 26.5–33)
MCHC RBC AUTO-ENTMCNC: 33.2 G/DL (ref 31.5–36.5)
MCV RBC AUTO: 96 FL (ref 78–100)
MONOCYTES # BLD AUTO: 0.6 10E3/UL (ref 0–1.3)
MONOCYTES NFR BLD AUTO: 8 %
NEUTROPHILS # BLD AUTO: 6.2 10E3/UL (ref 1.6–8.3)
NEUTROPHILS NFR BLD AUTO: 73 %
NRBC # BLD AUTO: 0 10E3/UL
NRBC BLD AUTO-RTO: 0 /100
PLATELET # BLD AUTO: 232 10E3/UL (ref 150–450)
RBC # BLD AUTO: 3.8 10E6/UL (ref 4.4–5.9)
WBC # BLD AUTO: 8.3 10E3/UL (ref 4–11)

## 2024-04-11 PROCEDURE — 84460 ALANINE AMINO (ALT) (SGPT): CPT | Mod: ORL | Performed by: INTERNAL MEDICINE

## 2024-04-11 PROCEDURE — 85025 COMPLETE CBC W/AUTO DIFF WBC: CPT | Mod: ORL | Performed by: INTERNAL MEDICINE

## 2024-04-11 PROCEDURE — 82565 ASSAY OF CREATININE: CPT | Mod: ORL | Performed by: INTERNAL MEDICINE

## 2024-04-11 PROCEDURE — P9604 ONE-WAY ALLOW PRORATED TRIP: HCPCS | Mod: ORL | Performed by: INTERNAL MEDICINE

## 2024-04-11 PROCEDURE — 36415 COLL VENOUS BLD VENIPUNCTURE: CPT | Mod: ORL | Performed by: INTERNAL MEDICINE

## 2024-04-11 PROCEDURE — 86140 C-REACTIVE PROTEIN: CPT | Mod: ORL | Performed by: INTERNAL MEDICINE

## 2024-04-16 ENCOUNTER — LAB REQUISITION (OUTPATIENT)
Dept: LAB | Facility: CLINIC | Age: 89
End: 2024-04-16
Payer: MEDICARE

## 2024-04-16 DIAGNOSIS — I50.22 CHRONIC SYSTOLIC (CONGESTIVE) HEART FAILURE (H): ICD-10-CM

## 2024-04-17 PROCEDURE — 83880 ASSAY OF NATRIURETIC PEPTIDE: CPT | Mod: ORL | Performed by: NURSE PRACTITIONER

## 2024-04-17 PROCEDURE — P9604 ONE-WAY ALLOW PRORATED TRIP: HCPCS | Mod: ORL | Performed by: NURSE PRACTITIONER

## 2024-04-17 PROCEDURE — 80048 BASIC METABOLIC PNL TOTAL CA: CPT | Mod: ORL | Performed by: NURSE PRACTITIONER

## 2024-04-17 PROCEDURE — 36415 COLL VENOUS BLD VENIPUNCTURE: CPT | Mod: ORL | Performed by: NURSE PRACTITIONER

## 2024-04-18 LAB
ANION GAP SERPL CALCULATED.3IONS-SCNC: 11 MMOL/L (ref 7–15)
BUN SERPL-MCNC: 24.8 MG/DL (ref 8–23)
CALCIUM SERPL-MCNC: 9 MG/DL (ref 8.2–9.6)
CHLORIDE SERPL-SCNC: 98 MMOL/L (ref 98–107)
CREAT SERPL-MCNC: 1.17 MG/DL (ref 0.67–1.17)
DEPRECATED HCO3 PLAS-SCNC: 28 MMOL/L (ref 22–29)
EGFRCR SERPLBLD CKD-EPI 2021: 59 ML/MIN/1.73M2
GLUCOSE SERPL-MCNC: 106 MG/DL (ref 70–99)
NT-PROBNP SERPL-MCNC: 1374 PG/ML (ref 0–1800)
POTASSIUM SERPL-SCNC: 4.9 MMOL/L (ref 3.4–5.3)
SODIUM SERPL-SCNC: 137 MMOL/L (ref 135–145)

## 2024-06-10 ENCOUNTER — LAB REQUISITION (OUTPATIENT)
Dept: LAB | Facility: CLINIC | Age: 89
End: 2024-06-10
Payer: MEDICARE

## 2024-06-10 DIAGNOSIS — D50.0 IRON DEFICIENCY ANEMIA SECONDARY TO BLOOD LOSS (CHRONIC): ICD-10-CM

## 2024-06-10 DIAGNOSIS — K81.1 CHRONIC CHOLECYSTITIS: ICD-10-CM

## 2024-06-10 DIAGNOSIS — E11.9 TYPE 2 DIABETES MELLITUS WITHOUT COMPLICATIONS (H): ICD-10-CM

## 2024-06-11 LAB
ANION GAP SERPL CALCULATED.3IONS-SCNC: 12 MMOL/L (ref 7–15)
BUN SERPL-MCNC: 23.4 MG/DL (ref 8–23)
CALCIUM SERPL-MCNC: 9.6 MG/DL (ref 8.2–9.6)
CHLORIDE SERPL-SCNC: 98 MMOL/L (ref 98–107)
CREAT SERPL-MCNC: 1.09 MG/DL (ref 0.67–1.17)
DEPRECATED HCO3 PLAS-SCNC: 28 MMOL/L (ref 22–29)
EGFRCR SERPLBLD CKD-EPI 2021: 64 ML/MIN/1.73M2
GLUCOSE SERPL-MCNC: 116 MG/DL (ref 70–99)
HBA1C MFR BLD: 7.6 %
HGB BLD-MCNC: 13.4 G/DL (ref 13.3–17.7)
POTASSIUM SERPL-SCNC: 4.1 MMOL/L (ref 3.4–5.3)
SODIUM SERPL-SCNC: 138 MMOL/L (ref 135–145)

## 2024-06-11 PROCEDURE — 36415 COLL VENOUS BLD VENIPUNCTURE: CPT | Mod: ORL | Performed by: INTERNAL MEDICINE

## 2024-06-11 PROCEDURE — 83036 HEMOGLOBIN GLYCOSYLATED A1C: CPT | Mod: ORL | Performed by: INTERNAL MEDICINE

## 2024-06-11 PROCEDURE — 80048 BASIC METABOLIC PNL TOTAL CA: CPT | Mod: ORL | Performed by: INTERNAL MEDICINE

## 2024-06-11 PROCEDURE — P9604 ONE-WAY ALLOW PRORATED TRIP: HCPCS | Mod: ORL | Performed by: INTERNAL MEDICINE

## 2024-06-11 PROCEDURE — 85018 HEMOGLOBIN: CPT | Mod: ORL | Performed by: INTERNAL MEDICINE

## 2024-08-10 ENCOUNTER — LAB REQUISITION (OUTPATIENT)
Dept: LAB | Facility: CLINIC | Age: 89
End: 2024-08-10
Payer: MEDICARE

## 2024-08-10 DIAGNOSIS — R53.1 WEAKNESS: ICD-10-CM

## 2024-08-12 LAB
ANION GAP SERPL CALCULATED.3IONS-SCNC: 11 MMOL/L (ref 7–15)
BUN SERPL-MCNC: 44.8 MG/DL (ref 8–23)
CALCIUM SERPL-MCNC: 9.2 MG/DL (ref 8.8–10.4)
CHLORIDE SERPL-SCNC: 101 MMOL/L (ref 98–107)
CREAT SERPL-MCNC: 1.76 MG/DL (ref 0.67–1.17)
EGFRCR SERPLBLD CKD-EPI 2021: 36 ML/MIN/1.73M2
ERYTHROCYTE [DISTWIDTH] IN BLOOD BY AUTOMATED COUNT: 13.6 % (ref 10–15)
GLUCOSE SERPL-MCNC: 201 MG/DL (ref 70–99)
HCO3 SERPL-SCNC: 26 MMOL/L (ref 22–29)
HCT VFR BLD AUTO: 34.1 % (ref 40–53)
HGB BLD-MCNC: 10.8 G/DL (ref 13.3–17.7)
MCH RBC QN AUTO: 30.9 PG (ref 26.5–33)
MCHC RBC AUTO-ENTMCNC: 31.7 G/DL (ref 31.5–36.5)
MCV RBC AUTO: 97 FL (ref 78–100)
PLATELET # BLD AUTO: 194 10E3/UL (ref 150–450)
POTASSIUM SERPL-SCNC: 4.5 MMOL/L (ref 3.4–5.3)
RBC # BLD AUTO: 3.5 10E6/UL (ref 4.4–5.9)
SODIUM SERPL-SCNC: 138 MMOL/L (ref 135–145)
WBC # BLD AUTO: 7.1 10E3/UL (ref 4–11)

## 2024-08-12 PROCEDURE — P9604 ONE-WAY ALLOW PRORATED TRIP: HCPCS | Mod: ORL | Performed by: INTERNAL MEDICINE

## 2024-08-12 PROCEDURE — 85027 COMPLETE CBC AUTOMATED: CPT | Mod: ORL | Performed by: INTERNAL MEDICINE

## 2024-08-12 PROCEDURE — 36415 COLL VENOUS BLD VENIPUNCTURE: CPT | Mod: ORL | Performed by: INTERNAL MEDICINE

## 2024-08-12 PROCEDURE — 80048 BASIC METABOLIC PNL TOTAL CA: CPT | Mod: ORL | Performed by: INTERNAL MEDICINE

## (undated) DEVICE — GLOVE PROTEXIS POWDER FREE 8.5 ORTHOPEDIC 2D73ET85

## (undated) DEVICE — LINEN TOWEL PACK X5 5464

## (undated) DEVICE — GLOVE PROTEXIS POWDER FREE 7.5 ORTHOPEDIC 2D73ET75

## (undated) DEVICE — SUCTION MANIFOLD NEPTUNE SGL

## (undated) DEVICE — MANIFOLD NEPTUNE 4 PORT 700-20

## (undated) DEVICE — BONE CLEANING TIP INTERPULSE  0210-010-000

## (undated) DEVICE — BONE CLEANING TIP INTERPULSE FEMORAL CANAL 0210-008-000

## (undated) DEVICE — ESU GROUND PAD UNIVERSAL W/O CORD

## (undated) DEVICE — SU FIBERWIRE 5 CCS-1 BLUE  AR-7211

## (undated) DEVICE — GLOVE PROTEXIS POWDER FREE 8.0 ORTHOPEDIC 2D73ET80

## (undated) DEVICE — SU ETHIBOND 0 CTX CR  8X18" CX31D

## (undated) DEVICE — PACK HIP NAILING SOP15HNSB

## (undated) DEVICE — IMM PILLOW ABDUCT HIP MED 31143061

## (undated) DEVICE — SU VICRYL 1 CTX CR 8X18" J765D

## (undated) DEVICE — SOL WATER IRRIG 1000ML BOTTLE 2F7114

## (undated) DEVICE — SU WND CLOSURE VLOC 180 ABS 0 24" GS-25 VLOCL0436

## (undated) DEVICE — NDL 18GA 1.5" 305196

## (undated) DEVICE — Device

## (undated) DEVICE — ESU ELEC BLADE 6" COATED E1450-6

## (undated) DEVICE — DRSG KERLIX FLUFFS X5

## (undated) DEVICE — GLOVE PROTEXIS W/NEU-THERA 8.0  2D73TE80

## (undated) DEVICE — SYR 20ML LL W/O NDL 302830

## (undated) DEVICE — DRILL BIT QUICK COUPLING 3 FLUTE 4.2MMX330/100MM CALIBRATE

## (undated) DEVICE — PACK TOTAL HIP W/U DRAPE SOP15HUFSC

## (undated) DEVICE — DRAIN ROUND W/RESERV KIT JACKSON PRATT 10FR 400ML SU130-402D

## (undated) DEVICE — SU VICRYL 2-0 CT-1 27" J339H

## (undated) DEVICE — CATH FOLEY COUDE 16FR 5ML SILICONE 0170SI16

## (undated) DEVICE — SPONGE BALL KERLIX ROUND XL W/O STRING LATEX 4935

## (undated) DEVICE — SU MONOCRYL 3-0 PS-2 27" Y427H

## (undated) DEVICE — DRSG XEROFORM 1X8"

## (undated) DEVICE — SOL NACL 0.9% IRRIG 1000ML BOTTLE 2F7124

## (undated) DEVICE — DRSG ADAPTIC 3X8" 6113

## (undated) DEVICE — SU VICRYL 2-0 CP-1 27" UND J266H

## (undated) DEVICE — NDL 22GA 1.5"

## (undated) DEVICE — GLOVE PROTEXIS W/NEU-THERA 8.5  2D73TE85

## (undated) DEVICE — GLOVE PROTEXIS BLUE W/NEU-THERA 8.5  2D73EB85

## (undated) DEVICE — KIT PATIENT CARE HANA TABLE PROFX SUPINE 6855

## (undated) DEVICE — SUCTION IRR SYSTEM W/O TIP INTERPULSE HANDPIECE 0210-100-000

## (undated) DEVICE — PREP CHLORAPREP 26ML TINTED ORANGE  260815

## (undated) DEVICE — WIRE GUIDE 3.2X400MM  357.399

## (undated) DEVICE — SOL NACL 0.9% INJ 1000ML BAG 2B1324X

## (undated) DEVICE — CAST PADDING 4" STERILE 9044S

## (undated) DEVICE — CAST PADDING 3" UNSTERILE 9043

## (undated) DEVICE — GLOVE PROTEXIS BLUE W/NEU-THERA 7.5  2D73EB75

## (undated) DEVICE — PACK HAND WRIST SOP15HWFSP

## (undated) DEVICE — SU NDL MAYO LG 216701

## (undated) DEVICE — DRSG TEGADERM 6X8" 1628

## (undated) DEVICE — SU STRATAFIX PDS PLUS 0 CT 45CM SXPP1A406

## (undated) DEVICE — PREP CHLORAPREP 26ML TINTED HI-LITE ORANGE 930815

## (undated) DEVICE — BNDG ELASTIC 4"X5YDS UNSTERILE 6611-40

## (undated) DEVICE — GLOVE PROTEXIS BLUE W/NEU-THERA 8.0  2D73EB80

## (undated) DEVICE — DRAPE C-ARM 60X42" 1013

## (undated) DEVICE — SU PROLENE 0 CT 30" 8434H

## (undated) DEVICE — STPL SKIN 35W ROTATING HEAD PRW35

## (undated) DEVICE — PACK SET-UP STD 9102

## (undated) DEVICE — DRAPE STERI TOWEL LG 1010

## (undated) DEVICE — SU STRATAFIX PDS PLUS 2-0 SPIRAL CT-1 30CM SXPP1B410

## (undated) DEVICE — SPONGE LAP 18X18" X8435

## (undated) DEVICE — WIPES FOLEY CARE SURESTEP PROVON DFC100

## (undated) DEVICE — BLADE CLIPPER 4412A

## (undated) DEVICE — GOWN IMPERVIOUS SPECIALTY XL/XLONG 39049

## (undated) DEVICE — CAST PLASTER SPLINT 3X15" 7393

## (undated) DEVICE — DRILL BIL CANNULATED 6MM/9MM 03.037.022

## (undated) DEVICE — SU ETHILON 4-0 PC-3 18" 1864G

## (undated) DEVICE — SU ETHIBOND 5 V-37 4X30" MB66G

## (undated) DEVICE — SU VICRYL 0 CT-1 27" J340H

## (undated) DEVICE — DRAPE IOBAN INCISE 23X17" 6650EZ

## (undated) RX ORDER — FENTANYL CITRATE 50 UG/ML
INJECTION, SOLUTION INTRAMUSCULAR; INTRAVENOUS
Status: DISPENSED
Start: 2022-07-11

## (undated) RX ORDER — PROPOFOL 10 MG/ML
INJECTION, EMULSION INTRAVENOUS
Status: DISPENSED
Start: 2019-04-25

## (undated) RX ORDER — BUPIVACAINE HYDROCHLORIDE AND EPINEPHRINE 2.5; 5 MG/ML; UG/ML
INJECTION, SOLUTION EPIDURAL; INFILTRATION; INTRACAUDAL; PERINEURAL
Status: DISPENSED
Start: 2020-05-28

## (undated) RX ORDER — DEXAMETHASONE SODIUM PHOSPHATE 4 MG/ML
INJECTION, SOLUTION INTRA-ARTICULAR; INTRALESIONAL; INTRAMUSCULAR; INTRAVENOUS; SOFT TISSUE
Status: DISPENSED
Start: 2020-05-13

## (undated) RX ORDER — DEXAMETHASONE SODIUM PHOSPHATE 4 MG/ML
INJECTION, SOLUTION INTRA-ARTICULAR; INTRALESIONAL; INTRAMUSCULAR; INTRAVENOUS; SOFT TISSUE
Status: DISPENSED
Start: 2020-05-28

## (undated) RX ORDER — BUPIVACAINE HYDROCHLORIDE 2.5 MG/ML
INJECTION, SOLUTION EPIDURAL; INFILTRATION; INTRACAUDAL
Status: DISPENSED
Start: 2017-12-28

## (undated) RX ORDER — FENTANYL CITRATE 50 UG/ML
INJECTION, SOLUTION INTRAMUSCULAR; INTRAVENOUS
Status: DISPENSED
Start: 2020-05-13

## (undated) RX ORDER — PROPOFOL 10 MG/ML
INJECTION, EMULSION INTRAVENOUS
Status: DISPENSED
Start: 2020-05-13

## (undated) RX ORDER — CEFAZOLIN SODIUM 2 G/100ML
INJECTION, SOLUTION INTRAVENOUS
Status: DISPENSED
Start: 2019-04-25

## (undated) RX ORDER — BUPIVACAINE HYDROCHLORIDE 2.5 MG/ML
INJECTION, SOLUTION EPIDURAL; INFILTRATION; INTRACAUDAL
Status: DISPENSED
Start: 2020-05-13

## (undated) RX ORDER — VANCOMYCIN HYDROCHLORIDE 1 G/20ML
INJECTION, POWDER, LYOPHILIZED, FOR SOLUTION INTRAVENOUS
Status: DISPENSED
Start: 2020-05-28

## (undated) RX ORDER — LIDOCAINE HYDROCHLORIDE 20 MG/ML
INJECTION, SOLUTION EPIDURAL; INFILTRATION; INTRACAUDAL; PERINEURAL
Status: DISPENSED
Start: 2022-07-11

## (undated) RX ORDER — ONDANSETRON 2 MG/ML
INJECTION INTRAMUSCULAR; INTRAVENOUS
Status: DISPENSED
Start: 2020-05-13

## (undated) RX ORDER — PROPOFOL 10 MG/ML
INJECTION, EMULSION INTRAVENOUS
Status: DISPENSED
Start: 2022-07-11

## (undated) RX ORDER — ONDANSETRON 2 MG/ML
INJECTION INTRAMUSCULAR; INTRAVENOUS
Status: DISPENSED
Start: 2020-05-28

## (undated) RX ORDER — HYDROMORPHONE HYDROCHLORIDE 1 MG/ML
INJECTION, SOLUTION INTRAMUSCULAR; INTRAVENOUS; SUBCUTANEOUS
Status: DISPENSED
Start: 2020-05-13

## (undated) RX ORDER — HYDROMORPHONE HYDROCHLORIDE 1 MG/ML
INJECTION, SOLUTION INTRAMUSCULAR; INTRAVENOUS; SUBCUTANEOUS
Status: DISPENSED
Start: 2020-05-28

## (undated) RX ORDER — CEFAZOLIN SODIUM 1 G/3ML
INJECTION, POWDER, FOR SOLUTION INTRAMUSCULAR; INTRAVENOUS
Status: DISPENSED
Start: 2017-12-28

## (undated) RX ORDER — BUPIVACAINE HYDROCHLORIDE 2.5 MG/ML
INJECTION, SOLUTION EPIDURAL; INFILTRATION; INTRACAUDAL
Status: DISPENSED
Start: 2020-05-28

## (undated) RX ORDER — ALBUMIN, HUMAN INJ 5% 5 %
SOLUTION INTRAVENOUS
Status: DISPENSED
Start: 2020-05-13

## (undated) RX ORDER — LIDOCAINE HYDROCHLORIDE 20 MG/ML
INJECTION, SOLUTION EPIDURAL; INFILTRATION; INTRACAUDAL; PERINEURAL
Status: DISPENSED
Start: 2020-05-13

## (undated) RX ORDER — BUPIVACAINE HYDROCHLORIDE 5 MG/ML
INJECTION, SOLUTION EPIDURAL; INTRACAUDAL
Status: DISPENSED
Start: 2019-04-25

## (undated) RX ORDER — ONDANSETRON 2 MG/ML
INJECTION INTRAMUSCULAR; INTRAVENOUS
Status: DISPENSED
Start: 2022-07-11

## (undated) RX ORDER — CEFAZOLIN SODIUM 2 G/100ML
INJECTION, SOLUTION INTRAVENOUS
Status: DISPENSED
Start: 2020-05-13

## (undated) RX ORDER — PROPOFOL 10 MG/ML
INJECTION, EMULSION INTRAVENOUS
Status: DISPENSED
Start: 2020-05-28

## (undated) RX ORDER — LIDOCAINE HYDROCHLORIDE 20 MG/ML
INJECTION, SOLUTION EPIDURAL; INFILTRATION; INTRACAUDAL; PERINEURAL
Status: DISPENSED
Start: 2019-04-25

## (undated) RX ORDER — FENTANYL CITRATE 50 UG/ML
INJECTION, SOLUTION INTRAMUSCULAR; INTRAVENOUS
Status: DISPENSED
Start: 2020-05-28

## (undated) RX ORDER — ONDANSETRON 2 MG/ML
INJECTION INTRAMUSCULAR; INTRAVENOUS
Status: DISPENSED
Start: 2019-04-25

## (undated) RX ORDER — VECURONIUM BROMIDE 1 MG/ML
INJECTION, POWDER, LYOPHILIZED, FOR SOLUTION INTRAVENOUS
Status: DISPENSED
Start: 2017-12-28

## (undated) RX ORDER — GLYCOPYRROLATE 0.2 MG/ML
INJECTION, SOLUTION INTRAMUSCULAR; INTRAVENOUS
Status: DISPENSED
Start: 2020-05-13

## (undated) RX ORDER — VANCOMYCIN HYDROCHLORIDE 1 G/20ML
INJECTION, POWDER, LYOPHILIZED, FOR SOLUTION INTRAVENOUS
Status: DISPENSED
Start: 2020-05-13

## (undated) RX ORDER — CEFAZOLIN SODIUM 2 G/100ML
INJECTION, SOLUTION INTRAVENOUS
Status: DISPENSED
Start: 2020-05-28

## (undated) RX ORDER — NEOSTIGMINE METHYLSULFATE 1 MG/ML
VIAL (ML) INJECTION
Status: DISPENSED
Start: 2020-05-13

## (undated) RX ORDER — EPINEPHRINE 1 MG/ML
INJECTION, SOLUTION INTRAMUSCULAR; SUBCUTANEOUS
Status: DISPENSED
Start: 2017-12-28

## (undated) RX ORDER — FENTANYL CITRATE 50 UG/ML
INJECTION, SOLUTION INTRAMUSCULAR; INTRAVENOUS
Status: DISPENSED
Start: 2017-12-28

## (undated) RX ORDER — PROPOFOL 10 MG/ML
INJECTION, EMULSION INTRAVENOUS
Status: DISPENSED
Start: 2017-12-28

## (undated) RX ORDER — LIDOCAINE HYDROCHLORIDE 5 MG/ML
INJECTION, SOLUTION INFILTRATION; INTRAVENOUS
Status: DISPENSED
Start: 2019-04-25

## (undated) RX ORDER — LIDOCAINE HYDROCHLORIDE 20 MG/ML
INJECTION, SOLUTION EPIDURAL; INFILTRATION; INTRACAUDAL; PERINEURAL
Status: DISPENSED
Start: 2017-12-28

## (undated) RX ORDER — CEFAZOLIN SODIUM 1 G/3ML
INJECTION, POWDER, FOR SOLUTION INTRAMUSCULAR; INTRAVENOUS
Status: DISPENSED
Start: 2020-05-13

## (undated) RX ORDER — DEXAMETHASONE SODIUM PHOSPHATE 4 MG/ML
INJECTION, SOLUTION INTRA-ARTICULAR; INTRALESIONAL; INTRAMUSCULAR; INTRAVENOUS; SOFT TISSUE
Status: DISPENSED
Start: 2022-07-11

## (undated) RX ORDER — CEFAZOLIN SODIUM 2 G/100ML
INJECTION, SOLUTION INTRAVENOUS
Status: DISPENSED
Start: 2017-12-28

## (undated) RX ORDER — FENTANYL CITRATE 50 UG/ML
INJECTION, SOLUTION INTRAMUSCULAR; INTRAVENOUS
Status: DISPENSED
Start: 2019-04-25

## (undated) RX ORDER — HYDROMORPHONE HYDROCHLORIDE 1 MG/ML
INJECTION, SOLUTION INTRAMUSCULAR; INTRAVENOUS; SUBCUTANEOUS
Status: DISPENSED
Start: 2017-12-28

## (undated) RX ORDER — BUPIVACAINE HYDROCHLORIDE AND EPINEPHRINE 2.5; 5 MG/ML; UG/ML
INJECTION, SOLUTION EPIDURAL; INFILTRATION; INTRACAUDAL; PERINEURAL
Status: DISPENSED
Start: 2020-05-13

## (undated) RX ORDER — LIDOCAINE HYDROCHLORIDE 20 MG/ML
INJECTION, SOLUTION EPIDURAL; INFILTRATION; INTRACAUDAL; PERINEURAL
Status: DISPENSED
Start: 2020-05-28